# Patient Record
Sex: FEMALE | Race: WHITE | NOT HISPANIC OR LATINO | Employment: OTHER | URBAN - METROPOLITAN AREA
[De-identification: names, ages, dates, MRNs, and addresses within clinical notes are randomized per-mention and may not be internally consistent; named-entity substitution may affect disease eponyms.]

---

## 2017-02-20 RX ORDER — MESALAMINE 1.2 G/1
TABLET, DELAYED RELEASE ORAL
Qty: 60 TABLET | Refills: 0 | Status: SHIPPED | OUTPATIENT
Start: 2017-02-20 | End: 2017-04-10 | Stop reason: SDUPTHER

## 2017-03-06 ENCOUNTER — OFFICE VISIT (OUTPATIENT)
Dept: GASTROENTEROLOGY | Facility: CLINIC | Age: 77
End: 2017-03-06

## 2017-03-06 VITALS
BODY MASS INDEX: 23.55 KG/M2 | HEIGHT: 62 IN | WEIGHT: 128 LBS | SYSTOLIC BLOOD PRESSURE: 126 MMHG | DIASTOLIC BLOOD PRESSURE: 74 MMHG

## 2017-03-06 DIAGNOSIS — K21.9 GASTROESOPHAGEAL REFLUX DISEASE, ESOPHAGITIS PRESENCE NOT SPECIFIED: Primary | ICD-10-CM

## 2017-03-06 DIAGNOSIS — K63.5 COLON POLYPS: ICD-10-CM

## 2017-03-06 DIAGNOSIS — K51.90 ULCERATIVE COLITIS WITHOUT COMPLICATIONS, UNSPECIFIED LOCATION (HCC): ICD-10-CM

## 2017-03-06 PROCEDURE — 99213 OFFICE O/P EST LOW 20 MIN: CPT | Performed by: INTERNAL MEDICINE

## 2017-03-06 RX ORDER — OMEPRAZOLE 20 MG/1
20 CAPSULE, DELAYED RELEASE ORAL AS NEEDED
Refills: 0 | COMMUNITY
Start: 2017-02-20 | End: 2019-04-08 | Stop reason: ALTCHOICE

## 2017-03-06 NOTE — PROGRESS NOTES
Chief Complaint   Patient presents with   • Crohn's Disease       History of Present Illness: She is on Lialda 2/day, and is on imodium 1/day prn.  She also takes colestipol 1 BID. Yesterday she had 2 BM. She will have 1-3 BM/day. No rectal bleeding or melena. No nausea or vomiting. No abdominal or chest pain.  Weight stable.     Past Medical History   Diagnosis Date   • Anemia    • Arthritis    • Crohn disease    • GERD (gastroesophageal reflux disease)    • History of transfusion    • Hyperlipidemia    • Hypertension        Past Surgical History   Procedure Laterality Date   • Colectomy partial / total     • Cholecystectomy     • Appendectomy     • Splenectomy     • Endoscopy  08/20/2015     erythematous mucosa in stomach, chronic gastritis,    • Colonoscopy  08/20/2015     s/p right hemicolectomy, recurrent Crohns, IH         Current Outpatient Prescriptions:   •  colestipol (COLESTID) 1 G tablet, Take 1 g by mouth 2 (two) times a day., Disp: , Rfl:   •  diazepam (VALIUM) 5 MG tablet, Take 5 mg by mouth 2 (two) times a day as needed for anxiety., Disp: , Rfl:   •  LIALDA 1.2 G EC tablet, take 2 tablets by mouth every morning, Disp: 60 tablet, Rfl: 0  •  metoprolol tartrate (LOPRESSOR) 50 MG tablet, take 1 tablet by mouth twice a day, Disp: , Rfl: 0  •  Multiple Vitamin (MULTI VITAMIN DAILY) tablet, Take  by mouth., Disp: , Rfl:   •  omeprazole (priLOSEC) 20 MG capsule, , Disp: , Rfl: 0  •  potassium chloride (MICRO-K) 10 MEQ CR capsule, take 1 capsule by mouth once daily, Disp: , Rfl: 0  •  pravastatin (PRAVACHOL) 20 MG tablet, Take 20 mg by mouth daily., Disp: , Rfl:   •  vitamin B-12 (CYANOCOBALAMIN) 100 MCG tablet, Take 50 mcg by mouth daily., Disp: , Rfl:   •  ferrous sulfate 325 (65 FE) MG tablet, , Disp: , Rfl: 0    Allergies   Allergen Reactions   • Amitriptyline    • Mysoline [Primidone]        History reviewed. No pertinent family history.    Social History     Social History   • Marital status: Single      Spouse name: N/A   • Number of children: N/A   • Years of education: N/A     Occupational History   • Not on file.     Social History Main Topics   • Smoking status: Never Smoker   • Smokeless tobacco: Not on file   • Alcohol use No   • Drug use: No   • Sexual activity: Defer     Other Topics Concern   • Not on file     Social History Narrative       Review of Systems   All other systems reviewed and are negative.      Vitals:    03/06/17 0930   BP: 126/74       Physical Exam   Constitutional: She is oriented to person, place, and time. She appears well-developed and well-nourished. No distress.   HENT:   Head: Normocephalic and atraumatic. Hair is normal.   Right Ear: Hearing, tympanic membrane, external ear and ear canal normal. No drainage. No decreased hearing is noted.   Left Ear: Hearing, tympanic membrane, external ear and ear canal normal. No decreased hearing is noted.   Nose: No nasal deformity.   Mouth/Throat: Oropharynx is clear and moist.   Eyes: Conjunctivae, EOM and lids are normal. Pupils are equal, round, and reactive to light. Right eye exhibits no discharge. Left eye exhibits no discharge.   Neck: Normal range of motion. Neck supple. No JVD present. No tracheal deviation present. No thyromegaly present.   Cardiovascular: Normal rate, regular rhythm, normal heart sounds, intact distal pulses and normal pulses.  Exam reveals no gallop and no friction rub.    No murmur heard.  Pulmonary/Chest: Effort normal and breath sounds normal. No respiratory distress. She has no wheezes. She has no rales. She exhibits no tenderness.   Abdominal: Soft. Bowel sounds are normal. She exhibits no distension and no mass. There is no tenderness. There is no rebound and no guarding. No hernia.   Genitourinary:   Genitourinary Comments: She refused a rectal exam.   Musculoskeletal: Normal range of motion. She exhibits no edema, tenderness or deformity.   Lymphadenopathy:     She has no cervical adenopathy.    Neurological: She is alert and oriented to person, place, and time. She has normal reflexes. She displays normal reflexes. No cranial nerve deficit. She exhibits normal muscle tone. Coordination normal.   Skin: Skin is warm and dry. No rash noted. She is not diaphoretic. No erythema.   Psychiatric: She has a normal mood and affect. Her behavior is normal. Judgment and thought content normal.   Vitals reviewed.      Michela was seen today for crohn's disease.    Diagnoses and all orders for this visit:    Gastroesophageal reflux disease, esophagitis presence not specified    Ulcerative colitis without complications, unspecified location    Colon polyps         Assessment:  1) h/o crohn's disease of the TI with resection of the TI in 2014  2) h/o colon polyps  3) GERD    Recommendations:  1) Continue Lialda 2/day.  2) f/u 1 yr.      No Follow-up on file.

## 2017-04-10 RX ORDER — MESALAMINE 1.2 G/1
TABLET, DELAYED RELEASE ORAL
Qty: 60 TABLET | Refills: 11 | Status: SHIPPED | OUTPATIENT
Start: 2017-04-10 | End: 2018-05-15 | Stop reason: SDUPTHER

## 2017-09-11 ENCOUNTER — TELEPHONE (OUTPATIENT)
Dept: GASTROENTEROLOGY | Facility: CLINIC | Age: 77
End: 2017-09-11

## 2017-09-11 NOTE — TELEPHONE ENCOUNTER
Faxed request received from [facility] to change Lialda to generic Mesalamine.  Original rx sent to [facility].      Call to pt.  States she wants to keep Lialda thru [facility] - NOT [facility].  Does not want to change to generic.  Denial faxed to  - confirmation received.

## 2018-02-26 ENCOUNTER — APPOINTMENT (OUTPATIENT)
Dept: CT IMAGING | Facility: HOSPITAL | Age: 78
End: 2018-02-26

## 2018-02-26 ENCOUNTER — HOSPITAL ENCOUNTER (EMERGENCY)
Facility: HOSPITAL | Age: 78
Discharge: HOME | End: 2018-02-27
Attending: EMERGENCY MEDICINE | Admitting: EMERGENCY MEDICINE

## 2018-02-26 VITALS
WEIGHT: 127 LBS | SYSTOLIC BLOOD PRESSURE: 165 MMHG | HEIGHT: 62 IN | RESPIRATION RATE: 19 BRPM | TEMPERATURE: 98.4 F | BODY MASS INDEX: 23.37 KG/M2 | HEART RATE: 59 BPM | DIASTOLIC BLOOD PRESSURE: 82 MMHG | OXYGEN SATURATION: 97 %

## 2018-02-26 DIAGNOSIS — I10 ELEVATED BLOOD PRESSURE READING IN OFFICE WITH DIAGNOSIS OF HYPERTENSION: ICD-10-CM

## 2018-02-26 DIAGNOSIS — G43.109 OCULAR MIGRAINE: Primary | ICD-10-CM

## 2018-02-26 LAB
ALBUMIN SERPL-MCNC: 4.2 G/DL (ref 3.5–5.2)
ALBUMIN/GLOB SERPL: 1.3 G/DL
ALP SERPL-CCNC: 113 U/L (ref 39–117)
ALT SERPL W P-5'-P-CCNC: 27 U/L (ref 1–33)
ANION GAP SERPL CALCULATED.3IONS-SCNC: 11.2 MMOL/L
APTT PPP: 28.5 SECONDS (ref 22.7–35.4)
AST SERPL-CCNC: 29 U/L (ref 1–32)
BASOPHILS # BLD AUTO: 0.05 10*3/MM3 (ref 0–0.2)
BASOPHILS NFR BLD AUTO: 0.5 % (ref 0–1.5)
BILIRUB SERPL-MCNC: 0.2 MG/DL (ref 0.1–1.2)
BUN BLD-MCNC: 16 MG/DL (ref 8–23)
BUN/CREAT SERPL: 16.5 (ref 7–25)
CALCIUM SPEC-SCNC: 9.7 MG/DL (ref 8.6–10.5)
CHLORIDE SERPL-SCNC: 99 MMOL/L (ref 98–107)
CO2 SERPL-SCNC: 29.8 MMOL/L (ref 22–29)
CREAT BLD-MCNC: 0.97 MG/DL (ref 0.57–1)
DEPRECATED RDW RBC AUTO: 50.2 FL (ref 37–54)
EOSINOPHIL # BLD AUTO: 0.23 10*3/MM3 (ref 0–0.7)
EOSINOPHIL NFR BLD AUTO: 2.2 % (ref 0.3–6.2)
ERYTHROCYTE [DISTWIDTH] IN BLOOD BY AUTOMATED COUNT: 14.9 % (ref 11.7–13)
GFR SERPL CREATININE-BSD FRML MDRD: 56 ML/MIN/1.73
GLOBULIN UR ELPH-MCNC: 3.3 GM/DL
GLUCOSE BLD-MCNC: 137 MG/DL (ref 65–99)
HCT VFR BLD AUTO: 44.6 % (ref 35.6–45.5)
HGB BLD-MCNC: 14.1 G/DL (ref 11.9–15.5)
IMM GRANULOCYTES # BLD: 0.02 10*3/MM3 (ref 0–0.03)
IMM GRANULOCYTES NFR BLD: 0.2 % (ref 0–0.5)
INR PPP: 0.96 (ref 0.9–1.1)
LYMPHOCYTES # BLD AUTO: 4.17 10*3/MM3 (ref 0.9–4.8)
LYMPHOCYTES NFR BLD AUTO: 39.9 % (ref 19.6–45.3)
MCH RBC QN AUTO: 28.9 PG (ref 26.9–32)
MCHC RBC AUTO-ENTMCNC: 31.6 G/DL (ref 32.4–36.3)
MCV RBC AUTO: 91.4 FL (ref 80.5–98.2)
MONOCYTES # BLD AUTO: 0.98 10*3/MM3 (ref 0.2–1.2)
MONOCYTES NFR BLD AUTO: 9.4 % (ref 5–12)
NEUTROPHILS # BLD AUTO: 4.99 10*3/MM3 (ref 1.9–8.1)
NEUTROPHILS NFR BLD AUTO: 47.8 % (ref 42.7–76)
PLATELET # BLD AUTO: 253 10*3/MM3 (ref 140–500)
PMV BLD AUTO: 10.8 FL (ref 6–12)
POTASSIUM BLD-SCNC: 4 MMOL/L (ref 3.5–5.2)
PROT SERPL-MCNC: 7.5 G/DL (ref 6–8.5)
PROTHROMBIN TIME: 12.6 SECONDS (ref 11.7–14.2)
RBC # BLD AUTO: 4.88 10*6/MM3 (ref 3.9–5.2)
SODIUM BLD-SCNC: 140 MMOL/L (ref 136–145)
WBC NRBC COR # BLD: 10.44 10*3/MM3 (ref 4.5–10.7)

## 2018-02-26 PROCEDURE — 85025 COMPLETE CBC W/AUTO DIFF WBC: CPT | Performed by: EMERGENCY MEDICINE

## 2018-02-26 PROCEDURE — 85610 PROTHROMBIN TIME: CPT | Performed by: EMERGENCY MEDICINE

## 2018-02-26 PROCEDURE — 99285 EMERGENCY DEPT VISIT HI MDM: CPT

## 2018-02-26 PROCEDURE — 80053 COMPREHEN METABOLIC PANEL: CPT | Performed by: EMERGENCY MEDICINE

## 2018-02-26 PROCEDURE — 85730 THROMBOPLASTIN TIME PARTIAL: CPT | Performed by: EMERGENCY MEDICINE

## 2018-02-26 PROCEDURE — 70450 CT HEAD/BRAIN W/O DYE: CPT

## 2018-02-26 RX ORDER — EZETIMIBE 10 MG/1
10 TABLET ORAL EVERY EVENING
COMMUNITY
End: 2021-08-10 | Stop reason: HOSPADM

## 2018-02-26 RX ORDER — SODIUM CHLORIDE 0.9 % (FLUSH) 0.9 %
10 SYRINGE (ML) INJECTION AS NEEDED
Status: DISCONTINUED | OUTPATIENT
Start: 2018-02-26 | End: 2018-02-27 | Stop reason: HOSPADM

## 2018-02-26 RX ORDER — CLONIDINE HYDROCHLORIDE 0.1 MG/1
0.1 TABLET ORAL ONCE
Status: COMPLETED | OUTPATIENT
Start: 2018-02-26 | End: 2018-02-26

## 2018-02-26 RX ADMIN — CLONIDINE HYDROCHLORIDE 0.1 MG: 0.1 TABLET ORAL at 23:07

## 2018-03-14 ENCOUNTER — OFFICE VISIT (OUTPATIENT)
Dept: GASTROENTEROLOGY | Facility: CLINIC | Age: 78
End: 2018-03-14

## 2018-03-14 VITALS
HEIGHT: 62 IN | WEIGHT: 128 LBS | SYSTOLIC BLOOD PRESSURE: 142 MMHG | TEMPERATURE: 97.9 F | DIASTOLIC BLOOD PRESSURE: 74 MMHG | BODY MASS INDEX: 23.55 KG/M2

## 2018-03-14 DIAGNOSIS — K50.00 CROHN'S DISEASE OF SMALL INTESTINE WITHOUT COMPLICATION (HCC): ICD-10-CM

## 2018-03-14 DIAGNOSIS — K21.9 GASTROESOPHAGEAL REFLUX DISEASE, ESOPHAGITIS PRESENCE NOT SPECIFIED: Primary | ICD-10-CM

## 2018-03-14 PROCEDURE — 99213 OFFICE O/P EST LOW 20 MIN: CPT | Performed by: INTERNAL MEDICINE

## 2018-03-14 NOTE — PROGRESS NOTES
Chief Complaint   Patient presents with   • Annual Exam       History of Present Illness: 76 yo female w/ h/o crohn's of the TI with resection of TI in 2014. She is on mesalamine DR 2/day. No abdominal or chest pain. She has 1-2 soft/watery BM/day average. NO rectal bleeding or melena. No nausea or vomiting. NO fevers, chills, nite sweats. Weight stable. She went to the [facility] ER last month with headaches (occular migraines). She takes Colestipol 1 BID.     Past Medical History:   Diagnosis Date   • Anemia    • Arthritis    • Crohn disease    • GERD (gastroesophageal reflux disease)    • History of transfusion    • Hyperlipidemia    • Hypertension    • TIA (transient ischemic attack)        Past Surgical History:   Procedure Laterality Date   • APPENDECTOMY     • CHOLECYSTECTOMY     • COLECTOMY PARTIAL / TOTAL     • COLONOSCOPY  08/20/2015    s/p right hemicolectomy, recurrent Crohns, IH   • ENDOSCOPY  08/20/2015    erythematous mucosa in stomach, chronic gastritis,    • SPLENECTOMY           Current Outpatient Prescriptions:   •  colestipol (COLESTID) 1 G tablet, Take 1 g by mouth 2 (two) times a day., Disp: , Rfl:   •  diazepam (VALIUM) 5 MG tablet, Take 5 mg by mouth 2 (two) times a day as needed for anxiety., Disp: , Rfl:   •  ezetimibe (ZETIA) 10 MG tablet, Take 10 mg by mouth Every Evening., Disp: , Rfl:   •  LIALDA 1.2 G EC tablet, take 2 tablets by mouth every morning, Disp: 60 tablet, Rfl: 11  •  metoprolol tartrate (LOPRESSOR) 50 MG tablet, take 1 tablet by mouth twice a day, Disp: , Rfl: 0  •  Multiple Vitamin (MULTI VITAMIN DAILY) tablet, Take  by mouth., Disp: , Rfl:   •  potassium chloride (MICRO-K) 10 MEQ CR capsule, take 1 capsule by mouth once daily, Disp: , Rfl: 0  •  vitamin B-12 (CYANOCOBALAMIN) 100 MCG tablet, Take 50 mcg by mouth daily., Disp: , Rfl:   •  ferrous sulfate 325 (65 FE) MG tablet, , Disp: , Rfl: 0  •  omeprazole (priLOSEC) 20 MG capsule, Take 20 mg by mouth As Needed., Disp: , Rfl:  0  •  pravastatin (PRAVACHOL) 20 MG tablet, Take 20 mg by mouth daily., Disp: , Rfl:     Allergies   Allergen Reactions   • Amitriptyline    • Mysoline [Primidone]        History reviewed. No pertinent family history.    Social History     Social History   • Marital status: Single     Spouse name: N/A   • Number of children: N/A   • Years of education: N/A     Occupational History   • Not on file.     Social History Main Topics   • Smoking status: Never Smoker   • Smokeless tobacco: Not on file   • Alcohol use No   • Drug use: No   • Sexual activity: Defer     Other Topics Concern   • Not on file     Social History Narrative   • No narrative on file       Review of Systems   All other systems reviewed and are negative.      Vitals:    03/14/18 1513   BP: 142/74   Temp: 97.9 °F (36.6 °C)       Physical Exam   Constitutional: She is oriented to person, place, and time. She appears well-developed and well-nourished. No distress.   HENT:   Head: Normocephalic and atraumatic. Hair is normal.   Right Ear: Hearing, tympanic membrane, external ear and ear canal normal. No drainage. No decreased hearing is noted.   Left Ear: Hearing, tympanic membrane, external ear and ear canal normal. No decreased hearing is noted.   Nose: No nasal deformity.   Mouth/Throat: Oropharynx is clear and moist.   Eyes: Conjunctivae, EOM and lids are normal. Pupils are equal, round, and reactive to light. Right eye exhibits no discharge. Left eye exhibits no discharge.   Neck: Normal range of motion. Neck supple. No JVD present. No tracheal deviation present. No thyromegaly present.   Cardiovascular: Normal rate, regular rhythm, normal heart sounds, intact distal pulses and normal pulses.  Exam reveals no gallop and no friction rub.    No murmur heard.  Pulmonary/Chest: Effort normal and breath sounds normal. No respiratory distress. She has no wheezes. She has no rales. She exhibits no tenderness.   Abdominal: Soft. Bowel sounds are normal. She  exhibits no distension and no mass. There is no tenderness. There is no rebound and no guarding. No hernia.   Musculoskeletal: Normal range of motion. She exhibits no edema, tenderness or deformity.   Lymphadenopathy:     She has no cervical adenopathy.   Neurological: She is alert and oriented to person, place, and time. She has normal reflexes. She displays normal reflexes. No cranial nerve deficit. She exhibits normal muscle tone. Coordination normal.   Skin: Skin is warm and dry. No rash noted. She is not diaphoretic. No erythema.   Psychiatric: She has a normal mood and affect. Her behavior is normal. Judgment and thought content normal.   Vitals reviewed.      Michela was seen today for annual exam.    Diagnoses and all orders for this visit:    Gastroesophageal reflux disease, esophagitis presence not specified    Crohn's disease of small intestine without complication      Assessment:  1) h/o crohn's disease of the TI with resection in 2014. Diagnosed in 2013.   2) h/o elevated LFT's with intermittent abdominal pains.  3) gerd     Recommendations:  1) Continue taking Mesalamine 2/day.  2) Try decreasing the Colestipol.   3) f/u 1 yr.     Return in about 1 year (around 3/14/2019).    Marcel Ricketts MD  3/14/2018

## 2018-05-18 RX ORDER — MESALAMINE 1.2 G/1
TABLET, DELAYED RELEASE ORAL
Qty: 60 TABLET | Refills: 10 | Status: SHIPPED | OUTPATIENT
Start: 2018-05-18 | End: 2019-06-12 | Stop reason: SDUPTHER

## 2019-04-08 ENCOUNTER — OFFICE VISIT (OUTPATIENT)
Dept: GASTROENTEROLOGY | Facility: CLINIC | Age: 79
End: 2019-04-08

## 2019-04-08 VITALS
SYSTOLIC BLOOD PRESSURE: 156 MMHG | BODY MASS INDEX: 24.11 KG/M2 | WEIGHT: 131 LBS | DIASTOLIC BLOOD PRESSURE: 84 MMHG | HEIGHT: 62 IN | TEMPERATURE: 98.1 F

## 2019-04-08 DIAGNOSIS — K50.00 CROHN'S DISEASE OF SMALL INTESTINE WITHOUT COMPLICATION (HCC): ICD-10-CM

## 2019-04-08 DIAGNOSIS — K21.9 GASTROESOPHAGEAL REFLUX DISEASE, ESOPHAGITIS PRESENCE NOT SPECIFIED: Primary | ICD-10-CM

## 2019-04-08 PROCEDURE — 99213 OFFICE O/P EST LOW 20 MIN: CPT | Performed by: INTERNAL MEDICINE

## 2019-04-08 RX ORDER — FAMOTIDINE 20 MG/1
20 TABLET, FILM COATED ORAL DAILY
COMMUNITY
End: 2021-08-10 | Stop reason: HOSPADM

## 2019-04-08 RX ORDER — LOPERAMIDE HYDROCHLORIDE 2 MG/1
2 CAPSULE ORAL EVERY MORNING
COMMUNITY
End: 2021-08-10 | Stop reason: HOSPADM

## 2019-04-08 RX ORDER — AMLODIPINE BESYLATE 5 MG/1
5 TABLET ORAL DAILY
Refills: 1 | COMMUNITY
Start: 2019-02-04 | End: 2021-08-10 | Stop reason: HOSPADM

## 2019-04-08 NOTE — PROGRESS NOTES
Chief Complaint   Patient presents with   • Follow-up   • Crohn's Disease   • Heartburn       History of Present Illness: 78-year-old female with a history of Crohn's disease of the terminal ileum diagnosed in 2013.  She had resection of the terminal ileum in 2014. She will have diarrhea usually in the AM. She averages 2-3 BM/day with some urgency. . No rectal bleeding or melena. No abdominal or chest pain. No nausea or vomiting. She has gained some weight. She takes colestid one/day. She tried stopping colestid and she had to have BM at night. She is on Mesalamine (Lialda 1.2 gm) one day. She takes imodium 1/day average.     Past Medical History:   Diagnosis Date   • Anemia    • Arthritis    • Crohn disease (CMS/HCC)    • GERD (gastroesophageal reflux disease)    • History of transfusion    • Hyperlipidemia    • Hypertension    • Ocular migraine    • TIA (transient ischemic attack)        Past Surgical History:   Procedure Laterality Date   • APPENDECTOMY     • CHOLECYSTECTOMY     • COLECTOMY PARTIAL / TOTAL     • COLONOSCOPY  08/20/2015    s/p right hemicolectomy, recurrent Crohns, IH   • ENDOSCOPY  08/20/2015    erythematous mucosa in stomach, chronic gastritis,    • SPLENECTOMY           Current Outpatient Medications:   •  amLODIPine (NORVASC) 5 MG tablet, Take 5 mg by mouth Daily., Disp: , Rfl: 1  •  colestipol (COLESTID) 1 G tablet, Take 1 g by mouth 2 (two) times a day., Disp: , Rfl:   •  diazepam (VALIUM) 5 MG tablet, Take 2.5 mg by mouth Every Morning., Disp: , Rfl:   •  ezetimibe (ZETIA) 10 MG tablet, Take 10 mg by mouth Every Evening., Disp: , Rfl:   •  famotidine (PEPCID) 20 MG tablet, Take 20 mg by mouth Daily., Disp: , Rfl:   •  loperamide (IMODIUM) 2 MG capsule, Take 2 mg by mouth Every Morning., Disp: , Rfl:   •  mesalamine (LIALDA) 1.2 g EC tablet, take 2 tablets by mouth every morning, Disp: 60 tablet, Rfl: 10  •  metoprolol tartrate (LOPRESSOR) 50 MG tablet, take 1 tablet by mouth twice a day,  Disp: , Rfl: 0  •  Multiple Vitamin (MULTI VITAMIN DAILY) tablet, Take  by mouth., Disp: , Rfl:   •  potassium chloride (MICRO-K) 10 MEQ CR capsule, take 1 capsule by mouth once daily, Disp: , Rfl: 0  •  vitamin B-12 (CYANOCOBALAMIN) 100 MCG tablet, Take 1,000 mcg by mouth Daily., Disp: , Rfl:     Allergies   Allergen Reactions   • Amitriptyline    • Mysoline [Primidone]        History reviewed. No pertinent family history.    Social History     Socioeconomic History   • Marital status: Single     Spouse name: Not on file   • Number of children: Not on file   • Years of education: Not on file   • Highest education level: Not on file   Tobacco Use   • Smoking status: Never Smoker   • Smokeless tobacco: Never Used   Substance and Sexual Activity   • Alcohol use: No   • Drug use: No   • Sexual activity: Defer       Review of Systems   All other systems reviewed and are negative.      Vitals:    04/08/19 1206   BP: 156/84   Temp: 98.1 °F (36.7 °C)       Physical Exam   Constitutional: She is oriented to person, place, and time. She appears well-developed and well-nourished. No distress.   HENT:   Head: Normocephalic and atraumatic. Hair is normal.   Right Ear: Hearing, tympanic membrane, external ear and ear canal normal. No drainage. No decreased hearing is noted.   Left Ear: Hearing, tympanic membrane, external ear and ear canal normal. No decreased hearing is noted.   Nose: No nasal deformity.   Mouth/Throat: Oropharynx is clear and moist.   Eyes: Conjunctivae, EOM and lids are normal. Pupils are equal, round, and reactive to light. Right eye exhibits no discharge. Left eye exhibits no discharge.   Neck: Normal range of motion. Neck supple. No JVD present. No tracheal deviation present. No thyromegaly present.   Cardiovascular: Normal rate, regular rhythm, normal heart sounds, intact distal pulses and normal pulses. Exam reveals no gallop and no friction rub.   No murmur heard.  Pulmonary/Chest: Effort normal and  breath sounds normal. No respiratory distress. She has no wheezes. She has no rales. She exhibits no tenderness.   Abdominal: Soft. Bowel sounds are normal. She exhibits no distension and no mass. There is no tenderness. There is no rebound and no guarding. No hernia.   Musculoskeletal: Normal range of motion. She exhibits no edema, tenderness or deformity.   Lymphadenopathy:     She has no cervical adenopathy.   Neurological: She is alert and oriented to person, place, and time. She has normal reflexes. She displays normal reflexes. No cranial nerve deficit. She exhibits normal muscle tone. Coordination normal.   Skin: Skin is warm and dry. No rash noted. She is not diaphoretic. No erythema.   Psychiatric: She has a normal mood and affect. Her behavior is normal. Judgment and thought content normal.   Vitals reviewed.      Michela was seen today for follow-up, crohn's disease and heartburn.    Diagnoses and all orders for this visit:    Gastroesophageal reflux disease, esophagitis presence not specified    Crohn's disease of small intestine without complication (CMS/[facility])      Assessment:  1.  History of Crohn's disease of the terminal ileum diagnosed in 2013 with resection of the terminal ileum in 2014.  2. GERD    Recommendations:  1. Take imodium 2/day  2) f/u one year.   3) She would rather not have a colonoscopy now.     Return in about 1 year (around 4/8/2020).    Marcel Ricketts MD  4/8/2019

## 2019-04-27 ENCOUNTER — APPOINTMENT (OUTPATIENT)
Dept: CT IMAGING | Facility: HOSPITAL | Age: 79
End: 2019-04-27

## 2019-04-27 ENCOUNTER — HOSPITAL ENCOUNTER (INPATIENT)
Facility: HOSPITAL | Age: 79
LOS: 3 days | Discharge: HOME | End: 2019-04-30
Attending: EMERGENCY MEDICINE | Admitting: HOSPITALIST

## 2019-04-27 DIAGNOSIS — K50.018 CROHN'S DISEASE OF SMALL INTESTINE WITH OTHER COMPLICATION (HCC): ICD-10-CM

## 2019-04-27 DIAGNOSIS — R10.84 GENERALIZED ABDOMINAL PAIN: Primary | ICD-10-CM

## 2019-04-27 DIAGNOSIS — D72.829 LEUKOCYTOSIS, UNSPECIFIED TYPE: ICD-10-CM

## 2019-04-27 LAB
ALBUMIN SERPL-MCNC: 4.5 G/DL (ref 3.5–5.2)
ALBUMIN/GLOB SERPL: 1.6 G/DL
ALP SERPL-CCNC: 124 U/L (ref 39–117)
ALT SERPL W P-5'-P-CCNC: 39 U/L (ref 1–33)
ANION GAP SERPL CALCULATED.3IONS-SCNC: 13.5 MMOL/L
AST SERPL-CCNC: 26 U/L (ref 1–32)
BACTERIA UR QL AUTO: NORMAL /HPF
BASOPHILS # BLD AUTO: 0.05 10*3/MM3 (ref 0–0.2)
BASOPHILS NFR BLD AUTO: 0.3 % (ref 0–1.5)
BILIRUB SERPL-MCNC: 0.4 MG/DL (ref 0.2–1.2)
BILIRUB UR QL STRIP: NEGATIVE
BUN BLD-MCNC: 11 MG/DL (ref 8–23)
BUN/CREAT SERPL: 12.6 (ref 7–25)
CALCIUM SPEC-SCNC: 10.2 MG/DL (ref 8.6–10.5)
CHLORIDE SERPL-SCNC: 101 MMOL/L (ref 98–107)
CLARITY UR: CLEAR
CO2 SERPL-SCNC: 27.5 MMOL/L (ref 22–29)
COLOR UR: YELLOW
CREAT BLD-MCNC: 0.87 MG/DL (ref 0.57–1)
DEPRECATED RDW RBC AUTO: 47 FL (ref 37–54)
EOSINOPHIL # BLD AUTO: 0.11 10*3/MM3 (ref 0–0.4)
EOSINOPHIL NFR BLD AUTO: 0.6 % (ref 0.3–6.2)
ERYTHROCYTE [DISTWIDTH] IN BLOOD BY AUTOMATED COUNT: 14.5 % (ref 12.3–15.4)
GFR SERPL CREATININE-BSD FRML MDRD: 63 ML/MIN/1.73
GLOBULIN UR ELPH-MCNC: 2.9 GM/DL
GLUCOSE BLD-MCNC: 103 MG/DL (ref 65–99)
GLUCOSE UR STRIP-MCNC: NEGATIVE MG/DL
HCT VFR BLD AUTO: 45.6 % (ref 34–46.6)
HGB BLD-MCNC: 14.7 G/DL (ref 12–15.9)
HGB UR QL STRIP.AUTO: NEGATIVE
HOLD SPECIMEN: NORMAL
HOLD SPECIMEN: NORMAL
HYALINE CASTS UR QL AUTO: NORMAL /LPF
IMM GRANULOCYTES # BLD AUTO: 0.11 10*3/MM3 (ref 0–0.05)
IMM GRANULOCYTES NFR BLD AUTO: 0.6 % (ref 0–0.5)
KETONES UR QL STRIP: NEGATIVE
LEUKOCYTE ESTERASE UR QL STRIP.AUTO: ABNORMAL
LIPASE SERPL-CCNC: 42 U/L (ref 13–60)
LYMPHOCYTES # BLD AUTO: 1.78 10*3/MM3 (ref 0.7–3.1)
LYMPHOCYTES NFR BLD AUTO: 8.9 % (ref 19.6–45.3)
MCH RBC QN AUTO: 28.8 PG (ref 26.6–33)
MCHC RBC AUTO-ENTMCNC: 32.2 G/DL (ref 31.5–35.7)
MCV RBC AUTO: 89.4 FL (ref 79–97)
MONOCYTES # BLD AUTO: 1.24 10*3/MM3 (ref 0.1–0.9)
MONOCYTES NFR BLD AUTO: 6.2 % (ref 5–12)
NEUTROPHILS # BLD AUTO: 16.62 10*3/MM3 (ref 1.7–7)
NEUTROPHILS NFR BLD AUTO: 83.4 % (ref 42.7–76)
NITRITE UR QL STRIP: NEGATIVE
NRBC BLD AUTO-RTO: 0 /100 WBC (ref 0–0.2)
PH UR STRIP.AUTO: 6.5 [PH] (ref 5–8)
PLATELET # BLD AUTO: 255 10*3/MM3 (ref 140–450)
PMV BLD AUTO: 10.5 FL (ref 6–12)
POTASSIUM BLD-SCNC: 3.8 MMOL/L (ref 3.5–5.2)
PROT SERPL-MCNC: 7.4 G/DL (ref 6–8.5)
PROT UR QL STRIP: NEGATIVE
RBC # BLD AUTO: 5.1 10*6/MM3 (ref 3.77–5.28)
RBC # UR: NORMAL /HPF
REF LAB TEST METHOD: NORMAL
SODIUM BLD-SCNC: 142 MMOL/L (ref 136–145)
SP GR UR STRIP: 1.01 (ref 1–1.03)
SQUAMOUS #/AREA URNS HPF: NORMAL /HPF
UROBILINOGEN UR QL STRIP: ABNORMAL
WBC NRBC COR # BLD: 19.91 10*3/MM3 (ref 3.4–10.8)
WBC UR QL AUTO: NORMAL /HPF
WHOLE BLOOD HOLD SPECIMEN: NORMAL
WHOLE BLOOD HOLD SPECIMEN: NORMAL

## 2019-04-27 PROCEDURE — 87507 IADNA-DNA/RNA PROBE TQ 12-25: CPT | Performed by: HOSPITALIST

## 2019-04-27 PROCEDURE — 80053 COMPREHEN METABOLIC PANEL: CPT

## 2019-04-27 PROCEDURE — 99284 EMERGENCY DEPT VISIT MOD MDM: CPT

## 2019-04-27 PROCEDURE — 74177 CT ABD & PELVIS W/CONTRAST: CPT

## 2019-04-27 PROCEDURE — 99221 1ST HOSP IP/OBS SF/LOW 40: CPT | Performed by: INTERNAL MEDICINE

## 2019-04-27 PROCEDURE — 25010000002 MORPHINE PER 10 MG: Performed by: NURSE PRACTITIONER

## 2019-04-27 PROCEDURE — 25010000002 IOPAMIDOL 61 % SOLUTION: Performed by: NURSE PRACTITIONER

## 2019-04-27 PROCEDURE — 25010000002 METHYLPREDNISOLONE PER 40 MG: Performed by: HOSPITALIST

## 2019-04-27 PROCEDURE — 85025 COMPLETE CBC W/AUTO DIFF WBC: CPT

## 2019-04-27 PROCEDURE — 25810000003 SODIUM CHLORIDE 0.9 % WITH KCL 20 MEQ 20-0.9 MEQ/L-% SOLUTION: Performed by: HOSPITALIST

## 2019-04-27 PROCEDURE — 83690 ASSAY OF LIPASE: CPT

## 2019-04-27 PROCEDURE — 81001 URINALYSIS AUTO W/SCOPE: CPT

## 2019-04-27 PROCEDURE — 25010000002 METHYLPREDNISOLONE PER 125 MG: Performed by: NURSE PRACTITIONER

## 2019-04-27 PROCEDURE — 80074 ACUTE HEPATITIS PANEL: CPT | Performed by: INTERNAL MEDICINE

## 2019-04-27 PROCEDURE — 25010000002 ONDANSETRON PER 1 MG: Performed by: NURSE PRACTITIONER

## 2019-04-27 RX ORDER — DIAZEPAM 5 MG/1
2.5 TABLET ORAL EVERY 6 HOURS PRN
Status: DISCONTINUED | OUTPATIENT
Start: 2019-04-27 | End: 2019-04-30 | Stop reason: HOSPADM

## 2019-04-27 RX ORDER — MESALAMINE 1.2 G/1
2.4 TABLET, DELAYED RELEASE ORAL EVERY MORNING
Status: DISCONTINUED | OUTPATIENT
Start: 2019-04-27 | End: 2019-04-30 | Stop reason: HOSPADM

## 2019-04-27 RX ORDER — SODIUM CHLORIDE AND POTASSIUM CHLORIDE 150; 900 MG/100ML; MG/100ML
75 INJECTION, SOLUTION INTRAVENOUS CONTINUOUS
Status: DISCONTINUED | OUTPATIENT
Start: 2019-04-27 | End: 2019-04-29

## 2019-04-27 RX ORDER — CHOLECALCIFEROL (VITAMIN D3) 125 MCG
1000 CAPSULE ORAL DAILY
Status: DISCONTINUED | OUTPATIENT
Start: 2019-04-27 | End: 2019-04-30 | Stop reason: HOSPADM

## 2019-04-27 RX ORDER — AMLODIPINE BESYLATE 5 MG/1
5 TABLET ORAL DAILY
Status: DISCONTINUED | OUTPATIENT
Start: 2019-04-27 | End: 2019-04-30 | Stop reason: HOSPADM

## 2019-04-27 RX ORDER — ONDANSETRON 2 MG/ML
4 INJECTION INTRAMUSCULAR; INTRAVENOUS ONCE
Status: DISCONTINUED | OUTPATIENT
Start: 2019-04-27 | End: 2019-04-27

## 2019-04-27 RX ORDER — ONDANSETRON 2 MG/ML
4 INJECTION INTRAMUSCULAR; INTRAVENOUS EVERY 6 HOURS PRN
Status: DISCONTINUED | OUTPATIENT
Start: 2019-04-27 | End: 2019-04-30 | Stop reason: HOSPADM

## 2019-04-27 RX ORDER — METHYLPREDNISOLONE SODIUM SUCCINATE 40 MG/ML
40 INJECTION, POWDER, LYOPHILIZED, FOR SOLUTION INTRAMUSCULAR; INTRAVENOUS EVERY 12 HOURS
Status: DISCONTINUED | OUTPATIENT
Start: 2019-04-27 | End: 2019-04-29

## 2019-04-27 RX ORDER — METOPROLOL TARTRATE 50 MG/1
50 TABLET, FILM COATED ORAL 2 TIMES DAILY
Status: DISCONTINUED | OUTPATIENT
Start: 2019-04-27 | End: 2019-04-30 | Stop reason: HOSPADM

## 2019-04-27 RX ORDER — CHOLESTYRAMINE LIGHT 4 G/5.7G
1 POWDER, FOR SUSPENSION ORAL DAILY
Status: DISCONTINUED | OUTPATIENT
Start: 2019-04-27 | End: 2019-04-30 | Stop reason: HOSPADM

## 2019-04-27 RX ORDER — FAMOTIDINE 20 MG/1
20 TABLET, FILM COATED ORAL DAILY
Status: DISCONTINUED | OUTPATIENT
Start: 2019-04-27 | End: 2019-04-27

## 2019-04-27 RX ORDER — DIPHENOXYLATE HYDROCHLORIDE AND ATROPINE SULFATE 2.5; .025 MG/1; MG/1
1 TABLET ORAL DAILY
Status: DISCONTINUED | OUTPATIENT
Start: 2019-04-27 | End: 2019-04-30 | Stop reason: HOSPADM

## 2019-04-27 RX ORDER — MORPHINE SULFATE 2 MG/ML
2 INJECTION, SOLUTION INTRAMUSCULAR; INTRAVENOUS EVERY 4 HOURS PRN
Status: DISCONTINUED | OUTPATIENT
Start: 2019-04-27 | End: 2019-04-30 | Stop reason: HOSPADM

## 2019-04-27 RX ORDER — METHYLPREDNISOLONE SODIUM SUCCINATE 125 MG/2ML
125 INJECTION, POWDER, LYOPHILIZED, FOR SOLUTION INTRAMUSCULAR; INTRAVENOUS ONCE
Status: COMPLETED | OUTPATIENT
Start: 2019-04-27 | End: 2019-04-27

## 2019-04-27 RX ORDER — ONDANSETRON 2 MG/ML
4 INJECTION INTRAMUSCULAR; INTRAVENOUS ONCE
Status: COMPLETED | OUTPATIENT
Start: 2019-04-27 | End: 2019-04-27

## 2019-04-27 RX ORDER — SODIUM CHLORIDE 0.9 % (FLUSH) 0.9 %
10 SYRINGE (ML) INJECTION AS NEEDED
Status: DISCONTINUED | OUTPATIENT
Start: 2019-04-27 | End: 2019-04-30 | Stop reason: HOSPADM

## 2019-04-27 RX ORDER — MORPHINE SULFATE 2 MG/ML
2 INJECTION, SOLUTION INTRAMUSCULAR; INTRAVENOUS ONCE
Status: COMPLETED | OUTPATIENT
Start: 2019-04-27 | End: 2019-04-27

## 2019-04-27 RX ORDER — DIAZEPAM 5 MG/1
2.5 TABLET ORAL EVERY MORNING
Status: DISCONTINUED | OUTPATIENT
Start: 2019-04-27 | End: 2019-04-27

## 2019-04-27 RX ORDER — FAMOTIDINE 20 MG/1
20 TABLET, FILM COATED ORAL 2 TIMES DAILY
Status: DISCONTINUED | OUTPATIENT
Start: 2019-04-27 | End: 2019-04-28

## 2019-04-27 RX ADMIN — METHYLPREDNISOLONE SODIUM SUCCINATE 40 MG: 40 INJECTION, POWDER, FOR SOLUTION INTRAMUSCULAR; INTRAVENOUS at 23:33

## 2019-04-27 RX ADMIN — SODIUM CHLORIDE 1000 ML: 9 INJECTION, SOLUTION INTRAVENOUS at 06:58

## 2019-04-27 RX ADMIN — METHYLPREDNISOLONE SODIUM SUCCINATE 40 MG: 40 INJECTION, POWDER, FOR SOLUTION INTRAMUSCULAR; INTRAVENOUS at 14:04

## 2019-04-27 RX ADMIN — METOPROLOL TARTRATE 50 MG: 50 TABLET, FILM COATED ORAL at 12:26

## 2019-04-27 RX ADMIN — POTASSIUM CHLORIDE AND SODIUM CHLORIDE 75 ML/HR: 900; 150 INJECTION, SOLUTION INTRAVENOUS at 14:03

## 2019-04-27 RX ADMIN — ONDANSETRON 4 MG: 2 INJECTION INTRAMUSCULAR; INTRAVENOUS at 07:01

## 2019-04-27 RX ADMIN — METRONIDAZOLE 500 MG: 500 INJECTION, SOLUTION INTRAVENOUS at 20:10

## 2019-04-27 RX ADMIN — Medication 1000 MCG: at 12:26

## 2019-04-27 RX ADMIN — METOPROLOL TARTRATE 50 MG: 50 TABLET, FILM COATED ORAL at 20:11

## 2019-04-27 RX ADMIN — AMLODIPINE BESYLATE 5 MG: 5 TABLET ORAL at 12:26

## 2019-04-27 RX ADMIN — IOPAMIDOL 85 ML: 612 INJECTION, SOLUTION INTRAVENOUS at 07:11

## 2019-04-27 RX ADMIN — FAMOTIDINE 20 MG: 20 TABLET, FILM COATED ORAL at 12:26

## 2019-04-27 RX ADMIN — MORPHINE SULFATE 2 MG: 2 INJECTION, SOLUTION INTRAMUSCULAR; INTRAVENOUS at 07:01

## 2019-04-27 RX ADMIN — FAMOTIDINE 20 MG: 20 TABLET, FILM COATED ORAL at 20:11

## 2019-04-27 RX ADMIN — METRONIDAZOLE 500 MG: 500 INJECTION, SOLUTION INTRAVENOUS at 14:03

## 2019-04-27 RX ADMIN — METHYLPREDNISOLONE SODIUM SUCCINATE 125 MG: 125 INJECTION, POWDER, FOR SOLUTION INTRAMUSCULAR; INTRAVENOUS at 09:11

## 2019-04-27 RX ADMIN — MESALAMINE 2.4 G: 1.2 TABLET, DELAYED RELEASE ORAL at 12:26

## 2019-04-28 ENCOUNTER — ANESTHESIA EVENT (OUTPATIENT)
Dept: GASTROENTEROLOGY | Facility: HOSPITAL | Age: 79
End: 2019-04-28

## 2019-04-28 ENCOUNTER — ANESTHESIA (OUTPATIENT)
Dept: GASTROENTEROLOGY | Facility: HOSPITAL | Age: 79
End: 2019-04-28

## 2019-04-28 LAB
ADV 40+41 DNA STL QL NAA+NON-PROBE: NOT DETECTED
ALBUMIN SERPL-MCNC: 3.4 G/DL (ref 3.5–5.2)
ALBUMIN/GLOB SERPL: 1.5 G/DL
ALP SERPL-CCNC: 98 U/L (ref 39–117)
ALT SERPL W P-5'-P-CCNC: 120 U/L (ref 1–33)
ANION GAP SERPL CALCULATED.3IONS-SCNC: 9.6 MMOL/L
AST SERPL-CCNC: 72 U/L (ref 1–32)
ASTRO TYP 1-8 RNA STL QL NAA+NON-PROBE: NOT DETECTED
BASOPHILS # BLD AUTO: 0.02 10*3/MM3 (ref 0–0.2)
BASOPHILS NFR BLD AUTO: 0.1 % (ref 0–1.5)
BILIRUB SERPL-MCNC: 0.5 MG/DL (ref 0.2–1.2)
BUN BLD-MCNC: 13 MG/DL (ref 8–23)
BUN/CREAT SERPL: 15.9 (ref 7–25)
C CAYETANENSIS DNA STL QL NAA+NON-PROBE: NOT DETECTED
CALCIUM SPEC-SCNC: 8.5 MG/DL (ref 8.6–10.5)
CAMPY SP DNA.DIARRHEA STL QL NAA+PROBE: NOT DETECTED
CHLORIDE SERPL-SCNC: 109 MMOL/L (ref 98–107)
CHOLEST SERPL-MCNC: 149 MG/DL (ref 0–200)
CO2 SERPL-SCNC: 22.4 MMOL/L (ref 22–29)
CREAT BLD-MCNC: 0.82 MG/DL (ref 0.57–1)
CRYPTOSP STL CULT: NOT DETECTED
DEPRECATED RDW RBC AUTO: 50.5 FL (ref 37–54)
E COLI DNA SPEC QL NAA+PROBE: NOT DETECTED
E HISTOLYT AG STL-ACNC: NOT DETECTED
EAEC PAA PLAS AGGR+AATA ST NAA+NON-PRB: NOT DETECTED
EC STX1 + STX2 GENES STL NAA+PROBE: NOT DETECTED
EOSINOPHIL # BLD AUTO: 0 10*3/MM3 (ref 0–0.4)
EOSINOPHIL NFR BLD AUTO: 0 % (ref 0.3–6.2)
EPEC EAE GENE STL QL NAA+NON-PROBE: NOT DETECTED
ERYTHROCYTE [DISTWIDTH] IN BLOOD BY AUTOMATED COUNT: 15 % (ref 12.3–15.4)
ETEC LTA+ST1A+ST1B TOX ST NAA+NON-PROBE: NOT DETECTED
G LAMBLIA DNA SPEC QL NAA+PROBE: NOT DETECTED
GFR SERPL CREATININE-BSD FRML MDRD: 68 ML/MIN/1.73
GLOBULIN UR ELPH-MCNC: 2.3 GM/DL
GLUCOSE BLD-MCNC: 124 MG/DL (ref 65–99)
HAV IGM SERPL QL IA: NORMAL
HBA1C MFR BLD: 5.1 % (ref 4.8–5.6)
HBV CORE IGM SERPL QL IA: NORMAL
HBV SURFACE AG SERPL QL IA: NORMAL
HCT VFR BLD AUTO: 36.9 % (ref 34–46.6)
HCV AB SER DONR QL: NORMAL
HDLC SERPL-MCNC: 58 MG/DL (ref 40–60)
HGB BLD-MCNC: 11.5 G/DL (ref 12–15.9)
IMM GRANULOCYTES # BLD AUTO: 0.07 10*3/MM3 (ref 0–0.05)
IMM GRANULOCYTES NFR BLD AUTO: 0.5 % (ref 0–0.5)
LDLC SERPL CALC-MCNC: 79 MG/DL (ref 0–100)
LDLC/HDLC SERPL: 1.37 {RATIO}
LIPASE SERPL-CCNC: 15 U/L (ref 13–60)
LYMPHOCYTES # BLD AUTO: 1.95 10*3/MM3 (ref 0.7–3.1)
LYMPHOCYTES NFR BLD AUTO: 13 % (ref 19.6–45.3)
MCH RBC QN AUTO: 28.3 PG (ref 26.6–33)
MCHC RBC AUTO-ENTMCNC: 31.2 G/DL (ref 31.5–35.7)
MCV RBC AUTO: 90.9 FL (ref 79–97)
MONOCYTES # BLD AUTO: 0.67 10*3/MM3 (ref 0.1–0.9)
MONOCYTES NFR BLD AUTO: 4.5 % (ref 5–12)
NEUTROPHILS # BLD AUTO: 12.27 10*3/MM3 (ref 1.7–7)
NEUTROPHILS NFR BLD AUTO: 81.9 % (ref 42.7–76)
NOROVIRUS GI+II RNA STL QL NAA+NON-PROBE: NOT DETECTED
NRBC BLD AUTO-RTO: 0 /100 WBC (ref 0–0.2)
NT-PROBNP SERPL-MCNC: 817.9 PG/ML (ref 5–1800)
P SHIGELLOIDES DNA STL QL NAA+NON-PROBE: NOT DETECTED
PLATELET # BLD AUTO: 231 10*3/MM3 (ref 140–450)
PMV BLD AUTO: 10.8 FL (ref 6–12)
POTASSIUM BLD-SCNC: 4.2 MMOL/L (ref 3.5–5.2)
PROT SERPL-MCNC: 5.7 G/DL (ref 6–8.5)
RBC # BLD AUTO: 4.06 10*6/MM3 (ref 3.77–5.28)
RV RNA STL NAA+PROBE: NOT DETECTED
SALMONELLA DNA SPEC QL NAA+PROBE: NOT DETECTED
SAPO I+II+IV+V RNA STL QL NAA+NON-PROBE: NOT DETECTED
SHIGELLA SP+EIEC IPAH STL QL NAA+PROBE: NOT DETECTED
SODIUM BLD-SCNC: 141 MMOL/L (ref 136–145)
TRIGL SERPL-MCNC: 58 MG/DL (ref 0–150)
TSH SERPL DL<=0.05 MIU/L-ACNC: 0.6 MIU/ML (ref 0.27–4.2)
V CHOLERAE DNA SPEC QL NAA+PROBE: NOT DETECTED
VIBRIO DNA SPEC NAA+PROBE: NOT DETECTED
VLDLC SERPL-MCNC: 11.6 MG/DL (ref 5–40)
WBC NRBC COR # BLD: 14.98 10*3/MM3 (ref 3.4–10.8)
YERSINIA STL CULT: NOT DETECTED

## 2019-04-28 PROCEDURE — 0DB98ZX EXCISION OF DUODENUM, VIA NATURAL OR ARTIFICIAL OPENING ENDOSCOPIC, DIAGNOSTIC: ICD-10-PCS | Performed by: INTERNAL MEDICINE

## 2019-04-28 PROCEDURE — 83036 HEMOGLOBIN GLYCOSYLATED A1C: CPT | Performed by: HOSPITALIST

## 2019-04-28 PROCEDURE — 85025 COMPLETE CBC W/AUTO DIFF WBC: CPT | Performed by: HOSPITALIST

## 2019-04-28 PROCEDURE — 83690 ASSAY OF LIPASE: CPT | Performed by: INTERNAL MEDICINE

## 2019-04-28 PROCEDURE — 80053 COMPREHEN METABOLIC PANEL: CPT | Performed by: HOSPITALIST

## 2019-04-28 PROCEDURE — 88305 TISSUE EXAM BY PATHOLOGIST: CPT | Performed by: INTERNAL MEDICINE

## 2019-04-28 PROCEDURE — 83880 ASSAY OF NATRIURETIC PEPTIDE: CPT | Performed by: HOSPITALIST

## 2019-04-28 PROCEDURE — 25010000002 PROPOFOL 10 MG/ML EMULSION: Performed by: ANESTHESIOLOGY

## 2019-04-28 PROCEDURE — 25810000003 SODIUM CHLORIDE 0.9 % WITH KCL 20 MEQ 20-0.9 MEQ/L-% SOLUTION: Performed by: HOSPITALIST

## 2019-04-28 PROCEDURE — 87081 CULTURE SCREEN ONLY: CPT | Performed by: INTERNAL MEDICINE

## 2019-04-28 PROCEDURE — 84443 ASSAY THYROID STIM HORMONE: CPT | Performed by: HOSPITALIST

## 2019-04-28 PROCEDURE — 0DB68ZX EXCISION OF STOMACH, VIA NATURAL OR ARTIFICIAL OPENING ENDOSCOPIC, DIAGNOSTIC: ICD-10-PCS | Performed by: INTERNAL MEDICINE

## 2019-04-28 PROCEDURE — 25010000002 METHYLPREDNISOLONE PER 40 MG: Performed by: HOSPITALIST

## 2019-04-28 PROCEDURE — 80061 LIPID PANEL: CPT | Performed by: HOSPITALIST

## 2019-04-28 PROCEDURE — 43239 EGD BIOPSY SINGLE/MULTIPLE: CPT | Performed by: INTERNAL MEDICINE

## 2019-04-28 RX ORDER — LIDOCAINE HYDROCHLORIDE 20 MG/ML
INJECTION, SOLUTION INFILTRATION; PERINEURAL AS NEEDED
Status: DISCONTINUED | OUTPATIENT
Start: 2019-04-28 | End: 2019-04-28 | Stop reason: SURG

## 2019-04-28 RX ORDER — ONDANSETRON 2 MG/ML
4 INJECTION INTRAMUSCULAR; INTRAVENOUS ONCE AS NEEDED
Status: DISCONTINUED | OUTPATIENT
Start: 2019-04-28 | End: 2019-04-28 | Stop reason: HOSPADM

## 2019-04-28 RX ORDER — EPHEDRINE SULFATE 50 MG/ML
5 INJECTION, SOLUTION INTRAVENOUS ONCE AS NEEDED
Status: DISCONTINUED | OUTPATIENT
Start: 2019-04-28 | End: 2019-04-28 | Stop reason: HOSPADM

## 2019-04-28 RX ORDER — NALOXONE HCL 0.4 MG/ML
0.2 VIAL (ML) INJECTION AS NEEDED
Status: DISCONTINUED | OUTPATIENT
Start: 2019-04-28 | End: 2019-04-28 | Stop reason: HOSPADM

## 2019-04-28 RX ORDER — SODIUM CHLORIDE 9 MG/ML
30 INJECTION, SOLUTION INTRAVENOUS CONTINUOUS PRN
Status: DISCONTINUED | OUTPATIENT
Start: 2019-04-28 | End: 2019-04-30 | Stop reason: HOSPADM

## 2019-04-28 RX ORDER — PANTOPRAZOLE SODIUM 40 MG/1
40 TABLET, DELAYED RELEASE ORAL
Status: DISCONTINUED | OUTPATIENT
Start: 2019-04-28 | End: 2019-04-28 | Stop reason: HOSPADM

## 2019-04-28 RX ORDER — PROMETHAZINE HYDROCHLORIDE 25 MG/1
25 TABLET ORAL ONCE AS NEEDED
Status: DISCONTINUED | OUTPATIENT
Start: 2019-04-28 | End: 2019-04-28 | Stop reason: HOSPADM

## 2019-04-28 RX ORDER — HYDROMORPHONE HCL 110MG/55ML
0.5 PATIENT CONTROLLED ANALGESIA SYRINGE INTRAVENOUS
Status: DISCONTINUED | OUTPATIENT
Start: 2019-04-28 | End: 2019-04-28 | Stop reason: HOSPADM

## 2019-04-28 RX ORDER — PROPOFOL 10 MG/ML
VIAL (ML) INTRAVENOUS AS NEEDED
Status: DISCONTINUED | OUTPATIENT
Start: 2019-04-28 | End: 2019-04-28 | Stop reason: SURG

## 2019-04-28 RX ORDER — HYDROCODONE BITARTRATE AND ACETAMINOPHEN 7.5; 325 MG/1; MG/1
1 TABLET ORAL ONCE AS NEEDED
Status: DISCONTINUED | OUTPATIENT
Start: 2019-04-28 | End: 2019-04-28 | Stop reason: HOSPADM

## 2019-04-28 RX ORDER — ACETAMINOPHEN 650 MG/1
650 SUPPOSITORY RECTAL ONCE AS NEEDED
Status: DISCONTINUED | OUTPATIENT
Start: 2019-04-28 | End: 2019-04-28 | Stop reason: HOSPADM

## 2019-04-28 RX ORDER — HYDRALAZINE HYDROCHLORIDE 20 MG/ML
5 INJECTION INTRAMUSCULAR; INTRAVENOUS
Status: DISCONTINUED | OUTPATIENT
Start: 2019-04-28 | End: 2019-04-28 | Stop reason: HOSPADM

## 2019-04-28 RX ORDER — PANTOPRAZOLE SODIUM 40 MG/1
40 TABLET, DELAYED RELEASE ORAL
Status: DISCONTINUED | OUTPATIENT
Start: 2019-04-29 | End: 2019-04-30 | Stop reason: HOSPADM

## 2019-04-28 RX ORDER — DIPHENHYDRAMINE HCL 25 MG
25 CAPSULE ORAL EVERY 6 HOURS PRN
Status: DISCONTINUED | OUTPATIENT
Start: 2019-04-28 | End: 2019-04-28 | Stop reason: HOSPADM

## 2019-04-28 RX ORDER — SODIUM CHLORIDE, SODIUM LACTATE, POTASSIUM CHLORIDE, CALCIUM CHLORIDE 600; 310; 30; 20 MG/100ML; MG/100ML; MG/100ML; MG/100ML
INJECTION, SOLUTION INTRAVENOUS CONTINUOUS PRN
Status: DISCONTINUED | OUTPATIENT
Start: 2019-04-28 | End: 2019-04-28 | Stop reason: SURG

## 2019-04-28 RX ORDER — PROMETHAZINE HYDROCHLORIDE 25 MG/1
25 SUPPOSITORY RECTAL ONCE AS NEEDED
Status: DISCONTINUED | OUTPATIENT
Start: 2019-04-28 | End: 2019-04-28 | Stop reason: HOSPADM

## 2019-04-28 RX ORDER — FENTANYL CITRATE 50 UG/ML
50 INJECTION, SOLUTION INTRAMUSCULAR; INTRAVENOUS
Status: DISCONTINUED | OUTPATIENT
Start: 2019-04-28 | End: 2019-04-28 | Stop reason: HOSPADM

## 2019-04-28 RX ORDER — PROPOFOL 10 MG/ML
VIAL (ML) INTRAVENOUS CONTINUOUS PRN
Status: DISCONTINUED | OUTPATIENT
Start: 2019-04-28 | End: 2019-04-28 | Stop reason: SURG

## 2019-04-28 RX ORDER — FLUMAZENIL 0.1 MG/ML
0.2 INJECTION INTRAVENOUS AS NEEDED
Status: DISCONTINUED | OUTPATIENT
Start: 2019-04-28 | End: 2019-04-28 | Stop reason: HOSPADM

## 2019-04-28 RX ORDER — ACETAMINOPHEN 325 MG/1
650 TABLET ORAL ONCE AS NEEDED
Status: DISCONTINUED | OUTPATIENT
Start: 2019-04-28 | End: 2019-04-28 | Stop reason: HOSPADM

## 2019-04-28 RX ORDER — OXYCODONE AND ACETAMINOPHEN 7.5; 325 MG/1; MG/1
1 TABLET ORAL ONCE AS NEEDED
Status: DISCONTINUED | OUTPATIENT
Start: 2019-04-28 | End: 2019-04-28 | Stop reason: HOSPADM

## 2019-04-28 RX ORDER — PROMETHAZINE HYDROCHLORIDE 25 MG/ML
12.5 INJECTION, SOLUTION INTRAMUSCULAR; INTRAVENOUS ONCE AS NEEDED
Status: DISCONTINUED | OUTPATIENT
Start: 2019-04-28 | End: 2019-04-28 | Stop reason: HOSPADM

## 2019-04-28 RX ORDER — DIPHENHYDRAMINE HCL 25 MG
25 CAPSULE ORAL
Status: DISCONTINUED | OUTPATIENT
Start: 2019-04-28 | End: 2019-04-28 | Stop reason: HOSPADM

## 2019-04-28 RX ADMIN — MESALAMINE 2.4 G: 1.2 TABLET, DELAYED RELEASE ORAL at 06:36

## 2019-04-28 RX ADMIN — PROPOFOL 100 MCG/KG/MIN: 10 INJECTION, EMULSION INTRAVENOUS at 10:46

## 2019-04-28 RX ADMIN — AMLODIPINE BESYLATE 5 MG: 5 TABLET ORAL at 09:57

## 2019-04-28 RX ADMIN — POTASSIUM CHLORIDE AND SODIUM CHLORIDE 75 ML/HR: 900; 150 INJECTION, SOLUTION INTRAVENOUS at 04:17

## 2019-04-28 RX ADMIN — LIDOCAINE HYDROCHLORIDE 100 MG: 20 INJECTION, SOLUTION INFILTRATION; PERINEURAL at 10:46

## 2019-04-28 RX ADMIN — PROPOFOL 50 MG: 10 INJECTION, EMULSION INTRAVENOUS at 10:46

## 2019-04-28 RX ADMIN — DIAZEPAM 2.5 MG: 5 TABLET ORAL at 09:55

## 2019-04-28 RX ADMIN — METOPROLOL TARTRATE 50 MG: 50 TABLET, FILM COATED ORAL at 20:41

## 2019-04-28 RX ADMIN — METRONIDAZOLE 500 MG: 500 INJECTION, SOLUTION INTRAVENOUS at 04:17

## 2019-04-28 RX ADMIN — METHYLPREDNISOLONE SODIUM SUCCINATE 40 MG: 40 INJECTION, POWDER, FOR SOLUTION INTRAMUSCULAR; INTRAVENOUS at 11:41

## 2019-04-28 RX ADMIN — METHYLPREDNISOLONE SODIUM SUCCINATE 40 MG: 40 INJECTION, POWDER, FOR SOLUTION INTRAMUSCULAR; INTRAVENOUS at 23:01

## 2019-04-28 RX ADMIN — POTASSIUM CHLORIDE AND SODIUM CHLORIDE 75 ML/HR: 900; 150 INJECTION, SOLUTION INTRAVENOUS at 23:01

## 2019-04-28 RX ADMIN — METRONIDAZOLE 500 MG: 500 INJECTION, SOLUTION INTRAVENOUS at 21:19

## 2019-04-28 RX ADMIN — SODIUM CHLORIDE 30 ML/HR: 9 INJECTION, SOLUTION INTRAVENOUS at 10:28

## 2019-04-28 RX ADMIN — SODIUM CHLORIDE, POTASSIUM CHLORIDE, SODIUM LACTATE AND CALCIUM CHLORIDE: 600; 310; 30; 20 INJECTION, SOLUTION INTRAVENOUS at 10:44

## 2019-04-28 RX ADMIN — FAMOTIDINE 20 MG: 20 TABLET, FILM COATED ORAL at 09:55

## 2019-04-28 RX ADMIN — METOPROLOL TARTRATE 50 MG: 50 TABLET, FILM COATED ORAL at 09:55

## 2019-04-28 RX ADMIN — METRONIDAZOLE 500 MG: 500 INJECTION, SOLUTION INTRAVENOUS at 11:41

## 2019-04-28 NOTE — ANESTHESIA PREPROCEDURE EVALUATION
Anesthesia Evaluation     Patient summary reviewed and Nursing notes reviewed   no history of anesthetic complications:  NPO Solid Status: > 8 hours  NPO Liquid Status: > 2 hours           Airway   Mallampati: II  TM distance: >3 FB  Neck ROM: full  Dental - normal exam     Pulmonary - negative pulmonary ROS and normal exam   Cardiovascular - normal exam  Exercise tolerance: good (4-7 METS)    Rhythm: regular    (+) hypertension, hyperlipidemia,       Neuro/Psych  (+) TIA, headaches,     GI/Hepatic/Renal/Endo    (+)  GERD,      Musculoskeletal     Abdominal  - normal exam    Bowel sounds: normal.   Substance History - negative use     OB/GYN negative ob/gyn ROS         Other   (+) arthritis                     Anesthesia Plan    ASA 3     MAC     Anesthetic plan, all risks, benefits, and alternatives have been provided, discussed and informed consent has been obtained with: patient.

## 2019-04-28 NOTE — ANESTHESIA POSTPROCEDURE EVALUATION
"Patient: Michela Aguilar    Procedure Summary     Date:  04/28/19 Room / Location:   [facility] /  [facility]    Anesthesia Start:  1044 Anesthesia Stop:  1058    Procedure:  ESOPHAGOGASTRODUODENOSCOPY WITH BIOPSIES (N/A Esophagus) Diagnosis:       Generalized abdominal pain      Crohn's disease of small intestine with other complication (CMS/HCC)      (Generalized abdominal pain [R10.84])      (Crohn's disease of small intestine with other complication (CMS/HCC) [K50.018])    Surgeon:  Marcel Ricketts MD Provider:  Martha Gould MD    Anesthesia Type:  MAC ASA Status:  3          Anesthesia Type: MAC  Last vitals  BP   158/67 (04/28/19 1020)   Temp   36.5 °C (97.7 °F) (04/28/19 0532)   Pulse   56 (04/28/19 1020)   Resp   14 (04/28/19 1020)     SpO2   100 % (04/28/19 1020)     Post Anesthesia Care and Evaluation    Patient location during evaluation: PACU  Patient participation: complete - patient participated  Level of consciousness: sleepy but conscious  Pain management: adequate  Airway patency: patent  Anesthetic complications: No anesthetic complications    Cardiovascular status: acceptable  Respiratory status: acceptable  Hydration status: acceptable    Comments: /67 (BP Location: Right arm, Patient Position: Sitting)   Pulse 56   Temp 36.5 °C (97.7 °F) (Oral)   Resp 14   Ht 157.5 cm (62\")   Wt 60.6 kg (133 lb 11.2 oz)   SpO2 100%   BMI 24.45 kg/m²         "

## 2019-04-29 ENCOUNTER — APPOINTMENT (OUTPATIENT)
Dept: MRI IMAGING | Facility: HOSPITAL | Age: 79
End: 2019-04-29

## 2019-04-29 LAB
ALBUMIN SERPL-MCNC: 3.3 G/DL (ref 3.5–5.2)
ALBUMIN/GLOB SERPL: 1.6 G/DL
ALP SERPL-CCNC: 85 U/L (ref 39–117)
ALT SERPL W P-5'-P-CCNC: 83 U/L (ref 1–33)
ANION GAP SERPL CALCULATED.3IONS-SCNC: 9.4 MMOL/L
AST SERPL-CCNC: 42 U/L (ref 1–32)
BASOPHILS # BLD AUTO: 0.02 10*3/MM3 (ref 0–0.2)
BASOPHILS NFR BLD AUTO: 0.1 % (ref 0–1.5)
BILIRUB SERPL-MCNC: 0.4 MG/DL (ref 0.2–1.2)
BUN BLD-MCNC: 14 MG/DL (ref 8–23)
BUN/CREAT SERPL: 17.9 (ref 7–25)
CALCIUM SPEC-SCNC: 8.3 MG/DL (ref 8.6–10.5)
CHLORIDE SERPL-SCNC: 111 MMOL/L (ref 98–107)
CO2 SERPL-SCNC: 21.6 MMOL/L (ref 22–29)
CREAT BLD-MCNC: 0.78 MG/DL (ref 0.57–1)
CYTO UR: NORMAL
DEPRECATED RDW RBC AUTO: 50.9 FL (ref 37–54)
EOSINOPHIL # BLD AUTO: 0 10*3/MM3 (ref 0–0.4)
EOSINOPHIL NFR BLD AUTO: 0 % (ref 0.3–6.2)
ERYTHROCYTE [DISTWIDTH] IN BLOOD BY AUTOMATED COUNT: 15.3 % (ref 12.3–15.4)
GFR SERPL CREATININE-BSD FRML MDRD: 72 ML/MIN/1.73
GLOBULIN UR ELPH-MCNC: 2.1 GM/DL
GLUCOSE BLD-MCNC: 125 MG/DL (ref 65–99)
HCT VFR BLD AUTO: 34.4 % (ref 34–46.6)
HGB BLD-MCNC: 10.8 G/DL (ref 12–15.9)
IMM GRANULOCYTES # BLD AUTO: 0.12 10*3/MM3 (ref 0–0.05)
IMM GRANULOCYTES NFR BLD AUTO: 0.7 % (ref 0–0.5)
LAB AP CASE REPORT: NORMAL
LYMPHOCYTES # BLD AUTO: 1.87 10*3/MM3 (ref 0.7–3.1)
LYMPHOCYTES NFR BLD AUTO: 10.8 % (ref 19.6–45.3)
MCH RBC QN AUTO: 28.6 PG (ref 26.6–33)
MCHC RBC AUTO-ENTMCNC: 31.4 G/DL (ref 31.5–35.7)
MCV RBC AUTO: 91.2 FL (ref 79–97)
MONOCYTES # BLD AUTO: 0.6 10*3/MM3 (ref 0.1–0.9)
MONOCYTES NFR BLD AUTO: 3.5 % (ref 5–12)
NEUTROPHILS # BLD AUTO: 14.66 10*3/MM3 (ref 1.7–7)
NEUTROPHILS NFR BLD AUTO: 84.9 % (ref 42.7–76)
NRBC BLD AUTO-RTO: 0.1 /100 WBC (ref 0–0.2)
PATH REPORT.FINAL DX SPEC: NORMAL
PATH REPORT.GROSS SPEC: NORMAL
PLATELET # BLD AUTO: 237 10*3/MM3 (ref 140–450)
PMV BLD AUTO: 10.8 FL (ref 6–12)
POTASSIUM BLD-SCNC: 3.9 MMOL/L (ref 3.5–5.2)
PROT SERPL-MCNC: 5.4 G/DL (ref 6–8.5)
RBC # BLD AUTO: 3.77 10*6/MM3 (ref 3.77–5.28)
SODIUM BLD-SCNC: 142 MMOL/L (ref 136–145)
UREASE TISS QL: NEGATIVE
WBC NRBC COR # BLD: 17.27 10*3/MM3 (ref 3.4–10.8)

## 2019-04-29 PROCEDURE — 99232 SBSQ HOSP IP/OBS MODERATE 35: CPT | Performed by: INTERNAL MEDICINE

## 2019-04-29 PROCEDURE — 0 GADOBENATE DIMEGLUMINE 529 MG/ML SOLUTION: Performed by: HOSPITALIST

## 2019-04-29 PROCEDURE — 63710000001 PREDNISONE PER 1 MG: Performed by: INTERNAL MEDICINE

## 2019-04-29 PROCEDURE — A9577 INJ MULTIHANCE: HCPCS | Performed by: HOSPITALIST

## 2019-04-29 PROCEDURE — 80053 COMPREHEN METABOLIC PANEL: CPT | Performed by: HOSPITALIST

## 2019-04-29 PROCEDURE — 85025 COMPLETE CBC W/AUTO DIFF WBC: CPT | Performed by: HOSPITALIST

## 2019-04-29 PROCEDURE — 74183 MRI ABD W/O CNTR FLWD CNTR: CPT

## 2019-04-29 RX ORDER — PREDNISONE 20 MG/1
40 TABLET ORAL
Status: DISCONTINUED | OUTPATIENT
Start: 2019-04-29 | End: 2019-04-30 | Stop reason: HOSPADM

## 2019-04-29 RX ORDER — LOPERAMIDE HYDROCHLORIDE 2 MG/1
2 CAPSULE ORAL 2 TIMES DAILY PRN
Status: DISCONTINUED | OUTPATIENT
Start: 2019-04-29 | End: 2019-04-30 | Stop reason: HOSPADM

## 2019-04-29 RX ADMIN — PANTOPRAZOLE SODIUM 40 MG: 40 TABLET, DELAYED RELEASE ORAL at 06:37

## 2019-04-29 RX ADMIN — Medication 1 TABLET: at 10:35

## 2019-04-29 RX ADMIN — PREDNISONE 40 MG: 20 TABLET ORAL at 14:21

## 2019-04-29 RX ADMIN — DIAZEPAM 2.5 MG: 5 TABLET ORAL at 08:37

## 2019-04-29 RX ADMIN — METRONIDAZOLE 500 MG: 500 INJECTION, SOLUTION INTRAVENOUS at 21:31

## 2019-04-29 RX ADMIN — GADOBENATE DIMEGLUMINE 12 ML: 529 INJECTION, SOLUTION INTRAVENOUS at 09:28

## 2019-04-29 RX ADMIN — AMLODIPINE BESYLATE 5 MG: 5 TABLET ORAL at 10:35

## 2019-04-29 RX ADMIN — LOPERAMIDE HYDROCHLORIDE 2 MG: 2 CAPSULE ORAL at 18:20

## 2019-04-29 RX ADMIN — MESALAMINE 2.4 G: 1.2 TABLET, DELAYED RELEASE ORAL at 10:34

## 2019-04-29 RX ADMIN — Medication 1000 MCG: at 10:34

## 2019-04-29 RX ADMIN — METRONIDAZOLE 500 MG: 500 INJECTION, SOLUTION INTRAVENOUS at 03:53

## 2019-04-29 RX ADMIN — METOPROLOL TARTRATE 50 MG: 50 TABLET, FILM COATED ORAL at 10:34

## 2019-04-29 RX ADMIN — DIAZEPAM 2.5 MG: 5 TABLET ORAL at 02:30

## 2019-04-29 RX ADMIN — METOPROLOL TARTRATE 50 MG: 50 TABLET, FILM COATED ORAL at 21:35

## 2019-04-29 RX ADMIN — METRONIDAZOLE 500 MG: 500 INJECTION, SOLUTION INTRAVENOUS at 12:03

## 2019-04-30 VITALS
SYSTOLIC BLOOD PRESSURE: 170 MMHG | OXYGEN SATURATION: 98 % | RESPIRATION RATE: 16 BRPM | TEMPERATURE: 97.1 F | HEIGHT: 62 IN | HEART RATE: 52 BPM | WEIGHT: 133.7 LBS | BODY MASS INDEX: 24.61 KG/M2 | DIASTOLIC BLOOD PRESSURE: 73 MMHG

## 2019-04-30 LAB
ALBUMIN SERPL-MCNC: 3.4 G/DL (ref 3.5–5.2)
ALBUMIN/GLOB SERPL: 1.6 G/DL
ALP SERPL-CCNC: 80 U/L (ref 39–117)
ALT SERPL W P-5'-P-CCNC: 63 U/L (ref 1–33)
ANION GAP SERPL CALCULATED.3IONS-SCNC: 7.2 MMOL/L
AST SERPL-CCNC: 30 U/L (ref 1–32)
BASOPHILS # BLD AUTO: 0.03 10*3/MM3 (ref 0–0.2)
BASOPHILS NFR BLD AUTO: 0.2 % (ref 0–1.5)
BILIRUB SERPL-MCNC: 0.3 MG/DL (ref 0.2–1.2)
BUN BLD-MCNC: 12 MG/DL (ref 8–23)
BUN/CREAT SERPL: 17.6 (ref 7–25)
CALCIUM SPEC-SCNC: 8.3 MG/DL (ref 8.6–10.5)
CHLORIDE SERPL-SCNC: 110 MMOL/L (ref 98–107)
CO2 SERPL-SCNC: 24.8 MMOL/L (ref 22–29)
CREAT BLD-MCNC: 0.68 MG/DL (ref 0.57–1)
DEPRECATED RDW RBC AUTO: 51.7 FL (ref 37–54)
EOSINOPHIL # BLD AUTO: 0.03 10*3/MM3 (ref 0–0.4)
EOSINOPHIL NFR BLD AUTO: 0.2 % (ref 0.3–6.2)
ERYTHROCYTE [DISTWIDTH] IN BLOOD BY AUTOMATED COUNT: 15.5 % (ref 12.3–15.4)
GFR SERPL CREATININE-BSD FRML MDRD: 84 ML/MIN/1.73
GLOBULIN UR ELPH-MCNC: 2.1 GM/DL
GLUCOSE BLD-MCNC: 87 MG/DL (ref 65–99)
HCT VFR BLD AUTO: 34.2 % (ref 34–46.6)
HGB BLD-MCNC: 10.8 G/DL (ref 12–15.9)
IMM GRANULOCYTES # BLD AUTO: 0.09 10*3/MM3 (ref 0–0.05)
IMM GRANULOCYTES NFR BLD AUTO: 0.6 % (ref 0–0.5)
LYMPHOCYTES # BLD AUTO: 3.34 10*3/MM3 (ref 0.7–3.1)
LYMPHOCYTES NFR BLD AUTO: 23.3 % (ref 19.6–45.3)
MCH RBC QN AUTO: 28.8 PG (ref 26.6–33)
MCHC RBC AUTO-ENTMCNC: 31.6 G/DL (ref 31.5–35.7)
MCV RBC AUTO: 91.2 FL (ref 79–97)
MONOCYTES # BLD AUTO: 1.29 10*3/MM3 (ref 0.1–0.9)
MONOCYTES NFR BLD AUTO: 9 % (ref 5–12)
NEUTROPHILS # BLD AUTO: 9.54 10*3/MM3 (ref 1.7–7)
NEUTROPHILS NFR BLD AUTO: 66.7 % (ref 42.7–76)
NRBC BLD AUTO-RTO: 0 /100 WBC (ref 0–0.2)
PLATELET # BLD AUTO: 242 10*3/MM3 (ref 140–450)
PMV BLD AUTO: 11.1 FL (ref 6–12)
POTASSIUM BLD-SCNC: 3.7 MMOL/L (ref 3.5–5.2)
PROT SERPL-MCNC: 5.5 G/DL (ref 6–8.5)
RBC # BLD AUTO: 3.75 10*6/MM3 (ref 3.77–5.28)
SODIUM BLD-SCNC: 142 MMOL/L (ref 136–145)
WBC NRBC COR # BLD: 14.32 10*3/MM3 (ref 3.4–10.8)

## 2019-04-30 PROCEDURE — 80053 COMPREHEN METABOLIC PANEL: CPT | Performed by: INTERNAL MEDICINE

## 2019-04-30 PROCEDURE — 85025 COMPLETE CBC W/AUTO DIFF WBC: CPT | Performed by: INTERNAL MEDICINE

## 2019-04-30 PROCEDURE — 99232 SBSQ HOSP IP/OBS MODERATE 35: CPT | Performed by: INTERNAL MEDICINE

## 2019-04-30 PROCEDURE — 63710000001 PREDNISONE PER 1 MG: Performed by: INTERNAL MEDICINE

## 2019-04-30 RX ORDER — PREDNISONE 20 MG/1
40 TABLET ORAL
Qty: 40 TABLET | Refills: 0 | Status: SHIPPED | OUTPATIENT
Start: 2019-05-01 | End: 2019-05-01 | Stop reason: DRUGHIGH

## 2019-04-30 RX ADMIN — MESALAMINE 2.4 G: 1.2 TABLET, DELAYED RELEASE ORAL at 06:29

## 2019-04-30 RX ADMIN — Medication 1000 MCG: at 08:27

## 2019-04-30 RX ADMIN — METRONIDAZOLE 500 MG: 500 INJECTION, SOLUTION INTRAVENOUS at 12:50

## 2019-04-30 RX ADMIN — AMLODIPINE BESYLATE 5 MG: 5 TABLET ORAL at 08:27

## 2019-04-30 RX ADMIN — METOPROLOL TARTRATE 50 MG: 50 TABLET, FILM COATED ORAL at 08:27

## 2019-04-30 RX ADMIN — METRONIDAZOLE 500 MG: 500 INJECTION, SOLUTION INTRAVENOUS at 04:46

## 2019-04-30 RX ADMIN — Medication 1 TABLET: at 08:28

## 2019-04-30 RX ADMIN — PREDNISONE 40 MG: 20 TABLET ORAL at 08:27

## 2019-04-30 RX ADMIN — PANTOPRAZOLE SODIUM 40 MG: 40 TABLET, DELAYED RELEASE ORAL at 06:29

## 2019-05-01 ENCOUNTER — TELEPHONE (OUTPATIENT)
Dept: GASTROENTEROLOGY | Facility: CLINIC | Age: 79
End: 2019-05-01

## 2019-05-01 DIAGNOSIS — K50.00 CROHN'S DISEASE OF SMALL INTESTINE WITHOUT COMPLICATION (HCC): Primary | ICD-10-CM

## 2019-05-01 RX ORDER — PREDNISONE 10 MG/1
TABLET ORAL
Qty: 120 TABLET | Refills: 4 | Status: SHIPPED | OUTPATIENT
Start: 2019-05-01 | End: 2019-12-30

## 2019-05-01 NOTE — PROGRESS NOTES
Case Management Discharge Note    Final Note: Home    Destination      No service has been selected for the patient.      Durable Medical Equipment      No service has been selected for the patient.      Dialysis/Infusion      No service has been selected for the patient.      Home Medical Care      No service has been selected for the patient.      Therapy      No service has been selected for the patient.      Community Resources      No service has been selected for the patient.        Transportation Services  Private: Car  Other: Other(Home w/o needs)    Final Discharge Disposition Code: 01 - home or self-care

## 2019-05-01 NOTE — TELEPHONE ENCOUNTER
----- Message from Kelsey Flores sent at 5/1/2019 11:17 AM EDT -----  Regarding: pt called with questions   Contact: [phone]  Pt just got out the hospital yesterday

## 2019-05-01 NOTE — TELEPHONE ENCOUNTER
Pt called and reports that she was dc'd home from [facility] yesterday. Pt states she need to make a f/u appt for 2-4 wks .  Pt made appt for 05/20 with Tiara BURRELL at 10am.      Also pt reports that they discharged her on prednisone 40mg daily.  They told her to call us and see how Dr Ricketts wanted her to taper the dose and pt reports she will needs a refill for the taper. Advised pt will send message to Dr Ricketts.

## 2019-05-20 ENCOUNTER — OFFICE VISIT (OUTPATIENT)
Dept: GASTROENTEROLOGY | Facility: CLINIC | Age: 79
End: 2019-05-20

## 2019-05-20 VITALS
SYSTOLIC BLOOD PRESSURE: 134 MMHG | WEIGHT: 127.6 LBS | DIASTOLIC BLOOD PRESSURE: 70 MMHG | HEIGHT: 62 IN | TEMPERATURE: 98.4 F | BODY MASS INDEX: 23.48 KG/M2

## 2019-05-20 DIAGNOSIS — K56.600 PARTIAL SMALL BOWEL OBSTRUCTION (HCC): Primary | ICD-10-CM

## 2019-05-20 DIAGNOSIS — K29.00 OTHER ACUTE GASTRITIS WITHOUT HEMORRHAGE: ICD-10-CM

## 2019-05-20 DIAGNOSIS — R74.8 ELEVATED LIVER ENZYMES: ICD-10-CM

## 2019-05-20 DIAGNOSIS — K50.018 CROHN'S DISEASE OF SMALL INTESTINE WITH OTHER COMPLICATION (HCC): ICD-10-CM

## 2019-05-20 LAB
ALBUMIN SERPL-MCNC: 3.9 G/DL (ref 3.5–5.2)
ALBUMIN/GLOB SERPL: 2.1 G/DL
ALP SERPL-CCNC: 68 U/L (ref 39–117)
ALT SERPL-CCNC: 21 U/L (ref 1–33)
AST SERPL-CCNC: 17 U/L (ref 1–32)
BASOPHILS # BLD AUTO: 0.04 10*3/MM3 (ref 0–0.2)
BASOPHILS NFR BLD AUTO: 0.3 % (ref 0–1.5)
BILIRUB SERPL-MCNC: 0.3 MG/DL (ref 0.2–1.2)
BUN SERPL-MCNC: 16 MG/DL (ref 8–23)
BUN/CREAT SERPL: 16.2 (ref 7–25)
CALCIUM SERPL-MCNC: 9.6 MG/DL (ref 8.6–10.5)
CHLORIDE SERPL-SCNC: 103 MMOL/L (ref 98–107)
CO2 SERPL-SCNC: 27.3 MMOL/L (ref 22–29)
CREAT SERPL-MCNC: 0.99 MG/DL (ref 0.57–1)
EOSINOPHIL # BLD AUTO: 0.08 10*3/MM3 (ref 0–0.4)
EOSINOPHIL NFR BLD AUTO: 0.6 % (ref 0.3–6.2)
ERYTHROCYTE [DISTWIDTH] IN BLOOD BY AUTOMATED COUNT: 16.9 % (ref 12.3–15.4)
GLOBULIN SER CALC-MCNC: 1.9 GM/DL
GLUCOSE SERPL-MCNC: 82 MG/DL (ref 65–99)
HCT VFR BLD AUTO: 40.4 % (ref 34–46.6)
HGB BLD-MCNC: 12.7 G/DL (ref 12–15.9)
IMM GRANULOCYTES # BLD AUTO: 0.07 10*3/MM3 (ref 0–0.05)
IMM GRANULOCYTES NFR BLD AUTO: 0.6 % (ref 0–0.5)
LYMPHOCYTES # BLD AUTO: 2 10*3/MM3 (ref 0.7–3.1)
LYMPHOCYTES NFR BLD AUTO: 15.8 % (ref 19.6–45.3)
MCH RBC QN AUTO: 29.3 PG (ref 26.6–33)
MCHC RBC AUTO-ENTMCNC: 31.4 G/DL (ref 31.5–35.7)
MCV RBC AUTO: 93.3 FL (ref 79–97)
MONOCYTES # BLD AUTO: 0.66 10*3/MM3 (ref 0.1–0.9)
MONOCYTES NFR BLD AUTO: 5.2 % (ref 5–12)
NEUTROPHILS # BLD AUTO: 9.81 10*3/MM3 (ref 1.7–7)
NEUTROPHILS NFR BLD AUTO: 77.5 % (ref 42.7–76)
NRBC BLD AUTO-RTO: 0 /100 WBC (ref 0–0.2)
PLATELET # BLD AUTO: 216 10*3/MM3 (ref 140–450)
POTASSIUM SERPL-SCNC: 4.1 MMOL/L (ref 3.5–5.2)
PROT SERPL-MCNC: 5.8 G/DL (ref 6–8.5)
RBC # BLD AUTO: 4.33 10*6/MM3 (ref 3.77–5.28)
SODIUM SERPL-SCNC: 141 MMOL/L (ref 136–145)
WBC # BLD AUTO: 12.66 10*3/MM3 (ref 3.4–10.8)

## 2019-05-20 PROCEDURE — 99214 OFFICE O/P EST MOD 30 MIN: CPT | Performed by: NURSE PRACTITIONER

## 2019-05-20 NOTE — PROGRESS NOTES
Chief Complaint   Patient presents with   • Crohn's Disease       Michela Aguilar is a  78 y.o. female here for a hospital follow up visit for Crohn's disease.     HPI  79 yo f presents today for hospital follow up visit for Crohn's disease, partial bowel obstruction and Enteritis. She is a patient of Dr. Ricketts. She was seen by our service at [facility] on 4/27/19. She was admitted with worsening abd pain. CT scan abd/pelvis showed:    IMPRESSION:  1. Findings suggesting small bowel enteritis. Mild wall thickening in  the terminal ileum, as well as descending and sigmoid colon, may in part  be result of lack of distention, but may reflect inflammation related to  patient's Crohn disease. Borderline caliber of some loops of small  bowel, follow-up recommended. Mild pelvic free fluid.  2. No obstructive uropathy.  3. Gallbladder is surgically absent. Common biliary duct caliber, 1 cm,  and mild intrahepatic biliary ductal dilatation may related to the post  cholecystectomy, correlate clinically.     She had elevated WBC and LFT's. She then underwent MRCP that showed:  IMPRESSION:  No intra or extrahepatic biliary obstruction or  choledocholithiasis identified. The pancreatic duct demonstrates normal  caliber.    She was started on prednisone taper for her suspected Crohn's flare. She continued to have upper abd pain and nausea so she underwent EGD that showed Gastritis. Path was negative. She started eating better and her bowels started moving well. She also has hx GERD and admits she does well most of the time with pepcid when she remembers to take it. She admits she doesn't like taking pills.     Since being home she admits she has been feeling a lot better. She is down to 15 mg daily of prednisone. She continues to take Lialda 1-2 tabs daily. She has been taking the pepcid 20 mg daily since being home. She admits her appetite is better and her bowels are moving well. She denies any dysphagia,  reflux, abd pain, N&V, diarrhea, constipation, rectal bleeding or melena.         Past Medical History:   Diagnosis Date   • Anemia    • Arthritis    • Crohn disease (CMS/HCC)    • GERD (gastroesophageal reflux disease)    • History of transfusion    • Hyperlipidemia    • Hypertension    • Ocular migraine    • TIA (transient ischemic attack)        Past Surgical History:   Procedure Laterality Date   • APPENDECTOMY     • CHOLECYSTECTOMY     • COLECTOMY PARTIAL / TOTAL     • COLONOSCOPY  08/20/2015    s/p right hemicolectomy, recurrent Crohns, IH   • ENDOSCOPY  08/20/2015    erythematous mucosa in stomach, chronic gastritis,    • ENDOSCOPY N/A 4/28/2019    Erythematous mucosa in stomach, path benign   • SPLENECTOMY         Scheduled Meds:    Continuous Infusions:  No current facility-administered medications for this visit.     PRN Meds:.    Allergies   Allergen Reactions   • Amitriptyline    • Mysoline [Primidone]        Social History     Socioeconomic History   • Marital status: Single     Spouse name: Not on file   • Number of children: Not on file   • Years of education: Not on file   • Highest education level: Not on file   Tobacco Use   • Smoking status: Never Smoker   • Smokeless tobacco: Never Used   Substance and Sexual Activity   • Alcohol use: No   • Drug use: No   • Sexual activity: Defer       History reviewed. No pertinent family history.    Review of Systems   Constitutional: Negative for appetite change, chills, diaphoresis, fatigue, fever and unexpected weight change.   HENT: Negative for nosebleeds, postnasal drip, sore throat, trouble swallowing and voice change.    Respiratory: Negative for cough, choking, chest tightness, shortness of breath and wheezing.    Cardiovascular: Negative for chest pain.   Gastrointestinal: Positive for abdominal distention. Negative for abdominal pain, anal bleeding, blood in stool, constipation, diarrhea, nausea, rectal pain and vomiting.   Endocrine: Negative for  polydipsia, polyphagia and polyuria.   Musculoskeletal: Negative for gait problem.   Skin: Negative for rash and wound.   Allergic/Immunologic: Negative for food allergies.   Neurological: Negative for dizziness, speech difficulty and light-headedness.   Psychiatric/Behavioral: Negative for confusion, self-injury, sleep disturbance and suicidal ideas.       Vitals:    05/20/19 1002   BP: 134/70   Temp: 98.4 °F (36.9 °C)       Physical Exam   Constitutional: She is oriented to person, place, and time. She appears well-developed and well-nourished. She does not appear ill. No distress.   HENT:   Head: Normocephalic.   Eyes: Pupils are equal, round, and reactive to light.   Cardiovascular: Normal rate, regular rhythm and normal heart sounds.   Pulmonary/Chest: Effort normal and breath sounds normal.   Abdominal: Soft. Bowel sounds are normal. She exhibits no distension and no mass. There is no hepatosplenomegaly. There is no tenderness. There is no rebound and no guarding. No hernia.   Musculoskeletal: Normal range of motion.   Neurological: She is alert and oriented to person, place, and time.   Skin: Skin is warm and dry.   Psychiatric: She has a normal mood and affect. Her speech is normal and behavior is normal. Judgment normal.       No images are attached to the encounter.    Assessment & Plan     1. Partial small bowel obstruction (CMS/HCC)  - CBC & Differential  - Comprehensive Metabolic Panel    2. Crohn's disease of small intestine with other complication (CMS/HCC)  - CBC & Differential  - Comprehensive Metabolic Panel    3. Other acute gastritis without hemorrhage    4. Elevated liver enzymes  - Comprehensive Metabolic Panel    Reviewed hospital records with her today. She seems to be much improved now. Bowels moving well. Good appetite. Continue prednisone taper and Lialda 2 tabs daily for now. Recommend Pepcid 20 mg BID for now with her newly diagnosed gastritis. Continue bland diet. Will check labs today  to make sure LFTs and WBC are trending down. Will have her call office next week with update. Follow up with Dr. Ricketts in 2 months. Call office with any issues.

## 2019-05-21 ENCOUNTER — TELEPHONE (OUTPATIENT)
Dept: GASTROENTEROLOGY | Facility: CLINIC | Age: 79
End: 2019-05-21

## 2019-05-21 NOTE — TELEPHONE ENCOUNTER
----- Message from YANY Lentz sent at 5/21/2019  8:41 AM EDT -----  Please call patient and let her know the labs are much improved. Hgb is much better at 12.7 from 10.8. WBC is still slightly elevated but lower than previous. LFTs are all normal. Thanks.

## 2019-05-21 NOTE — TELEPHONE ENCOUNTER
Call to pt.  Advise per M Kathleen that labs are much improved.  Hgb much better at 12.7 from 10.8.  WBC is still slightly elevated, but lower than previous.  LFTs are all normal.  Verb understanding.

## 2019-06-13 RX ORDER — MESALAMINE 1.2 G/1
TABLET, DELAYED RELEASE ORAL
Qty: 60 TABLET | Refills: 0 | Status: SHIPPED | OUTPATIENT
Start: 2019-06-13 | End: 2019-08-23 | Stop reason: SDUPTHER

## 2019-06-24 ENCOUNTER — TELEPHONE (OUTPATIENT)
Dept: GASTROENTEROLOGY | Facility: CLINIC | Age: 79
End: 2019-06-24

## 2019-06-24 ENCOUNTER — PREP FOR SURGERY (OUTPATIENT)
Dept: OTHER | Facility: HOSPITAL | Age: 79
End: 2019-06-24

## 2019-06-24 DIAGNOSIS — K50.90 CROHN DISEASE (HCC): Primary | ICD-10-CM

## 2019-06-24 DIAGNOSIS — K50.00 CROHN'S DISEASE OF SMALL INTESTINE WITHOUT COMPLICATION (HCC): ICD-10-CM

## 2019-06-24 DIAGNOSIS — R14.0 BLOATING: ICD-10-CM

## 2019-06-24 DIAGNOSIS — R14.0 BLOATING: Primary | ICD-10-CM

## 2019-06-24 NOTE — TELEPHONE ENCOUNTER
C/o since this past Wed has had abdominal pain with bloating and nausea. She stopped prednisone x 2 weeks.        She last had a colonoscopy 2015? Last SBFT 4/16. Her surgery by Dr. Limon in 2014. She is having  2-3 BM/day that are watery. No rectal bleeding or melena.   Assessment:  1) flair of crohn's with her off of prednisone    Plan:  1) Try prednisone 20 mg/day. I won't give 40 mg/day because it causes trouble sleeping when on high doses of prednisone.   2) SBFT  3) colonoscopy. (on the day of c/s get labs and r/o TB. Go to ID to talk about vaccines given that she had her spleen taken out)  4. Think about Humira vs having repeat surgical resection of the TI?  5. Lialda 1-2 pills/day.    SCHEDULING - please schedule her for a colonoscopy to be done in the next two weeks.   Marcel Ricketts M.D.

## 2019-06-24 NOTE — TELEPHONE ENCOUNTER
Called pt and pt reports that she was in the hospital in April.  She finished her prednisone taper on June 6 th.   She reports that on June 19th her stomach began hurting again.  She states the pain was the same type but not as severe and she also has nausea.  Pt is reports bloating, abd pain , and gas.   Pt reports her pain is a 5-6 on the pain scale and her pain is worse at night.  Pt reports that her bm's are daily and normal for her.  Pt denies a fever and chills. She states the pain has been going on for 7 days and does not want to go back to hospital.  She feels like her crohns is flaring again.   Pt is taking lialda 2 pills daily and a colestipol at night.  Pt is asking for something nausea and what can she do or take for the flare.   Pt advises she has a lot of prednisone left over. Advised pt will send message to Dr Ricketts and in the meantime if symptoms worsen to go to ER.

## 2019-06-24 NOTE — TELEPHONE ENCOUNTER
----- Message from Nilesh Palumbo sent at 6/24/2019 11:50 AM EDT -----  Regarding: not feeling well   Contact: [phone]  Complain of stomach pain and bloating

## 2019-06-25 PROBLEM — R14.0 BLOATING: Status: ACTIVE | Noted: 2019-06-25

## 2019-06-25 PROBLEM — K50.90 CROHN DISEASE (HCC): Status: ACTIVE | Noted: 2019-06-25

## 2019-07-10 ENCOUNTER — HOSPITAL ENCOUNTER (OUTPATIENT)
Dept: GENERAL RADIOLOGY | Facility: HOSPITAL | Age: 79
Discharge: HOME | End: 2019-07-10
Admitting: INTERNAL MEDICINE

## 2019-07-10 DIAGNOSIS — R14.0 BLOATING: ICD-10-CM

## 2019-07-10 DIAGNOSIS — K50.00 CROHN'S DISEASE OF SMALL INTESTINE WITHOUT COMPLICATION (HCC): ICD-10-CM

## 2019-07-10 PROCEDURE — 74250 X-RAY XM SM INT 1CNTRST STD: CPT

## 2019-07-10 PROCEDURE — A9270 NON-COVERED ITEM OR SERVICE: HCPCS | Performed by: INTERNAL MEDICINE

## 2019-07-10 PROCEDURE — 63710000001 BARIUM SULFATE 96 % RECONSTITUTED SUSPENSION: Performed by: INTERNAL MEDICINE

## 2019-07-10 RX ADMIN — BARIUM SULFATE 240 ML: 960 POWDER, FOR SUSPENSION ORAL at 10:00

## 2019-07-11 ENCOUNTER — TELEPHONE (OUTPATIENT)
Dept: GASTROENTEROLOGY | Facility: CLINIC | Age: 79
End: 2019-07-11

## 2019-07-11 NOTE — TELEPHONE ENCOUNTER
Tell her that her SBFT did show a distal esophageal and small bowel diverticulae (which I am not worried about. We can discuss in more detail when I see her next).        Please see how she is doing. I see that I am scheduled to do a colonoscopy next month. If she is still having troubles then have her see me in the office next week. [email or web address]

## 2019-07-12 NOTE — TELEPHONE ENCOUNTER
Have her decrease the prednisone to 15 mg po daily for two weeks. If she does well for that 2 week period of time then she could decrease the prednisone to 10 mg po daily after being on 15 mg/day for two weeks. arik

## 2019-07-15 NOTE — TELEPHONE ENCOUNTER
Call to pt.  Advise per Dr Ricketts to decrease the prednisone to 150 mg po daily for 2 wks.  If does well for that 2 wk period of time, they could decrease to 10 mg po daily.  Verb understanding.

## 2019-08-09 ENCOUNTER — ANESTHESIA EVENT (OUTPATIENT)
Dept: GASTROENTEROLOGY | Facility: HOSPITAL | Age: 79
End: 2019-08-09

## 2019-08-09 ENCOUNTER — HOSPITAL ENCOUNTER (OUTPATIENT)
Facility: HOSPITAL | Age: 79
Setting detail: HOSPITAL OUTPATIENT SURGERY
Discharge: HOME | End: 2019-08-09
Attending: INTERNAL MEDICINE | Admitting: INTERNAL MEDICINE

## 2019-08-09 ENCOUNTER — ANESTHESIA (OUTPATIENT)
Dept: GASTROENTEROLOGY | Facility: HOSPITAL | Age: 79
End: 2019-08-09

## 2019-08-09 VITALS
HEIGHT: 62 IN | SYSTOLIC BLOOD PRESSURE: 129 MMHG | TEMPERATURE: 97.8 F | HEART RATE: 69 BPM | OXYGEN SATURATION: 97 % | RESPIRATION RATE: 16 BRPM | DIASTOLIC BLOOD PRESSURE: 65 MMHG | BODY MASS INDEX: 23.2 KG/M2 | WEIGHT: 126.06 LBS

## 2019-08-09 DIAGNOSIS — K50.90 CROHN DISEASE (HCC): ICD-10-CM

## 2019-08-09 DIAGNOSIS — R14.0 BLOATING: ICD-10-CM

## 2019-08-09 LAB
ALBUMIN SERPL-MCNC: 3.8 G/DL (ref 3.5–5.2)
ALBUMIN/GLOB SERPL: 1.7 G/DL
ALP SERPL-CCNC: 68 U/L (ref 39–117)
ALT SERPL W P-5'-P-CCNC: 19 U/L (ref 1–33)
ANION GAP SERPL CALCULATED.3IONS-SCNC: 12 MMOL/L (ref 5–15)
AST SERPL-CCNC: 19 U/L (ref 1–32)
BASOPHILS # BLD AUTO: 0.05 10*3/MM3 (ref 0–0.2)
BASOPHILS NFR BLD AUTO: 0.5 % (ref 0–1.5)
BILIRUB SERPL-MCNC: 0.7 MG/DL (ref 0.2–1.2)
BUN BLD-MCNC: 13 MG/DL (ref 8–23)
BUN/CREAT SERPL: 13.8 (ref 7–25)
CALCIUM SPEC-SCNC: 8.8 MG/DL (ref 8.6–10.5)
CHLORIDE SERPL-SCNC: 105 MMOL/L (ref 98–107)
CO2 SERPL-SCNC: 26 MMOL/L (ref 22–29)
CREAT BLD-MCNC: 0.94 MG/DL (ref 0.57–1)
CRP SERPL-MCNC: 0.51 MG/DL (ref 0–0.5)
DEPRECATED RDW RBC AUTO: 55 FL (ref 37–54)
EOSINOPHIL # BLD AUTO: 0.18 10*3/MM3 (ref 0–0.4)
EOSINOPHIL NFR BLD AUTO: 1.6 % (ref 0.3–6.2)
ERYTHROCYTE [DISTWIDTH] IN BLOOD BY AUTOMATED COUNT: 15.8 % (ref 12.3–15.4)
ERYTHROCYTE [SEDIMENTATION RATE] IN BLOOD: 6 MM/HR (ref 0–30)
GFR SERPL CREATININE-BSD FRML MDRD: 58 ML/MIN/1.73
GLOBULIN UR ELPH-MCNC: 2.3 GM/DL
GLUCOSE BLD-MCNC: 85 MG/DL (ref 65–99)
HCT VFR BLD AUTO: 40.4 % (ref 34–46.6)
HGB BLD-MCNC: 12.5 G/DL (ref 12–15.9)
IMM GRANULOCYTES # BLD AUTO: 0.11 10*3/MM3 (ref 0–0.05)
IMM GRANULOCYTES NFR BLD AUTO: 1 % (ref 0–0.5)
LYMPHOCYTES # BLD AUTO: 2.51 10*3/MM3 (ref 0.7–3.1)
LYMPHOCYTES NFR BLD AUTO: 22.7 % (ref 19.6–45.3)
MCH RBC QN AUTO: 29.3 PG (ref 26.6–33)
MCHC RBC AUTO-ENTMCNC: 30.9 G/DL (ref 31.5–35.7)
MCV RBC AUTO: 94.8 FL (ref 79–97)
MONOCYTES # BLD AUTO: 1.12 10*3/MM3 (ref 0.1–0.9)
MONOCYTES NFR BLD AUTO: 10.1 % (ref 5–12)
NEUTROPHILS # BLD AUTO: 7.1 10*3/MM3 (ref 1.7–7)
NEUTROPHILS NFR BLD AUTO: 64.1 % (ref 42.7–76)
NRBC BLD AUTO-RTO: 0 /100 WBC (ref 0–0.2)
PLATELET # BLD AUTO: 309 10*3/MM3 (ref 140–450)
PMV BLD AUTO: 10.3 FL (ref 6–12)
POTASSIUM BLD-SCNC: 3.3 MMOL/L (ref 3.5–5.2)
PROT SERPL-MCNC: 6.1 G/DL (ref 6–8.5)
RBC # BLD AUTO: 4.26 10*6/MM3 (ref 3.77–5.28)
SODIUM BLD-SCNC: 143 MMOL/L (ref 136–145)
WBC NRBC COR # BLD: 11.07 10*3/MM3 (ref 3.4–10.8)

## 2019-08-09 PROCEDURE — 86480 TB TEST CELL IMMUN MEASURE: CPT | Performed by: INTERNAL MEDICINE

## 2019-08-09 PROCEDURE — 85652 RBC SED RATE AUTOMATED: CPT | Performed by: INTERNAL MEDICINE

## 2019-08-09 PROCEDURE — 80053 COMPREHEN METABOLIC PANEL: CPT | Performed by: INTERNAL MEDICINE

## 2019-08-09 PROCEDURE — 88305 TISSUE EXAM BY PATHOLOGIST: CPT | Performed by: INTERNAL MEDICINE

## 2019-08-09 PROCEDURE — 86140 C-REACTIVE PROTEIN: CPT | Performed by: INTERNAL MEDICINE

## 2019-08-09 PROCEDURE — 25010000002 PROPOFOL 10 MG/ML EMULSION: Performed by: NURSE ANESTHETIST, CERTIFIED REGISTERED

## 2019-08-09 PROCEDURE — 85025 COMPLETE CBC W/AUTO DIFF WBC: CPT | Performed by: INTERNAL MEDICINE

## 2019-08-09 PROCEDURE — 45380 COLONOSCOPY AND BIOPSY: CPT | Performed by: INTERNAL MEDICINE

## 2019-08-09 RX ORDER — SODIUM CHLORIDE, SODIUM LACTATE, POTASSIUM CHLORIDE, CALCIUM CHLORIDE 600; 310; 30; 20 MG/100ML; MG/100ML; MG/100ML; MG/100ML
30 INJECTION, SOLUTION INTRAVENOUS CONTINUOUS PRN
Status: DISCONTINUED | OUTPATIENT
Start: 2019-08-09 | End: 2019-08-09 | Stop reason: HOSPADM

## 2019-08-09 RX ORDER — PROPOFOL 10 MG/ML
VIAL (ML) INTRAVENOUS AS NEEDED
Status: DISCONTINUED | OUTPATIENT
Start: 2019-08-09 | End: 2019-08-09 | Stop reason: SURG

## 2019-08-09 RX ORDER — PROPOFOL 10 MG/ML
VIAL (ML) INTRAVENOUS CONTINUOUS PRN
Status: DISCONTINUED | OUTPATIENT
Start: 2019-08-09 | End: 2019-08-09 | Stop reason: SURG

## 2019-08-09 RX ORDER — LIDOCAINE HYDROCHLORIDE 20 MG/ML
INJECTION, SOLUTION INFILTRATION; PERINEURAL AS NEEDED
Status: DISCONTINUED | OUTPATIENT
Start: 2019-08-09 | End: 2019-08-09 | Stop reason: SURG

## 2019-08-09 RX ORDER — SODIUM CHLORIDE, SODIUM LACTATE, POTASSIUM CHLORIDE, CALCIUM CHLORIDE 600; 310; 30; 20 MG/100ML; MG/100ML; MG/100ML; MG/100ML
INJECTION, SOLUTION INTRAVENOUS CONTINUOUS PRN
Status: DISCONTINUED | OUTPATIENT
Start: 2019-08-09 | End: 2019-08-09 | Stop reason: SURG

## 2019-08-09 RX ADMIN — SODIUM CHLORIDE, POTASSIUM CHLORIDE, SODIUM LACTATE AND CALCIUM CHLORIDE 30 ML/HR: 600; 310; 30; 20 INJECTION, SOLUTION INTRAVENOUS at 12:37

## 2019-08-09 RX ADMIN — PROPOFOL 30 MG: 10 INJECTION, EMULSION INTRAVENOUS at 13:19

## 2019-08-09 RX ADMIN — LIDOCAINE HYDROCHLORIDE 60 MG: 20 INJECTION, SOLUTION INFILTRATION; PERINEURAL at 13:19

## 2019-08-09 RX ADMIN — SODIUM CHLORIDE, POTASSIUM CHLORIDE, SODIUM LACTATE AND CALCIUM CHLORIDE: 600; 310; 30; 20 INJECTION, SOLUTION INTRAVENOUS at 13:13

## 2019-08-09 RX ADMIN — PROPOFOL 300 MCG/KG/MIN: 10 INJECTION, EMULSION INTRAVENOUS at 13:19

## 2019-08-09 NOTE — ANESTHESIA PREPROCEDURE EVALUATION
Anesthesia Evaluation     Patient summary reviewed and Nursing notes reviewed   NPO Solid Status: > 8 hours  NPO Liquid Status: > 4 hours           Airway   TM distance: <3 FB  Neck ROM: limited  Possible difficult intubation  Dental - normal exam     Pulmonary - normal exam   Cardiovascular - normal exam    Patient on routine beta blocker and Beta blocker given within 24 hours of surgery    (+) hypertension 2 medications or greater,       Neuro/Psych  GI/Hepatic/Renal/Endo      ROS Comment: crohn's    Musculoskeletal     Abdominal    Substance History      OB/GYN          Other   (+) blood dyscrasia (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

## 2019-08-09 NOTE — ANESTHESIA POSTPROCEDURE EVALUATION
"Patient: Michela Aguilar    Procedure Summary     Date:  08/09/19 [facility]    Anesthesia Start:  1313 Anesthesia Stop:  1350    Procedure:  COLONOSCOPY to anastamosis and ileum with biopsies (N/A ) Diagnosis:       Crohn disease (CMS/HCC)      Bloating      (Crohn disease (CMS/HCC) [K50.90])      (Bloating [R14.0])    Surgeon:  Marcel Ricketts MD Provider:  Tressa Gamboa MD    Anesthesia Type:  MAC ASA Status:  3          Anesthesia Type: MAC  Last vitals  BP   129/65 (08/09/19 1409)   Temp   36.6 °C (97.8 °F) (08/09/19 1231)   Pulse   69 (08/09/19 1409)   Resp   16 (08/09/19 1409)     SpO2   97 % (08/09/19 1409)     Post Anesthesia Care and Evaluation    Patient location during evaluation: PACU  Patient participation: complete - patient participated  Level of consciousness: awake  Pain score: 0  Pain management: adequate  Airway patency: patent  Anesthetic complications: No anesthetic complications    Cardiovascular status: acceptable  Respiratory status: acceptable  Hydration status: acceptable    Comments: Blood pressure 129/65, pulse 69, temperature 36.6 °C (97.8 °F), temperature source Oral, resp. rate 16, height 157.5 cm (62\"), weight 57.2 kg (126 lb 1 oz), SpO2 97 %.    No anesthesia care post op    "

## 2019-08-09 NOTE — DISCHARGE INSTRUCTIONS
Colonoscopy, Adult, Care After  This sheet gives you information about how to care for yourself after your procedure. Your doctor may also give you more specific instructions. If you have problems or questions, call your doctor.  What can I expect after the procedure?  After the procedure, it is common to have:  · A small amount of blood in your poop for 24 hours.  · Some gas.  · Mild cramping or bloating in your belly.  Follow these instructions at home:  General instructions  · For the first 24 hours after the procedure:  ? Do not drive or use machinery.  ? Do not sign important documents.  ? Do not drink alcohol.  ? Do your daily activities more slowly than normal.  ? Eat foods that are soft and easy to digest.  · Take over-the-counter or prescription medicines only as told by your doctor.  To help cramping and bloating:    · Try walking around.  · Put heat on your belly (abdomen) as told by your doctor. Use a heat source that your doctor recommends, such as a moist heat pack or a heating pad.  ? Put a towel between your skin and the heat source.  ? Leave the heat on for 20-30 minutes.  ? Remove the heat if your skin turns bright red. This is especially important if you cannot feel pain, heat, or cold. You can get burned.  Eating and drinking    · Drink enough fluid to keep your pee (urine) clear or pale yellow.  · Return to your normal diet as told by your doctor. Avoid heavy or fried foods that are hard to digest.  · Avoid drinking alcohol for as long as told by your doctor.  Contact a doctor if:  · You have blood in your poop (stool) 2-3 days after the procedure.  Get help right away if:  · You have more than a small amount of blood in your poop.  · You see large clumps of tissue (blood clots) in your poop.  · Your belly is swollen.  · You feel sick to your stomach (nauseous).  · You throw up (vomit).  · You have a fever.  · You have belly pain that gets worse, and medicine does not help your  pain.  Summary  · After the procedure, it is common to have a small amount of blood in your poop. You may also have mild cramping and bloating in your belly.  · For the first 24 hours after the procedure, do not drive or use machinery, do not sign important documents, and do not drink alcohol.  · Get help right away if you have a lot of blood in your poop, feel sick to your stomach, have a fever, or have more belly pain.  This information is not intended to replace advice given to you by your health care provider. Make sure you discuss any questions you have with your health care provider.  Document Released: 01/20/2012 Document Revised: 10/18/2018 Document Reviewed: 09/11/2017  [employer] Interactive Patient Education © 2019 [employer]  Hemorrhoids  Hemorrhoids are swollen veins in and around the rectum or anus. There are two types of hemorrhoids:  · Internal hemorrhoids. These occur in the veins that are just inside the rectum. They may poke through to the outside and become irritated and painful.  · External hemorrhoids. These occur in the veins that are outside of the anus and can be felt as a painful swelling or hard lump near the anus.  Most hemorrhoids do not cause serious problems, and they can be managed with home treatments such as diet and lifestyle changes. If home treatments do not help your symptoms, procedures can be done to shrink or remove the hemorrhoids.  What are the causes?  This condition is caused by increased pressure in the anal area. This pressure may result from various things, including:  · Constipation.  · Straining to have a bowel movement.  · Diarrhea.  · Pregnancy.  · Obesity.  · Sitting for long periods of time.  · Heavy lifting or other activity that causes you to strain.  · Anal sex.  What are the signs or symptoms?  Symptoms of this condition include:  · Pain.  · Anal itching or irritation.  · Rectal bleeding.  · Leakage of stool (feces).  · Anal swelling.  · One or more lumps  around the anus.  How is this diagnosed?  This condition can often be diagnosed through a visual exam. Other exams or tests may also be done, such as:  · Examination of the rectal area with a gloved hand (digital rectal exam).  · Examination of the anal canal using a small tube (anoscope).  · A blood test, if you have lost a significant amount of blood.  · A test to look inside the colon (sigmoidoscopy or colonoscopy).  How is this treated?  This condition can usually be treated at home. However, various procedures may be done if dietary changes, lifestyle changes, and other home treatments do not help your symptoms. These procedures can help make the hemorrhoids smaller or remove them completely. Some of these procedures involve surgery, and others do not. Common procedures include:  · Rubber band ligation. Rubber bands are placed at the base of the hemorrhoids to cut off the blood supply to them.  · Sclerotherapy. Medicine is injected into the hemorrhoids to shrink them.  · Infrared coagulation. A type of light energy is used to get rid of the hemorrhoids.  · Hemorrhoidectomy surgery. The hemorrhoids are surgically removed, and the veins that supply them are tied off.  · Stapled hemorrhoidopexy surgery. A circular stapling device is used to remove the hemorrhoids and use staples to cut off the blood supply to them.  Follow these instructions at home:  Eating and drinking  · Eat foods that have a lot of fiber in them, such as whole grains, beans, nuts, fruits, and vegetables. Ask your health care provider about taking products that have added fiber (fiber supplements).  · Drink enough fluid to keep your urine clear or pale yellow.  Managing pain and swelling  · Take warm sitz baths for 20 minutes, 3-4 times a day to ease pain and discomfort.  · If directed, apply ice to the affected area. Using ice packs between sitz baths may be helpful.  ? Put ice in a plastic bag.  ? Place a towel between your skin and the  bag.  ? Leave the ice on for 20 minutes, 2-3 times a day.  General instructions  · Take over-the-counter and prescription medicines only as told by your health care provider.  · Use medicated creams or suppositories as told.  · Exercise regularly.  · Go to the bathroom when you have the urge to have a bowel movement. Do not wait.  · Avoid straining to have bowel movements.  · Keep the anal area dry and clean. Use wet toilet paper or moist towelettes after a bowel movement.  · Do not sit on the toilet for long periods of time. This increases blood pooling and pain.  Contact a health care provider if:  · You have increasing pain and swelling that are not controlled by treatment or medicine.  · You have uncontrolled bleeding.  · You have difficulty having a bowel movement, or you are unable to have a bowel movement.  · You have pain or inflammation outside the area of the hemorrhoids.  This information is not intended to replace advice given to you by your health care provider. Make sure you discuss any questions you have with your health care provider.  Document Released: 12/15/2001 Document Revised: 05/17/2017 Document Reviewed: 08/31/2016  [employer] Interactive Patient Education © 2019 [employer]

## 2019-08-09 NOTE — H&P
Chief Complaint   Patient presents with   • Crohn's Disease         Michela Aguilar is a  78 y.o. female here for a hospital follow up visit for Crohn's disease.      HPI  77 yo f presents today for hospital follow up visit for Crohn's disease, partial bowel obstruction and Enteritis. She is a patient of Dr. Ricketts. She was seen by our service at [facility] on 4/27/19. She was admitted with worsening abd pain. CT scan abd/pelvis showed:     IMPRESSION:  1. Findings suggesting small bowel enteritis. Mild wall thickening in  the terminal ileum, as well as descending and sigmoid colon, may in part  be result of lack of distention, but may reflect inflammation related to  patient's Crohn disease. Borderline caliber of some loops of small  bowel, follow-up recommended. Mild pelvic free fluid.  2. No obstructive uropathy.  3. Gallbladder is surgically absent. Common biliary duct caliber, 1 cm,  and mild intrahepatic biliary ductal dilatation may related to the post  cholecystectomy, correlate clinically.     She had elevated WBC and LFT's. She then underwent MRCP that showed:  IMPRESSION:  No intra or extrahepatic biliary obstruction or  choledocholithiasis identified. The pancreatic duct demonstrates normal  caliber.     She was started on prednisone taper for her suspected Crohn's flare. She continued to have upper abd pain and nausea so she underwent EGD that showed Gastritis. Path was negative. She started eating better and her bowels started moving well. She also has hx GERD and admits she does well most of the time with pepcid when she remembers to take it. She admits she doesn't like taking pills.      Since being home she admits she has been feeling a lot better. She is down to 15 mg daily of prednisone. She continues to take Lialda 1-2 tabs daily. She has been taking the pepcid 20 mg daily since being home. She admits her appetite is better and her bowels are moving well. She denies any  dysphagia, reflux, abd pain, N&V, diarrhea, constipation, rectal bleeding or melena.            Medical History        Past Medical History:   Diagnosis Date   • Anemia     • Arthritis     • Crohn disease (CMS/HCC)     • GERD (gastroesophageal reflux disease)     • History of transfusion     • Hyperlipidemia     • Hypertension     • Ocular migraine     • TIA (transient ischemic attack)              Surgical History         Past Surgical History:   Procedure Laterality Date   • APPENDECTOMY       • CHOLECYSTECTOMY       • COLECTOMY PARTIAL / TOTAL       • COLONOSCOPY   08/20/2015     s/p right hemicolectomy, recurrent Crohns, IH   • ENDOSCOPY   08/20/2015     erythematous mucosa in stomach, chronic gastritis,    • ENDOSCOPY N/A 4/28/2019     Erythematous mucosa in stomach, path benign   • SPLENECTOMY                Scheduled Meds:     Continuous Infusions:  No current facility-administered medications for this visit.      PRN Meds:.          Allergies   Allergen Reactions   • Amitriptyline     • Mysoline [Primidone]           Social History   Social History            Socioeconomic History   • Marital status: Single       Spouse name: Not on file   • Number of children: Not on file   • Years of education: Not on file   • Highest education level: Not on file   Tobacco Use   • Smoking status: Never Smoker   • Smokeless tobacco: Never Used   Substance and Sexual Activity   • Alcohol use: No   • Drug use: No   • Sexual activity: Defer            History reviewed. No pertinent family history.     Review of Systems   Constitutional: Negative for appetite change, chills, diaphoresis, fatigue, fever and unexpected weight change.   HENT: Negative for nosebleeds, postnasal drip, sore throat, trouble swallowing and voice change.    Respiratory: Negative for cough, choking, chest tightness, shortness of breath and wheezing.    Cardiovascular: Negative for chest pain.   Gastrointestinal: Positive for abdominal distention.  Negative for abdominal pain, anal bleeding, blood in stool, constipation, diarrhea, nausea, rectal pain and vomiting.   Endocrine: Negative for polydipsia, polyphagia and polyuria.   Musculoskeletal: Negative for gait problem.   Skin: Negative for rash and wound.   Allergic/Immunologic: Negative for food allergies.   Neurological: Negative for dizziness, speech difficulty and light-headedness.   Psychiatric/Behavioral: Negative for confusion, self-injury, sleep disturbance and suicidal ideas.             Vitals:     05/20/19 1002   BP: 134/70   Temp: 98.4 °F (36.9 °C)         Physical Exam   Constitutional: She is oriented to person, place, and time. She appears well-developed and well-nourished. She does not appear ill. No distress.   HENT:   Head: Normocephalic.   Eyes: Pupils are equal, round, and reactive to light.   Cardiovascular: Normal rate, regular rhythm and normal heart sounds.   Pulmonary/Chest: Effort normal and breath sounds normal.   Abdominal: Soft. Bowel sounds are normal. She exhibits no distension and no mass. There is no hepatosplenomegaly. There is no tenderness. There is no rebound and no guarding. No hernia.   Musculoskeletal: Normal range of motion.   Neurological: She is alert and oriented to person, place, and time.   Skin: Skin is warm and dry.   Psychiatric: She has a normal mood and affect. Her speech is normal and behavior is normal. Judgment normal.         No images are attached to the encounter.     Assessment & Plan      1. Partial small bowel obstruction (CMS/HCC)  - CBC & Differential  - Comprehensive Metabolic Panel     2. Crohn's disease of small intestine with other complication (CMS/HCC)  - CBC & Differential  - Comprehensive Metabolic Panel     3. Other acute gastritis without hemorrhage     4. Elevated liver enzymes  - Comprehensive Metabolic Panel     Reviewed hospital records with her today. She seems to be much improved now. Bowels moving well. Good appetite. Continue  prednisone taper and Lialda 2 tabs daily for now. Recommend Pepcid 20 mg BID for now with her newly diagnosed gastritis. Continue bland diet. Will check labs today to make sure LFTs and WBC are trending down. Will have her call office next week with update. Follow up with Dr. Ricketts in 2 months. Call office with any issues.                   8/9/19 - No change from the above H and P.   Marcel Ricketts M.D.

## 2019-08-12 LAB
CYTO UR: NORMAL
LAB AP CASE REPORT: NORMAL
LAB AP DIAGNOSIS COMMENT: NORMAL
PATH REPORT.FINAL DX SPEC: NORMAL
PATH REPORT.GROSS SPEC: NORMAL

## 2019-08-14 ENCOUNTER — TELEPHONE (OUTPATIENT)
Dept: GASTROENTEROLOGY | Facility: CLINIC | Age: 79
End: 2019-08-14

## 2019-08-14 LAB
QUANTIFERON CRITERIA: NORMAL
QUANTIFERON MITOGEN VALUE: >10 IU/ML
QUANTIFERON NIL VALUE: 0.04 IU/ML
QUANTIFERON TB1 AG VALUE: 0.07 IU/ML
QUANTIFERON TB2 AG VALUE: 0.03 IU/ML
QUANTIFERON-TB GOLD PLUS: NEGATIVE

## 2019-08-14 NOTE — TELEPHONE ENCOUNTER
----- Message from Marcel Ricketts MD sent at 8/13/2019  5:42 PM EDT -----  Tell her that her lab work looks good.  Biopsies of the colon were consistent with Crohn's disease.  We can discuss in more detail when I see her in the office.  Please fax a copy of this report to her PCP.  Thx. kjh

## 2019-08-14 NOTE — TELEPHONE ENCOUNTER
Called pt and advised per Dr Ricketts that her labs looked good.  Bx of the colon were consistent with Crohns disease.  He will discuss in more detail at her appt on 08/29/2019.  Pt verb understanding.     Labs and path report faxed to Dr Mckenzie thru [employer].

## 2019-08-23 RX ORDER — MESALAMINE 1.2 G/1
TABLET, DELAYED RELEASE ORAL
Qty: 60 TABLET | Refills: 0 | Status: SHIPPED | OUTPATIENT
Start: 2019-08-23 | End: 2019-08-29

## 2019-08-29 ENCOUNTER — OFFICE VISIT (OUTPATIENT)
Dept: GASTROENTEROLOGY | Facility: CLINIC | Age: 79
End: 2019-08-29

## 2019-08-29 VITALS
DIASTOLIC BLOOD PRESSURE: 68 MMHG | WEIGHT: 129.8 LBS | BODY MASS INDEX: 23.89 KG/M2 | SYSTOLIC BLOOD PRESSURE: 118 MMHG | HEIGHT: 62 IN

## 2019-08-29 DIAGNOSIS — K50.018 CROHN'S DISEASE OF SMALL INTESTINE WITH OTHER COMPLICATION (HCC): Primary | ICD-10-CM

## 2019-08-29 PROCEDURE — 99213 OFFICE O/P EST LOW 20 MIN: CPT | Performed by: INTERNAL MEDICINE

## 2019-08-29 RX ORDER — MESALAMINE 1.2 G/1
TABLET, DELAYED RELEASE ORAL
Qty: 120 TABLET | Refills: 11 | Status: SHIPPED | OUTPATIENT
Start: 2019-08-29 | End: 2020-09-15

## 2019-08-29 NOTE — PROGRESS NOTES
Chief Complaint   Patient presents with   • Crohn's Disease   • Heartburn       History of Present Illness: 78-year-old female who has a history of Crohn's disease of the terminal ileum and colon.  She was admitted to the hospital in 4 of 2019 with partial small bowel obstruction and enteritis.  The patient did well on steroids.  I did a colonoscopy on her on 8/9/2019.  In 2014 she had resection of the terminal ileum and cecum by Dr. oswald.  The colonoscopy showed that she was status post right hemicolectomy with ulceration at the anastomosis of the terminal ileum to the ascending colon.  I went about 15 cm up into the terminal ileum.  The terminal ileum looked normal.  The anastomosis of the terminal ileum to the colon was ulcerated.  She did have internal hemorrhoids.  Pathology showed:  Result Notes for Tissue Pathology Exam     Notes recorded by Marcel Ricketts MD on 8/13/2019 at 5:42 PM EDT  Tell her that her lab work looks good.  Biopsies of the colon were consistent with Crohn's disease.  We can discuss in more detail when I see her in the office.  Please fax a copy of this report to her PCP.  LauraLorena prosper   Tissue Pathology Exam: [id number]   Order: [id number]   Collected:  8/9/2019 13:33 Status:  Final result   Visible to patient:  No (Not Released) Dx:  Crohn disease (CMS/[facility]); Bloating   Component    Final Diagnosis   1. 10 cm into Ileum Biopsy: Benign small intestinal mucosa with               A. Focal active moderate chronic inflammation with mild villous blunting.               B. Focal active cryptitis noted, no well developed crypt abscess formation, granulomatous inflammation nor                    intestinal parasites identified (see comment).                 2. Ileocolonic Anastomotic Ulcer Biopsy: Benign small intestinal mucosa with                A. Mucosal erosion with active moderate chronic inflammation and inflamed granulation tissue.               B. Focal active cryptitis noted, no well formed  crypt abscess formation, granulomatous inflammation                   nor intestinal parasites by routine staining.               C. Inflammatory atypia noted, no glandular dysplasia nor malignancy identified.     3. Colon Biopsy at 70 cm: Benign colonic mucosa with                A. Minimal crypt distortion.               B. No active inflammation nor granulomatous inflammation.                 4. Colon Biopsy at 60 cm: Benign colonic mucosa with               A. Relatively normal crypt architecture.               B. No active inflammation nor granulomatous inflammation.     5. Colon Biopsy at 50 cm: Benign colonic mucosa with               A. Normal crypt architecture.               B. No active inflammation nor granulomatous inflammation.     6. Colon Biopsy at 40 cm:  Benign colonic mucosa with               A. Focal minimally active inflammation with mild hyperplastic change noted.                B. Minimal active cryptitis noted, no well developed crypt abscess formation nor granulomatous inflammation                    Identified.     7. Colon Biopsy at 30 cm: Benign colonic mucosa with                A. Minimal crypt distortion noted.               B. No active inflammation nor granulomatous inflammation identified.     8. Colon Biopsy at 20 cm: Benign colonic mucosa with                A. Relatively normal crypt architecture with mild hyperplastic change.               B. No active inflammation nor granulomatous inflammation identified.     9. Rectal Biopsy: Benign colonic mucosa with                A. Normal crypt architecture.               B. No active inflammation nor granulomatous inflammation identified.              The bloating has slowed down. The last 3 weeks she has had only three bouts. She is on prednisone 5 mg every other day now. NO abdominal or chest pain. No nausea or vomtiing. No diarrhea or constipation. No rectal bleeding or melena. Weight stable. On mesalamine 2/day x 4 yrs. She  averages 1-2 BM/day.     Past Medical History:   Diagnosis Date   • Anemia    • Arthritis    • Crohn disease (CMS/HCC)    • GERD (gastroesophageal reflux disease)    • History of transfusion    • Hyperlipidemia    • Hypertension    • Ocular migraine    • TIA (transient ischemic attack)        Past Surgical History:   Procedure Laterality Date   • APPENDECTOMY     • CHOLECYSTECTOMY     • COLECTOMY PARTIAL / TOTAL     • COLONOSCOPY  08/20/2015    s/p right hemicolectomy, recurrent Crohns, IH   • COLONOSCOPY N/A 8/9/2019    Crohns, IH.     • ENDOSCOPY  08/20/2015    erythematous mucosa in stomach, chronic gastritis,    • ENDOSCOPY N/A 4/28/2019    Erythematous mucosa in stomach, path benign   • SPLENECTOMY           Current Outpatient Medications:   •  amLODIPine (NORVASC) 5 MG tablet, Take 5 mg by mouth Daily., Disp: , Rfl: 1  •  colestipol (COLESTID) 1 G tablet, Take 1 g by mouth 2 (Two) Times a Day., Disp: , Rfl:   •  diazepam (VALIUM) 5 MG tablet, Take 2.5 mg by mouth Every Morning., Disp: , Rfl:   •  ezetimibe (ZETIA) 10 MG tablet, Take 10 mg by mouth Every Evening., Disp: , Rfl:   •  famotidine (PEPCID) 20 MG tablet, Take 20 mg by mouth Daily., Disp: , Rfl:   •  loperamide (IMODIUM) 2 MG capsule, Take 2 mg by mouth Every Morning., Disp: , Rfl:   •  mesalamine (LIALDA) 1.2 g EC tablet, Take 4 po daily, Disp: 120 tablet, Rfl: 11  •  metoprolol tartrate (LOPRESSOR) 50 MG tablet, take 1 tablet by mouth twice a day, Disp: , Rfl: 0  •  Multiple Vitamin (MULTI VITAMIN DAILY) tablet, Take  by mouth., Disp: , Rfl:   •  potassium chloride (MICRO-K) 10 MEQ CR capsule, take 1 capsule by mouth once daily, Disp: , Rfl: 0  •  predniSONE (DELTASONE) 10 MG tablet, Take 4 by mouth daily., Disp: 120 tablet, Rfl: 4  •  vitamin B-12 (CYANOCOBALAMIN) 100 MCG tablet, Take 1,000 mcg by mouth Daily., Disp: , Rfl:     Allergies   Allergen Reactions   • Amitriptyline Confusion   • Mysoline [Primidone] Confusion       History reviewed. No  pertinent family history.    Social History     Socioeconomic History   • Marital status: Single     Spouse name: Not on file   • Number of children: Not on file   • Years of education: Not on file   • Highest education level: Not on file   Tobacco Use   • Smoking status: Never Smoker   • Smokeless tobacco: Never Used   Substance and Sexual Activity   • Alcohol use: No   • Drug use: No   • Sexual activity: Defer       Review of Systems   All other systems reviewed and are negative.      Vitals:    08/29/19 1007   BP: 118/68       Physical Exam   Constitutional: She is oriented to person, place, and time. She appears well-developed and well-nourished. No distress.   HENT:   Head: Normocephalic and atraumatic. Hair is normal.   Right Ear: Hearing, tympanic membrane, external ear and ear canal normal. No drainage. No decreased hearing is noted.   Left Ear: Hearing, tympanic membrane, external ear and ear canal normal. No decreased hearing is noted.   Nose: No nasal deformity.   Mouth/Throat: Oropharynx is clear and moist.   Eyes: Conjunctivae, EOM and lids are normal. Pupils are equal, round, and reactive to light. Right eye exhibits no discharge. Left eye exhibits no discharge.   Neck: Normal range of motion. Neck supple. No JVD present. No tracheal deviation present. No thyromegaly present.   Cardiovascular: Normal rate, regular rhythm, normal heart sounds, intact distal pulses and normal pulses. Exam reveals no gallop and no friction rub.   No murmur heard.  Pulmonary/Chest: Effort normal and breath sounds normal. No respiratory distress. She has no wheezes. She has no rales. She exhibits no tenderness.   Abdominal: Soft. Bowel sounds are normal. She exhibits no distension and no mass. There is no tenderness. There is no rebound and no guarding. No hernia.   Musculoskeletal: Normal range of motion. She exhibits no edema, tenderness or deformity.   Lymphadenopathy:     She has no cervical adenopathy.   Neurological:  She is alert and oriented to person, place, and time. She has normal reflexes. She displays normal reflexes. No cranial nerve deficit. She exhibits normal muscle tone. Coordination normal.   Skin: Skin is warm and dry. No rash noted. She is not diaphoretic. No erythema.   Psychiatric: She has a normal mood and affect. Her behavior is normal. Judgment and thought content normal.   Vitals reviewed.      Michela was seen today for crohn's disease and heartburn.    Diagnoses and all orders for this visit:    Crohn's disease of small intestine with other complication (CMS/HCC)  -     mesalamine (LIALDA) 1.2 g EC tablet; Take 4 po daily       Assessment:  1) Crohn's of the TI and colon    Recommendations:  1) Continue weaning prednisone  2) I wanted to have her increase the Lialda to 4/day (from 2/day) but she wants to stay on 2/day  3) f/u 4 mos    Return in about 4 months (around 12/29/2019).    Marcel Ricketts MD  8/29/2019

## 2019-10-07 ENCOUNTER — TELEPHONE (OUTPATIENT)
Dept: GASTROENTEROLOGY | Facility: CLINIC | Age: 79
End: 2019-10-07

## 2019-10-07 DIAGNOSIS — R74.8 ELEVATED LIVER ENZYMES: Primary | ICD-10-CM

## 2019-10-07 NOTE — TELEPHONE ENCOUNTER
Elevated enzymes could be secondary to Lialda.  How much Lialda is she now taking?  Any other new medicines?  She taking any anti-inflammatory medication?  Any new medicines for cholesterol?  Any recent antibiotics?

## 2019-10-07 NOTE — TELEPHONE ENCOUNTER
----- Message from Lo Almanza sent at 10/7/2019  1:18 PM EDT -----  Regarding: labs  Contact: [phone]  Pt calling wanting to discuss her labs with the nurse.

## 2019-10-07 NOTE — TELEPHONE ENCOUNTER
Called pt and pt is asking about her elevated liver enzymes ( alk phos, ast and alt).  Labs are found under lab time.  Pt reports that her pcp is asking if the mesalamine could be causing this and what should she do.  Advised pt that Dr Ricketts is out of the office for 2 wks but will send message to Ashwini BURRELL.  Pt verb understanding.

## 2019-10-08 NOTE — TELEPHONE ENCOUNTER
Before we stop the Lialda lets go ahead and repeat a CMP to see if her liver enzymes are still elevated.  If they are then we consider changing the Lialda to something else. thanks.

## 2019-10-08 NOTE — TELEPHONE ENCOUNTER
Called pt and pt reports that she is taking 2 lialda per day.  She denies any new medications and is not taking any anti-inflammatoried.  Pt is still taking zetia and colestid but these are not new medication . Also pt has not been on any recent antibx's.  Advised will update Ashwini NP. Pt verb understanding.

## 2019-10-08 NOTE — TELEPHONE ENCOUNTER
Called pt and advised per Ashwini Np that before we stop the lialda lets go ahead and repeat a cmp to see if her liver enzymes are still elevated.  If they are the we can consider changing the lialda to something else.  Pt verb understanding and made lab appt for 10/16 10 am .

## 2019-10-16 LAB
ALBUMIN SERPL-MCNC: 4 G/DL (ref 3.5–5.2)
ALBUMIN/GLOB SERPL: 1.9 G/DL
ALP SERPL-CCNC: 102 U/L (ref 39–117)
ALT SERPL-CCNC: 16 U/L (ref 1–33)
AST SERPL-CCNC: 23 U/L (ref 1–32)
BILIRUB SERPL-MCNC: 0.3 MG/DL (ref 0.2–1.2)
BUN SERPL-MCNC: 11 MG/DL (ref 8–23)
BUN/CREAT SERPL: 12.4 (ref 7–25)
CALCIUM SERPL-MCNC: 8.9 MG/DL (ref 8.6–10.5)
CHLORIDE SERPL-SCNC: 103 MMOL/L (ref 98–107)
CO2 SERPL-SCNC: 28 MMOL/L (ref 22–29)
CREAT SERPL-MCNC: 0.89 MG/DL (ref 0.57–1)
GLOBULIN SER CALC-MCNC: 2.1 GM/DL
GLUCOSE SERPL-MCNC: 90 MG/DL (ref 65–99)
POTASSIUM SERPL-SCNC: 4.1 MMOL/L (ref 3.5–5.2)
PROT SERPL-MCNC: 6.1 G/DL (ref 6–8.5)
SODIUM SERPL-SCNC: 142 MMOL/L (ref 136–145)

## 2019-10-17 ENCOUNTER — TELEPHONE (OUTPATIENT)
Dept: GASTROENTEROLOGY | Facility: CLINIC | Age: 79
End: 2019-10-17

## 2019-10-17 NOTE — TELEPHONE ENCOUNTER
Call to pt.  Advise per M Kathleen that liver enzymes are completely normal now and no longer need f/u unless having issues.    Verb understanding.  Follows with Dr Ricketts for Crohn's - will keep f/u appt on 12/30.

## 2019-10-17 NOTE — TELEPHONE ENCOUNTER
----- Message from YANY Lentz sent at 10/17/2019  9:03 AM EDT -----  Please call the patient and let him know that his liver enzymes are completely normal now and he no longer has a follow-up with us unless he is having any issues.  So happy for him.

## 2019-12-20 ENCOUNTER — HOSPITAL ENCOUNTER (EMERGENCY)
Facility: HOSPITAL | Age: 79
Discharge: HOME | End: 2019-12-20
Attending: EMERGENCY MEDICINE | Admitting: EMERGENCY MEDICINE

## 2019-12-20 ENCOUNTER — APPOINTMENT (OUTPATIENT)
Dept: CT IMAGING | Facility: HOSPITAL | Age: 79
End: 2019-12-20

## 2019-12-20 VITALS
RESPIRATION RATE: 18 BRPM | TEMPERATURE: 97.5 F | OXYGEN SATURATION: 99 % | SYSTOLIC BLOOD PRESSURE: 164 MMHG | BODY MASS INDEX: 23.74 KG/M2 | DIASTOLIC BLOOD PRESSURE: 89 MMHG | HEART RATE: 99 BPM | HEIGHT: 62 IN

## 2019-12-20 DIAGNOSIS — R11.2 NAUSEA, VOMITING AND DIARRHEA: ICD-10-CM

## 2019-12-20 DIAGNOSIS — R19.7 NAUSEA, VOMITING AND DIARRHEA: ICD-10-CM

## 2019-12-20 DIAGNOSIS — R10.10 PAIN OF UPPER ABDOMEN: Primary | ICD-10-CM

## 2019-12-20 LAB
ALBUMIN SERPL-MCNC: 4.2 G/DL (ref 3.5–5.2)
ALBUMIN/GLOB SERPL: 1.4 G/DL
ALP SERPL-CCNC: 116 U/L (ref 39–117)
ALT SERPL W P-5'-P-CCNC: 20 U/L (ref 1–33)
ANION GAP SERPL CALCULATED.3IONS-SCNC: 16.2 MMOL/L (ref 5–15)
AST SERPL-CCNC: 26 U/L (ref 1–32)
BASOPHILS # BLD AUTO: 0.08 10*3/MM3 (ref 0–0.2)
BASOPHILS NFR BLD AUTO: 0.3 % (ref 0–1.5)
BILIRUB SERPL-MCNC: 0.4 MG/DL (ref 0.2–1.2)
BUN BLD-MCNC: 15 MG/DL (ref 8–23)
BUN/CREAT SERPL: 18.3 (ref 7–25)
CALCIUM SPEC-SCNC: 9.2 MG/DL (ref 8.6–10.5)
CHLORIDE SERPL-SCNC: 107 MMOL/L (ref 98–107)
CO2 SERPL-SCNC: 21.8 MMOL/L (ref 22–29)
CREAT BLD-MCNC: 0.82 MG/DL (ref 0.57–1)
DEPRECATED RDW RBC AUTO: 43.9 FL (ref 37–54)
EOSINOPHIL # BLD AUTO: 0.15 10*3/MM3 (ref 0–0.4)
EOSINOPHIL NFR BLD AUTO: 0.6 % (ref 0.3–6.2)
ERYTHROCYTE [DISTWIDTH] IN BLOOD BY AUTOMATED COUNT: 13.5 % (ref 12.3–15.4)
GFR SERPL CREATININE-BSD FRML MDRD: 67 ML/MIN/1.73
GLOBULIN UR ELPH-MCNC: 2.9 GM/DL
GLUCOSE BLD-MCNC: 109 MG/DL (ref 65–99)
HCT VFR BLD AUTO: 43.7 % (ref 34–46.6)
HGB BLD-MCNC: 14 G/DL (ref 12–15.9)
IMM GRANULOCYTES # BLD AUTO: 0.1 10*3/MM3 (ref 0–0.05)
IMM GRANULOCYTES NFR BLD AUTO: 0.4 % (ref 0–0.5)
LIPASE SERPL-CCNC: 49 U/L (ref 13–60)
LYMPHOCYTES # BLD AUTO: 1.43 10*3/MM3 (ref 0.7–3.1)
LYMPHOCYTES NFR BLD AUTO: 6.1 % (ref 19.6–45.3)
MCH RBC QN AUTO: 28.2 PG (ref 26.6–33)
MCHC RBC AUTO-ENTMCNC: 32 G/DL (ref 31.5–35.7)
MCV RBC AUTO: 87.9 FL (ref 79–97)
MONOCYTES # BLD AUTO: 2.28 10*3/MM3 (ref 0.1–0.9)
MONOCYTES NFR BLD AUTO: 9.8 % (ref 5–12)
NEUTROPHILS # BLD AUTO: 19.26 10*3/MM3 (ref 1.7–7)
NEUTROPHILS NFR BLD AUTO: 82.8 % (ref 42.7–76)
NRBC BLD AUTO-RTO: 0 /100 WBC (ref 0–0.2)
PLATELET # BLD AUTO: 223 10*3/MM3 (ref 140–450)
PMV BLD AUTO: 10.2 FL (ref 6–12)
POTASSIUM BLD-SCNC: 3.9 MMOL/L (ref 3.5–5.2)
PROT SERPL-MCNC: 7.1 G/DL (ref 6–8.5)
RBC # BLD AUTO: 4.97 10*6/MM3 (ref 3.77–5.28)
SODIUM BLD-SCNC: 145 MMOL/L (ref 136–145)
WBC NRBC COR # BLD: 23.3 10*3/MM3 (ref 3.4–10.8)

## 2019-12-20 PROCEDURE — 80053 COMPREHEN METABOLIC PANEL: CPT | Performed by: EMERGENCY MEDICINE

## 2019-12-20 PROCEDURE — 96374 THER/PROPH/DIAG INJ IV PUSH: CPT

## 2019-12-20 PROCEDURE — 83690 ASSAY OF LIPASE: CPT | Performed by: EMERGENCY MEDICINE

## 2019-12-20 PROCEDURE — 74177 CT ABD & PELVIS W/CONTRAST: CPT

## 2019-12-20 PROCEDURE — 99282 EMERGENCY DEPT VISIT SF MDM: CPT

## 2019-12-20 PROCEDURE — 25010000002 IOPAMIDOL 61 % SOLUTION: Performed by: EMERGENCY MEDICINE

## 2019-12-20 PROCEDURE — 25010000002 ONDANSETRON PER 1 MG: Performed by: EMERGENCY MEDICINE

## 2019-12-20 PROCEDURE — 85025 COMPLETE CBC W/AUTO DIFF WBC: CPT | Performed by: EMERGENCY MEDICINE

## 2019-12-20 RX ORDER — ONDANSETRON 4 MG/1
4 TABLET, ORALLY DISINTEGRATING ORAL EVERY 8 HOURS PRN
Qty: 15 TABLET | Refills: 0 | Status: SHIPPED | OUTPATIENT
Start: 2019-12-20 | End: 2022-12-20 | Stop reason: ALTCHOICE

## 2019-12-20 RX ORDER — SODIUM CHLORIDE 0.9 % (FLUSH) 0.9 %
10 SYRINGE (ML) INJECTION AS NEEDED
Status: DISCONTINUED | OUTPATIENT
Start: 2019-12-20 | End: 2019-12-20 | Stop reason: HOSPADM

## 2019-12-20 RX ORDER — ONDANSETRON 2 MG/ML
4 INJECTION INTRAMUSCULAR; INTRAVENOUS ONCE
Status: COMPLETED | OUTPATIENT
Start: 2019-12-20 | End: 2019-12-20

## 2019-12-20 RX ADMIN — SODIUM CHLORIDE 1000 ML: 9 INJECTION, SOLUTION INTRAVENOUS at 04:25

## 2019-12-20 RX ADMIN — ONDANSETRON 4 MG: 2 INJECTION INTRAMUSCULAR; INTRAVENOUS at 04:25

## 2019-12-20 RX ADMIN — IOPAMIDOL 85 ML: 612 INJECTION, SOLUTION INTRAVENOUS at 05:12

## 2019-12-20 NOTE — ED TRIAGE NOTES
PT PRESENTS TO ER WITH C/O ABDOMINAL PAIN WITH N/V/D ONSET 4 HOURS AGO. REPORTS HX OF CROHN'S. RATES PAIN 3/10 AT THIS TIME.    AMBULATORY TO TRIAGE DESK. NAD NOTED. VSS. A/O X4. MMM. RR E/U. SKIN PWD.

## 2019-12-20 NOTE — ED PROVIDER NOTES
EMERGENCY DEPARTMENT ENCOUNTER    CHIEF COMPLAINT  Chief Complaint: N/V/D  History given by: Patient  History limited by: nothing  Room Number: 09/09  PMD: Roni Mckenzie MD   GI: Dr. Ricketts      HPI:  Pt is a 79 y.o. female who presents complaining of N/V/D which began earlier this morning. Pt admits to abd pain. Pt reports hx of Crohn's Disease but states the only difference between this and her current sx is the abd pain.    Duration:  Earlier this morning  Onset: gradual  Timing: onstant  Quality: N/V/D  Intensity/Severity: mild  Progression: unchanged  Associated Symptoms: abd pain  Aggravating Factors: none  Alleviating Factors: none      PAST MEDICAL HISTORY  Active Ambulatory Problems     Diagnosis Date Noted   • Partial small bowel obstruction (CMS/[facility]) 04/19/2016   • Generalized abdominal pain 04/27/2019   • Crohn's disease of small intestine with other complication (CMS/[facility]) 04/27/2019   • Crohn disease (CMS/[facility]) 06/25/2019   • Bloating 06/25/2019     Resolved Ambulatory Problems     Diagnosis Date Noted   • No Resolved Ambulatory Problems     Past Medical History:   Diagnosis Date   • Anemia    • Arthritis    • GERD (gastroesophageal reflux disease)    • History of transfusion    • Hyperlipidemia    • Hypertension    • Ocular migraine    • TIA (transient ischemic attack)        PAST SURGICAL HISTORY  Past Surgical History:   Procedure Laterality Date   • APPENDECTOMY     • CHOLECYSTECTOMY     • COLECTOMY PARTIAL / TOTAL     • COLONOSCOPY  08/20/2015    s/p right hemicolectomy, recurrent Crohns, IH   • COLONOSCOPY N/A 8/9/2019    Crohns, IH.     • ENDOSCOPY  08/20/2015    erythematous mucosa in stomach, chronic gastritis,    • ENDOSCOPY N/A 4/28/2019    Erythematous mucosa in stomach, path benign   • SPLENECTOMY         FAMILY HISTORY  No family history on file.    SOCIAL HISTORY  Social History     Socioeconomic History   • Marital status: Single     Spouse name: Not on file   • Number of children:  Not on file   • Years of education: Not on file   • Highest education level: Not on file   Tobacco Use   • Smoking status: Never Smoker   • Smokeless tobacco: Never Used   Substance and Sexual Activity   • Alcohol use: No   • Drug use: No   • Sexual activity: Defer       ALLERGIES  Amitriptyline and Mysoline [primidone]    REVIEW OF SYSTEMS  Review of Systems   Constitutional: Negative for fever.   HENT: Negative for sore throat.    Eyes: Negative.    Respiratory: Negative for cough and shortness of breath.    Cardiovascular: Negative for chest pain.   Gastrointestinal: Positive for abdominal pain, diarrhea, nausea and vomiting.   Genitourinary: Negative for dysuria.   Musculoskeletal: Negative for neck pain.   Skin: Negative for rash.   Allergic/Immunologic: Negative.    Neurological: Negative for weakness, numbness and headaches.   Hematological: Negative.    Psychiatric/Behavioral: Negative.    All other systems reviewed and are negative.      PHYSICAL EXAM  ED Triage Vitals [12/20/19 0406]   Temp Heart Rate Resp BP SpO2   97.5 °F (36.4 °C) 99 18 -- 99 %      Temp src Heart Rate Source Patient Position BP Location FiO2 (%)   Tympanic Monitor -- -- --       Physical Exam   Constitutional: She is oriented to person, place, and time. No distress.   HENT:   Head: Normocephalic and atraumatic.   Eyes: Pupils are equal, round, and reactive to light. EOM are normal.   Neck: Normal range of motion. Neck supple.   Cardiovascular: Normal rate, regular rhythm and normal heart sounds.   Pulmonary/Chest: Effort normal and breath sounds normal. No respiratory distress.   Abdominal: Soft. There is no tenderness. There is no rebound and no guarding.   Musculoskeletal: Normal range of motion. She exhibits no edema.   Neurological: She is alert and oriented to person, place, and time. She has normal sensation and normal strength.   Skin: Skin is warm and dry. No rash noted.   Psychiatric: Mood and affect normal.   Nursing note and  vitals reviewed.      LAB RESULTS  Lab Results (last 24 hours)     Procedure Component Value Units Date/Time    CBC & Differential [[id number]] Collected:  12/20/19 0418    Specimen:  Blood Updated:  12/20/19 0429    Narrative:       The following orders were created for panel order CBC & Differential.  Procedure                               Abnormality         Status                     ---------                               -----------         ------                     CBC Auto Differential[[id number]]        Abnormal            Final result                 Please view results for these tests on the individual orders.    Comprehensive Metabolic Panel [[id number]]  (Abnormal) Collected:  12/20/19 0418    Specimen:  Blood Updated:  12/20/19 0450     Glucose 109 mg/dL      BUN 15 mg/dL      Creatinine 0.82 mg/dL      Sodium 145 mmol/L      Potassium 3.9 mmol/L      Chloride 107 mmol/L      CO2 21.8 mmol/L      Calcium 9.2 mg/dL      Total Protein 7.1 g/dL      Albumin 4.20 g/dL      ALT (SGPT) 20 U/L      AST (SGOT) 26 U/L      Alkaline Phosphatase 116 U/L      Total Bilirubin 0.4 mg/dL      eGFR Non African Amer 67 mL/min/1.73      Globulin 2.9 gm/dL      A/G Ratio 1.4 g/dL      BUN/Creatinine Ratio 18.3     Anion Gap 16.2 mmol/L     Narrative:       GFR Normal >60  Chronic Kidney Disease <60  Kidney Failure <15      Lipase [[id number]]  (Normal) Collected:  12/20/19 0418    Specimen:  Blood Updated:  12/20/19 0450     Lipase 49 U/L     CBC Auto Differential [[id number]]  (Abnormal) Collected:  12/20/19 0418    Specimen:  Blood Updated:  12/20/19 0429     WBC 23.30 10*3/mm3      RBC 4.97 10*6/mm3      Hemoglobin 14.0 g/dL      Hematocrit 43.7 %      MCV 87.9 fL      MCH 28.2 pg      MCHC 32.0 g/dL      RDW 13.5 %      RDW-SD 43.9 fl      MPV 10.2 fL      Platelets 223 10*3/mm3      Neutrophil % 82.8 %      Lymphocyte % 6.1 %      Monocyte % 9.8 %      Eosinophil % 0.6 %      Basophil % 0.3 %      Immature Grans % 0.4  %      Neutrophils, Absolute 19.26 10*3/mm3      Lymphocytes, Absolute 1.43 10*3/mm3      Monocytes, Absolute 2.28 10*3/mm3      Eosinophils, Absolute 0.15 10*3/mm3      Basophils, Absolute 0.08 10*3/mm3      Immature Grans, Absolute 0.10 10*3/mm3      nRBC 0.0 /100 WBC           I ordered the above labs and reviewed the results    RADIOLOGY  Ct Abdomen Pelvis With Contrast    Result Date: 12/20/2019  CT SCANS ABDOMEN AND PELVIS WITH IV CONTRAST.  HISTORY: Abdominal pain, unspecified  COMPARISON: 04/27/2019.  TECHNIQUE: Radiation dose reduction techniques were utilized, including automated exposure control and exposure modulation based on body size. Axial images were obtained from the lung bases to the symphysis pubis with IV contrast only.. Oral contrast was not administered per request.  FINDINGS :  Prior splenectomy, appendectomy and cholecystectomy with stable mild biliary dilation which is not uncommon postcholecystectomy. Remaining solid organs have an unremarkable appearance. Aorta nonaneurysmal. There is a small, 13 mm enhancing uterine mass to the left of midline which is unchanged and likely reflective of a small leiomyoma. The GI tract not opacified for assessment but non obstructive in appearance. There is a small hiatal hernia.        IMPRESSION : 1. Small hiatal hernia. 2. Stable enhancing uterine lesion, likely leiomyoma           I ordered the above noted radiological studies. Interpreted by radiologist. Reviewed by me in PACS.       PROCEDURES  Procedures      PROGRESS AND CONSULTS     0554- Rechecked pt who is resting comfortably in NAD. Pt states that she has not vomited or has an episode of diarrhea while in the ED. Informed pt of the lab  and imaging results. D/w pt the plan to DC with nausea mediation. Pt understands and agrees with plan. All questions answered.        MEDICATIONS GIVEN IN ER  Medications   sodium chloride 0.9 % flush 10 mL (has no administration in time range)   sodium  chloride 0.9 % bolus 1,000 mL (0 mL Intravenous Stopped 12/20/19 9827)   ondansetron (ZOFRAN) injection 4 mg (4 mg Intravenous Given 12/20/19 9857)   iopamidol (ISOVUE-300) 61 % injection 85 mL (85 mL Intravenous Given 12/20/19 5341)         MEDICAL DECISION MAKING  Results were reviewed/discussed with the patient and they were also made aware of online access. Pt also made aware that some labs, such as cultures, will not be resulted during ER visit and follow up with PMD is necessary.     MDM  Number of Diagnoses or Management Options     Amount and/or Complexity of Data Reviewed  Clinical lab tests: reviewed and ordered (WBC: 23.30  BUN: 15  Creatinine: 0.82  Potassium: 3.9  Sodium: 145)  Tests in the radiology section of CPT®: reviewed and ordered (CT Abd/Pelvis: 1. Small hiatal hernia. 2. Stable enhancing uterine lesion, likely leiomyoma  )  Decide to obtain previous medical records or to obtain history from someone other than the patient: yes  Review and summarize past medical records: yes (Pt was admitted for her Chron's Disease in April 2019)           DIAGNOSIS  Final diagnoses:   Pain of upper abdomen   Nausea, vomiting and diarrhea       DISPOSITION  DISCHARGE    Patient discharged in stable condition.    Reviewed implications of results, diagnosis, meds, responsibility to follow up, warning signs and symptoms of possible worsening, potential complications and reasons to return to ER.    Patient/Family voiced understanding of above instructions.    Discussed plan for discharge, as there is no emergent indication for admission. Patient referred to primary care provider for BP management due to today's BP. Pt/family is agreeable and understands need for follow up and repeat testing.  Pt is aware that discharge does not mean that nothing is wrong but it indicates no emergency is present that requires admission and they must continue care with follow-up as given below or physician of their choice.      FOLLOW-UP  Roni Mckenzie MD  [address]  [id number]  [place] [address]  [phone]    Schedule an appointment as soon as possible for a visit            Medication List      New Prescriptions    ondansetron ODT 4 MG disintegrating tablet  Commonly known as:  ZOFRAN ODT  Take 1 tablet by mouth Every 8 (Eight) Hours As Needed for Nausea or   Vomiting.              Latest Documented Vital Signs:  As of 6:51 AM  BP- 164/89 HR- 99 Temp- 97.5 °F (36.4 °C) (Tympanic) O2 sat- 99%    --  Documentation assistance provided by negro Fontaine for Dr. Dockery.  Information recorded by the scribe was done at my direction and has been verified and validated by me.       Marilyn Fontaine  12/20/19 0600       Mat Dockery MD  12/20/19 0606

## 2019-12-30 ENCOUNTER — OFFICE VISIT (OUTPATIENT)
Dept: GASTROENTEROLOGY | Facility: CLINIC | Age: 79
End: 2019-12-30

## 2019-12-30 VITALS
DIASTOLIC BLOOD PRESSURE: 68 MMHG | HEIGHT: 62 IN | TEMPERATURE: 97.5 F | BODY MASS INDEX: 24.07 KG/M2 | SYSTOLIC BLOOD PRESSURE: 140 MMHG | WEIGHT: 130.8 LBS

## 2019-12-30 DIAGNOSIS — R14.0 BLOATING: ICD-10-CM

## 2019-12-30 DIAGNOSIS — K50.018 CROHN'S DISEASE OF SMALL INTESTINE WITH OTHER COMPLICATION (HCC): ICD-10-CM

## 2019-12-30 DIAGNOSIS — D72.828 OTHER ELEVATED WHITE BLOOD CELL (WBC) COUNT: Primary | ICD-10-CM

## 2019-12-30 DIAGNOSIS — K21.9 GASTROESOPHAGEAL REFLUX DISEASE, ESOPHAGITIS PRESENCE NOT SPECIFIED: ICD-10-CM

## 2019-12-30 LAB
BASOPHILS # BLD AUTO: 0.05 10*3/MM3 (ref 0–0.2)
BASOPHILS NFR BLD AUTO: 0.6 % (ref 0–1.5)
EOSINOPHIL # BLD AUTO: 0.12 10*3/MM3 (ref 0–0.4)
EOSINOPHIL NFR BLD AUTO: 1.5 % (ref 0.3–6.2)
ERYTHROCYTE [DISTWIDTH] IN BLOOD BY AUTOMATED COUNT: 13.1 % (ref 12.3–15.4)
HCT VFR BLD AUTO: 40.1 % (ref 34–46.6)
HGB BLD-MCNC: 13.1 G/DL (ref 12–15.9)
IMM GRANULOCYTES # BLD AUTO: 0.03 10*3/MM3 (ref 0–0.05)
IMM GRANULOCYTES NFR BLD AUTO: 0.4 % (ref 0–0.5)
LYMPHOCYTES # BLD AUTO: 1.69 10*3/MM3 (ref 0.7–3.1)
LYMPHOCYTES NFR BLD AUTO: 20.8 % (ref 19.6–45.3)
MCH RBC QN AUTO: 27.8 PG (ref 26.6–33)
MCHC RBC AUTO-ENTMCNC: 32.7 G/DL (ref 31.5–35.7)
MCV RBC AUTO: 85.1 FL (ref 79–97)
MONOCYTES # BLD AUTO: 0.77 10*3/MM3 (ref 0.1–0.9)
MONOCYTES NFR BLD AUTO: 9.5 % (ref 5–12)
NEUTROPHILS # BLD AUTO: 5.46 10*3/MM3 (ref 1.7–7)
NEUTROPHILS NFR BLD AUTO: 67.2 % (ref 42.7–76)
NRBC BLD AUTO-RTO: 0 /100 WBC (ref 0–0.2)
PLATELET # BLD AUTO: 260 10*3/MM3 (ref 140–450)
RBC # BLD AUTO: 4.71 10*6/MM3 (ref 3.77–5.28)
WBC # BLD AUTO: 8.12 10*3/MM3 (ref 3.4–10.8)

## 2019-12-30 PROCEDURE — 99214 OFFICE O/P EST MOD 30 MIN: CPT | Performed by: INTERNAL MEDICINE

## 2019-12-30 NOTE — PROGRESS NOTES
Chief Complaint   Patient presents with   • Follow-up   • Crohn's Disease   • Heartburn       History of Present Illness:   79 y.o. female with a history of Crohn's disease of the terminal ileum and colon.  In 2014 Dr. Bella Limon performed resection of the terminal ileum and cecum. She has been off of prednisone since I last saw her 8/29/19. She feels OK. She went to the ER 12/20/19. CT abd/pelvis looked OK. Given IVF and Zofran and sent home and has done well. NO abdominal pain or nausea now. No fevers, chills. Denies NSAIDs use. No dysuria, no coughing. Occasional watery stools. She has 1-2 BM/day. No rectal bleeding or melena. Weight stable. She is on Lialda 2/day. She has bloating and gas sometimes.     Past Medical History:   Diagnosis Date   • Anemia    • Arthritis    • Crohn disease (CMS/HCC)    • GERD (gastroesophageal reflux disease)    • History of transfusion    • Hyperlipidemia    • Hypertension    • Ocular migraine    • TIA (transient ischemic attack)        Past Surgical History:   Procedure Laterality Date   • APPENDECTOMY     • CHOLECYSTECTOMY     • COLECTOMY PARTIAL / TOTAL     • COLONOSCOPY  08/20/2015    s/p right hemicolectomy, recurrent Crohns, IH   • COLONOSCOPY N/A 8/9/2019    Crohns, IH.     • ENDOSCOPY  08/20/2015    erythematous mucosa in stomach, chronic gastritis,    • ENDOSCOPY N/A 4/28/2019    Erythematous mucosa in stomach, path benign   • SPLENECTOMY           Current Outpatient Medications:   •  amLODIPine (NORVASC) 5 MG tablet, Take 5 mg by mouth Daily., Disp: , Rfl: 1  •  colestipol (COLESTID) 1 G tablet, Take 1 g by mouth Daily., Disp: , Rfl:   •  diazepam (VALIUM) 5 MG tablet, Take 2.5 mg by mouth Every Morning., Disp: , Rfl:   •  ezetimibe (ZETIA) 10 MG tablet, Take 10 mg by mouth Every Evening., Disp: , Rfl:   •  famotidine (PEPCID) 20 MG tablet, Take 20 mg by mouth Daily., Disp: , Rfl:   •  loperamide (IMODIUM) 2 MG capsule, Take 2 mg by mouth Every Morning., Disp: , Rfl:    •  mesalamine (LIALDA) 1.2 g EC tablet, Take 4 po daily (Patient taking differently: Take 800 mg by mouth Daily. Take 4 po daily), Disp: 120 tablet, Rfl: 11  •  metoprolol tartrate (LOPRESSOR) 50 MG tablet, take 1 tablet by mouth twice a day, Disp: , Rfl: 0  •  Multiple Vitamin (MULTI VITAMIN DAILY) tablet, Take  by mouth., Disp: , Rfl:   •  ondansetron ODT (ZOFRAN ODT) 4 MG disintegrating tablet, Take 1 tablet by mouth Every 8 (Eight) Hours As Needed for Nausea or Vomiting., Disp: 15 tablet, Rfl: 0  •  potassium chloride (MICRO-K) 10 MEQ CR capsule, take 1 capsule by mouth once daily, Disp: , Rfl: 0  •  vitamin B-12 (CYANOCOBALAMIN) 100 MCG tablet, Take 1,000 mcg by mouth Daily., Disp: , Rfl:     Allergies   Allergen Reactions   • Amitriptyline Confusion   • Mysoline [Primidone] Confusion       History reviewed. No pertinent family history.    Social History     Socioeconomic History   • Marital status: Single     Spouse name: Not on file   • Number of children: Not on file   • Years of education: Not on file   • Highest education level: Not on file   Tobacco Use   • Smoking status: Never Smoker   • Smokeless tobacco: Never Used   Substance and Sexual Activity   • Alcohol use: No   • Drug use: No   • Sexual activity: Defer       Review of Systems   Gastrointestinal: Negative for abdominal distention and abdominal pain.   All other systems reviewed and are negative.      Vitals:    12/30/19 1025   BP: 140/68   Temp: 97.5 °F (36.4 °C)       Physical Exam   Constitutional: She is oriented to person, place, and time. She appears well-developed and well-nourished. No distress.   HENT:   Head: Normocephalic and atraumatic. Hair is normal.   Right Ear: Hearing, tympanic membrane, external ear and ear canal normal. No drainage. No decreased hearing is noted.   Left Ear: Hearing, tympanic membrane, external ear and ear canal normal. No decreased hearing is noted.   Nose: No nasal deformity.   Mouth/Throat: Oropharynx is  clear and moist.   Eyes: Pupils are equal, round, and reactive to light. Conjunctivae, EOM and lids are normal. Right eye exhibits no discharge. Left eye exhibits no discharge.   Neck: Normal range of motion. Neck supple. No JVD present. No tracheal deviation present. No thyromegaly present.   Cardiovascular: Normal rate, regular rhythm, normal heart sounds, intact distal pulses and normal pulses. Exam reveals no gallop and no friction rub.   No murmur heard.  Pulmonary/Chest: Effort normal and breath sounds normal. No respiratory distress. She has no wheezes. She has no rales. She exhibits no tenderness.   Abdominal: Soft. Bowel sounds are normal. She exhibits no distension and no mass. There is no tenderness. There is no rebound and no guarding. No hernia.   Musculoskeletal: Normal range of motion. She exhibits no edema, tenderness or deformity.   Lymphadenopathy:     She has no cervical adenopathy.   Neurological: She is alert and oriented to person, place, and time. She has normal reflexes. She displays normal reflexes. No cranial nerve deficit. She exhibits normal muscle tone. Coordination normal.   Skin: Skin is warm and dry. No rash noted. She is not diaphoretic. No erythema.   Psychiatric: She has a normal mood and affect. Her behavior is normal. Judgment and thought content normal.   Vitals reviewed.      Michela was seen today for follow-up, crohn's disease and heartburn.    Diagnoses and all orders for this visit:    Other elevated white blood cell (WBC) count  -     CBC & Differential    Crohn's disease of small intestine with other complication (CMS/HCC)  -     CBC & Differential    Bloating    Gastroesophageal reflux disease, esophagitis presence not specified      Assessment:  1. a history of Crohn's disease of the terminal ileum and colon.  In 2014 Dr. Bella Limon performed resection of the terminal ileum and cecum.  2. Elevated WBC  3. Bloating and gas.     Recommendations:  1. CBC  2. Take Lialda  3/day  3. F/u 8 weeks.     Return in about 8 weeks (around 2/24/2020).    Marcel Ricketts MD  12/30/2019

## 2020-01-09 ENCOUNTER — TELEPHONE (OUTPATIENT)
Dept: GASTROENTEROLOGY | Facility: CLINIC | Age: 80
End: 2020-01-09

## 2020-01-09 NOTE — TELEPHONE ENCOUNTER
Call to pt.  Advise per Dr Ricketts that repeat CBC from 12/30/19 showed that WBC had normalized to 8.1, which is good.  Verb understanding.

## 2020-01-09 NOTE — TELEPHONE ENCOUNTER
Called pt and left vm for pt to call back.     Copy of Dr Ricketts's note sent to Dr Mckenzie thru [facility].

## 2020-01-09 NOTE — TELEPHONE ENCOUNTER
----- Message from Marcel Ricketts MD sent at 1/1/2020  5:31 PM EST -----  01/01/20  Tell her that her repeat CBC fromn 12/30/19 showed that her WBC had normalized to 8.1, which is good. FAX to her PCP.   Dai cheng

## 2020-03-04 ENCOUNTER — OFFICE VISIT (OUTPATIENT)
Dept: GASTROENTEROLOGY | Facility: CLINIC | Age: 80
End: 2020-03-04

## 2020-03-04 VITALS
BODY MASS INDEX: 24.07 KG/M2 | HEIGHT: 62 IN | DIASTOLIC BLOOD PRESSURE: 74 MMHG | WEIGHT: 130.8 LBS | TEMPERATURE: 97.9 F | SYSTOLIC BLOOD PRESSURE: 124 MMHG

## 2020-03-04 DIAGNOSIS — K21.9 GASTROESOPHAGEAL REFLUX DISEASE, ESOPHAGITIS PRESENCE NOT SPECIFIED: ICD-10-CM

## 2020-03-04 DIAGNOSIS — K50.018 CROHN'S DISEASE OF SMALL INTESTINE WITH OTHER COMPLICATION (HCC): Primary | ICD-10-CM

## 2020-03-04 PROCEDURE — 99213 OFFICE O/P EST LOW 20 MIN: CPT | Performed by: INTERNAL MEDICINE

## 2020-09-14 DIAGNOSIS — K50.018 CROHN'S DISEASE OF SMALL INTESTINE WITH OTHER COMPLICATION (HCC): ICD-10-CM

## 2020-09-15 RX ORDER — MESALAMINE 1.2 G/1
TABLET, DELAYED RELEASE ORAL
Qty: 120 TABLET | Refills: 6 | Status: SHIPPED | OUTPATIENT
Start: 2020-09-15 | End: 2021-03-11 | Stop reason: SDUPTHER

## 2020-12-14 ENCOUNTER — TRANSCRIBE ORDERS (OUTPATIENT)
Dept: ADMINISTRATIVE | Facility: HOSPITAL | Age: 80
End: 2020-12-14

## 2020-12-14 ENCOUNTER — HOSPITAL ENCOUNTER (OUTPATIENT)
Dept: CT IMAGING | Facility: HOSPITAL | Age: 80
Discharge: HOME | End: 2020-12-14
Admitting: INTERNAL MEDICINE

## 2020-12-14 DIAGNOSIS — R94.5 NONSPECIFIC ABNORMAL RESULTS OF LIVER FUNCTION STUDY: Primary | ICD-10-CM

## 2020-12-14 DIAGNOSIS — R94.5 NONSPECIFIC ABNORMAL RESULTS OF LIVER FUNCTION STUDY: ICD-10-CM

## 2020-12-14 LAB — CREAT BLDA-MCNC: 1.1 MG/DL (ref 0.6–1.3)

## 2020-12-14 PROCEDURE — 82565 ASSAY OF CREATININE: CPT

## 2020-12-14 PROCEDURE — 25010000002 IOPAMIDOL 61 % SOLUTION: Performed by: INTERNAL MEDICINE

## 2020-12-14 PROCEDURE — 74177 CT ABD & PELVIS W/CONTRAST: CPT

## 2020-12-14 RX ADMIN — IOPAMIDOL 85 ML: 612 INJECTION, SOLUTION INTRAVENOUS at 10:52

## 2020-12-14 NOTE — NURSING NOTE
Dr. Farah read CT A&P and Dr. Mckenzie was called and given  the impressions and stated patient may go home. Ambulated out to car per self.

## 2021-01-13 ENCOUNTER — TELEPHONE (OUTPATIENT)
Dept: GASTROENTEROLOGY | Facility: CLINIC | Age: 81
End: 2021-01-13

## 2021-01-13 NOTE — TELEPHONE ENCOUNTER
----- Message from Lo Alfaro Rep sent at 1/13/2021 12:57 PM EST -----  Regarding: questions  Contact: [phone]  Pt has some questions for you

## 2021-01-13 NOTE — TELEPHONE ENCOUNTER
Called pt and pt reports that her pcp Dr Mckenzie wanted her to see Dr Liliam raphael for her elvated lft's . She states that they have sent the labs. Advised pt that we do not have the labs but will call and obtain them. Also advised will send message to manager for sooner appt.   Verb understanding.     Called Dr Mckenzie's office and they are closed for the day.  Will try again tomorrow.

## 2021-03-02 DIAGNOSIS — Z23 IMMUNIZATION DUE: ICD-10-CM

## 2021-03-08 ENCOUNTER — OFFICE VISIT (OUTPATIENT)
Dept: GASTROENTEROLOGY | Facility: CLINIC | Age: 81
End: 2021-03-08

## 2021-03-08 VITALS
SYSTOLIC BLOOD PRESSURE: 130 MMHG | TEMPERATURE: 97.8 F | WEIGHT: 133 LBS | BODY MASS INDEX: 24.48 KG/M2 | DIASTOLIC BLOOD PRESSURE: 70 MMHG | HEIGHT: 62 IN

## 2021-03-08 DIAGNOSIS — K75.9 INFLAMMATORY LIVER DISEASE, UNSPECIFIED: ICD-10-CM

## 2021-03-08 DIAGNOSIS — R79.89 ELEVATED LFTS: Primary | ICD-10-CM

## 2021-03-08 DIAGNOSIS — R14.0 BLOATING: ICD-10-CM

## 2021-03-08 DIAGNOSIS — K50.018 CROHN'S DISEASE OF SMALL INTESTINE WITH OTHER COMPLICATION (HCC): ICD-10-CM

## 2021-03-08 LAB
ALBUMIN SERPL-MCNC: 4.3 G/DL (ref 3.5–5.2)
ALBUMIN/GLOB SERPL: 2.2 G/DL
ALP SERPL-CCNC: 109 U/L (ref 39–117)
ALT SERPL-CCNC: 22 U/L (ref 1–33)
AMYLASE SERPL-CCNC: 109 U/L (ref 28–100)
AST SERPL-CCNC: 42 U/L (ref 1–32)
BASOPHILS # BLD AUTO: 0.08 10*3/MM3 (ref 0–0.2)
BASOPHILS NFR BLD AUTO: 1 % (ref 0–1.5)
BILIRUB SERPL-MCNC: 0.4 MG/DL (ref 0–1.2)
BUN SERPL-MCNC: 11 MG/DL (ref 8–23)
BUN/CREAT SERPL: 12.6 (ref 7–25)
CALCIUM SERPL-MCNC: 9.2 MG/DL (ref 8.6–10.5)
CHLORIDE SERPL-SCNC: 104 MMOL/L (ref 98–107)
CO2 SERPL-SCNC: 26.6 MMOL/L (ref 22–29)
CREAT SERPL-MCNC: 0.87 MG/DL (ref 0.57–1)
EOSINOPHIL # BLD AUTO: 0.12 10*3/MM3 (ref 0–0.4)
EOSINOPHIL NFR BLD AUTO: 1.5 % (ref 0.3–6.2)
ERYTHROCYTE [DISTWIDTH] IN BLOOD BY AUTOMATED COUNT: 13.6 % (ref 12.3–15.4)
GLOBULIN SER CALC-MCNC: 2 GM/DL
GLUCOSE SERPL-MCNC: 86 MG/DL (ref 65–99)
HCT VFR BLD AUTO: 41.8 % (ref 34–46.6)
HGB BLD-MCNC: 13.9 G/DL (ref 12–15.9)
IMM GRANULOCYTES # BLD AUTO: 0.02 10*3/MM3 (ref 0–0.05)
IMM GRANULOCYTES NFR BLD AUTO: 0.2 % (ref 0–0.5)
LIPASE SERPL-CCNC: 37 U/L (ref 13–60)
LYMPHOCYTES # BLD AUTO: 2.11 10*3/MM3 (ref 0.7–3.1)
LYMPHOCYTES NFR BLD AUTO: 25.5 % (ref 19.6–45.3)
MCH RBC QN AUTO: 29.6 PG (ref 26.6–33)
MCHC RBC AUTO-ENTMCNC: 33.3 G/DL (ref 31.5–35.7)
MCV RBC AUTO: 88.9 FL (ref 79–97)
MONOCYTES # BLD AUTO: 0.66 10*3/MM3 (ref 0.1–0.9)
MONOCYTES NFR BLD AUTO: 8 % (ref 5–12)
NEUTROPHILS # BLD AUTO: 5.27 10*3/MM3 (ref 1.7–7)
NEUTROPHILS NFR BLD AUTO: 63.8 % (ref 42.7–76)
NRBC BLD AUTO-RTO: 0 /100 WBC (ref 0–0.2)
PLATELET # BLD AUTO: 214 10*3/MM3 (ref 140–450)
POTASSIUM SERPL-SCNC: 4.2 MMOL/L (ref 3.5–5.2)
PROT SERPL-MCNC: 6.3 G/DL (ref 6–8.5)
RBC # BLD AUTO: 4.7 10*6/MM3 (ref 3.77–5.28)
SODIUM SERPL-SCNC: 141 MMOL/L (ref 136–145)
WBC # BLD AUTO: 8.26 10*3/MM3 (ref 3.4–10.8)

## 2021-03-08 PROCEDURE — 99214 OFFICE O/P EST MOD 30 MIN: CPT | Performed by: INTERNAL MEDICINE

## 2021-03-08 NOTE — PROGRESS NOTES
"Chief Complaint   Patient presents with   • Elevated Hepatic Enzymes       History of Present Illness:   80 y.o. female        I last saw her in 3 of 2020.  My assessment and recommendations were as follows:  Assessment:  1. with a history of Crohn's disease of the terminal ileum and colon.  In 2014 Dr. Bella Limon performed resection of the terminal ileum and cecum.  2. GERD  3.      Recommendations:  1. Continue Lialda 3/day and Pepcid.  2         C/O intermittent bloating and gas x >3 yrs, this is the worse. She is on Mesalamine 2/day for crohn's. She isn't sure if that helps. She may see the pills in her stool at times. The price of Mesalamine is much higher. The prednisone helped. No abdominal or chest pain. NO nausea or vomiting. NO fevers, chills. NO rectal bleeding or melena. Her weight has gone up.        Her LFT's are going up.  She had a CT abd/pelvis at [facility] in 12/20:  Impression   There is stable mild biliary prominence which is likely   related to the previous cholecystectomy. No acute abnormality is seen.   Please follow-up as clinically indicated.       This report was finalized on 12/15/2020 8:37 AM by Dr. Lyndsay Farah M.D.         NO ETOH. No new medicines. Her LFT's werre elevated in 4/19. She had an MRCP then that was unrevealing. She was on Pravachol previously and was changed to Zetia (which can also cause elevated LFT\"s.     Past Medical History:   Diagnosis Date   • Anemia    • Arthritis    • Crohn disease (CMS/HCC)    • GERD (gastroesophageal reflux disease)    • History of transfusion    • Hyperlipidemia    • Hypertension    • Ocular migraine    • TIA (transient ischemic attack)        Past Surgical History:   Procedure Laterality Date   • APPENDECTOMY     • CHOLECYSTECTOMY     • COLECTOMY PARTIAL / TOTAL     • COLONOSCOPY  08/20/2015    s/p right hemicolectomy, recurrent Crohns, IH   • COLONOSCOPY N/A 8/9/2019    Crohns, IH.     • ENDOSCOPY  08/20/2015    erythematous mucosa in stomach, " chronic gastritis,    • ENDOSCOPY N/A 4/28/2019    Erythematous mucosa in stomach, path benign   • SPLENECTOMY           Current Outpatient Medications:   •  amLODIPine (NORVASC) 5 MG tablet, Take 5 mg by mouth Daily., Disp: , Rfl: 1  •  colestipol (COLESTID) 1 G tablet, Take 1 g by mouth Daily., Disp: , Rfl:   •  diazepam (VALIUM) 5 MG tablet, Take 2.5 mg by mouth Every Morning., Disp: , Rfl:   •  ezetimibe (ZETIA) 10 MG tablet, Take 10 mg by mouth Every Evening., Disp: , Rfl:   •  famotidine (PEPCID) 20 MG tablet, Take 20 mg by mouth Daily., Disp: , Rfl:   •  loperamide (IMODIUM) 2 MG capsule, Take 2 mg by mouth Every Morning., Disp: , Rfl:   •  mesalamine (LIALDA) 1.2 g EC tablet, TAKE 4 TABLETS BY MOUTH DAILY, Disp: 120 tablet, Rfl: 6  •  metoprolol tartrate (LOPRESSOR) 50 MG tablet, take 1 tablet by mouth twice a day, Disp: , Rfl: 0  •  Multiple Vitamin (MULTI VITAMIN DAILY) tablet, Take  by mouth., Disp: , Rfl:   •  potassium chloride (MICRO-K) 10 MEQ CR capsule, take 1 capsule by mouth once daily, Disp: , Rfl: 0  •  vitamin B-12 (CYANOCOBALAMIN) 100 MCG tablet, Take 1,000 mcg by mouth Daily., Disp: , Rfl:   •  ondansetron ODT (ZOFRAN ODT) 4 MG disintegrating tablet, Take 1 tablet by mouth Every 8 (Eight) Hours As Needed for Nausea or Vomiting., Disp: 15 tablet, Rfl: 0    Allergies   Allergen Reactions   • Amitriptyline Confusion   • Mysoline [Primidone] Confusion       History reviewed. No pertinent family history.    Social History     Socioeconomic History   • Marital status: Single     Spouse name: Not on file   • Number of children: Not on file   • Years of education: Not on file   • Highest education level: Not on file   Tobacco Use   • Smoking status: Never Smoker   • Smokeless tobacco: Never Used   Substance and Sexual Activity   • Alcohol use: No   • Drug use: No   • Sexual activity: Defer       Review of Systems   Gastrointestinal: Positive for abdominal distention. Negative for abdominal pain.      Pertinent positives and negatives documented in the HPI and all other systems reviewed and were found to be negative.  Vitals:    03/08/21 0957   BP: 130/70   Temp: 97.8 °F (36.6 °C)       Physical Exam  Vitals reviewed.   Constitutional:       General: She is not in acute distress.     Appearance: Normal appearance. She is well-developed. She is not diaphoretic.   HENT:      Head: Normocephalic and atraumatic. Hair is normal.      Right Ear: Hearing, tympanic membrane, ear canal and external ear normal. No decreased hearing noted. No drainage.      Left Ear: Hearing, tympanic membrane, ear canal and external ear normal. No decreased hearing noted.      Nose: Nose normal. No nasal deformity.      Mouth/Throat:      Mouth: Mucous membranes are moist.   Eyes:      General: Lids are normal.         Right eye: No discharge.         Left eye: No discharge.      Extraocular Movements: Extraocular movements intact.      Conjunctiva/sclera: Conjunctivae normal.      Pupils: Pupils are equal, round, and reactive to light.   Neck:      Thyroid: No thyromegaly.      Vascular: No JVD.      Trachea: No tracheal deviation.   Cardiovascular:      Rate and Rhythm: Normal rate and regular rhythm.      Pulses: Normal pulses.      Heart sounds: Normal heart sounds. No murmur. No friction rub. No gallop.    Pulmonary:      Effort: Pulmonary effort is normal. No respiratory distress.      Breath sounds: Normal breath sounds. No wheezing or rales.   Chest:      Chest wall: No tenderness.   Abdominal:      General: Bowel sounds are normal. There is no distension.      Palpations: Abdomen is soft. There is no mass.      Tenderness: There is no abdominal tenderness. There is no guarding or rebound.      Hernia: No hernia is present.   Genitourinary:     Rectum: Normal. Guaiac result negative.   Musculoskeletal:         General: No tenderness or deformity. Normal range of motion.      Cervical back: Normal range of motion and neck  supple.   Lymphadenopathy:      Cervical: No cervical adenopathy.   Skin:     General: Skin is warm and dry.      Findings: No erythema or rash.   Neurological:      Mental Status: She is alert and oriented to person, place, and time.      Cranial Nerves: No cranial nerve deficit.      Motor: No abnormal muscle tone.      Coordination: Coordination normal.      Deep Tendon Reflexes: Reflexes are normal and symmetric. Reflexes normal.   Psychiatric:         Mood and Affect: Mood normal.         Behavior: Behavior normal.         Thought Content: Thought content normal.         Judgment: Judgment normal.         Diagnoses and all orders for this visit:    1. Elevated LFTs (Primary)  -     Amylase  -     Lipase  -     CBC & Differential  -     Comprehensive Metabolic Panel  -     Hepatitis Panel, Acute  -     EBVCA(IgG / M)  -     Alpha - 1 - Antitrypsin  -     MICHAELA  -     Anti-Smooth Muscle Antibody Titer  -     Celiac Comprehensive Panel  -     Ceruloplasmin  -     Mitochondrial Antibodies, M2  -     Protein Elec + Interp, Serum  -     MRI abdomen w wo contrast mrcp; Future    2. Crohn's disease of small intestine with other complication (CMS/HCC)  -     Amylase  -     Lipase  -     CBC & Differential  -     Comprehensive Metabolic Panel  -     Hepatitis Panel, Acute  -     EBVCA(IgG / M)  -     Alpha - 1 - Antitrypsin  -     MICHAELA  -     Anti-Smooth Muscle Antibody Titer  -     Celiac Comprehensive Panel  -     Ceruloplasmin  -     Mitochondrial Antibodies, M2  -     Protein Elec + Interp, Serum    3. Bloating  -     Amylase  -     Lipase  -     CBC & Differential  -     Comprehensive Metabolic Panel  -     Hepatitis Panel, Acute  -     EBVCA(IgG / M)  -     Alpha - 1 - Antitrypsin  -     MICHAELA  -     Anti-Smooth Muscle Antibody Titer  -     Celiac Comprehensive Panel  -     Ceruloplasmin  -     Mitochondrial Antibodies, M2  -     Protein Elec + Interp, Serum  -     MRI abdomen w wo contrast mrcp; Future    4.  "Inflammatory liver disease, unspecified   -     Hepatitis Panel, Acute      Assessment:  1. H/o crohns of anastomosis of TI to ascending colon  2. Elevated LFT's with dilated bile ducts. Zetia could cause elevated LFT\"s  3. Bloating and gas    Recommendations:  1. Labs: CMP, hepatitis profile, EB viral serology  2. MRCP  3. Change from Lialda (mesalamine 1.2 gm) to Mesalamine DR (400 mg) 4/day. #90 by mail.  4. F/u 6 weeks.       No follow-ups on file.    Marcel Ricketts MD  3/8/2021  "

## 2021-03-09 LAB
A1AT SERPL-MCNC: 124 MG/DL (ref 101–187)
ACTIN IGG SERPL-ACNC: 13 UNITS (ref 0–19)
ALBUMIN SERPL ELPH-MCNC: 3.5 G/DL (ref 2.9–4.4)
ALBUMIN/GLOB SERPL: 1.3 {RATIO} (ref 0.7–1.7)
ALPHA1 GLOB SERPL ELPH-MCNC: 0.2 G/DL (ref 0–0.4)
ALPHA2 GLOB SERPL ELPH-MCNC: 0.8 G/DL (ref 0.4–1)
ANA SER QL: NEGATIVE
B-GLOBULIN SERPL ELPH-MCNC: 1.2 G/DL (ref 0.7–1.3)
CERULOPLASMIN SERPL-MCNC: 25.1 MG/DL (ref 19–39)
ENDOMYSIUM IGA SER QL: NEGATIVE
GAMMA GLOB SERPL ELPH-MCNC: 0.6 G/DL (ref 0.4–1.8)
GLIADIN PEPTIDE IGA SER-ACNC: 7 UNITS (ref 0–19)
GLIADIN PEPTIDE IGG SER-ACNC: 4 UNITS (ref 0–19)
GLOBULIN SER CALC-MCNC: 2.8 G/DL (ref 2.2–3.9)
HAV IGM SERPL QL IA: NEGATIVE
HBV CORE IGM SERPL QL IA: NEGATIVE
HBV SURFACE AG SERPL QL IA: NEGATIVE
HCV AB S/CO SERPL IA: <0.1 S/CO RATIO (ref 0–0.9)
IGA SERPL-MCNC: 185 MG/DL (ref 64–422)
LABORATORY COMMENT REPORT: NORMAL
M PROTEIN SERPL ELPH-MCNC: NORMAL G/DL
MITOCHONDRIA M2 IGG SER-ACNC: <20 UNITS (ref 0–20)
PROT PATTERN SERPL ELPH-IMP: NORMAL
TTG IGA SER-ACNC: <2 U/ML (ref 0–3)
TTG IGG SER-ACNC: 7 U/ML (ref 0–5)

## 2021-03-11 ENCOUNTER — TELEPHONE (OUTPATIENT)
Dept: GASTROENTEROLOGY | Facility: CLINIC | Age: 81
End: 2021-03-11

## 2021-03-11 DIAGNOSIS — K50.018 CROHN'S DISEASE OF SMALL INTESTINE WITH OTHER COMPLICATION (HCC): ICD-10-CM

## 2021-03-11 LAB
EBV VCA IGG SER IA-ACNC: <18 U/ML (ref 0–17.9)
EBV VCA IGM SER IA-ACNC: <36 U/ML (ref 0–35.9)

## 2021-03-11 RX ORDER — MESALAMINE 1.2 G/1
TABLET, DELAYED RELEASE ORAL
Qty: 360 TABLET | Refills: 3 | Status: SHIPPED | OUTPATIENT
Start: 2021-03-11 | End: 2021-03-15 | Stop reason: ALTCHOICE

## 2021-03-11 NOTE — TELEPHONE ENCOUNTER
----- Message from Lo Bowen sent at 3/11/2021 11:40 AM EST -----  Regarding: mesalamine 400mg  Contact: [phone]  PT needs an electronic prescription sent to [facility], please send     mesalamine 400mg      [facility] - [place], [place] - [address] [place] [address] - [phone] [facility] [phone] FX    Contact Information    Phone Fax Address  [phone] [address] [facility] [address]            PT number- [phone]

## 2021-03-14 NOTE — PROGRESS NOTES
03/14/21       Her labs look good. Your LFT's look normal except your AST is mildaly elevated at 42.        Let's see what the MRI of the abdomen shows?        FAX to her PCP.   Dai law

## 2021-03-15 ENCOUNTER — TELEPHONE (OUTPATIENT)
Dept: GASTROENTEROLOGY | Facility: CLINIC | Age: 81
End: 2021-03-15

## 2021-03-15 RX ORDER — MESALAMINE 400 MG/1
CAPSULE, DELAYED RELEASE ORAL
Qty: 360 CAPSULE | Refills: 3 | Status: SHIPPED | OUTPATIENT
Start: 2021-03-15 | End: 2021-03-18 | Stop reason: SDUPTHER

## 2021-03-15 NOTE — TELEPHONE ENCOUNTER
----- Message from Marcel Ricketts MD sent at 3/14/2021  5:42 PM EDT -----  03/14/21       Her labs look good. Your LFT's look normal except your AST is mildaly elevated at 42.        Let's see what the MRI of the abdomen shows?        FAX to her PCP.   Thx. kj

## 2021-03-15 NOTE — TELEPHONE ENCOUNTER
----- Message from Lo Alfaro sent at 3/15/2021  8:52 AM EDT -----  Regarding: med  Contact: [phone]  Pt had the wrong dose sent to pharmacy it S/B         mesalamine 400mg          mesalamine (LIALDA) 1.2 g EC tablet     S/B     mesalamine 400mg          [facility] - [place], [place] - [address] [place] [address] - [phone]  - [phone] [address] [address]   Phone:  [phone]  Fax:  [phone]

## 2021-03-15 NOTE — TELEPHONE ENCOUNTER
Call to pt.  Advise per DR Ricketts note.  Verb understanding.     Labs faxed via epic to DR Roni Mckenzie.      Pt states lialda cost is >$1000/90 day supply.  Requests change to mesalamine 400 mg as discussed with DR Ricketts on 3/8 (see o/v note recommendations).      Escribe initiated for mesalamine  400 mg 4 tabs  po daily, #360, R3.  Message to DR Ricketts.

## 2021-03-18 ENCOUNTER — TELEPHONE (OUTPATIENT)
Dept: GASTROENTEROLOGY | Facility: CLINIC | Age: 81
End: 2021-03-18

## 2021-03-18 NOTE — TELEPHONE ENCOUNTER
See note of 3/15.      Call to pt.  Verify mesalamine 400 mg - 4 tabs by mouth daily, #360 (for 90 day supply).      Pharmacy info updated - elinor initiated and to Dr Ricketts.

## 2021-03-18 NOTE — TELEPHONE ENCOUNTER
----- Message from Lo Padron sent at 3/18/2021  8:44 AM EDT -----  Regarding: new script/pharmacy  Contact: [phone]  [facility] script was sent to [facility] and not [facility]. Script also needs to be 400 mg  #1080 tablets for 90 days. [facility] and update quantity. Thank you      mesalamine (DELZICOL) 400 MG capsule delayed-release delayed release capsule 360 capsule 3 3/15/2021    Sig: Take 4 tabs by mouth daily   Sent to pharmacy as: Mesalamine 400 MG Oral Capsule Delayed Release (DELZICOL)   E-Prescribing Status: Receipt confirmed by pharmacy (3/15/2021  6:57 PM EDT)   Pharmacy      [facility] - [address] [phone] [facility] [phone] FX

## 2021-03-18 NOTE — TELEPHONE ENCOUNTER
Please call the patient. She says that she is on Mesalamine 4 po daily. So a 90 days supply would be #360 and not 1080 which she said in her note. Tell her that we will send a prescription for #360 and not 1080 pills. If she is OK with that then I will sign the script. Thx.kjh

## 2021-03-19 RX ORDER — MESALAMINE 400 MG/1
CAPSULE, DELAYED RELEASE ORAL
Qty: 360 CAPSULE | Refills: 3 | Status: SHIPPED | OUTPATIENT
Start: 2021-03-19 | End: 2021-08-10 | Stop reason: HOSPADM

## 2021-03-26 ENCOUNTER — TELEPHONE (OUTPATIENT)
Dept: GASTROENTEROLOGY | Facility: CLINIC | Age: 81
End: 2021-03-26

## 2021-03-26 NOTE — TELEPHONE ENCOUNTER
----- Message from Lo Bowen sent at 3/26/2021 12:19 PM EDT -----  Regarding: Vickey SWEENEY  Contact: [phone]  PT would ana to discuss the best way to take medication Vickey SWEENEY, Please advise

## 2021-03-26 NOTE — TELEPHONE ENCOUNTER
Called pt and pt is asking how does she take the mesalamine.  Advised pt to take all 4 pills at the same time.  Pt verb understanding.

## 2021-04-09 ENCOUNTER — APPOINTMENT (OUTPATIENT)
Dept: MRI IMAGING | Facility: HOSPITAL | Age: 81
End: 2021-04-09

## 2021-04-11 ENCOUNTER — HOSPITAL ENCOUNTER (OUTPATIENT)
Dept: MRI IMAGING | Facility: HOSPITAL | Age: 81
Discharge: HOME | End: 2021-04-11
Admitting: INTERNAL MEDICINE

## 2021-04-11 DIAGNOSIS — R14.0 BLOATING: ICD-10-CM

## 2021-04-11 DIAGNOSIS — R79.89 ELEVATED LFTS: ICD-10-CM

## 2021-04-11 PROCEDURE — 82565 ASSAY OF CREATININE: CPT

## 2021-04-11 PROCEDURE — 74183 MRI ABD W/O CNTR FLWD CNTR: CPT

## 2021-04-11 PROCEDURE — 0 GADOBENATE DIMEGLUMINE 529 MG/ML SOLUTION: Performed by: INTERNAL MEDICINE

## 2021-04-11 PROCEDURE — A9577 INJ MULTIHANCE: HCPCS | Performed by: INTERNAL MEDICINE

## 2021-04-11 RX ADMIN — GADOBENATE DIMEGLUMINE 12 ML: 529 INJECTION, SOLUTION INTRAVENOUS at 11:19

## 2021-04-12 LAB — CREAT BLDA-MCNC: 1 MG/DL (ref 0.6–1.3)

## 2021-04-19 ENCOUNTER — OFFICE VISIT (OUTPATIENT)
Dept: GASTROENTEROLOGY | Facility: CLINIC | Age: 81
End: 2021-04-19

## 2021-04-19 VITALS — WEIGHT: 133 LBS | HEIGHT: 62 IN | BODY MASS INDEX: 24.48 KG/M2 | TEMPERATURE: 97.1 F

## 2021-04-19 DIAGNOSIS — K21.9 GASTROESOPHAGEAL REFLUX DISEASE WITHOUT ESOPHAGITIS: ICD-10-CM

## 2021-04-19 DIAGNOSIS — R79.89 ELEVATED LIVER FUNCTION TESTS: ICD-10-CM

## 2021-04-19 DIAGNOSIS — K50.00 CROHN'S DISEASE OF SMALL INTESTINE WITHOUT COMPLICATION (HCC): Primary | ICD-10-CM

## 2021-04-19 PROCEDURE — 99214 OFFICE O/P EST MOD 30 MIN: CPT | Performed by: NURSE PRACTITIONER

## 2021-04-19 NOTE — PROGRESS NOTES
Chief Complaint   Patient presents with   • Discuss MRI ABD findings   • Crohn's Disease       Michela Aguilar is a  80 y.o. female here for a follow up visit for Crohn's disease.    HPI  80-year-old female presents today for follow-up visit for Crohn's disease.  She is a patient of Dr. Ricketts.  She was last seen in the office on 3/8/2021.  She has a history of Crohn's disease of the small bowel and underwent resection in 2014.  She was recently changed from Lialda to Delzicol due to her insurance.  She is taking Delzicol 4 tabs daily and is wondering if she is taking it correctly.  She had some questions about wondering about maybe splitting the dose up during the day.  She does report currently that her bowels are moving well.  They are quite loose every morning but she is accustomed to that.  She is not having any fecal incontinence episodes.  She also has a history of GERD and admits she does well as long she takes Pepcid 20 mg daily.  She also takes colestipol 1 g daily to help control the diarrhea.  She tells me if she takes 2 it just makes her too constipated.  Her last colonoscopy was in 2019.  She also has history of cholecystectomy.  She had a CT scan done this past December that showed:  Impression   There is stable mild biliary prominence which is likely   related to the previous cholecystectomy. No acute abnormality is seen.   Please follow-up as clinically indicated.      She then had a repeat imaging she had an MRI done this past March that showed:    IMPRESSION:  Since prior imaging in 2019 there is mild to minimal  increase in biliary dilatation without choledocholithiasis. 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 Correlation with alkaline phosphatase is  recommended to rule out ampullary stenosis.     She did have some elevated liver enzymes including an elevated alkaline phosphatase.  Her most recent lab work does show improvement and  only shows a slightly elevated AST at 42.  All other liver enzymes are completely normal.  She also had liver serology labs including hepatitis panel which were all normal.  She does report feeling quite well right now.  She denies any dysphagia, reflux, abdominal pain, nausea and vomiting, constipation, rectal bleeding or melena.  She is appetite is good and her weight is stable.      Past Medical History:   Diagnosis Date   • Anemia    • Arthritis    • Crohn disease (CMS/HCC)    • GERD (gastroesophageal reflux disease)    • History of transfusion    • Hyperlipidemia    • Hypertension    • Ocular migraine    • TIA (transient ischemic attack)        Past Surgical History:   Procedure Laterality Date   • APPENDECTOMY     • CHOLECYSTECTOMY     • COLECTOMY PARTIAL / TOTAL     • COLONOSCOPY  08/20/2015    s/p right hemicolectomy, recurrent Crohns, IH   • COLONOSCOPY N/A 8/9/2019    Crohns, IH.     • ENDOSCOPY  08/20/2015    erythematous mucosa in stomach, chronic gastritis,    • ENDOSCOPY N/A 4/28/2019    Erythematous mucosa in stomach, path benign   • SPLENECTOMY         Scheduled Meds:    Continuous Infusions:No current facility-administered medications for this visit.      PRN Meds:.    Allergies   Allergen Reactions   • Amitriptyline Confusion   • Mysoline [Primidone] Confusion       Social History     Socioeconomic History   • Marital status: Single     Spouse name: Not on file   • Number of children: Not on file   • Years of education: Not on file   • Highest education level: Not on file   Tobacco Use   • Smoking status: Never Smoker   • Smokeless tobacco: Never Used   Substance and Sexual Activity   • Alcohol use: No   • Drug use: No   • Sexual activity: Defer       History reviewed. No pertinent family history.    Review of Systems   Constitutional: Negative for appetite change, chills, diaphoresis, fatigue, fever and unexpected weight change.   HENT: Negative for nosebleeds, postnasal drip, sore throat, trouble  swallowing and voice change.    Respiratory: Negative for cough, choking, chest tightness, shortness of breath and wheezing.    Cardiovascular: Negative for chest pain, palpitations and leg swelling.   Gastrointestinal: Negative for abdominal distention, abdominal pain, anal bleeding, blood in stool, constipation, diarrhea, nausea, rectal pain and vomiting.   Endocrine: Negative for polydipsia, polyphagia and polyuria.   Musculoskeletal: Negative for gait problem.   Skin: Negative for rash and wound.   Allergic/Immunologic: Negative for food allergies.   Neurological: Negative for dizziness, speech difficulty and light-headedness.   Psychiatric/Behavioral: Negative for confusion, self-injury, sleep disturbance and suicidal ideas.       Vitals:    04/19/21 1058   Temp: 97.1 °F (36.2 °C)       Physical Exam  Constitutional:       General: She is not in acute distress.     Appearance: She is well-developed. She is not ill-appearing.   HENT:      Head: Normocephalic.   Eyes:      Pupils: Pupils are equal, round, and reactive to light.   Cardiovascular:      Rate and Rhythm: Normal rate and regular rhythm.      Heart sounds: Normal heart sounds.   Pulmonary:      Effort: Pulmonary effort is normal.      Breath sounds: Normal breath sounds.   Abdominal:      General: Bowel sounds are normal. There is no distension.      Palpations: Abdomen is soft. There is no mass.      Tenderness: There is no abdominal tenderness. There is no guarding or rebound.      Hernia: No hernia is present.   Musculoskeletal:         General: Normal range of motion.   Skin:     General: Skin is warm and dry.   Neurological:      Mental Status: She is alert and oriented to person, place, and time.   Psychiatric:         Speech: Speech normal.         Behavior: Behavior normal.         Judgment: Judgment normal.         No radiology results for the last 7 days     Assessment and plan     1. Crohn's disease of small intestine without complication  (CMS/HCC)    2. Elevated liver function tests    3. Gastroesophageal reflux disease without esophagitis    Reviewed most recent labs and imaging results with her today.  I did give her copies of the CT scan and the MRI so she could show her PCP.  Overall she seems to be doing quite well.  The MRI did show stable slightly dilated bile ducts most likely due to her previous history of cholecystectomy.  Most recent liver enzymes look really good.  Only the AST is slightly elevated at 42.  Bowels are moving well on 4 Delzicol a day and colestipol daily.  I told her it was okay to split up the Delzicol and take 2 in the morning and 2 in the evening before food.  GERD seems well controlled on Pepcid 20 mg daily.  Continue GERD precautions.  Patient tells me she is up-to-date with all of her health screenings and vaccinations.  Patient to follow-up with Dr. Ricketts in 3 to 4 months.  Patient is agreeable to the plan.

## 2021-07-28 ENCOUNTER — OFFICE VISIT (OUTPATIENT)
Dept: GASTROENTEROLOGY | Facility: CLINIC | Age: 81
End: 2021-07-28

## 2021-07-28 VITALS
DIASTOLIC BLOOD PRESSURE: 72 MMHG | SYSTOLIC BLOOD PRESSURE: 126 MMHG | TEMPERATURE: 98.2 F | HEIGHT: 62 IN | BODY MASS INDEX: 24.66 KG/M2 | WEIGHT: 134 LBS

## 2021-07-28 DIAGNOSIS — K21.9 GASTROESOPHAGEAL REFLUX DISEASE WITHOUT ESOPHAGITIS: ICD-10-CM

## 2021-07-28 DIAGNOSIS — K50.00 CROHN'S DISEASE OF SMALL INTESTINE WITHOUT COMPLICATION (HCC): Primary | ICD-10-CM

## 2021-07-28 PROCEDURE — 99213 OFFICE O/P EST LOW 20 MIN: CPT | Performed by: INTERNAL MEDICINE

## 2021-07-28 RX ORDER — CIPROFLOXACIN 500 MG/1
TABLET, FILM COATED ORAL
COMMUNITY
Start: 2021-07-21 | End: 2021-08-10 | Stop reason: HOSPADM

## 2021-07-28 NOTE — PROGRESS NOTES
Chief Complaint   Patient presents with   • Crohn's Disease       History of Present Illness:   80 y.o. female with GERD and a history of Crohn's disease of the small bowel and underwent resection in 2014 (diagnosed in 2013).  She was recently changed from Lialda to Mesalamine  mg 2 BID. She is doing well and is affordable. NO abdominal or chest pain. Her last flair up was 4/19 and was hospitalized. No nausea or vomiting. NO fevers, chills. Weight stable. No diarrhea. Takes imodium 1/day. She averages 1-2 BM/day. No rectal bleeding or melena.  She last had a colonoscopy in 8/19.     Past Medical History:   Diagnosis Date   • Anemia    • Arthritis    • Crohn disease (CMS/HCC)    • GERD (gastroesophageal reflux disease)    • History of transfusion    • Hyperlipidemia    • Hypertension    • Ocular migraine    • TIA (transient ischemic attack)    • UTI (urinary tract infection) 07/21/2021       Past Surgical History:   Procedure Laterality Date   • APPENDECTOMY     • CHOLECYSTECTOMY     • COLECTOMY PARTIAL / TOTAL     • COLONOSCOPY  08/20/2015    s/p right hemicolectomy, recurrent Crohns, IH   • COLONOSCOPY N/A 8/9/2019    Crohns, IH.     • ENDOSCOPY  08/20/2015    erythematous mucosa in stomach, chronic gastritis,    • ENDOSCOPY N/A 4/28/2019    Erythematous mucosa in stomach, path benign   • SPLENECTOMY           Current Outpatient Medications:   •  amLODIPine (NORVASC) 5 MG tablet, Take 5 mg by mouth Daily., Disp: , Rfl: 1  •  ciprofloxacin (CIPRO) 500 MG tablet, TAKE 1 TABLET BY MOUTH NOW THEN TAKE 1 TABLET TWICE DAILY, Disp: , Rfl:   •  colestipol (COLESTID) 1 G tablet, Take 1 g by mouth Daily., Disp: , Rfl:   •  diazepam (VALIUM) 5 MG tablet, Take 2.5 mg by mouth Every Morning., Disp: , Rfl:   •  ezetimibe (ZETIA) 10 MG tablet, Take 10 mg by mouth Every Evening., Disp: , Rfl:   •  famotidine (PEPCID) 20 MG tablet, Take 20 mg by mouth Daily., Disp: , Rfl:   •  loperamide (IMODIUM) 2 MG capsule, Take 2 mg by  mouth Every Morning., Disp: , Rfl:   •  mesalamine (DELZICOL) 400 MG capsule delayed-release delayed release capsule, Take 4 tabs by mouth daily, Disp: 360 capsule, Rfl: 3  •  metoprolol tartrate (LOPRESSOR) 50 MG tablet, take 1 tablet by mouth twice a day, Disp: , Rfl: 0  •  Multiple Vitamin (MULTI VITAMIN DAILY) tablet, Take  by mouth., Disp: , Rfl:   •  potassium chloride (MICRO-K) 10 MEQ CR capsule, take 1 capsule by mouth once daily, Disp: , Rfl: 0  •  vitamin B-12 (CYANOCOBALAMIN) 100 MCG tablet, Take 1,000 mcg by mouth Daily., Disp: , Rfl:   •  ondansetron ODT (ZOFRAN ODT) 4 MG disintegrating tablet, Take 1 tablet by mouth Every 8 (Eight) Hours As Needed for Nausea or Vomiting., Disp: 15 tablet, Rfl: 0    Allergies   Allergen Reactions   • Amitriptyline Confusion   • Mysoline [Primidone] Confusion       History reviewed. No pertinent family history.    Social History     Socioeconomic History   • Marital status: Single     Spouse name: Not on file   • Number of children: Not on file   • Years of education: Not on file   • Highest education level: Not on file   Tobacco Use   • Smoking status: Never Smoker   • Smokeless tobacco: Never Used   Substance and Sexual Activity   • Alcohol use: No   • Drug use: No   • Sexual activity: Defer       Review of Systems   Gastrointestinal: Negative for abdominal pain.     Pertinent positives and negatives documented in the HPI and all other systems reviewed and were found to be negative.  Vitals:    07/28/21 1250   BP: 126/72   Temp: 98.2 °F (36.8 °C)       Physical Exam  Vitals reviewed.   Constitutional:       General: She is not in acute distress.     Appearance: Normal appearance. She is well-developed. She is not diaphoretic.   HENT:      Head: Normocephalic and atraumatic. Hair is normal.      Right Ear: Hearing, tympanic membrane, ear canal and external ear normal. No decreased hearing noted. No drainage.      Left Ear: Hearing, tympanic membrane, ear canal and  external ear normal. No decreased hearing noted.      Nose: Nose normal. No nasal deformity.      Mouth/Throat:      Mouth: Mucous membranes are moist.   Eyes:      General: Lids are normal.         Right eye: No discharge.         Left eye: No discharge.      Extraocular Movements: Extraocular movements intact.      Conjunctiva/sclera: Conjunctivae normal.      Pupils: Pupils are equal, round, and reactive to light.   Neck:      Thyroid: No thyromegaly.      Vascular: No JVD.      Trachea: No tracheal deviation.   Cardiovascular:      Rate and Rhythm: Normal rate and regular rhythm.      Pulses: Normal pulses.      Heart sounds: Normal heart sounds. No murmur heard.   No friction rub. No gallop.    Pulmonary:      Effort: Pulmonary effort is normal. No respiratory distress.      Breath sounds: Normal breath sounds. No wheezing or rales.   Chest:      Chest wall: No tenderness.   Abdominal:      General: Bowel sounds are normal. There is no distension.      Palpations: Abdomen is soft. There is no mass.      Tenderness: There is no abdominal tenderness. There is no guarding or rebound.      Hernia: No hernia is present.   Musculoskeletal:         General: No tenderness or deformity. Normal range of motion.      Cervical back: Normal range of motion and neck supple.   Lymphadenopathy:      Cervical: No cervical adenopathy.   Skin:     General: Skin is warm and dry.      Findings: No erythema or rash.   Neurological:      Mental Status: She is alert and oriented to person, place, and time.      Cranial Nerves: No cranial nerve deficit.      Motor: No abnormal muscle tone.      Coordination: Coordination normal.      Deep Tendon Reflexes: Reflexes are normal and symmetric. Reflexes normal.   Psychiatric:         Mood and Affect: Mood normal.         Behavior: Behavior normal.         Thought Content: Thought content normal.         Judgment: Judgment normal.         Diagnoses and all orders for this visit:    1.  Crohn's disease of small intestine without complication (CMS/HCC) (Primary)    2. Gastroesophageal reflux disease without esophagitis      Assessment:  1. a history of Crohn's disease of the small bowel and underwent resection in 2014.  She was recently changed from Lialda to Mesalamine  due to her insurance.  She is taking Delzicol 4 tabs daily   2. GERD  3.     Recommendations:  1. Continue same  2. F/u 6 mos.     Return in about 6 months (around 1/28/2022).    Marcel Ricketts MD  7/28/2021

## 2021-08-03 ENCOUNTER — APPOINTMENT (OUTPATIENT)
Dept: GENERAL RADIOLOGY | Facility: HOSPITAL | Age: 81
End: 2021-08-03

## 2021-08-03 ENCOUNTER — APPOINTMENT (OUTPATIENT)
Dept: CT IMAGING | Facility: HOSPITAL | Age: 81
End: 2021-08-03

## 2021-08-03 ENCOUNTER — HOSPITAL ENCOUNTER (INPATIENT)
Facility: HOSPITAL | Age: 81
LOS: 7 days | Discharge: HOME | End: 2021-08-10
Attending: EMERGENCY MEDICINE | Admitting: HOSPITALIST

## 2021-08-03 DIAGNOSIS — K56.609 SMALL BOWEL OBSTRUCTION (HCC): Primary | ICD-10-CM

## 2021-08-03 DIAGNOSIS — Z90.49 HISTORY OF BOWEL RESECTION: ICD-10-CM

## 2021-08-03 DIAGNOSIS — Z87.19 HISTORY OF CROHN'S DISEASE: ICD-10-CM

## 2021-08-03 DIAGNOSIS — R11.0 NAUSEA: ICD-10-CM

## 2021-08-03 DIAGNOSIS — R10.13 EPIGASTRIC PAIN: ICD-10-CM

## 2021-08-03 LAB
ALBUMIN SERPL-MCNC: 4.7 G/DL (ref 3.5–5.2)
ALBUMIN/GLOB SERPL: 2 G/DL
ALP SERPL-CCNC: 112 U/L (ref 39–117)
ALT SERPL W P-5'-P-CCNC: 29 U/L (ref 1–33)
ANION GAP SERPL CALCULATED.3IONS-SCNC: 12.9 MMOL/L (ref 5–15)
AST SERPL-CCNC: 29 U/L (ref 1–32)
BASOPHILS # BLD AUTO: 0.07 10*3/MM3 (ref 0–0.2)
BASOPHILS NFR BLD AUTO: 0.6 % (ref 0–1.5)
BILIRUB SERPL-MCNC: 0.4 MG/DL (ref 0–1.2)
BILIRUB UR QL STRIP: NEGATIVE
BUN SERPL-MCNC: 11 MG/DL (ref 8–23)
BUN/CREAT SERPL: 13.1 (ref 7–25)
CALCIUM SPEC-SCNC: 9.8 MG/DL (ref 8.6–10.5)
CHLORIDE SERPL-SCNC: 103 MMOL/L (ref 98–107)
CLARITY UR: CLEAR
CO2 SERPL-SCNC: 25.1 MMOL/L (ref 22–29)
COLOR UR: YELLOW
CREAT SERPL-MCNC: 0.84 MG/DL (ref 0.57–1)
DEPRECATED RDW RBC AUTO: 43.1 FL (ref 37–54)
EOSINOPHIL # BLD AUTO: 0.08 10*3/MM3 (ref 0–0.4)
EOSINOPHIL NFR BLD AUTO: 0.7 % (ref 0.3–6.2)
ERYTHROCYTE [DISTWIDTH] IN BLOOD BY AUTOMATED COUNT: 13.4 % (ref 12.3–15.4)
GFR SERPL CREATININE-BSD FRML MDRD: 65 ML/MIN/1.73
GLOBULIN UR ELPH-MCNC: 2.3 GM/DL
GLUCOSE SERPL-MCNC: 107 MG/DL (ref 65–99)
GLUCOSE UR STRIP-MCNC: NEGATIVE MG/DL
HCT VFR BLD AUTO: 44.4 % (ref 34–46.6)
HGB BLD-MCNC: 14.6 G/DL (ref 12–15.9)
HGB UR QL STRIP.AUTO: NEGATIVE
HOLD SPECIMEN: NORMAL
HOLD SPECIMEN: NORMAL
IMM GRANULOCYTES # BLD AUTO: 0.04 10*3/MM3 (ref 0–0.05)
IMM GRANULOCYTES NFR BLD AUTO: 0.3 % (ref 0–0.5)
KETONES UR QL STRIP: NEGATIVE
LEUKOCYTE ESTERASE UR QL STRIP.AUTO: NEGATIVE
LIPASE SERPL-CCNC: 43 U/L (ref 13–60)
LYMPHOCYTES # BLD AUTO: 2.27 10*3/MM3 (ref 0.7–3.1)
LYMPHOCYTES NFR BLD AUTO: 19.5 % (ref 19.6–45.3)
MCH RBC QN AUTO: 28.6 PG (ref 26.6–33)
MCHC RBC AUTO-ENTMCNC: 32.9 G/DL (ref 31.5–35.7)
MCV RBC AUTO: 87.1 FL (ref 79–97)
MONOCYTES # BLD AUTO: 0.81 10*3/MM3 (ref 0.1–0.9)
MONOCYTES NFR BLD AUTO: 6.9 % (ref 5–12)
NEUTROPHILS NFR BLD AUTO: 72 % (ref 42.7–76)
NEUTROPHILS NFR BLD AUTO: 8.4 10*3/MM3 (ref 1.7–7)
NITRITE UR QL STRIP: NEGATIVE
NRBC BLD AUTO-RTO: 0 /100 WBC (ref 0–0.2)
PH UR STRIP.AUTO: 6.5 [PH] (ref 5–8)
PLATELET # BLD AUTO: 252 10*3/MM3 (ref 140–450)
PMV BLD AUTO: 10.1 FL (ref 6–12)
POTASSIUM SERPL-SCNC: 4 MMOL/L (ref 3.5–5.2)
PROT SERPL-MCNC: 7 G/DL (ref 6–8.5)
PROT UR QL STRIP: NEGATIVE
QT INTERVAL: 261 MS
RBC # BLD AUTO: 5.1 10*6/MM3 (ref 3.77–5.28)
SARS-COV-2 RNA RESP QL NAA+PROBE: NOT DETECTED
SODIUM SERPL-SCNC: 141 MMOL/L (ref 136–145)
SP GR UR STRIP: 1.01 (ref 1–1.03)
TROPONIN T SERPL-MCNC: <0.01 NG/ML (ref 0–0.03)
TROPONIN T SERPL-MCNC: <0.01 NG/ML (ref 0–0.03)
UROBILINOGEN UR QL STRIP: NORMAL
WBC # BLD AUTO: 11.67 10*3/MM3 (ref 3.4–10.8)
WHOLE BLOOD HOLD SPECIMEN: NORMAL

## 2021-08-03 PROCEDURE — 25010000002 MORPHINE PER 10 MG: Performed by: EMERGENCY MEDICINE

## 2021-08-03 PROCEDURE — 74177 CT ABD & PELVIS W/CONTRAST: CPT

## 2021-08-03 PROCEDURE — 85025 COMPLETE CBC W/AUTO DIFF WBC: CPT

## 2021-08-03 PROCEDURE — 84484 ASSAY OF TROPONIN QUANT: CPT | Performed by: EMERGENCY MEDICINE

## 2021-08-03 PROCEDURE — 80053 COMPREHEN METABOLIC PANEL: CPT

## 2021-08-03 PROCEDURE — 71046 X-RAY EXAM CHEST 2 VIEWS: CPT

## 2021-08-03 PROCEDURE — 81003 URINALYSIS AUTO W/O SCOPE: CPT

## 2021-08-03 PROCEDURE — 25010000002 IOPAMIDOL 61 % SOLUTION: Performed by: EMERGENCY MEDICINE

## 2021-08-03 PROCEDURE — 93010 ELECTROCARDIOGRAM REPORT: CPT | Performed by: INTERNAL MEDICINE

## 2021-08-03 PROCEDURE — U0003 INFECTIOUS AGENT DETECTION BY NUCLEIC ACID (DNA OR RNA); SEVERE ACUTE RESPIRATORY SYNDROME CORONAVIRUS 2 (SARS-COV-2) (CORONAVIRUS DISEASE [COVID-19]), AMPLIFIED PROBE TECHNIQUE, MAKING USE OF HIGH THROUGHPUT TECHNOLOGIES AS DESCRIBED BY CMS-2020-01-R: HCPCS | Performed by: EMERGENCY MEDICINE

## 2021-08-03 PROCEDURE — 93005 ELECTROCARDIOGRAM TRACING: CPT | Performed by: EMERGENCY MEDICINE

## 2021-08-03 PROCEDURE — 25010000002 SODIUM CHLORIDE 0.9 % WITH KCL 20 MEQ 20-0.9 MEQ/L-% SOLUTION: Performed by: HOSPITALIST

## 2021-08-03 PROCEDURE — 25010000002 ONDANSETRON PER 1 MG: Performed by: EMERGENCY MEDICINE

## 2021-08-03 PROCEDURE — 83690 ASSAY OF LIPASE: CPT

## 2021-08-03 PROCEDURE — 99285 EMERGENCY DEPT VISIT HI MDM: CPT

## 2021-08-03 PROCEDURE — 84484 ASSAY OF TROPONIN QUANT: CPT

## 2021-08-03 PROCEDURE — 93005 ELECTROCARDIOGRAM TRACING: CPT

## 2021-08-03 PROCEDURE — 99222 1ST HOSP IP/OBS MODERATE 55: CPT | Performed by: SURGERY

## 2021-08-03 PROCEDURE — 74018 RADEX ABDOMEN 1 VIEW: CPT

## 2021-08-03 PROCEDURE — 25010000002 HYDROMORPHONE PER 4 MG: Performed by: EMERGENCY MEDICINE

## 2021-08-03 RX ORDER — FAMOTIDINE 10 MG/ML
20 INJECTION, SOLUTION INTRAVENOUS ONCE
Status: COMPLETED | OUTPATIENT
Start: 2021-08-03 | End: 2021-08-03

## 2021-08-03 RX ORDER — HYDROMORPHONE HYDROCHLORIDE 1 MG/ML
0.5 INJECTION, SOLUTION INTRAMUSCULAR; INTRAVENOUS; SUBCUTANEOUS EVERY 4 HOURS PRN
Status: DISCONTINUED | OUTPATIENT
Start: 2021-08-03 | End: 2021-08-09

## 2021-08-03 RX ORDER — HYDROMORPHONE HYDROCHLORIDE 1 MG/ML
0.5 INJECTION, SOLUTION INTRAMUSCULAR; INTRAVENOUS; SUBCUTANEOUS ONCE
Status: COMPLETED | OUTPATIENT
Start: 2021-08-03 | End: 2021-08-03

## 2021-08-03 RX ORDER — AMLODIPINE BESYLATE 10 MG/1
10 TABLET ORAL DAILY
Status: DISCONTINUED | OUTPATIENT
Start: 2021-08-03 | End: 2021-08-03

## 2021-08-03 RX ORDER — AMLODIPINE BESYLATE 5 MG/1
5 TABLET ORAL DAILY
Status: DISCONTINUED | OUTPATIENT
Start: 2021-08-03 | End: 2021-08-03

## 2021-08-03 RX ORDER — ONDANSETRON 2 MG/ML
4 INJECTION INTRAMUSCULAR; INTRAVENOUS EVERY 6 HOURS PRN
Status: DISCONTINUED | OUTPATIENT
Start: 2021-08-03 | End: 2021-08-10

## 2021-08-03 RX ORDER — SODIUM CHLORIDE 0.9 % (FLUSH) 0.9 %
10 SYRINGE (ML) INJECTION AS NEEDED
Status: DISCONTINUED | OUTPATIENT
Start: 2021-08-03 | End: 2021-08-10 | Stop reason: HOSPADM

## 2021-08-03 RX ORDER — METOPROLOL TARTRATE 50 MG/1
50 TABLET, FILM COATED ORAL 2 TIMES DAILY
Status: DISCONTINUED | OUTPATIENT
Start: 2021-08-03 | End: 2021-08-05

## 2021-08-03 RX ORDER — PANTOPRAZOLE SODIUM 40 MG/10ML
40 INJECTION, POWDER, LYOPHILIZED, FOR SOLUTION INTRAVENOUS EVERY 12 HOURS SCHEDULED
Status: DISCONTINUED | OUTPATIENT
Start: 2021-08-03 | End: 2021-08-05

## 2021-08-03 RX ORDER — SODIUM CHLORIDE AND POTASSIUM CHLORIDE 150; 900 MG/100ML; MG/100ML
50 INJECTION, SOLUTION INTRAVENOUS CONTINUOUS
Status: DISCONTINUED | OUTPATIENT
Start: 2021-08-03 | End: 2021-08-05

## 2021-08-03 RX ORDER — FAMOTIDINE 10 MG/ML
20 INJECTION, SOLUTION INTRAVENOUS ONCE
Status: DISCONTINUED | OUTPATIENT
Start: 2021-08-03 | End: 2021-08-03

## 2021-08-03 RX ORDER — DIAZEPAM 5 MG/1
2.5 TABLET ORAL EVERY MORNING
Status: DISCONTINUED | OUTPATIENT
Start: 2021-08-04 | End: 2021-08-10 | Stop reason: HOSPADM

## 2021-08-03 RX ORDER — MORPHINE SULFATE 2 MG/ML
2 INJECTION, SOLUTION INTRAMUSCULAR; INTRAVENOUS ONCE
Status: COMPLETED | OUTPATIENT
Start: 2021-08-03 | End: 2021-08-03

## 2021-08-03 RX ORDER — ONDANSETRON 2 MG/ML
4 INJECTION INTRAMUSCULAR; INTRAVENOUS ONCE
Status: COMPLETED | OUTPATIENT
Start: 2021-08-03 | End: 2021-08-03

## 2021-08-03 RX ORDER — MORPHINE SULFATE 2 MG/ML
2 INJECTION, SOLUTION INTRAMUSCULAR; INTRAVENOUS ONCE
Status: DISCONTINUED | OUTPATIENT
Start: 2021-08-03 | End: 2021-08-03

## 2021-08-03 RX ORDER — DILTIAZEM HCL IN NACL,ISO-OSM 125 MG/125
5-15 PLASTIC BAG, INJECTION (ML) INTRAVENOUS
Status: DISCONTINUED | OUTPATIENT
Start: 2021-08-03 | End: 2021-08-07

## 2021-08-03 RX ADMIN — SODIUM CHLORIDE, POTASSIUM CHLORIDE, SODIUM LACTATE AND CALCIUM CHLORIDE 500 ML: 600; 310; 30; 20 INJECTION, SOLUTION INTRAVENOUS at 13:11

## 2021-08-03 RX ADMIN — MORPHINE SULFATE 2 MG: 2 INJECTION, SOLUTION INTRAMUSCULAR; INTRAVENOUS at 10:56

## 2021-08-03 RX ADMIN — IOPAMIDOL 85 ML: 612 INJECTION, SOLUTION INTRAVENOUS at 10:11

## 2021-08-03 RX ADMIN — AMLODIPINE BESYLATE 10 MG: 10 TABLET ORAL at 17:58

## 2021-08-03 RX ADMIN — SODIUM CHLORIDE, POTASSIUM CHLORIDE, SODIUM LACTATE AND CALCIUM CHLORIDE 500 ML: 600; 310; 30; 20 INJECTION, SOLUTION INTRAVENOUS at 09:16

## 2021-08-03 RX ADMIN — ONDANSETRON 4 MG: 2 INJECTION INTRAMUSCULAR; INTRAVENOUS at 09:16

## 2021-08-03 RX ADMIN — METOPROLOL TARTRATE 5 MG: 5 INJECTION, SOLUTION INTRAVENOUS at 19:40

## 2021-08-03 RX ADMIN — FAMOTIDINE 20 MG: 10 INJECTION INTRAVENOUS at 09:14

## 2021-08-03 RX ADMIN — PANTOPRAZOLE SODIUM 40 MG: 40 INJECTION, POWDER, FOR SOLUTION INTRAVENOUS at 21:19

## 2021-08-03 RX ADMIN — METOROPROLOL TARTRATE 5 MG: 5 INJECTION, SOLUTION INTRAVENOUS at 13:17

## 2021-08-03 RX ADMIN — Medication 5 MG/HR: at 23:50

## 2021-08-03 RX ADMIN — POTASSIUM CHLORIDE AND SODIUM CHLORIDE 75 ML/HR: 900; 150 INJECTION, SOLUTION INTRAVENOUS at 18:48

## 2021-08-03 RX ADMIN — HYDROMORPHONE HYDROCHLORIDE 0.5 MG: 1 INJECTION, SOLUTION INTRAMUSCULAR; INTRAVENOUS; SUBCUTANEOUS at 13:09

## 2021-08-03 RX ADMIN — METOROPROLOL TARTRATE 5 MG: 5 INJECTION, SOLUTION INTRAVENOUS at 14:01

## 2021-08-03 NOTE — ED PROVIDER NOTES
EMERGENCY DEPARTMENT ENCOUNTER    Room Number:  N429/1  Date of encounter:  8/3/2021  PCP: Roni Mckenzie MD  Historian: Patient      HPI:  Chief Complaint: Abdominal pain, nausea  A complete HPI/ROS/PMH/PSH/SH/FH are unobtainable due to: None    Context: Michela Aguilar is a 80 y.o. female who presents to the ED via private vehicle for evaluation for epigastric abdominal pain since last night with nausea but no vomiting.  Patient has not had any diarrhea.  History of Crohn's disease and this feels similar to that that she has had the past.  No fevers, chills, cough, chest pain, shortness of breath.  Did have some reflux earlier.      MEDICAL RECORD REVIEW    MRCP 04/11/2021    IMPRESSION:  Since prior imaging in 2019 there is mild to minimal  increase in biliary dilatation without choledocholithiasis. The findings are nonspecific and favored to represent benign biliary ectasia status post cholecystectomy. Correlation with alkaline phosphatase is  recommended to rule out ampullary stenosis.     This report was finalized on 4/13/2021 11:28 AM by Dr. Kirby Thompson M.D.    PAST MEDICAL HISTORY  Active Ambulatory Problems     Diagnosis Date Noted   • Partial small bowel obstruction (CMS/HCC) 04/19/2016   • Generalized abdominal pain 04/27/2019   • Crohn's disease of small intestine with other complication (CMS/HCC) 04/27/2019   • Crohn disease (CMS/HCC) 06/25/2019   • Bloating 06/25/2019     Resolved Ambulatory Problems     Diagnosis Date Noted   • No Resolved Ambulatory Problems     Past Medical History:   Diagnosis Date   • Anemia    • Arthritis    • GERD (gastroesophageal reflux disease)    • History of transfusion    • Hyperlipidemia    • Hypertension    • Ocular migraine    • TIA (transient ischemic attack)    • UTI (urinary tract infection) 07/21/2021         PAST SURGICAL HISTORY  Past Surgical History:   Procedure Laterality Date   • APPENDECTOMY     • CHOLECYSTECTOMY     • COLECTOMY PARTIAL / TOTAL     •  COLONOSCOPY  08/20/2015    s/p right hemicolectomy, recurrent Crohns, IH   • COLONOSCOPY N/A 8/9/2019    Crohns, IH.     • ENDOSCOPY  08/20/2015    erythematous mucosa in stomach, chronic gastritis,    • ENDOSCOPY N/A 4/28/2019    Erythematous mucosa in stomach, path benign   • SPLENECTOMY           FAMILY HISTORY  History reviewed. No pertinent family history.      SOCIAL HISTORY  Social History     Socioeconomic History   • Marital status: Single     Spouse name: Not on file   • Number of children: Not on file   • Years of education: Not on file   • Highest education level: Not on file   Tobacco Use   • Smoking status: Never Smoker   • Smokeless tobacco: Never Used   Substance and Sexual Activity   • Alcohol use: No   • Drug use: No   • Sexual activity: Defer         ALLERGIES  Amitriptyline and Mysoline [primidone]        REVIEW OF SYSTEMS  Review of Systems     All systems reviewed and negative except for those discussed in HPI.       PHYSICAL EXAM    I have reviewed the triage vital signs and nursing notes.    ED Triage Vitals   Temp Heart Rate Resp BP SpO2   08/03/21 0516 08/03/21 0514 08/03/21 0514 08/03/21 0516 08/03/21 0514   98.2 °F (36.8 °C) 94 18 151/80 96 %      Temp src Heart Rate Source Patient Position BP Location FiO2 (%)   08/03/21 0516 08/03/21 0514 -- -- --   Tympanic Monitor          Physical Exam  General: Awake, alert, appears uncomfortable but nontoxic  HEENT: Mucous membranes moist, atraumatic, EOMI  Neck: Full ROM  Pulm: Symmetric chest rise, nonlabored, lungs CTAB  Cardiovascular: Regular rate and rhythm, intact distal pulses  GI: Soft, epigastric tenderness to palpation, nondistended, no rebound, no guarding, bowel sounds diminished  MSK: Full ROM, no deformity  Skin: Warm, dry  Neuro: Alert and oriented x 3, GCS 15, moving all extremities, no focal deficits  Psych: Calm, cooperative      Surgical mask, protective eye goggles, and gloves used during this encounter. Patient in surgical  mask.      LAB RESULTS  Recent Results (from the past 24 hour(s))   ECG 12 Lead    Collection Time: 08/03/21  5:21 AM   Result Value Ref Range    QT Interval 380 ms   Comprehensive Metabolic Panel    Collection Time: 08/03/21  5:30 AM    Specimen: Blood   Result Value Ref Range    Glucose 107 (H) 65 - 99 mg/dL    BUN 11 8 - 23 mg/dL    Creatinine 0.84 0.57 - 1.00 mg/dL    Sodium 141 136 - 145 mmol/L    Potassium 4.0 3.5 - 5.2 mmol/L    Chloride 103 98 - 107 mmol/L    CO2 25.1 22.0 - 29.0 mmol/L    Calcium 9.8 8.6 - 10.5 mg/dL    Total Protein 7.0 6.0 - 8.5 g/dL    Albumin 4.70 3.50 - 5.20 g/dL    ALT (SGPT) 29 1 - 33 U/L    AST (SGOT) 29 1 - 32 U/L    Alkaline Phosphatase 112 39 - 117 U/L    Total Bilirubin 0.4 0.0 - 1.2 mg/dL    eGFR Non African Amer 65 >60 mL/min/1.73    Globulin 2.3 gm/dL    A/G Ratio 2.0 g/dL    BUN/Creatinine Ratio 13.1 7.0 - 25.0    Anion Gap 12.9 5.0 - 15.0 mmol/L   Lipase    Collection Time: 08/03/21  5:30 AM    Specimen: Blood   Result Value Ref Range    Lipase 43 13 - 60 U/L   Troponin    Collection Time: 08/03/21  5:30 AM    Specimen: Blood   Result Value Ref Range    Troponin T <0.010 0.000 - 0.030 ng/mL   Green Top (Gel)    Collection Time: 08/03/21  5:30 AM   Result Value Ref Range    Extra Tube Hold for add-ons.    Lavender Top    Collection Time: 08/03/21  5:30 AM   Result Value Ref Range    Extra Tube hold for add-on    Gold Top - SST    Collection Time: 08/03/21  5:30 AM   Result Value Ref Range    Extra Tube Hold for add-ons.    CBC Auto Differential    Collection Time: 08/03/21  5:30 AM    Specimen: Blood   Result Value Ref Range    WBC 11.67 (H) 3.40 - 10.80 10*3/mm3    RBC 5.10 3.77 - 5.28 10*6/mm3    Hemoglobin 14.6 12.0 - 15.9 g/dL    Hematocrit 44.4 34.0 - 46.6 %    MCV 87.1 79.0 - 97.0 fL    MCH 28.6 26.6 - 33.0 pg    MCHC 32.9 31.5 - 35.7 g/dL    RDW 13.4 12.3 - 15.4 %    RDW-SD 43.1 37.0 - 54.0 fl    MPV 10.1 6.0 - 12.0 fL    Platelets 252 140 - 450 10*3/mm3    Neutrophil  % 72.0 42.7 - 76.0 %    Lymphocyte % 19.5 (L) 19.6 - 45.3 %    Monocyte % 6.9 5.0 - 12.0 %    Eosinophil % 0.7 0.3 - 6.2 %    Basophil % 0.6 0.0 - 1.5 %    Immature Grans % 0.3 0.0 - 0.5 %    Neutrophils, Absolute 8.40 (H) 1.70 - 7.00 10*3/mm3    Lymphocytes, Absolute 2.27 0.70 - 3.10 10*3/mm3    Monocytes, Absolute 0.81 0.10 - 0.90 10*3/mm3    Eosinophils, Absolute 0.08 0.00 - 0.40 10*3/mm3    Basophils, Absolute 0.07 0.00 - 0.20 10*3/mm3    Immature Grans, Absolute 0.04 0.00 - 0.05 10*3/mm3    nRBC 0.0 0.0 - 0.2 /100 WBC   Urinalysis With Microscopic If Indicated (No Culture) - Urine, Clean Catch    Collection Time: 08/03/21  8:32 AM    Specimen: Urine, Clean Catch   Result Value Ref Range    Color, UA Yellow Yellow, Straw    Appearance, UA Clear Clear    pH, UA 6.5 5.0 - 8.0    Specific Gravity, UA 1.015 1.005 - 1.030    Glucose, UA Negative Negative    Ketones, UA Negative Negative    Bilirubin, UA Negative Negative    Blood, UA Negative Negative    Protein, UA Negative Negative    Leuk Esterase, UA Negative Negative    Nitrite, UA Negative Negative    Urobilinogen, UA 0.2 E.U./dL 0.2 - 1.0 E.U./dL   Troponin    Collection Time: 08/03/21  9:17 AM    Specimen: Blood   Result Value Ref Range    Troponin T <0.010 0.000 - 0.030 ng/mL   COVID-19,[facility] NP SWAB IN TRANSPORT MEDIA 8-12 HR TAT - Swab, Nasopharynx    Collection Time: 08/03/21 11:03 AM    Specimen: Nasopharynx; Swab   Result Value Ref Range    COVID19 Not Detected Not Detected - Ref. Range   ECG 12 Lead    Collection Time: 08/03/21  1:08 PM   Result Value Ref Range    QT Interval 261 ms       Ordered the above labs and independently reviewed the results.        RADIOLOGY  XR Chest 2 View    Result Date: 8/3/2021  XR CHEST 2 VW-  HISTORY:  Upper abdominal pain that started last night, just finished antibiotics for UTI.  COMPARISON:  Chest radiograph 09/01/2014  FINDINGS:  2 views of the chest were obtained.  Support Devices:  None.  Cardiac Silhouette/Mediastinum/Ritu:  The cardiac, mediastinal, and hilar contours are within normal limits and not significantly changed. There is calcific aortic atherosclerosis. Lungs/Pleural Spaces:  The lungs and pleural spaces are clear. Chest Wall/Diaphragm/Upper Abdomen:  There are surgical clips in the right upper quadrant. There is calcific atherosclerosis in the upper abdomen. There is exaggeration of the normal thoracic kyphosis. There is diffuse osseous demineralization.   CONCLUSION(S):   1.  No focal consolidation or effusion.  This report was finalized on 8/3/2021 7:42 AM by Dr. Jeanne Holley M.D.      CT Abdomen Pelvis With Contrast    Result Date: 8/3/2021  CT ABDOMEN PELVIS W CONTRAST-  CLINICAL HISTORY: 80-year-old female with nausea and epigastric pain. History of Crohn's disease.  TECHNIQUE: Spiral CT images were acquired through the abdomen and pelvis with IV contrast only and were reconstructed in 3 mm thick slices.  Radiation dose reduction techniques were utilized, including automated exposure control and exposure modulation based on body size.  COMPARISON: CT scan of the abdomen and pelvis dated 12/14/2020  FINDINGS: The liver and pancreas and kidneys and adrenal glands appear within normal limits. The gallbladder and spleen are absent. The stomach is moderately distended with fluid and air. In addition, there are multiple moderately distended air and fluid-filled segments of small bowel within the abdomen and pelvis. Completely decompressed segments of small bowel are also identified in the right hemipelvis extending to the terminal ileum. The findings are consistent with distal small bowel obstruction. The exact site of the obstruction is identified, but is suspected to be in the right side of the pelvis. The colon is unremarkable. It contains air and formed stool. There is a small amount of ascites in the pelvis. These findings are all new since the preceding CT..      CT findings  consistent with distal small bowel obstruction as described. The exact site of the obstruction is not identified, but is suspected to be in the right side of the pelvis.  This report was finalized on 8/3/2021 10:44 AM by Dr. Honorio Montemayor M.D.      XR Abdomen KUB    Result Date: 8/3/2021  PROCEDURE:  XR ABDOMEN KUB-  HISTORY: Small bowel obstruction, NG tube placement.  COMPARISON: CT abdomen and pelvis with contrast 08/03/2021. Chest radiograph 08/03/2021.  FINDINGS: A single portable view of the abdomen/pelvis was obtained. There is a new enteric tube with the tip projecting over the gastric body. The sidehole is below the gastroesophageal junction. The cardiac silhouette is normal in size. There is calcific aortic atherosclerosis. There is mild patchy opacity in the lung bases, left greater than right, new from earlier same date chest radiographs, suggestive of atelectasis. There are surgical clips in the right upper quadrant. There is multilevel degenerative disc disease.  This report was finalized on 8/3/2021 3:02 PM by Dr. Jeanne Holley M.D.        I ordered the above noted radiological studies. Reviewed by me.  See dictation for official radiology interpretation.      PROCEDURES    Critical Care  Performed by: Manuelito Tilley MD  Authorized by: Manuelito Tilley MD     Critical care provider statement:     Critical care time (minutes):  33    Critical care time was exclusive of:  Separately billable procedures and treating other patients    Critical care was necessary to treat or prevent imminent or life-threatening deterioration of the following conditions:  Cardiac failure    Critical care was time spent personally by me on the following activities:  Development of treatment plan with patient or surrogate, discussions with consultants, evaluation of patient's response to treatment, examination of patient, obtaining history from patient or surrogate, ordering and performing treatments and interventions,  ordering and review of laboratory studies, ordering and review of radiographic studies, pulse oximetry, re-evaluation of patient's condition and review of old charts      EKG    EKG Time: 0521  Rhythm/Rate: Sinus rhythm with a rate of 77  Borderline left axis deviation  Normal intervals  Low voltages in the precordial leads  No STEMI     Interpreted Contemporaneously by me, independently viewed  No emergent changes compared to February 2013    EKG    EKG Time: 1308  Rhythm/Rate: Atrial fibrillation with a rate of 135  Normal axis  QTC of 404  Nonspecific repolarization abnormalities  No STEMI     Interpreted Contemporaneously by me, independently viewed  These are new changes compared to initial        MEDICATIONS GIVEN IN ER    Medications   sodium chloride 0.9 % flush 10 mL (has no administration in time range)   famotidine (PEPCID) injection 20 mg (20 mg Intravenous Given 8/3/21 0914)   ondansetron (ZOFRAN) injection 4 mg (4 mg Intravenous Given 8/3/21 0916)   lactated ringers bolus 500 mL (0 mL Intravenous Stopped 8/3/21 0946)   iopamidol (ISOVUE-300) 61 % injection 100 mL (85 mL Intravenous Given by Other 8/3/21 1011)   morphine injection 2 mg (2 mg Intravenous Given 8/3/21 1056)   HYDROmorphone (DILAUDID) injection 0.5 mg (0.5 mg Intravenous Given 8/3/21 1309)   lactated ringers bolus 500 mL (0 mL Intravenous Stopped 8/3/21 1404)   metoprolol tartrate (LOPRESSOR) injection 5 mg (5 mg Intravenous Given 8/3/21 1317)   metoprolol tartrate (LOPRESSOR) injection 5 mg (5 mg Intravenous Given 8/3/21 1401)         PROGRESS, DATA ANALYSIS, CONSULTS, AND MEDICAL DECISION MAKING    All labs have been independently reviewed by me.  All radiology studies have been reviewed by me and discussed with radiologist dictating the report.   EKG's independently viewed and interpreted by me.  Discussion below represents my analysis of pertinent findings related to patient's condition, differential diagnosis, treatment plan and final  disposition.    Initial concern for gastritis, colitis, Crohn's flare, bowel obstruction, renal failure, electrolyte normality, among others.    ED Course as of Aug 03 1545   Tue Aug 03, 2021   1044 Discussed CT scan with Dr. Montemayor, patient demonstrating a small bowel obstruction    [DC]   1051 Patient updated on the CT findings with the bowel obstruction, discussed that I am awaiting surgical call back with potential NG tube placement, plan for hospitalization.  All questions and concerns addressed.  We will give a small dose of morphine for additional pain support.  Nausea seems improved.    [DC]   1142 Discussed with Dr. Victor, Surgery, discussed patient's clinical course and findings today, at this point he feel like we can continue to monitor and see how she does without an NG tube and, if vomiting returns will plan for an NG tube at that time.  He will consult.    [DC]   1154 Discussed with MIHIR Jones, discussed patient's clinical course and findings today, surgical consult, and need for hospitalization.    [DC]   1410 Patient has developed several episodes of vomiting, will plan for NG tube.  Also went into atrial fibrillation with RVR, several doses of IV Lopressor been given, cardiology consult in place    [DC]      ED Course User Index  [DC] Manuelito Tilley MD     HR with minor improvement after two doses of IV Lopressor, still awaiting Cardiology consult, patient has gone up to the floor.     AS OF 15:45 EDT VITALS:    BP - 131/97  HR - (!) 138  TEMP - 98.2 °F (36.8 °C) (Tympanic)  02 SATS - 94%        DIAGNOSIS  Final diagnoses:   Small bowel obstruction (CMS/HCC)   Epigastric pain   Nausea   History of Crohn's disease   History of bowel resection         DISPOSITION  HOSPITALIZATION    Discussed treatment plan and reason for hospitalization with pt/family and hospitalizing physician.  Pt/family voiced understanding of the plan for hospitalization for further testing/treatment as needed.                   Manuelito Tilley MD  08/03/21 1540

## 2021-08-03 NOTE — H&P
History and physical    Primary care physician  Dr. Mckenzie    Chief complaint  Abdominal pain  Nausea vomiting  No BM for last few days    History of present illness  80-year-old white female with history of Crohn's disease hypertension hyperlipidemia chronic anemia and gastroesophageal reflux disease who also has had atrial fibrillation several years ago but no follow-up as she has been in sinus rhythm presented to [facility] emergency room with abdominal pain for last 2 days followed by nausea and started vomiting in the ER.  Patient also have palpitations but no chest pain shortness of breath.  Patient last BM was 2 days ago and was passing gas until today.  Patient work-up in ER revealed small bowel obstruction and found to be in atrial fibrillation with rapid ventricular rate admit for management.  Patient has no fever chills night sweats weight loss or weight gain.    PAST MEDICAL HISTORY  • Anemia     • Arthritis     • GERD (gastroesophageal reflux disease)     • History of transfusion     • Hyperlipidemia     • Hypertension     • Ocular migraine     • TIA (transient ischemic attack)     • UTI (urinary tract infection) 07/21/2021      PAST SURGICAL HISTORY              Procedure Laterality Date   • APPENDECTOMY       • CHOLECYSTECTOMY       • COLECTOMY PARTIAL / TOTAL       • COLONOSCOPY   08/20/2015     s/p right hemicolectomy, recurrent Crohns, IH   • COLONOSCOPY N/A 8/9/2019     Crohns, IH.     • ENDOSCOPY   08/20/2015     erythematous mucosa in stomach, chronic gastritis,    • ENDOSCOPY N/A 4/28/2019     Erythematous mucosa in stomach, path benign   • SPLENECTOMY             FAMILY HISTORY  History reviewed. No pertinent family history.     SOCIAL HISTORY                 Socioeconomic History   • Marital status: Single       Spouse name: Not on file   • Number of children: Not on file   • Years of education: Not on file   • Highest education level: Not on file   Tobacco Use   • Smoking status:  "Never Smoker   • Smokeless tobacco: Never Used   Substance and Sexual Activity   • Alcohol use: No   • Drug use: No   • Sexual activity: Defer         ALLERGIES  Amitriptyline and Mysoline [primidone]  Home medications reviewed     REVIEW OF SYSTEMS  All systems reviewed and negative except for those discussed in HPI.      PHYSICAL EXAM   Blood pressure 131/97, pulse (!) 138, temperature 97.4 °F (36.3 °C), temperature source Oral, resp. rate 16, height 157.5 cm (62\"), weight 61.2 kg (135 lb), SpO2 94 %.    General: Awake, alert, appears uncomfortable but nontoxic  HEENT: Mucous membranes moist, atraumatic, EOMI  Neck: Full ROM  Pulm: Symmetric chest rise, nonlabored, lungs CTAB  Cardiovascular: Tachy S1-S2 irregular  GI: Soft, epigastric tenderness to palpation, hypoactive bowel sounds  MSK: Full ROM, no deformity  Skin: Warm, dry  Neuro: Alert and oriented x 3, GCS 15, moving all extremities, no focal deficits  Psych: Calm, cooperative     LAB RESULTS  Lab Results (last 24 hours)     Procedure Component Value Units Date/Time    COVID PRE-OP / PRE-PROCEDURE SCREENING ORDER (NO ISOLATION) - Swab, Nasopharynx [[id number]]  (Normal) Collected: 08/03/21 1103    Specimen: Swab from Nasopharynx Updated: 08/03/21 1243    Narrative:      The following orders were created for panel order COVID PRE-OP / PRE-PROCEDURE SCREENING ORDER (NO ISOLATION) - Swab, Nasopharynx.  Procedure                               Abnormality         Status                     ---------                               -----------         ------                     COVID-19,[facility]...[[id number]]  Normal              Final result                 Please view results for these tests on the individual orders.    COVID-19,[facility] CEPHEID/MARIA LUISA NP SWAB IN TRANSPORT MEDIA 8-12 HR TAT - Swab, Nasopharynx [[id number]]  (Normal) Collected: 08/03/21 1103    Specimen: Swab from Nasopharynx Updated: 08/03/21 1243     COVID19 Not Detected    Narrative: "      Fact sheet for providers: https://www.fda.gov/media/526354/download     Fact sheet for patients: https://www.fda.gov/media/488007/download    Troponin [[id number]]  (Normal) Collected: 08/03/21 0917    Specimen: Blood Updated: 08/03/21 0953     Troponin T <0.010 ng/mL     Narrative:      Troponin T Reference Range:  <= 0.03 ng/mL-   Negative for AMI  >0.03 ng/mL-     Abnormal for myocardial necrosis.  Clinicians would have to utilize clinical acumen, EKG, Troponin and serial changes to determine if it is an Acute Myocardial Infarction or myocardial injury due to an underlying chronic condition.       Results may be falsely decreased if patient taking Biotin.      Urinalysis With Microscopic If Indicated (No Culture) - Urine, Clean Catch [[id number]]  (Normal) Collected: 08/03/21 0832    Specimen: Urine, Clean Catch Updated: 08/03/21 0853     Color, UA Yellow     Appearance, UA Clear     pH, UA 6.5     Specific Gravity, UA 1.015     Glucose, UA Negative     Ketones, UA Negative     Bilirubin, UA Negative     Blood, UA Negative     Protein, UA Negative     Leuk Esterase, UA Negative     Nitrite, UA Negative     Urobilinogen, UA 0.2 E.U./dL    Narrative:      Urine microscopic not indicated.    [place] Draw [[id number]] Collected: 08/03/21 0530    Specimen: Blood Updated: 08/03/21 0631    Narrative:      The following orders were created for panel order [place] Draw.  Procedure                               Abnormality         Status                     ---------                               -----------         ------                     Green Top (Gel)[[id number]]                                  Final result               Lavender Top[[id number]]                                     Final result               Gold Top - SST[[id number]]                                   Final result                 Please view results for these tests on the individual orders.    Green Top (Gel) [[id number]] Collected: 08/03/21 0530     Specimen: Blood Updated: 08/03/21 0631     Extra Tube Hold for add-ons.     Comment: Auto resulted.       Lavender Top [[id number]] Collected: 08/03/21 0530    Specimen: Blood Updated: 08/03/21 0631     Extra Tube hold for add-on     Comment: Auto resulted       Gold Top - SST [[id number]] Collected: 08/03/21 0530    Specimen: Blood Updated: 08/03/21 0631     Extra Tube Hold for add-ons.     Comment: Auto resulted.       Comprehensive Metabolic Panel [[id number]]  (Abnormal) Collected: 08/03/21 0530    Specimen: Blood Updated: 08/03/21 0601     Glucose 107 mg/dL      BUN 11 mg/dL      Creatinine 0.84 mg/dL      Sodium 141 mmol/L      Potassium 4.0 mmol/L      Chloride 103 mmol/L      CO2 25.1 mmol/L      Calcium 9.8 mg/dL      Total Protein 7.0 g/dL      Albumin 4.70 g/dL      ALT (SGPT) 29 U/L      AST (SGOT) 29 U/L      Alkaline Phosphatase 112 U/L      Total Bilirubin 0.4 mg/dL      eGFR Non African Amer 65 mL/min/1.73      Globulin 2.3 gm/dL      A/G Ratio 2.0 g/dL      BUN/Creatinine Ratio 13.1     Anion Gap 12.9 mmol/L     Narrative:      GFR Normal >60  Chronic Kidney Disease <60  Kidney Failure <15      Lipase [[id number]]  (Normal) Collected: 08/03/21 0530    Specimen: Blood Updated: 08/03/21 0601     Lipase 43 U/L     Troponin [[id number]]  (Normal) Collected: 08/03/21 0530    Specimen: Blood Updated: 08/03/21 0559     Troponin T <0.010 ng/mL     Narrative:      Troponin T Reference Range:  <= 0.03 ng/mL-   Negative for AMI  >0.03 ng/mL-     Abnormal for myocardial necrosis.  Clinicians would have to utilize clinical acumen, EKG, Troponin and serial changes to determine if it is an Acute Myocardial Infarction or myocardial injury due to an underlying chronic condition.       Results may be falsely decreased if patient taking Biotin.      CBC & Differential [[id number]]  (Abnormal) Collected: 08/03/21 0530    Specimen: Blood Updated: 08/03/21 0554    Narrative:      The following orders were created for panel  order CBC & Differential.  Procedure                               Abnormality         Status                     ---------                               -----------         ------                     CBC Auto Differential[[id number]]        Abnormal            Final result                 Please view results for these tests on the individual orders.    CBC Auto Differential [[id number]]  (Abnormal) Collected: 08/03/21 0530    Specimen: Blood Updated: 08/03/21 0554     WBC 11.67 10*3/mm3      RBC 5.10 10*6/mm3      Hemoglobin 14.6 g/dL      Hematocrit 44.4 %      MCV 87.1 fL      MCH 28.6 pg      MCHC 32.9 g/dL      RDW 13.4 %      RDW-SD 43.1 fl      MPV 10.1 fL      Platelets 252 10*3/mm3      Neutrophil % 72.0 %      Lymphocyte % 19.5 %      Monocyte % 6.9 %      Eosinophil % 0.7 %      Basophil % 0.6 %      Immature Grans % 0.3 %      Neutrophils, Absolute 8.40 10*3/mm3      Lymphocytes, Absolute 2.27 10*3/mm3      Monocytes, Absolute 0.81 10*3/mm3      Eosinophils, Absolute 0.08 10*3/mm3      Basophils, Absolute 0.07 10*3/mm3      Immature Grans, Absolute 0.04 10*3/mm3      nRBC 0.0 /100 WBC         Imaging Results (Last 24 Hours)     Procedure Component Value Units Date/Time    XR Abdomen KUB [[id number]] Collected: 08/03/21 1459     Updated: 08/03/21 1505    Narrative:      PROCEDURE:  XR ABDOMEN KUB-     HISTORY: Small bowel obstruction, NG tube placement.     COMPARISON: CT abdomen and pelvis with contrast 08/03/2021. Chest  radiograph 08/03/2021.     FINDINGS: A single portable view of the abdomen/pelvis was obtained.  There is a new enteric tube with the tip projecting over the gastric  body. The sidehole is below the gastroesophageal junction. The cardiac  silhouette is normal in size. There is calcific aortic atherosclerosis.  There is mild patchy opacity in the lung bases, left greater than right,  new from earlier same date chest radiographs, suggestive of atelectasis.  There are surgical clips in the  right upper quadrant. There is  multilevel degenerative disc disease.     This report was finalized on 8/3/2021 3:02 PM by Dr. Jeanne Holley M.D.       CT Abdomen Pelvis With Contrast [[id number]] Collected: 08/03/21 1033     Updated: 08/03/21 1047    Narrative:      CT ABDOMEN PELVIS W CONTRAST-     CLINICAL HISTORY: 80-year-old female with nausea and epigastric pain.  History of Crohn's disease.     TECHNIQUE: Spiral CT images were acquired through the abdomen and pelvis  with IV contrast only and were reconstructed in 3 mm thick slices.     Radiation dose reduction techniques were utilized, including automated  exposure control and exposure modulation based on body size.     COMPARISON: CT scan of the abdomen and pelvis dated 12/14/2020     FINDINGS: The liver and pancreas and kidneys and adrenal glands appear  within normal limits. The gallbladder and spleen are absent. The stomach  is moderately distended with fluid and air. In addition, there are  multiple moderately distended air and fluid-filled segments of small  bowel within the abdomen and pelvis. Completely decompressed segments of  small bowel are also identified in the right hemipelvis extending to the  terminal ileum. The findings are consistent with distal small bowel  obstruction. The exact site of the obstruction is identified, but is  suspected to be in the right side of the pelvis. The colon is  unremarkable. It contains air and formed stool. There is a small amount  of ascites in the pelvis. These findings are all new since the preceding  CT..       Impression:      CT findings consistent with distal small bowel obstruction  as described. The exact site of the obstruction is not identified, but  is suspected to be in the right side of the pelvis.     This report was finalized on 8/3/2021 10:44 AM by Dr. Honorio Montemayor M.D.       XR Chest 2 View [[id number]] Collected: 08/03/21 0740     Updated: 08/03/21 0745    Narrative:      XR CHEST 2 VW-      HISTORY:  Upper abdominal pain that started last night, just finished  antibiotics for UTI.     COMPARISON:  Chest radiograph 09/01/2014     FINDINGS:    2 views of the chest were obtained.     Support Devices:  None.  Cardiac Silhouette/Mediastinum/Ritu:  The cardiac, mediastinal, and  hilar contours are within normal limits and not significantly changed.  There is calcific aortic atherosclerosis.  Lungs/Pleural Spaces:  The lungs and pleural spaces are clear.  Chest Wall/Diaphragm/Upper Abdomen:  There are surgical clips in the  right upper quadrant. There is calcific atherosclerosis in the upper  abdomen. There is exaggeration of the normal thoracic kyphosis. There is  diffuse osseous demineralization.        CONCLUSION(S):       1.  No focal consolidation or effusion.     This report was finalized on 8/3/2021 7:42 AM by Dr. Jeanne Holley M.D.           ECG 12 Lead  Component   Ref Range & Units 8/3/21 1308 8/3/21 0521   QT Interval   ms 261 P  380 P         HEART RATE= 135  bpm  RR Interval= 417  ms  MD Interval=   ms  P Horizontal Axis=   deg  P Front Axis=   deg  QRSD Interval= 73  ms  QT Interval= 261  ms  QRS Axis= -2  deg  T Wave Axis= 164  deg  - ABNORMAL ECG -  Atrial fibrillation with rapid V-rate  Low voltage, precordial leads  Repolarization abnormality, prob rate related             Current Facility-Administered Medications:   •  amLODIPine (NORVASC) tablet 10 mg, 10 mg, Oral, Daily, Drew Cardona MD  •  [START ON 8/4/2021] diazePAM (VALIUM) tablet 2.5 mg, 2.5 mg, Oral, QAM, Drew Cardona MD  •  HYDROmorphone (DILAUDID) injection 0.5 mg, 0.5 mg, Intravenous, Q4H PRN, Drew Cardona MD  •  metoprolol tartrate (LOPRESSOR) injection 5 mg, 5 mg, Intravenous, Q6H PRN, Drew Cardona MD  •  metoprolol tartrate (LOPRESSOR) tablet 50 mg, 50 mg, Oral, BID, Drew Cardona MD  •  ondansetron (ZOFRAN) injection 4 mg, 4 mg, Intravenous, Q6H PRN, Drew Cardona MD  •  pantoprazole (PROTONIX) injection 40 mg, 40 mg,  Intravenous, Q12H, Cesilia Cardona MD  •  sodium chloride 0.9 % flush 10 mL, 10 mL, Intravenous, PRN, Emergency, Triage Protocol, MD  •  sodium chloride 0.9 % with KCl 20 mEq/L infusion, 75 mL/hr, Intravenous, Continuous, Cesilia Cardona MD     ASSESSMENT  Small bowel obstruction  New onset atrial fibrillation with rapid ventricular rate  Crohn's disease  Hypertension  Vitamin B12 deficiency  Gastroesophageal reflux disease    PLAN  Admit  Control the heart rate  NPO  NG tube  IV Protonix  IV fluid  General surgery consult  Cardiology and gastroenterology to follow patient  Check 2D echo  Serial cardiac enzymes and EKG  Adjust home medications  Stress ulcer DVT prophylaxis  Supportive care  DNR  Discussed with nursing staff  Follow closely further recommendation according to hospital course    CESILIA CARDONA MD

## 2021-08-03 NOTE — CASE MANAGEMENT/SOCIAL WORK
Discharge Planning Assessment  [facility]     Patient Name: Michela Aguilar  MRN: [id number]  Today's Date: 8/3/2021    Admit Date: 8/3/2021    Discharge Needs Assessment     Row Name 08/03/21 1603       Living Environment    Lives With  sibling(s)    Name(s) of Who Lives With Patient  Jackelin Aguilar    Current Living Arrangements  home/apartment/condo    Primary Care Provided by  self    Provides Primary Care For  no one    Family Caregiver if Needed  sibling(s)    Family Caregiver Names  Jackelin Aguilar, Kevin Spear    Quality of Family Relationships  involved;supportive    Able to Return to Prior Arrangements  yes    Living Arrangement Comments  Lives with sister 24/7       Resource/Environmental Concerns    Resource/Environmental Concerns  none       Transition Planning    Patient/Family Anticipates Transition to  home with family    Patient/Family Anticipated Services at Transition  none    Transportation Anticipated  family or friend will provide       Discharge Needs Assessment    Equipment Currently Used at Home  none    Concerns to be Addressed  discharge planning    Equipment Needed After Discharge  none        Discharge Plan     Row Name 08/03/21 1602       Plan    Plan  Plan pending treatment course    Patient/Family in Agreement with Plan  yes    Plan Comments  CCP spoke to patient at bedside; explained role of CCP, verified facesheet, and discussed d/c planning needs. Plan pending treatment course, family to transport. Patient lives with her sister who is home 24/7 and can help if needed. Patient lives in a 1 level home with 1 step to enter. Patient uses no DME at home and is independent for mobility at baseline.  Patient has never been to a SNF or used HH. Patient is able to drive herself to any future appointments needed, or has her sister for support. CCP to follow for any recommendations that arise pending treatment. REJI العلي        Continued Care and Services - Admitted Since 8/3/2021     Coordination has not been started for this encounter.         Demographic Summary     Row Name 08/03/21 1603       General Information    Admission Type  inpatient    Arrived From  home    Referral Source  admission list    Reason for Consult  discharge planning    Preferred Language  English     Used During This Interaction  no       Contact Information    Permission Granted to Share Info With  family/designee        Functional Status     Row Name 08/03/21 1603       Functional Status    Usual Activity Tolerance  good    Current Activity Tolerance  moderate       Functional Status, IADL    Medications  independent    Meal Preparation  independent    Housekeeping  independent    Laundry  independent    Shopping  independent       Mental Status    General Appearance WDL  WDL        Psychosocial    No documentation.       Abuse/Neglect    No documentation.       Legal    No documentation.       Substance Abuse    No documentation.       Patient Forms    No documentation.           REJI LAGOS

## 2021-08-03 NOTE — ED TRIAGE NOTES
Pt to ed from home with complaints of upper abd pain. States the pain has been there since 2100 8/2. Patient has hx of crohns, and states she was just treated for a uti and finished her dose of cipro.    Subjective   History of Present Illness   History of Present Illness    Chief complaint: head injury    Location: Forehead    Quality/Severity: No pain.  0/10    Timing/Duration: A few hours prior to arrival.  Constant    Modifying Factors: None    Associated Symptoms: Denies headache.  Denies change in vision.  Denies nausea or vomiting.  Denies neck pain.  Denies chest pain or shortness of breath.  Denies back pain.  Denies seizure.  Denies fevers or chills.    Narrative: 77-year-old female, on warfarin for paroxysmal atrial fibrillation, presents after she tripped, falling forward hitting her forehead.  She did have some skin tears on her arm as well.  She went to urgent care, where her wounds were cleaned and addressed.  They sent her here for a head CT.  Patient is quite upset that she even has to be here.  She states that she has no symptoms.  She does agree to head CT, but does not want her INR drawn.  Her last INR was 1.8 on 1/9/2020.    Review of Systems   Constitutional: Negative.    Eyes: Negative.  Negative for visual disturbance.   Respiratory: Negative.  Negative for shortness of breath.    Cardiovascular: Negative.  Negative for chest pain.   Gastrointestinal: Negative.  Negative for abdominal pain.   Musculoskeletal: Negative.  Negative for back pain and neck pain.   Skin: Negative.    Neurological: Negative.  Negative for dizziness and headaches.   All other systems reviewed and are negative.      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   • CARDIAC CATHETERIZATION  01/01/2011   • CARDIAC  DEFIBRILLATOR PLACEMENT  2010   • MITRAL VALVE REPAIR/REPLACEMENT  2010       Family History   Problem Relation Age of Onset   • Coronary artery disease Father    • No Known Problems Mother        Social History     Socioeconomic History   • Marital status: Single     Spouse name: Not on file   • Number of children: Not on file   • Years of education: Not on file   • Highest education level: Not on file   Tobacco Use   • Smoking status: Former Smoker   • Smokeless tobacco: Never Used   • Tobacco comment: caffeine use   Substance and Sexual Activity   • Alcohol use: No   • Drug use: No   • Sexual activity: Defer     No current facility-administered medications for this encounter.     Current Outpatient Medications:   •  b complex vitamins capsule, Take 1 capsule by mouth Daily., Disp: , Rfl:   •  BIOTIN PO, Take 1 tablet by mouth Daily., Disp: , Rfl:   •  bumetanide (BUMEX) 2 MG tablet, Take 1 tablet by mouth 2 (Two) Times a Day As Needed (edema). (Patient taking differently: Take 2 mg by mouth Daily. 1-2 as needed), Disp: 180 tablet, Rfl: 0  •  Cholecalciferol (VITAMIN D-3 PO), Take 1 tablet by mouth Daily. Only through the winter, Disp: , Rfl:   •  diclofenac (VOLTAREN) 1 % gel gel, Apply 4 g topically to the appropriate area as directed 4 (Four) Times a Day., Disp: 100 g, Rfl: 5  •  fluticasone (FLONASE) 50 MCG/ACT nasal spray, 1 spray into each nostril Daily., Disp: , Rfl:   •  levothyroxine (SYNTHROID, LEVOTHROID) 150 MCG tablet, Take 150 mcg by mouth Daily., Disp: , Rfl:   •  methylPREDNISolone (MEDROL, STEPHON,) 4 MG tablet, Use as directed by package instructions, Disp: 21 tablet, Rfl: 0  •  metoprolol succinate XL (TOPROL-XL) 25 MG 24 hr tablet, Take 1 tablet by mouth Daily., Disp: 90 tablet, Rfl: 3  •  Multiple Vitamin (MULTIVITAMIN) capsule, Take 1 capsule by mouth Daily., Disp: , Rfl:   •  Potassium 99 MG tablet, Take 1 tablet by mouth As Needed., Disp: , Rfl:   •  warfarin (JANTOVEN) 5 MG tablet, Take 1  tablet by mouth Daily. Or as directed by [facility], Disp: 90 tablet, Rfl: 0  •  CALCIUM-MAGNESIUM-ZINC PO, Take 1 tablet by mouth Daily., Disp: , Rfl:         Objective   Physical Exam  Vitals:    01/21/20 1814   BP: 173/83   Pulse:    Resp:    Temp:    SpO2:    Heart rate 76, respirations 16, temp 98.2, oxygen saturations 96% on room air    GENERAL: a/o x 4, NAD  SKIN: Warm pink and dry   HEENT:  PERRLA, EOM intact, conjunctiva normal, sclera clear.  Mid to right forehead with golf ball sized edema with ecchymosis.  There is no erythema.  NECK: supple, no spinal or paraspinal tenderness.  Full range of motion.  LUNGS: Clear to auscultation bilaterally without wheezes, rales or rhonchi.  No accessory muscle use and no nasal flaring.  No ecchymosis  CARDIAC:  Regular rate and rhythm, S1-S2.  No murmurs, rubs or gallops.  No peripheral edema.  Equal pulses bilaterally.  ABDOMEN: Soft, nontender, nondistended.  No guarding or rebound tenderness.  Normal bowel sounds.  No ecchymosis  MUSCULOSKELETAL: Moves all extremities well.  No deformity.  No spinal or paraspinal tenderness.  No ecchymosis.  NEURO: Cranial nerves II through XII grossly intact.  No gross focal deficits.  Alert.  Normal speech and motor.  PSYCH: Normal mood and affect        Procedures           ED Course        Reviewed CT head. Independently viewed by me. Interpreted by radiologist. Discussed with pt.  Xr Shoulder 2+ View Right    Result Date: 1/20/2020  Impression: Ordering physician's impression is located in the Encounter Note dated 01/20/20.     Ct Head Without Contrast    Result Date: 1/21/2020  Narrative: CT Head WO HISTORY: 77-year-old female with frontal head bruising and swelling status post fall this afternoon and striking forehead. Patient on blood thinners. TECHNIQUE: Routine noncontrast head CT. Coronal and sagittal reformatted images. Radiation dose reduction techniques included automated exposure control or exposure  modulation based on body size. Count of known CT and cardiac nuc med studies performed in previous 12 months: 1. COMPARISON: CT head 12/18/2016 FINDINGS: No hemorrhage, acute infarction, mass lesion, or abnormal extra-axial fluid collection. No midline shift or focal mass effect. Ventricular system is normal in size and configuration. Small right frontal scalp hematoma. No acute osseous abnormality. Visualized paranasal sinuses are clear. Visualized mastoid air cells are clear.     Impression: 1. No acute intracranial abnormality. 2. Small right frontal scalp hematoma. No underlying osseous injury. Signer Name: Eduardo Castillo MD  Signed: 1/21/2020 6:41 PM  Workstation Name: [email or web address]  [facility]    Patient declines INR check.  She is completely asymptomatic and neurologically intact.  Follow-up primary care.    Discussed pertinent labs and imaging findings with the patient/family.  Patient/Family voiced understanding of need to follow-up for recheck, further testing as needed.  Return to the emergency Department warnings were given.              MDM  Number of Diagnoses or Management Options  Chronic anticoagulation: established and worsening  Hematoma of scalp, initial encounter: new and requires workup  Injury of forehead, initial encounter: new and requires workup     Amount and/or Complexity of Data Reviewed  Tests in the radiology section of CPT®: reviewed and ordered  Tests in the medicine section of CPT®: reviewed and ordered    Risk of Complications, Morbidity, and/or Mortality  Presenting problems: moderate  Diagnostic procedures: moderate  Management options: moderate    Patient Progress  Patient progress: improved      Final diagnoses:   Injury of forehead, initial encounter   Hematoma of scalp, initial encounter   Chronic anticoagulation     Dictated utilizing Dragon dictation         Mallika Oliveira PA-C  01/21/20 1926

## 2021-08-03 NOTE — CONSULTS
Cc: Abdominal pain, vomiting    History of presenting illness:   This is a nice 80-year-old female with a history of Crohn's disease who underwent what sounds like a laparoscopic ileocecal resection in 2014 done by Dr. Limon.  She recovered well, and although she has some complaints about loose stools and incontinence, she has not had any significant trouble since that time.  Yesterday evening she began having some upper abdominal pain which worsened through the night and became fairly intense this morning, prompting a visit to the emergency room.  She has had some vomiting here in the emergency department.  She describes bloating.  Last bowel movement was yesterday.    Past Medical History: Crohn's disease, anemia, TIA, hypertension, hyperlipidemia, gastroesophageal reflux disease    Past Surgical History: Laparoscopic splenectomy (precise indication unclear, patient says she was anemic), done around 2009.  Laparoscopic cholecystectomy around 2012 or 2013.  Laparoscopic ileocecal or right colectomy 2014.  Colonoscopy most recently 2019.    Medications: Mesalamine, amlodipine, cholestatic fall, Valium, Zetia, Pepcid, Imodium    Allergies: Amitriptyline, primidone (caused confusion)    Social History: She is a non-smoker, lives fairly independently    Family History: Negative for known colorectal cancer    Review of Systems:  Constitutional: Positive for weakness, negative for weight loss, fever, chills  HENT: no hearing change, no runny nose  Eyes: no visual changes or icterus  Neck: no swollen glands or dysphagia or odynophagia  Respiratory: negative for SOB, cough, hemoptysis or wheezing  Cardiovascular: negative for chest pain, palpitations or peripheral edema  Gastrointestinal: Positive for abdominal pain, bloating, diarrhea, nausea and vomiting  Musculoskeletal: negative for myalgias or joint pain  Skin: patient denies icterus or rash  Hematologic: negative for easy bleeding or bruising      Physical  Exam:  Body mass index: 24.7  Vitals: Heart rate initially low in the 40s to 50s at admission, was anywhere from the 130s to the 160s during my exam apparently in atrial fibrillation.  Blood pressure 154/96.  Temperature 98.2 oxygenation 96% on room air.  General: alert and oriented, appropriate, no acute distress  HENT:  Head is normocephalic, atraumatic, mucous membranes are moist  Eyes: Extraocular movements are intact, no scleral icterus  Neck: Supple without lymphadenopathy or thyromegaly, trachea is in the midline  Respiratory: Lungs are clear bilaterally without wheezing, no use of accessory muscles is noted  Cardiovascular: Regular rate and rhythm without murmur, no peripheral edema  Gastrointestinal: Soft, mildly distended, minimal tenderness.  No guarding.  Laparoscopic incisions are noted.  There is a periumbilical incision and probably an extraction site seen above this.  There is no obvious hernia.  Musculoskeletal: Muscle bulk and strength is normal and equal bilaterally.  No gross deformity.  Skin: No rash or icterus noted    Laboratory data: White blood cell count 11.7 hemoglobin 14.6 chemistries are in good order.    Imaging data: CT of the abdomen and pelvis is reviewed by me.  Stomach is moderately distended.  There are moderately dilated small bowel loops proximally and there appear to be some distally decompressed loops.  There does appear to be gas in the colon.  There is no obvious twisting of the mesentery.  I do not see any obvious inflammatory changes of the small bowel consistent with an inflammation of Crohn's disease.  The point of obstruction appears to be proximal to her ileocolic anastomosis (although I do not see staples consistent with ileocolic anastomosis, I assume that is what she has had).      Assessment and plan:   -Small bowel obstruction, seems to be partial at this time.  She vomited earlier, and as such I would recommend nasogastric tube placement.  At the moment there is  no indication for urgent surgical intervention.  It seems to me that this is more likely related to scar tissue and adhesions then from acute inflammation from Crohn's disease, but it is difficult to be certain.  We will follow along with you closely.  -History of Crohn's disease, on mesalamine, would defer further medical management to gastroenterology  -Atrial fibrillation with rapid ventricular response, she does not carry this diagnosis chronically.  Most likely this is a stress reaction related to her bowel obstruction.  Defer management to medical service.      Parrish Victor MD, FACS  General, Minimally Invasive and Endoscopic Surgery  [facility]    [address], [address]  [place], [place], [address]  P: [phone]  F: [phone]

## 2021-08-04 ENCOUNTER — APPOINTMENT (OUTPATIENT)
Dept: GENERAL RADIOLOGY | Facility: HOSPITAL | Age: 81
End: 2021-08-04

## 2021-08-04 ENCOUNTER — APPOINTMENT (OUTPATIENT)
Dept: CARDIOLOGY | Facility: HOSPITAL | Age: 81
End: 2021-08-04

## 2021-08-04 LAB
ALBUMIN SERPL-MCNC: 3.5 G/DL (ref 3.5–5.2)
ALBUMIN/GLOB SERPL: 1.6 G/DL
ALP SERPL-CCNC: 121 U/L (ref 39–117)
ALT SERPL W P-5'-P-CCNC: 317 U/L (ref 1–33)
ANION GAP SERPL CALCULATED.3IONS-SCNC: 10.4 MMOL/L (ref 5–15)
AORTIC DIMENSIONLESS INDEX: 1 (DI)
AST SERPL-CCNC: 223 U/L (ref 1–32)
BASOPHILS # BLD AUTO: 0.05 10*3/MM3 (ref 0–0.2)
BASOPHILS NFR BLD AUTO: 0.3 % (ref 0–1.5)
BH CV ECHO MEAS - ACS: 1.6 CM
BH CV ECHO MEAS - AO MAX PG (FULL): 1.5 MMHG
BH CV ECHO MEAS - AO MAX PG: 7.3 MMHG
BH CV ECHO MEAS - AO MEAN PG (FULL): 0 MMHG
BH CV ECHO MEAS - AO MEAN PG: 3 MMHG
BH CV ECHO MEAS - AO ROOT AREA (BSA CORRECTED): 1.7
BH CV ECHO MEAS - AO ROOT AREA: 5.7 CM^2
BH CV ECHO MEAS - AO ROOT DIAM: 2.7 CM
BH CV ECHO MEAS - AO V2 MAX: 135 CM/SEC
BH CV ECHO MEAS - AO V2 MEAN: 83.4 CM/SEC
BH CV ECHO MEAS - AO V2 VTI: 22 CM
BH CV ECHO MEAS - ASC AORTA: 2.9 CM
BH CV ECHO MEAS - AVA(I,A): 2.5 CM^2
BH CV ECHO MEAS - AVA(I,D): 2.5 CM^2
BH CV ECHO MEAS - AVA(V,A): 2.3 CM^2
BH CV ECHO MEAS - AVA(V,D): 2.3 CM^2
BH CV ECHO MEAS - BSA(HAYCOCK): 1.6 M^2
BH CV ECHO MEAS - BSA: 1.6 M^2
BH CV ECHO MEAS - BZI_BMI: 24.7 KILOGRAMS/M^2
BH CV ECHO MEAS - BZI_METRIC_HEIGHT: 157.5 CM
BH CV ECHO MEAS - BZI_METRIC_WEIGHT: 61.2 KG
BH CV ECHO MEAS - EDV(CUBED): 42.9 ML
BH CV ECHO MEAS - EDV(MOD-SP2): 40 ML
BH CV ECHO MEAS - EDV(MOD-SP4): 49 ML
BH CV ECHO MEAS - EDV(TEICH): 50.9 ML
BH CV ECHO MEAS - EF(CUBED): 81.3 %
BH CV ECHO MEAS - EF(MOD-BP): 62 %
BH CV ECHO MEAS - EF(MOD-SP2): 55 %
BH CV ECHO MEAS - EF(MOD-SP4): 71.4 %
BH CV ECHO MEAS - EF(TEICH): 75 %
BH CV ECHO MEAS - ESV(CUBED): 8 ML
BH CV ECHO MEAS - ESV(MOD-SP2): 18 ML
BH CV ECHO MEAS - ESV(MOD-SP4): 14 ML
BH CV ECHO MEAS - ESV(TEICH): 12.7 ML
BH CV ECHO MEAS - FS: 42.9 %
BH CV ECHO MEAS - IVS/LVPW: 0.85
BH CV ECHO MEAS - IVSD: 1.1 CM
BH CV ECHO MEAS - LAT PEAK E' VEL: 7 CM/SEC
BH CV ECHO MEAS - LV DIASTOLIC VOL/BSA (35-75): 30.3 ML/M^2
BH CV ECHO MEAS - LV MASS(C)D: 135.8 GRAMS
BH CV ECHO MEAS - LV MASS(C)DI: 84 GRAMS/M^2
BH CV ECHO MEAS - LV MAX PG: 5.8 MMHG
BH CV ECHO MEAS - LV MEAN PG: 3 MMHG
BH CV ECHO MEAS - LV SYSTOLIC VOL/BSA (12-30): 8.7 ML/M^2
BH CV ECHO MEAS - LV V1 MAX: 120 CM/SEC
BH CV ECHO MEAS - LV V1 MEAN: 88.2 CM/SEC
BH CV ECHO MEAS - LV V1 VTI: 21.7 CM
BH CV ECHO MEAS - LVIDD: 3.5 CM
BH CV ECHO MEAS - LVIDS: 2 CM
BH CV ECHO MEAS - LVLD AP2: 6 CM
BH CV ECHO MEAS - LVLD AP4: 6.1 CM
BH CV ECHO MEAS - LVLS AP2: 5 CM
BH CV ECHO MEAS - LVLS AP4: 5.8 CM
BH CV ECHO MEAS - LVOT AREA (M): 2.5 CM^2
BH CV ECHO MEAS - LVOT AREA: 2.5 CM^2
BH CV ECHO MEAS - LVOT DIAM: 1.8 CM
BH CV ECHO MEAS - LVPWD: 1.3 CM
BH CV ECHO MEAS - MV DEC SLOPE: 574 CM/SEC^2
BH CV ECHO MEAS - MV DEC TIME: 120 SEC
BH CV ECHO MEAS - MV E MAX VEL: 99 CM/SEC
BH CV ECHO MEAS - MV MEAN PG: 2 MMHG
BH CV ECHO MEAS - MV P1/2T MAX VEL: 133 CM/SEC
BH CV ECHO MEAS - MV P1/2T: 67.9 MSEC
BH CV ECHO MEAS - MV V2 MEAN: 56.2 CM/SEC
BH CV ECHO MEAS - MV V2 VTI: 29.1 CM
BH CV ECHO MEAS - MVA P1/2T LCG: 1.7 CM^2
BH CV ECHO MEAS - MVA(P1/2T): 3.2 CM^2
BH CV ECHO MEAS - MVA(VTI): 1.9 CM^2
BH CV ECHO MEAS - PA ACC SLOPE: 1615 CM/SEC^2
BH CV ECHO MEAS - PA ACC TIME: 0.06 SEC
BH CV ECHO MEAS - PA MAX PG (FULL): 1.8 MMHG
BH CV ECHO MEAS - PA MAX PG: 3.1 MMHG
BH CV ECHO MEAS - PA PR(ACCEL): 54.3 MMHG
BH CV ECHO MEAS - PA V2 MAX: 88.2 CM/SEC
BH CV ECHO MEAS - RAP SYSTOLE: 3 MMHG
BH CV ECHO MEAS - RV MAX PG: 1.3 MMHG
BH CV ECHO MEAS - RV MEAN PG: 1 MMHG
BH CV ECHO MEAS - RV V1 MAX: 57.1 CM/SEC
BH CV ECHO MEAS - RV V1 MEAN: 37.1 CM/SEC
BH CV ECHO MEAS - RV V1 VTI: 9.7 CM
BH CV ECHO MEAS - RVSP: 24.3 MMHG
BH CV ECHO MEAS - SI(AO): 77.9 ML/M^2
BH CV ECHO MEAS - SI(CUBED): 21.6 ML/M^2
BH CV ECHO MEAS - SI(LVOT): 34.1 ML/M^2
BH CV ECHO MEAS - SI(MOD-SP2): 13.6 ML/M^2
BH CV ECHO MEAS - SI(MOD-SP4): 21.6 ML/M^2
BH CV ECHO MEAS - SI(TEICH): 23.6 ML/M^2
BH CV ECHO MEAS - SV(AO): 126 ML
BH CV ECHO MEAS - SV(CUBED): 34.9 ML
BH CV ECHO MEAS - SV(LVOT): 55.2 ML
BH CV ECHO MEAS - SV(MOD-SP2): 22 ML
BH CV ECHO MEAS - SV(MOD-SP4): 35 ML
BH CV ECHO MEAS - SV(TEICH): 38.1 ML
BH CV ECHO MEAS - TAPSE (>1.6): 1.4 CM
BH CV ECHO MEAS - TR MAX VEL: 231 CM/SEC
BH CV VAS BP RIGHT ARM: NORMAL MMHG
BH CV XLRA - RV BASE: 2.8 CM
BH CV XLRA - RV LENGTH: 4.9 CM
BH CV XLRA - RV MID: 1 CM
BH CV XLRA - TDI S': 11 CM/SEC
BILIRUB SERPL-MCNC: 1 MG/DL (ref 0–1.2)
BUN SERPL-MCNC: 14 MG/DL (ref 8–23)
BUN/CREAT SERPL: 16.1 (ref 7–25)
CALCIUM SPEC-SCNC: 8.5 MG/DL (ref 8.6–10.5)
CHLORIDE SERPL-SCNC: 108 MMOL/L (ref 98–107)
CHOLEST SERPL-MCNC: 148 MG/DL (ref 0–200)
CO2 SERPL-SCNC: 22.6 MMOL/L (ref 22–29)
CREAT SERPL-MCNC: 0.87 MG/DL (ref 0.57–1)
DEPRECATED RDW RBC AUTO: 43 FL (ref 37–54)
EOSINOPHIL # BLD AUTO: 0.03 10*3/MM3 (ref 0–0.4)
EOSINOPHIL NFR BLD AUTO: 0.2 % (ref 0.3–6.2)
ERYTHROCYTE [DISTWIDTH] IN BLOOD BY AUTOMATED COUNT: 13.3 % (ref 12.3–15.4)
GFR SERPL CREATININE-BSD FRML MDRD: 63 ML/MIN/1.73
GLOBULIN UR ELPH-MCNC: 2.2 GM/DL
GLUCOSE SERPL-MCNC: 103 MG/DL (ref 65–99)
HBA1C MFR BLD: 5.26 % (ref 4.8–5.6)
HCT VFR BLD AUTO: 38.9 % (ref 34–46.6)
HDLC SERPL-MCNC: 61 MG/DL (ref 40–60)
HGB BLD-MCNC: 12.9 G/DL (ref 12–15.9)
IMM GRANULOCYTES # BLD AUTO: 0.07 10*3/MM3 (ref 0–0.05)
IMM GRANULOCYTES NFR BLD AUTO: 0.4 % (ref 0–0.5)
LDLC SERPL CALC-MCNC: 71 MG/DL (ref 0–100)
LDLC/HDLC SERPL: 1.15 {RATIO}
LEFT ATRIUM VOLUME INDEX: 27 ML/M2
LV EF 2D ECHO EST: 60 %
LYMPHOCYTES # BLD AUTO: 2.17 10*3/MM3 (ref 0.7–3.1)
LYMPHOCYTES NFR BLD AUTO: 13.3 % (ref 19.6–45.3)
MAGNESIUM SERPL-MCNC: 2.1 MG/DL (ref 1.6–2.4)
MAXIMAL PREDICTED HEART RATE: 141 BPM
MCH RBC QN AUTO: 29.1 PG (ref 26.6–33)
MCHC RBC AUTO-ENTMCNC: 33.2 G/DL (ref 31.5–35.7)
MCV RBC AUTO: 87.6 FL (ref 79–97)
MONOCYTES # BLD AUTO: 1.18 10*3/MM3 (ref 0.1–0.9)
MONOCYTES NFR BLD AUTO: 7.3 % (ref 5–12)
NEUTROPHILS NFR BLD AUTO: 12.76 10*3/MM3 (ref 1.7–7)
NEUTROPHILS NFR BLD AUTO: 78.5 % (ref 42.7–76)
NRBC BLD AUTO-RTO: 0 /100 WBC (ref 0–0.2)
NT-PROBNP SERPL-MCNC: 4564 PG/ML (ref 0–1800)
PLATELET # BLD AUTO: 240 10*3/MM3 (ref 140–450)
PMV BLD AUTO: 10.3 FL (ref 6–12)
POTASSIUM SERPL-SCNC: 4.3 MMOL/L (ref 3.5–5.2)
PROT SERPL-MCNC: 5.7 G/DL (ref 6–8.5)
RBC # BLD AUTO: 4.44 10*6/MM3 (ref 3.77–5.28)
SODIUM SERPL-SCNC: 141 MMOL/L (ref 136–145)
STRESS TARGET HR: 120 BPM
TRIGL SERPL-MCNC: 85 MG/DL (ref 0–150)
TROPONIN T SERPL-MCNC: 0.1 NG/ML (ref 0–0.03)
TROPONIN T SERPL-MCNC: 0.1 NG/ML (ref 0–0.03)
TSH SERPL DL<=0.05 MIU/L-ACNC: 0.74 UIU/ML (ref 0.27–4.2)
VLDLC SERPL-MCNC: 16 MG/DL (ref 5–40)
WBC # BLD AUTO: 16.26 10*3/MM3 (ref 3.4–10.8)

## 2021-08-04 PROCEDURE — 25010000002 SODIUM CHLORIDE 0.9 % WITH KCL 20 MEQ 20-0.9 MEQ/L-% SOLUTION: Performed by: HOSPITALIST

## 2021-08-04 PROCEDURE — 25010000002 HYDROCORTISONE SODIUM SUCCINATE 100 MG RECONSTITUTED SOLUTION: Performed by: INTERNAL MEDICINE

## 2021-08-04 PROCEDURE — 93306 TTE W/DOPPLER COMPLETE: CPT

## 2021-08-04 PROCEDURE — 74018 RADEX ABDOMEN 1 VIEW: CPT

## 2021-08-04 PROCEDURE — 83880 ASSAY OF NATRIURETIC PEPTIDE: CPT | Performed by: HOSPITALIST

## 2021-08-04 PROCEDURE — 84443 ASSAY THYROID STIM HORMONE: CPT | Performed by: HOSPITALIST

## 2021-08-04 PROCEDURE — 80061 LIPID PANEL: CPT | Performed by: HOSPITALIST

## 2021-08-04 PROCEDURE — 99232 SBSQ HOSP IP/OBS MODERATE 35: CPT | Performed by: NURSE PRACTITIONER

## 2021-08-04 PROCEDURE — 80053 COMPREHEN METABOLIC PANEL: CPT | Performed by: HOSPITALIST

## 2021-08-04 PROCEDURE — 93005 ELECTROCARDIOGRAM TRACING: CPT | Performed by: INTERNAL MEDICINE

## 2021-08-04 PROCEDURE — 93306 TTE W/DOPPLER COMPLETE: CPT | Performed by: INTERNAL MEDICINE

## 2021-08-04 PROCEDURE — 83735 ASSAY OF MAGNESIUM: CPT | Performed by: INTERNAL MEDICINE

## 2021-08-04 PROCEDURE — 99221 1ST HOSP IP/OBS SF/LOW 40: CPT | Performed by: INTERNAL MEDICINE

## 2021-08-04 PROCEDURE — 93010 ELECTROCARDIOGRAM REPORT: CPT | Performed by: INTERNAL MEDICINE

## 2021-08-04 PROCEDURE — 83036 HEMOGLOBIN GLYCOSYLATED A1C: CPT | Performed by: HOSPITALIST

## 2021-08-04 PROCEDURE — 84484 ASSAY OF TROPONIN QUANT: CPT | Performed by: HOSPITALIST

## 2021-08-04 PROCEDURE — 85025 COMPLETE CBC W/AUTO DIFF WBC: CPT | Performed by: HOSPITALIST

## 2021-08-04 PROCEDURE — 99232 SBSQ HOSP IP/OBS MODERATE 35: CPT | Performed by: SURGERY

## 2021-08-04 PROCEDURE — 84484 ASSAY OF TROPONIN QUANT: CPT | Performed by: NURSE PRACTITIONER

## 2021-08-04 RX ADMIN — Medication 10 MG/HR: at 23:15

## 2021-08-04 RX ADMIN — METOPROLOL TARTRATE 50 MG: 50 TABLET, FILM COATED ORAL at 20:14

## 2021-08-04 RX ADMIN — PANTOPRAZOLE SODIUM 40 MG: 40 INJECTION, POWDER, FOR SOLUTION INTRAVENOUS at 08:45

## 2021-08-04 RX ADMIN — Medication 10 MG/HR: at 09:49

## 2021-08-04 RX ADMIN — HYDROCORTISONE SODIUM SUCCINATE 100 MG: 100 INJECTION, POWDER, FOR SOLUTION INTRAMUSCULAR; INTRAVENOUS at 12:33

## 2021-08-04 RX ADMIN — METOPROLOL TARTRATE 50 MG: 50 TABLET, FILM COATED ORAL at 08:36

## 2021-08-04 RX ADMIN — POTASSIUM CHLORIDE AND SODIUM CHLORIDE 75 ML/HR: 900; 150 INJECTION, SOLUTION INTRAVENOUS at 23:15

## 2021-08-04 RX ADMIN — PANTOPRAZOLE SODIUM 40 MG: 40 INJECTION, POWDER, FOR SOLUTION INTRAVENOUS at 20:15

## 2021-08-04 RX ADMIN — HYDROCORTISONE SODIUM SUCCINATE 100 MG: 100 INJECTION, POWDER, FOR SOLUTION INTRAMUSCULAR; INTRAVENOUS at 22:07

## 2021-08-04 NOTE — PLAN OF CARE
Goal Outcome Evaluation:              Outcome Summary: Called cardiology after several doses of lopresser was given previous shift. Cardiology started cardizem bolus/ gtt. Pt HR is much better and pt is rested well. BP is  wnl. Pt is independent at home here she shoudl be stand by. Pt NG tube on low suction. Pt is NPO and has oral care kit at bedside. GI doesnt believe that surgery is necessary right now for partial bowel obstruction. Cont to monitor, safety maintained.

## 2021-08-04 NOTE — CONSULTS
[facility]  Cardiology Consult Note    Patient Identification:  Name: Michela Aguilar  Age: 80 y.o.  Sex: female  :  1940  MRN: [id number]             Requesting Physician: Dr. VICENTE Cardona    Reason for Consultation / Chief Complaint: Elevated troponin/atrial fibrillation    History of Present Illness:   This is a 80-year-old female, who is new to our service.  She has a history to include anemia, hyperlipidemia, Crohn's disease, atrial fibrillation, hypertension, and history of TIA.  Presented to [facility] with abdominal pain which had been ongoing for the last 2 days.  It was associated with nausea vomiting and nausea.  He was noted to be in atrial fibrillation with RVR and she was admitted for management.  He was noted to have a small bowel obstruction.  No prior cardiac history noted.    On admission to troponins were elevated, Leukos 107, potassium 4, CO2 25.1, liver enzymes WNL, and WBC 11.67.  Chest x-ray showed no focal consolidation or effusion.             Comorbid cardiac risk factors: Hypertension, hyperlipidemia, family history of heart disease,      Past Medical History:  Past Medical History:   Diagnosis Date   • Anemia    • Arthritis    • Crohn disease (CMS/HCC)    • GERD (gastroesophageal reflux disease)    • History of transfusion    • Hyperlipidemia    • Hypertension    • Ocular migraine    • TIA (transient ischemic attack)    • UTI (urinary tract infection) 2021     Past Surgical History:  Past Surgical History:   Procedure Laterality Date   • APPENDECTOMY     • CHOLECYSTECTOMY     • COLECTOMY PARTIAL / TOTAL     • COLONOSCOPY  2015    s/p right hemicolectomy, recurrent Crohns, IH   • COLONOSCOPY N/A 2019    Crohns, IH.     • ENDOSCOPY  2015    erythematous mucosa in stomach, chronic gastritis,    • ENDOSCOPY N/A 2019    Erythematous mucosa in stomach, path benign   • SPLENECTOMY        Allergies:  Allergies   Allergen Reactions    • Amitriptyline Confusion   • Mysoline [Primidone] Confusion     Home Meds:  Medications Prior to Admission   Medication Sig Dispense Refill Last Dose   • amLODIPine (NORVASC) 5 MG tablet Take 5 mg by mouth Daily.  1 8/1/2021 at Unknown time   • ciprofloxacin (CIPRO) 500 MG tablet TAKE 1 TABLET BY MOUTH NOW THEN TAKE 1 TABLET TWICE DAILY   Past Week at Unknown time   • colestipol (COLESTID) 1 G tablet Take 1 g by mouth Daily.   8/1/2021 at Unknown time   • diazepam (VALIUM) 5 MG tablet Take 2.5 mg by mouth Every Morning.   8/1/2021 at Unknown time   • ezetimibe (ZETIA) 10 MG tablet Take 10 mg by mouth Every Evening.   8/1/2021 at Unknown time   • famotidine (PEPCID) 20 MG tablet Take 20 mg by mouth Daily.   8/1/2021 at Unknown time   • loperamide (IMODIUM) 2 MG capsule Take 2 mg by mouth Every Morning.   8/1/2021 at Unknown time   • mesalamine (DELZICOL) 400 MG capsule delayed-release delayed release capsule Take 4 tabs by mouth daily 360 capsule 3 8/1/2021 at Unknown time   • metoprolol tartrate (LOPRESSOR) 50 MG tablet take 1 tablet by mouth twice a day  0 8/1/2021 at Unknown time   • Multiple Vitamin (MULTI VITAMIN DAILY) tablet Take  by mouth.   Past Week at Unknown time   • ondansetron ODT (ZOFRAN ODT) 4 MG disintegrating tablet Take 1 tablet by mouth Every 8 (Eight) Hours As Needed for Nausea or Vomiting. 15 tablet 0 Past Week at Unknown time   • potassium chloride (MICRO-K) 10 MEQ CR capsule take 1 capsule by mouth once daily  0 Past Week at Unknown time   • vitamin B-12 (CYANOCOBALAMIN) 100 MCG tablet Take 1,000 mcg by mouth Daily.   Past Week at Unknown time     Current Meds:    Scheduled    Medication Ordered Dose/Rate, Route, Frequency Last Action   diazePAM (VALIUM) tablet 2.5 mg 2.5 mg, PO, QAM Ordered   hydrocortisone sodium succinate (Solu-CORTEF) injection 100 mg 100 mg, IV, Q12H Given, 100 mg at 08/04 1233   metoprolol tartrate (LOPRESSOR) tablet 50 mg 50 mg, PO, BID Given, 50 mg at 08/04 0836    pantoprazole (PROTONIX) injection 40 mg 40 mg, IV, Q12H Given, 40 mg at 08/04 0845   Continuous    Medication Ordered Dose/Rate, Route, Frequency Last Action   dilTIAZem (CARDIZEM) 125 mg in 125 mL 0.7% sodium chloride  infusion 5-15 mg/hr, IV, Titrated New Bag, 10 mg/hr at 08/04 0949   sodium chloride 0.9 % with KCl 20 mEq/L infusion 75 mL/hr, IV, Continuous New Bag, 75 mL/hr at 08/03 1848   PRN    Medication Ordered Dose/Rate, Route, Frequency Last Action   HYDROmorphone (DILAUDID) injection 0.5 mg 0.5 mg, IV, Q4H PRN Ordered   metoprolol tartrate (LOPRESSOR) injection 5 mg 5 mg, IV, Q6H PRN Given, 5 mg at 08/03 1940   ondansetron (ZOFRAN) injection 4 mg 4 mg, IV, Q6H PRN Ordered   sodium chloride 0.9 % flush 10 mL 10 mL, IV, PRN Ordered       Social History:   Social History     Tobacco Use   • Smoking status: Never Smoker   • Smokeless tobacco: Never Used   Substance Use Topics   • Alcohol use: No      Family History:  History reviewed. No pertinent family history.     Review of Systems    Constitutional: No weakness,fatigue, fever, rigors, chills   Eyes: No vision changes, eye pain   ENT/oropharynx: No difficulty swallowing, sore throat, epistaxis, changes in hearing   Cardiovascular: No chest pain, chest tightness, palpitations, paroxysmal nocturnal dyspnea, orthopnea, diaphoresis, dizziness / syncopal episode   Respiratory: No shortness of breath, dyspnea on exertion, cough, wheezing hemoptysis   Gastrointestinal: No abdominal pain, nausea, vomiting, diarrhea, bloody stools   Genitourinary: No hematuria, dysuria   Neurological: No headache, tremors, numbness,  one-sided weakness    Musculoskeletal: No cramps, myalgias,  joint pain, joint swelling   Integument: No rash, edema           Constitutional:  Temp:  [97.4 °F (36.3 °C)-98.1 °F (36.7 °C)] 98.1 °F (36.7 °C)  Heart Rate:  [] 104  Resp:  [15-16] 16  BP: ()/(48-97) 124/69    Physical Exam   General:  Appears in no acute distress, resting in  bed  Eyes: EOM normal no conjunctival drainage  HEENT:  No JVD. Thyroid not visibly enlarged. No mucosal pallor or cyanosis  Respiratory: Respirations regular and unlabored at rest. BBS with good air anterially and laterally. No crackles, rubs or wheezes auscultated  Cardiovascular: S1S2 Regular rate and rhythm. No murmur, rub or gallop auscultated. No carotid bruits. DP/PT pulses  +2  . No pretibial pitting edema  Gastrointestinal: Abdomen soft,  non tender. Bowel sounds present. No hepatosplenomegaly. No ascites  Musculoskeletal: ASENCIO x4. No abnormal movements  Extremities: No digital clubbing or cyanosis  Skin:   Skin warm and dry to touch. No rashes  No xanthoma  Neuro: AAO x3 CN II-XII grossly intact              Cardiographics  ECG:   afib    Telemetry:  afib    Echocardiogram:   Interpretation Summary    · Estimated left ventricular EF = 60% Left ventricular systolic function is normal.  · Left ventricular diastolic function was indeterminate.          Imaging  Chest X-ray:   Study Result    Narrative & Impression   ONE VIEW ABDOMEN     HISTORY: Small bowel obstruction.     FINDINGS: There is now some bowel gas scattered throughout the colon and  improvement in previous dilated small bowel loops noted on yesterday's  CT scan and the findings are most consistent with resolving small bowel  obstruction.     There is contrast within the urinary bladder from the recent IV contrast  enhanced CT scan and the urinary bladder has a smooth contour.     This report was finalized on 8/4/2021 11:14 AM by Dr. David Mayers M.D.        IMPRESSION:  CT findings consistent with distal small bowel obstruction  as described. The exact site of the obstruction is not identified, but  is suspected to be in the right side of the pelvis.     This report was finalized on 8/3/2021 10:44 AM by Dr. Honorio Montemayor M.D.       Lab Review   Results from last 7 days   Lab Units 08/04/21  1206 08/04/21  0530 08/03/21  0917   TROPONIN T  ng/mL 0.103* 0.097* <0.010     Results from last 7 days   Lab Units 08/04/21  0530   MAGNESIUM mg/dL 2.1     Results from last 7 days   Lab Units 08/04/21  0530   SODIUM mmol/L 141   POTASSIUM mmol/L 4.3   BUN mg/dL 14   CREATININE mg/dL 0.87   CALCIUM mg/dL 8.5*        Results from last 7 days   Lab Units 08/04/21  0530 08/03/21  0530   WBC 10*3/mm3 16.26* 11.67*   HEMOGLOBIN g/dL 12.9 14.6   HEMATOCRIT % 38.9 44.4   PLATELETS 10*3/mm3 240 252             Assessment:  New onset atrial fibrillation with RVR  small bowel obstruction  Hypertension: Stable  Hyperlipidemia: Good control noted.  Crohn's disease  Vitamin B-12 deficiency  leukocytosis    Recommendations / Plan:     This is a 80-year-old female, who is has history of hypertension and hyperlipidemia.  [facility].  She was noted to be in A. fib with RVR and was admitted for management.  She was placed on Cardizem drip at that time.  EKG shows atrial fibrillation. Continue Cardizem drip, and beta-blockade.   TSH WNL.  Electrolytes within normal limits.  No prior cardiac work-up noted.  She states she had a stress test years echo with [facility] which was okay.  Echo revealed EF 60%, the dysfunction was indeterminate.  See full report as above.  She will need a cardiac work-up once stable.      Labs/tests ordered for am: Will have nurse add Lovenox 1 mg/kg for atrial fibrillation with pharmacy to dose if okay with all.  Please obtain records from [facility] regarding stress test.cmp, and mag Tara Crews, APRN  8/4/2021, 13:46 EDT      EMR Dragon/Transcription:   Dictated utilizing Dragon dictation

## 2021-08-04 NOTE — PROGRESS NOTES
"Reevaluation    Chief complaint  Doing same  Still with abdominal pain and nausea but no vomiting  No new complaints  Family at bedside    History of present illness  80-year-old white female with history of Crohn's disease hypertension hyperlipidemia chronic anemia and gastroesophageal reflux disease who also has had atrial fibrillation several years ago but no follow-up as she has been in sinus rhythm presented to [facility] emergency room with abdominal pain for last 2 days followed by nausea and started vomiting in the ER.  Patient also have palpitations but no chest pain shortness of breath.  Patient last BM was 2 days ago and was passing gas until today.  Patient work-up in ER revealed small bowel obstruction and found to be in atrial fibrillation with rapid ventricular rate admit for management.  Patient has no fever chills night sweats weight loss or weight gain.     REVIEW OF SYSTEMS  All systems reviewed and negative except for those discussed in HPI.      PHYSICAL EXAM   Blood pressure 101/52, pulse 82, temperature 97.4 °F (36.3 °C), temperature source Oral, resp. rate 16, height 157.5 cm (62\"), weight 61.2 kg (135 lb), SpO2 94 %.    General: Awake, alert, appears uncomfortable but nontoxic  HEENT: Mucous membranes moist, atraumatic, EOMI  Neck: Full ROM  Pulm: Symmetric chest rise, nonlabored, lungs CTAB  Cardiovascular: Tachy S1-S2 irregular  GI: Soft, epigastric tenderness to palpation, hypoactive bowel sounds  MSK: Full ROM, no deformity  Skin: Warm, dry  Neuro: Alert and oriented x 3, GCS 15, moving all extremities, no focal deficits  Psych: Calm, cooperative     LAB RESULTS  Lab Results (last 24 hours)     Procedure Component Value Units Date/Time    Troponin [[id number]]  (Abnormal) Collected: 08/04/21 1206    Specimen: Blood Updated: 08/04/21 1249     Troponin T 0.103 ng/mL     Narrative:      Troponin T Reference Range:  <= 0.03 ng/mL-   Negative for AMI  >0.03 ng/mL-     Abnormal for " myocardial necrosis.  Clinicians would have to utilize clinical acumen, EKG, Troponin and serial changes to determine if it is an Acute Myocardial Infarction or myocardial injury due to an underlying chronic condition.       Results may be falsely decreased if patient taking Biotin.      Magnesium [[id number]]  (Normal) Collected: 08/04/21 0530    Specimen: Blood Updated: 08/04/21 0836     Magnesium 2.1 mg/dL     Troponin [[id number]]  (Abnormal) Collected: 08/04/21 0530    Specimen: Blood Updated: 08/04/21 0721     Troponin T 0.097 ng/mL     Narrative:      Troponin T Reference Range:  <= 0.03 ng/mL-   Negative for AMI  >0.03 ng/mL-     Abnormal for myocardial necrosis.  Clinicians would have to utilize clinical acumen, EKG, Troponin and serial changes to determine if it is an Acute Myocardial Infarction or myocardial injury due to an underlying chronic condition.       Results may be falsely decreased if patient taking Biotin.      Comprehensive Metabolic Panel [[id number]]  (Abnormal) Collected: 08/04/21 0530    Specimen: Blood Updated: 08/04/21 0707     Glucose 103 mg/dL      BUN 14 mg/dL      Creatinine 0.87 mg/dL      Sodium 141 mmol/L      Potassium 4.3 mmol/L      Chloride 108 mmol/L      CO2 22.6 mmol/L      Calcium 8.5 mg/dL      Total Protein 5.7 g/dL      Albumin 3.50 g/dL      ALT (SGPT) 317 U/L      AST (SGOT) 223 U/L      Alkaline Phosphatase 121 U/L      Total Bilirubin 1.0 mg/dL      eGFR Non African Amer 63 mL/min/1.73      Globulin 2.2 gm/dL      A/G Ratio 1.6 g/dL      BUN/Creatinine Ratio 16.1     Anion Gap 10.4 mmol/L     Narrative:      GFR Normal >60  Chronic Kidney Disease <60  Kidney Failure <15      Lipid Panel [[id number]]  (Abnormal) Collected: 08/04/21 0530    Specimen: Blood Updated: 08/04/21 0707     Total Cholesterol 148 mg/dL      Triglycerides 85 mg/dL      HDL Cholesterol 61 mg/dL      LDL Cholesterol  71 mg/dL      VLDL Cholesterol 16 mg/dL      LDL/HDL Ratio 1.15    Narrative:       Cholesterol Reference Ranges  (U.S. Department of Health and Human Services ATP III Classifications)    Desirable          <200 mg/dL  Borderline High    200-239 mg/dL  High Risk          >240 mg/dL      Triglyceride Reference Ranges  (U.S. Department of Health and Human Services ATP III Classifications)    Normal           <150 mg/dL  Borderline High  150-199 mg/dL  High             200-499 mg/dL  Very High        >500 mg/dL    HDL Reference Ranges  (U.S. Department of Health and Human Services ATP III Classifcations)    Low     <40 mg/dl (major risk factor for CHD)  High    >60 mg/dl ('negative' risk factor for CHD)        LDL Reference Ranges  (U.S. Department of Health and Human Services ATP III Classifcations)    Optimal          <100 mg/dL  Near Optimal     100-129 mg/dL  Borderline High  130-159 mg/dL  High             160-189 mg/dL  Very High        >189 mg/dL    TSH [[id number]]  (Normal) Collected: 08/04/21 0530    Specimen: Blood Updated: 08/04/21 0706     TSH 0.744 uIU/mL     BNP [[id number]]  (Abnormal) Collected: 08/04/21 0530    Specimen: Blood Updated: 08/04/21 0706     proBNP 4,564.0 pg/mL     Narrative:      Among patients with dyspnea, NT-proBNP is highly sensitive for the detection of acute congestive heart failure. In addition NT-proBNP of <300 pg/ml effectively rules out acute congestive heart failure with 99% negative predictive value.    Results may be falsely decreased if patient taking Biotin.      Hemoglobin A1c [[id number]]  (Normal) Collected: 08/04/21 0530    Specimen: Blood Updated: 08/04/21 0641     Hemoglobin A1C 5.26 %     Narrative:      Hemoglobin A1C Ranges:    Increased Risk for Diabetes  5.7% to 6.4%  Diabetes                     >= 6.5%  Diabetic Goal                < 7.0%    CBC & Differential [[id number]]  (Abnormal) Collected: 08/04/21 0530    Specimen: Blood Updated: 08/04/21 0612    Narrative:      The following orders were created for panel order CBC &  Differential.  Procedure                               Abnormality         Status                     ---------                               -----------         ------                     CBC Auto Differential[[id number]]        Abnormal            Final result                 Please view results for these tests on the individual orders.    CBC Auto Differential [[id number]]  (Abnormal) Collected: 08/04/21 0530    Specimen: Blood Updated: 08/04/21 0612     WBC 16.26 10*3/mm3      RBC 4.44 10*6/mm3      Hemoglobin 12.9 g/dL      Hematocrit 38.9 %      MCV 87.6 fL      MCH 29.1 pg      MCHC 33.2 g/dL      RDW 13.3 %      RDW-SD 43.0 fl      MPV 10.3 fL      Platelets 240 10*3/mm3      Neutrophil % 78.5 %      Lymphocyte % 13.3 %      Monocyte % 7.3 %      Eosinophil % 0.2 %      Basophil % 0.3 %      Immature Grans % 0.4 %      Neutrophils, Absolute 12.76 10*3/mm3      Lymphocytes, Absolute 2.17 10*3/mm3      Monocytes, Absolute 1.18 10*3/mm3      Eosinophils, Absolute 0.03 10*3/mm3      Basophils, Absolute 0.05 10*3/mm3      Immature Grans, Absolute 0.07 10*3/mm3      nRBC 0.0 /100 WBC         Imaging Results (Last 24 Hours)     Procedure Component Value Units Date/Time    XR Abdomen KUB [[id number]] Collected: 08/04/21 0805     Updated: 08/04/21 1117    Narrative:      ONE VIEW ABDOMEN     HISTORY: Small bowel obstruction.     FINDINGS: There is now some bowel gas scattered throughout the colon and  improvement in previous dilated small bowel loops noted on yesterday's  CT scan and the findings are most consistent with resolving small bowel  obstruction.     There is contrast within the urinary bladder from the recent IV contrast  enhanced CT scan and the urinary bladder has a smooth contour.     This report was finalized on 8/4/2021 11:14 AM by Dr. David Mayers M.D.           ECG 12 Lead  Component   Ref Range & Units 8/3/21 1308 8/3/21 0521   QT Interval   ms 261 P  380 P         HEART RATE= 135  bpm  RR  Interval= 417  ms  OR Interval=   ms  P Horizontal Axis=   deg  P Front Axis=   deg  QRSD Interval= 73  ms  QT Interval= 261  ms  QRS Axis= -2  deg  T Wave Axis= 164  deg  - ABNORMAL ECG -  Atrial fibrillation with rapid V-rate  Low voltage, precordial leads  Repolarization abnormality, prob rate related             Current Facility-Administered Medications:   •  diazePAM (VALIUM) tablet 2.5 mg, 2.5 mg, Oral, QAM, Drew Cardona MD  •  dilTIAZem (CARDIZEM) 125 mg in 125 mL 0.7% sodium chloride  infusion, 5-15 mg/hr, Intravenous, Titrated, Gildardo Clinton MD, Last Rate: 10 mL/hr at 08/04/21 0949, 10 mg/hr at 08/04/21 0949  •  hydrocortisone sodium succinate (Solu-CORTEF) injection 100 mg, 100 mg, Intravenous, Q12H, Power Singh MD, 100 mg at 08/04/21 1233  •  HYDROmorphone (DILAUDID) injection 0.5 mg, 0.5 mg, Intravenous, Q4H PRN, Drew Cardona MD  •  metoprolol tartrate (LOPRESSOR) injection 5 mg, 5 mg, Intravenous, Q6H PRN, Drew Cardona MD, 5 mg at 08/03/21 1940  •  metoprolol tartrate (LOPRESSOR) tablet 50 mg, 50 mg, Oral, BID, Drew Cardona MD, 50 mg at 08/04/21 0836  •  ondansetron (ZOFRAN) injection 4 mg, 4 mg, Intravenous, Q6H PRN, Drew Cardona MD  •  pantoprazole (PROTONIX) injection 40 mg, 40 mg, Intravenous, Q12H, Drew Cardona MD, 40 mg at 08/04/21 0845  •  sodium chloride 0.9 % flush 10 mL, 10 mL, Intravenous, PRN, Emergency, Triage Protocol, MD  •  sodium chloride 0.9 % with KCl 20 mEq/L infusion, 75 mL/hr, Intravenous, Continuous, Drew Cardona MD, Last Rate: 75 mL/hr at 08/03/21 1848, 75 mL/hr at 08/03/21 1848     ASSESSMENT  Small bowel obstruction  New onset atrial fibrillation with rapid ventricular rate  Elevated troponin no chest pain  Crohn's disease  Hypertension  Vitamin B12 deficiency  leukocytosis secondary to steroids  Gastroesophageal reflux disease    PLAN  CPM  Control the heart rate  NPO  NG tube  IV Protonix  IV fluid  IV steroids per GI  General surgery consult  appreciated  Cardiology and gastroenterology to follow patient  Adjust home medications  Stress ulcer DVT prophylaxis  Supportive care  DNR  Discussed with nursing staff and family  Follow closely further recommendation according to hospital course    CESILIA YUAN MD

## 2021-08-04 NOTE — CONSULTS
[facility]  Initial Inpatient Consult Note    Referring Provider: Dr. Roni Mckenzie, Dr. Cardona    Reason for Consultation: Crohn's disease, small bowel obstruction    Subjective     History of present illness:    80 y.o. female followed by Dr. Marcel Ricketts with a history of Crohn's dating back to 2014.  She had previous bowel resection by Dr. Bella Limon at that time involving the terminal ileum.  Just seen in the office setting several days ago without complaints.  Had switch from 1 form of mesalamine to another due to cost.  Last colonoscopy was in August 2019 after hospitalization in April that same year for nausea vomiting and diarrhea.  That study did show mild to moderate inflammation of the small bowel but negative in the colon itself.  There was no obvious stricture or obstruction at the anastomotic site.  Presentation at this time with CT scan findings suggesting obstruction of a partial nature located in the right pelvic area.  She had episodes of nausea and vomiting precluding this and did respond well to NG decompression which has been removed.  She is currently having some bowel sounds and has passed some flatus.  Surgery following added recommended relative gut rest with monitoring.  There may be some component of adhesion or scar tissue but may also be exacerbation of her Crohn's which would warrant further treatment will initiate corticosteroids for the short-term.  Also noted to have acute onset of atrial fibrillation which is being addressed by the cardiology service.    Past Medical History:  Past Medical History:   Diagnosis Date   • Anemia    • Arthritis    • Crohn disease (CMS/HCC)    • GERD (gastroesophageal reflux disease)    • History of transfusion    • Hyperlipidemia    • Hypertension    • Ocular migraine    • TIA (transient ischemic attack)    • UTI (urinary tract infection) 07/21/2021     Past Surgical History:  Past Surgical History:   Procedure Laterality  Date   • APPENDECTOMY     • CHOLECYSTECTOMY     • COLECTOMY PARTIAL / TOTAL     • COLONOSCOPY  08/20/2015    s/p right hemicolectomy, recurrent Crohns, IH   • COLONOSCOPY N/A 8/9/2019    Crohns, IH.     • ENDOSCOPY  08/20/2015    erythematous mucosa in stomach, chronic gastritis,    • ENDOSCOPY N/A 4/28/2019    Erythematous mucosa in stomach, path benign   • SPLENECTOMY        Social History:   Social History     Tobacco Use   • Smoking status: Never Smoker   • Smokeless tobacco: Never Used   Substance Use Topics   • Alcohol use: No      Family History:  History reviewed. No pertinent family history.    Home Meds:  Medications Prior to Admission   Medication Sig Dispense Refill Last Dose   • amLODIPine (NORVASC) 5 MG tablet Take 5 mg by mouth Daily.  1 8/1/2021 at Unknown time   • ciprofloxacin (CIPRO) 500 MG tablet TAKE 1 TABLET BY MOUTH NOW THEN TAKE 1 TABLET TWICE DAILY   Past Week at Unknown time   • colestipol (COLESTID) 1 G tablet Take 1 g by mouth Daily.   8/1/2021 at Unknown time   • diazepam (VALIUM) 5 MG tablet Take 2.5 mg by mouth Every Morning.   8/1/2021 at Unknown time   • ezetimibe (ZETIA) 10 MG tablet Take 10 mg by mouth Every Evening.   8/1/2021 at Unknown time   • famotidine (PEPCID) 20 MG tablet Take 20 mg by mouth Daily.   8/1/2021 at Unknown time   • loperamide (IMODIUM) 2 MG capsule Take 2 mg by mouth Every Morning.   8/1/2021 at Unknown time   • mesalamine (DELZICOL) 400 MG capsule delayed-release delayed release capsule Take 4 tabs by mouth daily 360 capsule 3 8/1/2021 at Unknown time   • metoprolol tartrate (LOPRESSOR) 50 MG tablet take 1 tablet by mouth twice a day  0 8/1/2021 at Unknown time   • Multiple Vitamin (MULTI VITAMIN DAILY) tablet Take  by mouth.   Past Week at Unknown time   • ondansetron ODT (ZOFRAN ODT) 4 MG disintegrating tablet Take 1 tablet by mouth Every 8 (Eight) Hours As Needed for Nausea or Vomiting. 15 tablet 0 Past Week at Unknown time   • potassium chloride (MICRO-K)  10 MEQ CR capsule take 1 capsule by mouth once daily  0 Past Week at Unknown time   • vitamin B-12 (CYANOCOBALAMIN) 100 MCG tablet Take 1,000 mcg by mouth Daily.   Past Week at Unknown time     Current Meds:   diazePAM, 2.5 mg, Oral, QAM  metoprolol tartrate, 50 mg, Oral, BID  pantoprazole, 40 mg, Intravenous, Q12H      Allergies:  Allergies   Allergen Reactions   • Amitriptyline Confusion   • Mysoline [Primidone] Confusion     Review of Systems  Pertinent items are noted in HPI, all other systems reviewed and negative     Objective     Vital Signs  Temp:  [97.4 °F (36.3 °C)-97.9 °F (36.6 °C)] 97.4 °F (36.3 °C)  Heart Rate:  [] 104  Resp:  [14-16] 16  BP: ()/() 108/64  Physical Exam:  General Appearance:    Alert, cooperative, in no acute distress   Head:    Normocephalic, without obvious abnormality, atraumatic   Eyes:          conjunctivae and sclerae normal, no   icterus   Throat:   no thrush, oral mucosa moist   Neck:   Supple, no adenopathy   Lungs:     Clear to auscultation bilaterally    Heart:    Regular rhythm and normal rate    Chest Wall:    No abnormalities observed   Abdomen:     Soft, nondistended, nontender; normal bowel sounds   Extremities:   no edema, no redness   Skin:   No bruising or rash   Psychiatric:  normal mood and insight     Results Review:   I reviewed the patient's new clinical results.    Results from last 7 days   Lab Units 08/04/21  0530 08/03/21  0530   WBC 10*3/mm3 16.26* 11.67*   HEMOGLOBIN g/dL 12.9 14.6   HEMATOCRIT % 38.9 44.4   PLATELETS 10*3/mm3 240 252     Results from last 7 days   Lab Units 08/04/21  0530 08/03/21  0530   SODIUM mmol/L 141 141   POTASSIUM mmol/L 4.3 4.0   CHLORIDE mmol/L 108* 103   CO2 mmol/L 22.6 25.1   BUN mg/dL 14 11   CREATININE mg/dL 0.87 0.84   CALCIUM mg/dL 8.5* 9.8   BILIRUBIN mg/dL 1.0 0.4   ALK PHOS U/L 121* 112   ALT (SGPT) U/L 317* 29   AST (SGOT) U/L 223* 29   GLUCOSE mg/dL 103* 107*         Lab Results   Lab Value Date/Time     LIPASE 43 08/03/2021 0530    LIPASE 37 03/08/2021 1145    LIPASE 49 12/20/2019 0418    LIPASE 15 04/28/2019 0714    LIPASE 42 04/27/2019 0557    LIPASE 29 04/19/2016 0925    LIPASE 60 09/01/2014 1000    LIPASE 22 01/14/2014 0611    LIPASE 49 01/12/2014 1340       Radiology:  XR Abdomen KUB         XR Abdomen KUB   Final Result      CT Abdomen Pelvis With Contrast   Final Result   CT findings consistent with distal small bowel obstruction   as described. The exact site of the obstruction is not identified, but   is suspected to be in the right side of the pelvis.       This report was finalized on 8/3/2021 10:44 AM by Dr. Honorio Montemayor M.D.          XR Chest 2 View   Final Result          Assessment/Plan   Patient Active Problem List   Diagnosis   • Partial small bowel obstruction (CMS/HCC)   • Generalized abdominal pain   • Crohn's disease of small intestine with other complication (CMS/HCC)   • Crohn disease (CMS/HCC)   • Bloating   • Small bowel obstruction (CMS/HCC)       Assessment:  1. Crohn's disease: Small bowel, managed with mesalamine  2. Small bowel obstruction: May be of a mechanical nature with adhesions or scar tissue, cannot rule out exacerbation of the Crohn's disease.  3. Atrial fibrillation    Plan:  · IV corticosteroids  · Gut rest  · KUB to monitor  · Cardiology to see for A. fib      I discussed the patients findings and my recommendations with patient and nursing staff.    Power Singh MD

## 2021-08-04 NOTE — NURSING NOTE
About 0400 Pt called RN in to room and states that she took out NG tube. Pt holding tube in hand. Pt woke up and states that it was not in nostril. About 150ml out low cont suction. Pt states that she does not want to have it put back in. HR elevated again with this. Cont to monitor.

## 2021-08-04 NOTE — PROGRESS NOTES
Follow-up partial small bowel obstruction    Subjective:  Reports feeling better today.  Thinks she passed a little bit of flatus but not entirely clear.  She removed her nasogastric tube last night and has no nausea or vomiting this morning.    Review of systems:  Constitutional: Positive for weakness, no fever or chills  Respiratory: Patient denies cough or wheezing    Physical exam:  Patient afebrile, heart rate 80s currently, went as high as the 110s during my exam, blood pressure 101/52  General: Awake and alert, no distress, chronic ill appearance  Eyes: Extraocular movements are intact without icterus  Neck: Supple, trachea midline  Respiratory: No use of accessory muscles, good bilateral chest expansion  Gastrointestinal: Abdomen is soft, there is mild tenderness, improved from yesterday, distention also improved, no hernia felt  Extremities: Minimal peripheral edema  Psychiatric: Judgment is intact    Labs are reviewed, white blood cell count 16.2 hemoglobin 12.9 chemistries are in reasonable order, bicarb 22.6    Plain film from this morning reviewed.  There appears to be gas in the colon now with decreased dilatation of the small bowel    Assessment and plan:  -Partial small bowel obstruction, improved  -Patient removed her nasogastric tube last night and I think we can leave that out for now  -Would continue with bowel rest, just ice chips and popsicles for now  -Await return of GI function, advance diet at that point  -No plans for operative intervention at the moment, will continue to follow    Parrish Victor MD  General and Endoscopic Surgery  [facility]    [address], [address]  [place], [place], [address]  P: [phone]  F: [phone]

## 2021-08-04 NOTE — PLAN OF CARE
Goal Outcome Evaluation:  Plan of Care Reviewed With: patient        Progress: no change  Outcome Summary: Admitted to  in afib with RVR. Amlodipine, lopressor IV administered for HR in 140s-150s. Pt not symptomatic. Will CTM.

## 2021-08-04 NOTE — PLAN OF CARE
I am the on call APRN tonight with LCG.  Received a call from RN reporting patient's HR remains tachycardic despite multiple doses of IV metoprolol earlier this afternoon.  HR sustaining 150s-160s.  BP 120s/70s. Normally on amlodipine at home.  Discussed with Dr. Rex MD--- will d/c amlodipine and stat cardizem drip per protocol with initial 20mg bolus and can repeat bolus x 1 in 60 minutes if remains tachycardic with HR >120 and if SBP allows. RN aware and will call with any issues/ concerns.

## 2021-08-05 ENCOUNTER — APPOINTMENT (OUTPATIENT)
Dept: GENERAL RADIOLOGY | Facility: HOSPITAL | Age: 81
End: 2021-08-05

## 2021-08-05 PROBLEM — I10 ESSENTIAL HYPERTENSION: Status: ACTIVE | Noted: 2021-08-05

## 2021-08-05 PROBLEM — I48.19 ATRIAL FIBRILLATION, PERSISTENT (HCC): Status: ACTIVE | Noted: 2021-08-05

## 2021-08-05 LAB
ALBUMIN SERPL-MCNC: 3.3 G/DL (ref 3.5–5.2)
ALBUMIN/GLOB SERPL: 1.5 G/DL
ALP SERPL-CCNC: 96 U/L (ref 39–117)
ALT SERPL W P-5'-P-CCNC: 175 U/L (ref 1–33)
ANION GAP SERPL CALCULATED.3IONS-SCNC: 12.9 MMOL/L (ref 5–15)
AST SERPL-CCNC: 78 U/L (ref 1–32)
BASOPHILS # BLD AUTO: 0.02 10*3/MM3 (ref 0–0.2)
BASOPHILS NFR BLD AUTO: 0.2 % (ref 0–1.5)
BILIRUB SERPL-MCNC: 0.6 MG/DL (ref 0–1.2)
BUN SERPL-MCNC: 20 MG/DL (ref 8–23)
BUN/CREAT SERPL: 24.7 (ref 7–25)
CALCIUM SPEC-SCNC: 8.4 MG/DL (ref 8.6–10.5)
CHLORIDE SERPL-SCNC: 112 MMOL/L (ref 98–107)
CO2 SERPL-SCNC: 18.1 MMOL/L (ref 22–29)
CREAT SERPL-MCNC: 0.81 MG/DL (ref 0.57–1)
DEPRECATED RDW RBC AUTO: 43.1 FL (ref 37–54)
EOSINOPHIL # BLD AUTO: 0 10*3/MM3 (ref 0–0.4)
EOSINOPHIL NFR BLD AUTO: 0 % (ref 0.3–6.2)
ERYTHROCYTE [DISTWIDTH] IN BLOOD BY AUTOMATED COUNT: 13.7 % (ref 12.3–15.4)
GFR SERPL CREATININE-BSD FRML MDRD: 68 ML/MIN/1.73
GLOBULIN UR ELPH-MCNC: 2.2 GM/DL
GLUCOSE SERPL-MCNC: 109 MG/DL (ref 65–99)
HCT VFR BLD AUTO: 34.2 % (ref 34–46.6)
HGB BLD-MCNC: 11.4 G/DL (ref 12–15.9)
IMM GRANULOCYTES # BLD AUTO: 0.06 10*3/MM3 (ref 0–0.05)
IMM GRANULOCYTES NFR BLD AUTO: 0.5 % (ref 0–0.5)
LYMPHOCYTES # BLD AUTO: 1.59 10*3/MM3 (ref 0.7–3.1)
LYMPHOCYTES NFR BLD AUTO: 12.5 % (ref 19.6–45.3)
MAGNESIUM SERPL-MCNC: 2.3 MG/DL (ref 1.6–2.4)
MCH RBC QN AUTO: 29.2 PG (ref 26.6–33)
MCHC RBC AUTO-ENTMCNC: 33.3 G/DL (ref 31.5–35.7)
MCV RBC AUTO: 87.5 FL (ref 79–97)
MONOCYTES # BLD AUTO: 0.79 10*3/MM3 (ref 0.1–0.9)
MONOCYTES NFR BLD AUTO: 6.2 % (ref 5–12)
NEUTROPHILS NFR BLD AUTO: 10.28 10*3/MM3 (ref 1.7–7)
NEUTROPHILS NFR BLD AUTO: 80.6 % (ref 42.7–76)
NRBC BLD AUTO-RTO: 0 /100 WBC (ref 0–0.2)
PLATELET # BLD AUTO: 219 10*3/MM3 (ref 140–450)
PMV BLD AUTO: 10.4 FL (ref 6–12)
POTASSIUM SERPL-SCNC: 4.2 MMOL/L (ref 3.5–5.2)
PROT SERPL-MCNC: 5.5 G/DL (ref 6–8.5)
RBC # BLD AUTO: 3.91 10*6/MM3 (ref 3.77–5.28)
SODIUM SERPL-SCNC: 143 MMOL/L (ref 136–145)
WBC # BLD AUTO: 12.74 10*3/MM3 (ref 3.4–10.8)

## 2021-08-05 PROCEDURE — 99232 SBSQ HOSP IP/OBS MODERATE 35: CPT | Performed by: INTERNAL MEDICINE

## 2021-08-05 PROCEDURE — 25010000002 FUROSEMIDE PER 20 MG: Performed by: INTERNAL MEDICINE

## 2021-08-05 PROCEDURE — 74018 RADEX ABDOMEN 1 VIEW: CPT

## 2021-08-05 PROCEDURE — 80053 COMPREHEN METABOLIC PANEL: CPT | Performed by: NURSE PRACTITIONER

## 2021-08-05 PROCEDURE — 99232 SBSQ HOSP IP/OBS MODERATE 35: CPT | Performed by: SURGERY

## 2021-08-05 PROCEDURE — 25010000002 SODIUM CHLORIDE 0.9 % WITH KCL 20 MEQ 20-0.9 MEQ/L-% SOLUTION: Performed by: HOSPITALIST

## 2021-08-05 PROCEDURE — 85025 COMPLETE CBC W/AUTO DIFF WBC: CPT | Performed by: HOSPITALIST

## 2021-08-05 PROCEDURE — 83735 ASSAY OF MAGNESIUM: CPT | Performed by: NURSE PRACTITIONER

## 2021-08-05 PROCEDURE — 25010000002 HYDROCORTISONE SODIUM SUCCINATE 100 MG RECONSTITUTED SOLUTION: Performed by: INTERNAL MEDICINE

## 2021-08-05 RX ORDER — PANTOPRAZOLE SODIUM 40 MG/1
40 TABLET, DELAYED RELEASE ORAL
Status: DISCONTINUED | OUTPATIENT
Start: 2021-08-06 | End: 2021-08-06

## 2021-08-05 RX ORDER — FUROSEMIDE 10 MG/ML
20 INJECTION INTRAMUSCULAR; INTRAVENOUS ONCE
Status: COMPLETED | OUTPATIENT
Start: 2021-08-05 | End: 2021-08-05

## 2021-08-05 RX ADMIN — FUROSEMIDE 20 MG: 10 INJECTION, SOLUTION INTRAMUSCULAR; INTRAVENOUS at 17:14

## 2021-08-05 RX ADMIN — METOPROLOL TARTRATE 75 MG: 25 TABLET, FILM COATED ORAL at 22:02

## 2021-08-05 RX ADMIN — HYDROCORTISONE SODIUM SUCCINATE 100 MG: 100 INJECTION, POWDER, FOR SOLUTION INTRAMUSCULAR; INTRAVENOUS at 22:02

## 2021-08-05 RX ADMIN — METOPROLOL TARTRATE 50 MG: 50 TABLET, FILM COATED ORAL at 09:12

## 2021-08-05 RX ADMIN — DIAZEPAM 2.5 MG: 5 TABLET ORAL at 09:12

## 2021-08-05 RX ADMIN — PANTOPRAZOLE SODIUM 40 MG: 40 INJECTION, POWDER, FOR SOLUTION INTRAVENOUS at 09:12

## 2021-08-05 RX ADMIN — POTASSIUM CHLORIDE AND SODIUM CHLORIDE 75 ML/HR: 900; 150 INJECTION, SOLUTION INTRAVENOUS at 13:19

## 2021-08-05 RX ADMIN — Medication 10 MG/HR: at 13:19

## 2021-08-05 RX ADMIN — HYDROCORTISONE SODIUM SUCCINATE 100 MG: 100 INJECTION, POWDER, FOR SOLUTION INTRAMUSCULAR; INTRAVENOUS at 09:12

## 2021-08-05 NOTE — CASE MANAGEMENT/SOCIAL WORK
Continued Stay Note  [facility]     Patient Name: Michela Aguilar  MRN: [id number]  Today's Date: 8/5/2021    Admit Date: 8/3/2021    Discharge Plan     Row Name 08/05/21 5233       Plan    Plan  Home with family support    Plan Comments  Met with patient at bedside. Discharge plans are to return home with sister, they help each other. Advanced diet to liquids today. Patient does not use any assistive or adaptive equipment. Patient declined meds to beds. Family will transport, either sister or nephew. Will continue to monitor for new or changing discharge needs.        Discharge Codes    No documentation.       Expected Discharge Date and Time     Expected Discharge Date Expected Discharge Time    Aug 7, 2021             Renetta Armstrong RN

## 2021-08-05 NOTE — PROGRESS NOTES
[facility]  Inpatient Progress Note    Reason for Follow Up:  Crohn's disease, small bowel obstruction    Subjective     Interval History:   Her NG tube came out.  She has no nausea or vomiting.  No abdominal pain.  She did have a bowel movement yesterday.    Current Facility-Administered Medications:   •  diazePAM (VALIUM) tablet 2.5 mg, 2.5 mg, Oral, QABrendan WINTERS Aftab, MD  •  dilTIAZem (CARDIZEM) 125 mg in 125 mL 0.7% sodium chloride  infusion, 5-15 mg/hr, Intravenous, Titrated, Gildardo Clinton MD, Last Rate: 10 mL/hr at 08/04/21 2315, 10 mg/hr at 08/04/21 2315  •  hydrocortisone sodium succinate (Solu-CORTEF) injection 100 mg, 100 mg, Intravenous, Q12H, Power Singh MD, 100 mg at 08/04/21 2207  •  HYDROmorphone (DILAUDID) injection 0.5 mg, 0.5 mg, Intravenous, Q4H PRN, Drew Cardona MD  •  metoprolol tartrate (LOPRESSOR) injection 5 mg, 5 mg, Intravenous, Q6H PRN, Drew Cardona MD, 5 mg at 08/03/21 1940  •  metoprolol tartrate (LOPRESSOR) tablet 50 mg, 50 mg, Oral, BID, Drew Cardona MD, 50 mg at 08/04/21 2014  •  ondansetron (ZOFRAN) injection 4 mg, 4 mg, Intravenous, Q6H PRN, Drew Cardona MD  •  pantoprazole (PROTONIX) injection 40 mg, 40 mg, Intravenous, Q12H, Drew Cardona MD, 40 mg at 08/04/21 2015  •  sodium chloride 0.9 % flush 10 mL, 10 mL, Intravenous, PRN, Emergency, Triage Protocol, MD  •  sodium chloride 0.9 % with KCl 20 mEq/L infusion, 75 mL/hr, Intravenous, Continuous, Drew Cardona MD, Last Rate: 75 mL/hr at 08/04/21 2315, 75 mL/hr at 08/04/21 2315  Review of Systems:    The following systems were reviewed and negative;  constitution, ENT, genitourinary, musculoskeletal and neurological    Objective     Vital Signs  Temp:  [97.6 °F (36.4 °C)-98.4 °F (36.9 °C)] 97.6 °F (36.4 °C)  Heart Rate:  [] 113  Resp:  [16-18] 18  BP: ()/(52-77) 118/77  Body mass index is 24.69 kg/m².    Intake/Output Summary (Last 24 hours) at 8/5/2021 0808  Last data  filed at 8/4/2021 1733  Gross per 24 hour   Intake 0 ml   Output --   Net 0 ml     No intake/output data recorded.     Physical Exam:   General: patient awake, alert and cooperative   Eyes: Normal lids and lashes, no scleral icterus   Neck: supple, normal ROM   Skin: warm and dry, not jaundiced   Cardiovascular: regular rhythm and rate, no murmurs auscultated   Pulm: clear to auscultation bilaterally, regular and unlabored   Abdomen: soft, nontender, nondistended; decreased bowel sounds   Extremities: no rash or edema   Psychiatric: Normal mood and behavior; memory intact     Results Review:     I reviewed the patient's new clinical results.    Results from last 7 days   Lab Units 08/05/21  0501 08/04/21  0530 08/03/21  0530   WBC 10*3/mm3 12.74* 16.26* 11.67*   HEMOGLOBIN g/dL 11.4* 12.9 14.6   HEMATOCRIT % 34.2 38.9 44.4   PLATELETS 10*3/mm3 219 240 252     Results from last 7 days   Lab Units 08/05/21  0501 08/04/21  0530 08/03/21  0530   SODIUM mmol/L 143 141 141   POTASSIUM mmol/L 4.2 4.3 4.0   CHLORIDE mmol/L 112* 108* 103   CO2 mmol/L 18.1* 22.6 25.1   BUN mg/dL 20 14 11   CREATININE mg/dL 0.81 0.87 0.84   CALCIUM mg/dL 8.4* 8.5* 9.8   BILIRUBIN mg/dL 0.6 1.0 0.4   ALK PHOS U/L 96 121* 112   ALT (SGPT) U/L 175* 317* 29   AST (SGOT) U/L 78* 223* 29   GLUCOSE mg/dL 109* 103* 107*         Lab Results   Lab Value Date/Time    LIPASE 43 08/03/2021 0530    LIPASE 37 03/08/2021 1145    LIPASE 49 12/20/2019 0418    LIPASE 15 04/28/2019 0714    LIPASE 42 04/27/2019 0557    LIPASE 29 04/19/2016 0925    LIPASE 60 09/01/2014 1000    LIPASE 22 01/14/2014 0611    LIPASE 49 01/12/2014 1340       Radiology:  XR Abdomen KUB   Final Result      XR Abdomen KUB   Final Result      CT Abdomen Pelvis With Contrast   Final Result   CT findings consistent with distal small bowel obstruction   as described. The exact site of the obstruction is not identified, but   is suspected to be in the right side of the pelvis.       This report  was finalized on 8/3/2021 10:44 AM by Dr. Honorio Montemayor M.D.          XR Chest 2 View   Final Result      XR Abdomen KUB    (Results Pending)       Assessment/Plan     Patient Active Problem List   Diagnosis   • Partial small bowel obstruction (CMS/HCC)   • Generalized abdominal pain   • Crohn's disease of small intestine with other complication (CMS/HCC)   • Crohn disease (CMS/HCC)   • Bloating   • Small bowel obstruction (CMS/HCC)       Assessment/Recommendations:    Assessment:  1. Crohn's disease: Small bowel, managed with mesalamine  2. Small bowel obstruction resolving: May be of a mechanical nature with adhesions or scar tissue, cannot rule out exacerbation of the Crohn's disease.  She is now on IV steroids.  Her NG tube fell out.  She had a bowel movement yesterday.  3. Atrial fibrillation     Plan:  · Continue IV corticosteroids  · I will defer starting her on a clear liquid diet to surgery.  · Cardiology plans to do a work-up when her small bowel obstruction has resolved.    I discussed the patients findings and my recommendations with patient.    Marcel Ricketts MD

## 2021-08-05 NOTE — PROGRESS NOTES
"Reevaluation    Chief complaint  Doing better   New complaints  Passing gas and had BM yesterday  Wants to eat  Family at bedside    History of present illness  80-year-old white female with history of Crohn's disease hypertension hyperlipidemia chronic anemia and gastroesophageal reflux disease who also has had atrial fibrillation several years ago but no follow-up as she has been in sinus rhythm presented to [facility] emergency room with abdominal pain for last 2 days followed by nausea and started vomiting in the ER.  Patient also have palpitations but no chest pain shortness of breath.  Patient last BM was 2 days ago and was passing gas until today.  Patient work-up in ER revealed small bowel obstruction and found to be in atrial fibrillation with rapid ventricular rate admit for management.  Patient has no fever chills night sweats weight loss or weight gain.     REVIEW OF SYSTEMS  Unremarkable     PHYSICAL EXAM   Blood pressure 116/80, pulse 91, temperature 97.7 °F (36.5 °C), resp. rate 16, height 157.5 cm (62\"), weight 61.2 kg (135 lb), SpO2 95 %.    General: Awake, alert, appears uncomfortable but nontoxic  HEENT: Mucous membranes moist, atraumatic, EOMI  Neck: Full ROM  Pulm: Symmetric chest rise, nonlabored, lungs CTAB  Cardiovascular: Tachy S1-S2 irregular  GI: Soft, epigastric tenderness to palpation, hypoactive bowel sounds  MSK: Full ROM, no deformity  Skin: Warm, dry  Neuro: Alert and oriented x 3, GCS 15, moving all extremities, no focal deficits  Psych: Calm, cooperative     LAB RESULTS  Lab Results (last 24 hours)     Procedure Component Value Units Date/Time    Comprehensive Metabolic Panel [[id number]]  (Abnormal) Collected: 08/05/21 0501    Specimen: Blood Updated: 08/05/21 0618     Glucose 109 mg/dL      BUN 20 mg/dL      Creatinine 0.81 mg/dL      Sodium 143 mmol/L      Potassium 4.2 mmol/L      Chloride 112 mmol/L      CO2 18.1 mmol/L      Calcium 8.4 mg/dL      Total Protein 5.5 " g/dL      Albumin 3.30 g/dL      ALT (SGPT) 175 U/L      AST (SGOT) 78 U/L      Alkaline Phosphatase 96 U/L      Total Bilirubin 0.6 mg/dL      eGFR Non African Amer 68 mL/min/1.73      Globulin 2.2 gm/dL      A/G Ratio 1.5 g/dL      BUN/Creatinine Ratio 24.7     Anion Gap 12.9 mmol/L     Narrative:      GFR Normal >60  Chronic Kidney Disease <60  Kidney Failure <15      Magnesium [[id number]]  (Normal) Collected: 08/05/21 0501    Specimen: Blood Updated: 08/05/21 0618     Magnesium 2.3 mg/dL     CBC & Differential [[id number]]  (Abnormal) Collected: 08/05/21 0501    Specimen: Blood Updated: 08/05/21 0600    Narrative:      The following orders were created for panel order CBC & Differential.  Procedure                               Abnormality         Status                     ---------                               -----------         ------                     CBC Auto Differential[[id number]]        Abnormal            Final result                 Please view results for these tests on the individual orders.    CBC Auto Differential [[id number]]  (Abnormal) Collected: 08/05/21 0501    Specimen: Blood Updated: 08/05/21 0600     WBC 12.74 10*3/mm3      RBC 3.91 10*6/mm3      Hemoglobin 11.4 g/dL      Hematocrit 34.2 %      MCV 87.5 fL      MCH 29.2 pg      MCHC 33.3 g/dL      RDW 13.7 %      RDW-SD 43.1 fl      MPV 10.4 fL      Platelets 219 10*3/mm3      Neutrophil % 80.6 %      Lymphocyte % 12.5 %      Monocyte % 6.2 %      Eosinophil % 0.0 %      Basophil % 0.2 %      Immature Grans % 0.5 %      Neutrophils, Absolute 10.28 10*3/mm3      Lymphocytes, Absolute 1.59 10*3/mm3      Monocytes, Absolute 0.79 10*3/mm3      Eosinophils, Absolute 0.00 10*3/mm3      Basophils, Absolute 0.02 10*3/mm3      Immature Grans, Absolute 0.06 10*3/mm3      nRBC 0.0 /100 WBC         Imaging Results (Last 24 Hours)     Procedure Component Value Units Date/Time    XR Abdomen KUB [[id number]] Collected: 08/05/21 1026     Updated: 08/05/21  1043    Narrative:      ONE VIEW ABDOMEN     HISTORY: Follow-up of small bowel obstruction.     FINDINGS: There is bowel gas scattered throughout the colon and no  distended small bowel is seen. This shows further improvement from  yesterday's exam and consistent with resolution of small bowel  obstruction.     This report was finalized on 8/5/2021 10:40 AM by Dr. David Mayers M.D.           ECG 12 Lead  Component   Ref Range & Units 8/3/21 1308 8/3/21 0521   QT Interval   ms 261 P  380 P         HEART RATE= 135  bpm  RR Interval= 417  ms  MS Interval=   ms  P Horizontal Axis=   deg  P Front Axis=   deg  QRSD Interval= 73  ms  QT Interval= 261  ms  QRS Axis= -2  deg  T Wave Axis= 164  deg  - ABNORMAL ECG -  Atrial fibrillation with rapid V-rate  Low voltage, precordial leads  Repolarization abnormality, prob rate related             Current Facility-Administered Medications:   •  diazePAM (VALIUM) tablet 2.5 mg, 2.5 mg, Oral, QAM, Drew Cardona MD, 2.5 mg at 08/05/21 0912  •  dilTIAZem (CARDIZEM) 125 mg in 125 mL 0.7% sodium chloride  infusion, 5-15 mg/hr, Intravenous, Titrated, Gildardo Clinton MD, Last Rate: 10 mL/hr at 08/05/21 1319, 10 mg/hr at 08/05/21 1319  •  hydrocortisone sodium succinate (Solu-CORTEF) injection 100 mg, 100 mg, Intravenous, Q12H, Power Singh MD, 100 mg at 08/05/21 0912  •  HYDROmorphone (DILAUDID) injection 0.5 mg, 0.5 mg, Intravenous, Q4H PRN, Drew Cardona MD  •  metoprolol tartrate (LOPRESSOR) injection 5 mg, 5 mg, Intravenous, Q6H PRN, Drew Cardona MD, 5 mg at 08/03/21 1940  •  metoprolol tartrate (LOPRESSOR) tablet 50 mg, 50 mg, Oral, BID, Drew Cardona MD, 50 mg at 08/05/21 0912  •  ondansetron (ZOFRAN) injection 4 mg, 4 mg, Intravenous, Q6H PRN, Drew Cardona MD  •  pantoprazole (PROTONIX) injection 40 mg, 40 mg, Intravenous, Q12H, Drew Cardona MD, 40 mg at 08/05/21 0912  •  sodium chloride 0.9 % flush 10 mL, 10 mL, Intravenous, PRN, Emergency, Triage Protocol,  MD  •  sodium chloride 0.9 % with KCl 20 mEq/L infusion, 75 mL/hr, Intravenous, Continuous, Cesilia Cardona MD, Last Rate: 75 mL/hr at 08/05/21 1319, 75 mL/hr at 08/05/21 1319     ASSESSMENT  Partial small bowel obstruction resolving  New onset atrial fibrillation with rapid ventricular rate  Elevated troponin no chest pain  Crohn's disease  Hypertension  Vitamin B12 deficiency  leukocytosis secondary to steroids  Gastroesophageal reflux disease    PLAN  CPM  Control the heart rate  IV Protonix  IV fluid  IV steroids per GI  Daily liquid diet and advance as tolerated  General surgery consult appreciated  Cardiology and gastroenterology to follow patient  Adjust home medications  Stress ulcer DVT prophylaxis  Supportive care  Patient is full code  Discussed with nursing staff and family  Follow closely further recommendation according to hospital course    CESILIA CARDONA MD

## 2021-08-05 NOTE — PLAN OF CARE
"Goal Outcome Evaluation:  Plan of Care Reviewed With: patient        Progress: improving  Outcome Summary: No c/o chest pain or discomfort. Pt reports feeling \"much better\" today. Afib in 120s in AM, down to 80s-90s after po metoprolol. Pt reports passing gas, bowel sounds hypoactive. Safety maintained. Will CTM.  "

## 2021-08-05 NOTE — PROGRESS NOTES
Follow-up partial small bowel obstruction     Subjective:  No complaints today, tolerated small amount of liquids, 2 bowel movements overnight and passing some flatus, no nausea or vomiting or abdominal pain currently.     Review of systems:  Constitutional: Positive for weakness, no fever or chills  Respiratory: Patient denies cough or wheezing     Physical exam:  Afebrile, heart rate irregular from 100s to 130s in A. fib, blood pressure 118/77  General: Awake and alert, no distress, chronic ill appearance  Eyes: Extraocular movements are intact without icterus  Neck: Supple, trachea midline  Respiratory: No use of accessory muscles, good bilateral chest expansion  Gastrointestinal: Abdomen is soft, minimal if any tenderness, mildly distended, no guarding, no hernia  Extremities: Minimal peripheral edema  Psychiatric: Judgment is intact     Labs are reviewed, white blood cell count improved to 12.7 from 16 yesterday, hemoglobin 11.4 chemistries are in reasonable order, liver function studies are improved creatinine normal at 0.8.       Assessment and plan:  -Partial small bowel obstruction, resolving  -Clear liquids today, then advance as able  -History of Crohn's disease, unclear to what extent this is responsible for her current issue, seems more like adhesive disease to me  -No plans for surgical intervention at this time  -Atrial fibrillation, per cardiology, okay for anticoagulation from my standpoint if that is felt to be appropriate     Parrish Victor MD  General and Endoscopic Surgery  [facility]     [address], [address]  [place], [place], [address]  P: [phone]  F: [phone]

## 2021-08-05 NOTE — PROGRESS NOTES
"   LOS: 2 days   Patient Care Team:  Roni Mckenzie MD as PCP - General  Roni Mckenzie MD as PCP - Family Medicine    Chief Complaint: AF     Interval History: Her belly feels better. She is tolerating some clear liquids and she had two very small BM. She walked around the unit.  She denies chest pain or palpitations. She is now having a bit of orthopnea and a cough and feels \"wheezy.\"       Objective   Vital Signs  Temp:  [97.6 °F (36.4 °C)-98.4 °F (36.9 °C)] 97.7 °F (36.5 °C)  Heart Rate:  [] 91  Resp:  [16-18] 16  BP: ()/(59-80) 116/80    Intake/Output Summary (Last 24 hours) at 8/5/2021 1657  Last data filed at 8/5/2021 1306  Gross per 24 hour   Intake 240 ml   Output --   Net 240 ml       Last Weight and Admission Weight        08/04/21  1111   Weight: 61.2 kg (135 lb)     Flowsheet Rows      First Filed Value   Admission Height  157.5 cm (62\") Documented at 08/03/2021 0830   Admission Weight  61.2 kg (135 lb) Documented at 08/03/2021 0830                  Physical Exam  Vitals reviewed.   Constitutional:       Appearance: She is well-developed.   HENT:      Head: Normocephalic.      Nose: Nose normal.   Eyes:      Conjunctiva/sclera: Conjunctivae normal.   Neck:      Vascular: No JVD.   Cardiovascular:      Rate and Rhythm: Normal rate. Rhythm irregular.      Pulses: Normal pulses.      Heart sounds: Normal heart sounds.   Pulmonary:      Effort: Pulmonary effort is normal.      Breath sounds: Examination of the right-lower field reveals rales. Rales present.   Abdominal:      Palpations: Abdomen is soft.      Tenderness: There is no abdominal tenderness.   Musculoskeletal:         General: Normal range of motion.      Cervical back: Normal range of motion.   Skin:     General: Skin is warm and dry.      Findings: No erythema.   Neurological:      General: No focal deficit present.      Mental Status: She is alert and oriented to person, place, and time.      Cranial Nerves: No cranial nerve " deficit.   Psychiatric:         Mood and Affect: Mood normal.         Behavior: Behavior normal.         Thought Content: Thought content normal.         Results Review:      Results from last 7 days   Lab Units 08/05/21  0501 08/04/21  0530 08/04/21  0530 08/03/21  0530 08/03/21  0530   SODIUM mmol/L 143  --  141  --  141   POTASSIUM mmol/L 4.2  --  4.3  --  4.0   CHLORIDE mmol/L 112*  --  108*  --  103   CO2 mmol/L 18.1*  --  22.6  --  25.1   BUN mg/dL 20  --  14  --  11   CREATININE mg/dL 0.81  --  0.87  --  0.84   GLUCOSE mg/dL 109*   < > 103*   < > 107*   CALCIUM mg/dL 8.4*  --  8.5*  --  9.8    < > = values in this interval not displayed.     Results from last 7 days   Lab Units 08/04/21  1206 08/04/21  0530 08/03/21  0917   TROPONIN T ng/mL 0.103* 0.097* <0.010     Results from last 7 days   Lab Units 08/05/21  0501 08/04/21  0530 08/03/21  0530   WBC 10*3/mm3 12.74* 16.26* 11.67*   HEMOGLOBIN g/dL 11.4* 12.9 14.6   HEMATOCRIT % 34.2 38.9 44.4   PLATELETS 10*3/mm3 219 240 252         Results from last 7 days   Lab Units 08/04/21  0530   CHOLESTEROL mg/dL 148     Results from last 7 days   Lab Units 08/05/21  0501   MAGNESIUM mg/dL 2.3     Results from last 7 days   Lab Units 08/04/21  0530   CHOLESTEROL mg/dL 148   TRIGLYCERIDES mg/dL 85   HDL CHOL mg/dL 61*   LDL CHOL mg/dL 71       I reviewed the patient's new clinical results.  I personally viewed and interpreted the patient's EKG/Telemetry data        Medication Review:   diazePAM, 2.5 mg, Oral, QAM  hydrocortisone sodium succinate, 100 mg, Intravenous, Q12H  metoprolol tartrate, 50 mg, Oral, BID  [START ON 8/6/2021] pantoprazole, 40 mg, Oral, Q AM        dilTIAZem, 5-15 mg/hr, Last Rate: 10 mg/hr (08/05/21 1319)  sodium chloride 0.9 % with KCl 20 mEq, 50 mL/hr, Last Rate: 75 mL/hr (08/05/21 1319)        Assessment/Plan     1. Atrial fibrillation -- of unclear duration, asymptomatic. Increase beta blocker, try to wean diltiazem. Echo shows normal LVSF. No  systemic AC for now given possibility of acute abdominal events and high dose steroids.      2. Cough/wheeze/orthopnea -- I think she's getting a bit wet. I stopped her IVF and will give a small dose of furosemide.    3. Elevated Tn -- no chest pain, normal LVEF/wall motion.  Likely demand from acute illness; may consider outpatient stress testing vs empiric medical therapy.  No aspirin given high dose steroids and abdominal issues.    4. SBO -- seems to be improving.    Will follow.     Juan Damon MD  08/05/21  16:57 EDT

## 2021-08-06 ENCOUNTER — APPOINTMENT (OUTPATIENT)
Dept: ULTRASOUND IMAGING | Facility: HOSPITAL | Age: 81
End: 2021-08-06

## 2021-08-06 LAB
ALBUMIN SERPL-MCNC: 3.4 G/DL (ref 3.5–5.2)
ALBUMIN/GLOB SERPL: 1.4 G/DL
ALP SERPL-CCNC: 85 U/L (ref 39–117)
ALT SERPL W P-5'-P-CCNC: 127 U/L (ref 1–33)
ANION GAP SERPL CALCULATED.3IONS-SCNC: 13.2 MMOL/L (ref 5–15)
AST SERPL-CCNC: 41 U/L (ref 1–32)
BASOPHILS # BLD AUTO: 0.03 10*3/MM3 (ref 0–0.2)
BASOPHILS NFR BLD AUTO: 0.2 % (ref 0–1.5)
BILIRUB SERPL-MCNC: 0.6 MG/DL (ref 0–1.2)
BUN SERPL-MCNC: 19 MG/DL (ref 8–23)
BUN/CREAT SERPL: 26 (ref 7–25)
CALCIUM SPEC-SCNC: 8.5 MG/DL (ref 8.6–10.5)
CHLORIDE SERPL-SCNC: 108 MMOL/L (ref 98–107)
CO2 SERPL-SCNC: 22.8 MMOL/L (ref 22–29)
CREAT SERPL-MCNC: 0.73 MG/DL (ref 0.57–1)
DEPRECATED RDW RBC AUTO: 42.9 FL (ref 37–54)
EOSINOPHIL # BLD AUTO: 0.01 10*3/MM3 (ref 0–0.4)
EOSINOPHIL NFR BLD AUTO: 0.1 % (ref 0.3–6.2)
ERYTHROCYTE [DISTWIDTH] IN BLOOD BY AUTOMATED COUNT: 13.6 % (ref 12.3–15.4)
GFR SERPL CREATININE-BSD FRML MDRD: 77 ML/MIN/1.73
GLOBULIN UR ELPH-MCNC: 2.5 GM/DL
GLUCOSE SERPL-MCNC: 102 MG/DL (ref 65–99)
HCT VFR BLD AUTO: 33.7 % (ref 34–46.6)
HGB BLD-MCNC: 11.1 G/DL (ref 12–15.9)
IMM GRANULOCYTES # BLD AUTO: 0.1 10*3/MM3 (ref 0–0.05)
IMM GRANULOCYTES NFR BLD AUTO: 0.7 % (ref 0–0.5)
LYMPHOCYTES # BLD AUTO: 1.61 10*3/MM3 (ref 0.7–3.1)
LYMPHOCYTES NFR BLD AUTO: 10.9 % (ref 19.6–45.3)
MCH RBC QN AUTO: 28.8 PG (ref 26.6–33)
MCHC RBC AUTO-ENTMCNC: 32.9 G/DL (ref 31.5–35.7)
MCV RBC AUTO: 87.3 FL (ref 79–97)
MONOCYTES # BLD AUTO: 0.79 10*3/MM3 (ref 0.1–0.9)
MONOCYTES NFR BLD AUTO: 5.4 % (ref 5–12)
NEUTROPHILS NFR BLD AUTO: 12.17 10*3/MM3 (ref 1.7–7)
NEUTROPHILS NFR BLD AUTO: 82.7 % (ref 42.7–76)
NRBC BLD AUTO-RTO: 0.1 /100 WBC (ref 0–0.2)
PLATELET # BLD AUTO: 233 10*3/MM3 (ref 140–450)
PMV BLD AUTO: 10.5 FL (ref 6–12)
POTASSIUM SERPL-SCNC: 3.8 MMOL/L (ref 3.5–5.2)
PROT SERPL-MCNC: 5.9 G/DL (ref 6–8.5)
RBC # BLD AUTO: 3.86 10*6/MM3 (ref 3.77–5.28)
SODIUM SERPL-SCNC: 144 MMOL/L (ref 136–145)
WBC # BLD AUTO: 14.71 10*3/MM3 (ref 3.4–10.8)

## 2021-08-06 PROCEDURE — 25010000002 HYDROCORTISONE SODIUM SUCCINATE 100 MG RECONSTITUTED SOLUTION: Performed by: INTERNAL MEDICINE

## 2021-08-06 PROCEDURE — 99232 SBSQ HOSP IP/OBS MODERATE 35: CPT | Performed by: INTERNAL MEDICINE

## 2021-08-06 PROCEDURE — 80053 COMPREHEN METABOLIC PANEL: CPT | Performed by: HOSPITALIST

## 2021-08-06 PROCEDURE — 76705 ECHO EXAM OF ABDOMEN: CPT

## 2021-08-06 PROCEDURE — 85025 COMPLETE CBC W/AUTO DIFF WBC: CPT | Performed by: HOSPITALIST

## 2021-08-06 PROCEDURE — 99232 SBSQ HOSP IP/OBS MODERATE 35: CPT | Performed by: SURGERY

## 2021-08-06 RX ORDER — METOPROLOL TARTRATE 50 MG/1
100 TABLET, FILM COATED ORAL 3 TIMES DAILY
Status: DISCONTINUED | OUTPATIENT
Start: 2021-08-06 | End: 2021-08-09

## 2021-08-06 RX ORDER — PANTOPRAZOLE SODIUM 40 MG/1
40 TABLET, DELAYED RELEASE ORAL
Status: DISCONTINUED | OUTPATIENT
Start: 2021-08-06 | End: 2021-08-10 | Stop reason: HOSPADM

## 2021-08-06 RX ADMIN — PANTOPRAZOLE SODIUM 40 MG: 40 TABLET, DELAYED RELEASE ORAL at 18:12

## 2021-08-06 RX ADMIN — HYDROCORTISONE SODIUM SUCCINATE 100 MG: 100 INJECTION, POWDER, FOR SOLUTION INTRAMUSCULAR; INTRAVENOUS at 22:58

## 2021-08-06 RX ADMIN — HYDROCORTISONE SODIUM SUCCINATE 100 MG: 100 INJECTION, POWDER, FOR SOLUTION INTRAMUSCULAR; INTRAVENOUS at 12:30

## 2021-08-06 RX ADMIN — METOPROLOL TARTRATE 100 MG: 50 TABLET, FILM COATED ORAL at 18:08

## 2021-08-06 RX ADMIN — DIAZEPAM 2.5 MG: 5 TABLET ORAL at 07:15

## 2021-08-06 RX ADMIN — PANTOPRAZOLE SODIUM 40 MG: 40 TABLET, DELAYED RELEASE ORAL at 07:15

## 2021-08-06 RX ADMIN — METOPROLOL TARTRATE 75 MG: 25 TABLET, FILM COATED ORAL at 08:20

## 2021-08-06 RX ADMIN — Medication 5 MG/HR: at 04:23

## 2021-08-06 NOTE — PLAN OF CARE
Goal Outcome Evaluation:  Plan of Care Reviewed With: patient           Outcome Summary: Bms today and good bowel sounds. ted. CL diet. Encouraged activity. HR improved with cardizem at 10 mg/hr. No distress. Lasix given with diuresing in progress.

## 2021-08-06 NOTE — PLAN OF CARE
Goal Outcome Evaluation:  Plan of Care Reviewed With: patient        Progress: improving  Outcome Summary: HR continues to increase to 150-160 with very little activity- sporadically sometimes while resting. Diltiazem currently running at 10mg/hr, was at 5mg but was not offering enough coverage. Pt up with SBA and tolerates well. Cardiology not wanting to start lovenox until GI has seen and cleared her. Metoprolol increased today. Safety maintained. Will continue to monitor.,

## 2021-08-06 NOTE — PROGRESS NOTES
"   LOS: 3 days   Patient Care Team:  Roni Mckenzie MD as PCP - General  Roni Mckenzie MD as PCP - Family Medicine    Chief Complaint: AF     Interval History: No complaints. Reports good response to furosemide yesterday and no more orthopnea or \"wheezing.\"     Objective   Vital Signs  Temp:  [97.2 °F (36.2 °C)-98.5 °F (36.9 °C)] 97.4 °F (36.3 °C)  Heart Rate:  [] 103  Resp:  [18-20] 18  BP: (111-138)/(77-95) 111/87    Intake/Output Summary (Last 24 hours) at 8/6/2021 1352  Last data filed at 8/6/2021 1336  Gross per 24 hour   Intake 430 ml   Output 750 ml   Net -320 ml       Last Weight and Admission Weight        08/04/21  1111   Weight: 61.2 kg (135 lb)     Flowsheet Rows      First Filed Value   Admission Height  157.5 cm (62\") Documented at 08/03/2021 0830   Admission Weight  61.2 kg (135 lb) Documented at 08/03/2021 0830                  Physical Exam  Vitals reviewed.   Constitutional:       Appearance: She is well-developed.   HENT:      Head: Normocephalic.      Nose: Nose normal.   Eyes:      Conjunctiva/sclera: Conjunctivae normal.   Neck:      Vascular: No JVD.   Cardiovascular:      Rate and Rhythm: Normal rate. Rhythm irregular.      Pulses: Normal pulses.      Heart sounds: Normal heart sounds.   Pulmonary:      Effort: Pulmonary effort is normal.      Breath sounds: No rales.   Abdominal:      Palpations: Abdomen is soft.      Tenderness: There is no abdominal tenderness.   Musculoskeletal:         General: Normal range of motion.      Cervical back: Normal range of motion.   Skin:     General: Skin is warm and dry.      Findings: No erythema.   Neurological:      General: No focal deficit present.      Mental Status: She is alert and oriented to person, place, and time.      Cranial Nerves: No cranial nerve deficit.   Psychiatric:         Mood and Affect: Mood normal.         Behavior: Behavior normal.         Thought Content: Thought content normal.         Results Review:      Results " from last 7 days   Lab Units 08/06/21  0345 08/05/21  0501 08/05/21  0501 08/04/21  0530 08/04/21  0530   SODIUM mmol/L 144  --  143  --  141   POTASSIUM mmol/L 3.8  --  4.2  --  4.3   CHLORIDE mmol/L 108*  --  112*  --  108*   CO2 mmol/L 22.8  --  18.1*  --  22.6   BUN mg/dL 19  --  20  --  14   CREATININE mg/dL 0.73  --  0.81  --  0.87   GLUCOSE mg/dL 102*   < > 109*   < > 103*   CALCIUM mg/dL 8.5*  --  8.4*  --  8.5*    < > = values in this interval not displayed.     Results from last 7 days   Lab Units 08/04/21  1206 08/04/21  0530 08/03/21  0917   TROPONIN T ng/mL 0.103* 0.097* <0.010     Results from last 7 days   Lab Units 08/06/21  0345 08/05/21  0501 08/04/21  0530   WBC 10*3/mm3 14.71* 12.74* 16.26*   HEMOGLOBIN g/dL 11.1* 11.4* 12.9   HEMATOCRIT % 33.7* 34.2 38.9   PLATELETS 10*3/mm3 233 219 240         Results from last 7 days   Lab Units 08/04/21  0530   CHOLESTEROL mg/dL 148     Results from last 7 days   Lab Units 08/05/21  0501   MAGNESIUM mg/dL 2.3     Results from last 7 days   Lab Units 08/04/21  0530   CHOLESTEROL mg/dL 148   TRIGLYCERIDES mg/dL 85   HDL CHOL mg/dL 61*   LDL CHOL mg/dL 71       I reviewed the patient's new clinical results.  I personally viewed and interpreted the patient's EKG/Telemetry data        Medication Review:   diazePAM, 2.5 mg, Oral, QAM  hydrocortisone sodium succinate, 100 mg, Intravenous, Q12H  metoprolol tartrate, 75 mg, Oral, TID  pantoprazole, 40 mg, Oral, Q AM        dilTIAZem, 5-15 mg/hr, Last Rate: 5 mg/hr (08/06/21 0423)        Assessment/Plan     1. Atrial fibrillation -- of unclear duration, asymptomatic. Increase beta blocker again today, try to come off. Echo shows normal LVSF. I'm reticent to start full dose anticoagulation given high dose steroid usage. I'll touch base with Dr Ricketts.     2. Cough/wheeze/orthopnea -- resolved after one dose of furosemide and cessation of IVF. I don't think she needs daily diuretics.     3. Elevated Tn -- no chest pain,  normal LVEF/wall motion.  Likely demand from acute illness; may consider outpatient stress testing vs empiric medical therapy.  No aspirin given high dose steroids.    4. SBO -- seems to be improving.    Will follow.     Juan Damon MD  08/06/21  13:52 EDT

## 2021-08-06 NOTE — PROGRESS NOTES
"Reevaluation    Chief complaint  Doing better   New complaints  Tolerating liquid diet  Family at bedside    History of present illness  80-year-old white female with history of Crohn's disease hypertension hyperlipidemia chronic anemia and gastroesophageal reflux disease who also has had atrial fibrillation several years ago but no follow-up as she has been in sinus rhythm presented to [facility] emergency room with abdominal pain for last 2 days followed by nausea and started vomiting in the ER.  Patient also have palpitations but no chest pain shortness of breath.  Patient last BM was 2 days ago and was passing gas until today.  Patient work-up in ER revealed small bowel obstruction and found to be in atrial fibrillation with rapid ventricular rate admit for management.  Patient has no fever chills night sweats weight loss or weight gain.     REVIEW OF SYSTEMS  Unremarkable     PHYSICAL EXAM   Blood pressure 137/77, pulse 110, temperature 98.5 °F (36.9 °C), temperature source Oral, resp. rate 18, height 157.5 cm (62\"), weight 61.2 kg (135 lb), SpO2 93 %.    General: Awake, alert, appears uncomfortable but nontoxic  HEENT: Mucous membranes moist, atraumatic, EOMI  Neck: Full ROM  Pulm: Symmetric chest rise, nonlabored, lungs CTAB  Cardiovascular: Tachy S1-S2 irregular  GI: Soft, epigastric tenderness to palpation, hypoactive bowel sounds  MSK: Full ROM, no deformity  Skin: Warm, dry  Neuro: Alert and oriented x 3, GCS 15, moving all extremities, no focal deficits  Psych: Calm, cooperative     LAB RESULTS  Lab Results (last 24 hours)     Procedure Component Value Units Date/Time    Comprehensive Metabolic Panel [[id number]]  (Abnormal) Collected: 08/06/21 0345    Specimen: Blood Updated: 08/06/21 0557     Glucose 102 mg/dL      BUN 19 mg/dL      Creatinine 0.73 mg/dL      Sodium 144 mmol/L      Potassium 3.8 mmol/L      Chloride 108 mmol/L      CO2 22.8 mmol/L      Calcium 8.5 mg/dL      Total Protein 5.9 " g/dL      Albumin 3.40 g/dL      ALT (SGPT) 127 U/L      AST (SGOT) 41 U/L      Alkaline Phosphatase 85 U/L      Total Bilirubin 0.6 mg/dL      eGFR Non African Amer 77 mL/min/1.73      Globulin 2.5 gm/dL      A/G Ratio 1.4 g/dL      BUN/Creatinine Ratio 26.0     Anion Gap 13.2 mmol/L     Narrative:      GFR Normal >60  Chronic Kidney Disease <60  Kidney Failure <15      CBC & Differential [[id number]]  (Abnormal) Collected: 08/06/21 0345    Specimen: Blood Updated: 08/06/21 0530    Narrative:      The following orders were created for panel order CBC & Differential.  Procedure                               Abnormality         Status                     ---------                               -----------         ------                     CBC Auto Differential[[id number]]        Abnormal            Final result                 Please view results for these tests on the individual orders.    CBC Auto Differential [[id number]]  (Abnormal) Collected: 08/06/21 0345    Specimen: Blood Updated: 08/06/21 0530     WBC 14.71 10*3/mm3      RBC 3.86 10*6/mm3      Hemoglobin 11.1 g/dL      Hematocrit 33.7 %      MCV 87.3 fL      MCH 28.8 pg      MCHC 32.9 g/dL      RDW 13.6 %      RDW-SD 42.9 fl      MPV 10.5 fL      Platelets 233 10*3/mm3      Neutrophil % 82.7 %      Lymphocyte % 10.9 %      Monocyte % 5.4 %      Eosinophil % 0.1 %      Basophil % 0.2 %      Immature Grans % 0.7 %      Neutrophils, Absolute 12.17 10*3/mm3      Lymphocytes, Absolute 1.61 10*3/mm3      Monocytes, Absolute 0.79 10*3/mm3      Eosinophils, Absolute 0.01 10*3/mm3      Basophils, Absolute 0.03 10*3/mm3      Immature Grans, Absolute 0.10 10*3/mm3      nRBC 0.1 /100 WBC         Imaging Results (Last 24 Hours)     ** No results found for the last 24 hours. **        ECG 12 Lead  Component   Ref Range & Units 8/3/21 1308 8/3/21 0521   QT Interval   ms 261 P  380 P         HEART RATE= 135  bpm  RR Interval= 417  ms  IL Interval=   ms  P Horizontal Axis=    deg  P Front Axis=   deg  QRSD Interval= 73  ms  QT Interval= 261  ms  QRS Axis= -2  deg  T Wave Axis= 164  deg  - ABNORMAL ECG -  Atrial fibrillation with rapid V-rate  Low voltage, precordial leads  Repolarization abnormality, prob rate related             Current Facility-Administered Medications:   •  diazePAM (VALIUM) tablet 2.5 mg, 2.5 mg, Oral, QABrendan WINTERS Aftab, MD, 2.5 mg at 08/06/21 0715  •  dilTIAZem (CARDIZEM) 125 mg in 125 mL 0.7% sodium chloride  infusion, 5-15 mg/hr, Intravenous, Titrated, Gildardo Clinton MD, Last Rate: 5 mL/hr at 08/06/21 0423, 5 mg/hr at 08/06/21 0423  •  hydrocortisone sodium succinate (Solu-CORTEF) injection 100 mg, 100 mg, Intravenous, Q12H, Power Singh MD, 100 mg at 08/05/21 2202  •  HYDROmorphone (DILAUDID) injection 0.5 mg, 0.5 mg, Intravenous, Q4H PRN, Drew Cardona MD  •  metoprolol tartrate (LOPRESSOR) injection 5 mg, 5 mg, Intravenous, Q6H PRN, Drew Cardona MD, 5 mg at 08/03/21 1940  •  metoprolol tartrate (LOPRESSOR) tablet 75 mg, 75 mg, Oral, TID, Juan Damon MD, 75 mg at 08/06/21 0820  •  ondansetron (ZOFRAN) injection 4 mg, 4 mg, Intravenous, Q6H PRN, Drew Cardona MD  •  pantoprazole (PROTONIX) EC tablet 40 mg, 40 mg, Oral, Q AM, Drew Cardona MD, 40 mg at 08/06/21 0715  •  sodium chloride 0.9 % flush 10 mL, 10 mL, Intravenous, PRN, Emergency, Triage Protocol, MD     ASSESSMENT  Partial small bowel obstruction resolving  New onset atrial fibrillation with rapid ventricular rate  Elevated troponin no chest pain  Crohn's disease  Hypertension  Vitamin B12 deficiency  leukocytosis secondary to steroids  Gastroesophageal reflux disease    PLAN  CPM  Control the heart rate  Protonix  IV fluid  Steroids per GI  Clear liquid diet and advance as tolerated  General surgery consult appreciated  Cardiology and gastroenterology to follow patient  Adjust home medications  Stress ulcer DVT prophylaxis  Supportive care  Patient is full code  Discussed with  nursing staff and family  Follow closely further recommendation according to hospital course    CESILIA YUAN MD

## 2021-08-06 NOTE — PLAN OF CARE
Goal Outcome Evaluation:  Plan of Care Reviewed With: patient           Outcome Summary: REMAINS IN AFIB WITH HR NOW 90'S -110'S,  WITH CARDIZEM DRIP AT 5MG/HR .

## 2021-08-06 NOTE — PROGRESS NOTES
Follow-up partial small bowel obstruction     Subjective:  No abdominal complaints, no pain currently, tolerating clear liquids, 3 bowel movements yesterday.     Review of systems:  Constitutional: Positive for weakness, no fever or chills  Respiratory: Patient denies cough or wheezing     Physical exam:  Afebrile, heart rate irregular from 100s to 130s in A. fib, blood pressure 137/77  General: Awake and alert, no distress, chronic ill appearance  Eyes: Extraocular movements are intact without icterus  Neck: Supple, trachea midline  Respiratory: No use of accessory muscles, good bilateral chest expansion  Gastrointestinal: Abdomen is soft, minimal if any tenderness, mildly distended, no guarding, no hernia  Extremities: Minimal peripheral edema  Psychiatric: Judgment is intact     Labs are reviewed,  White blood cell count 14.7 hemoglobin 11.1 chemistries show mild elevation of liver function studies although they are improving        Assessment and plan:  -Partial small bowel obstruction, apparently resolved at this time  -Diet as tolerated  -History of Crohn's disease, unclear to what extent this is responsible for her current issue, seems more like adhesive disease to me  -No plans for surgical intervention at this time  -Okay for discharge from surgical standpoint once felt to be medically stable, follow-up as needed  -Atrial fibrillation, per cardiology, okay for anticoagulation from my standpoint if that is felt to be appropriate     Parrish Victor MD  General and Endoscopic Surgery  [facility]     [address], [address]  [place], [place], [address]  P: [phone]  F: [phone]

## 2021-08-06 NOTE — NURSING NOTE
Up SBA to bathroom. No soa, pain, or imbalance noted. HR up to 130 for 1s while ambulating. HR leveled out to 103-110 once situated.

## 2021-08-07 LAB
ANION GAP SERPL CALCULATED.3IONS-SCNC: 12.2 MMOL/L (ref 5–15)
BASOPHILS # BLD AUTO: 0.02 10*3/MM3 (ref 0–0.2)
BASOPHILS NFR BLD AUTO: 0.2 % (ref 0–1.5)
BUN SERPL-MCNC: 16 MG/DL (ref 8–23)
BUN/CREAT SERPL: 23.2 (ref 7–25)
CALCIUM SPEC-SCNC: 8.4 MG/DL (ref 8.6–10.5)
CHLORIDE SERPL-SCNC: 104 MMOL/L (ref 98–107)
CO2 SERPL-SCNC: 23.8 MMOL/L (ref 22–29)
CREAT SERPL-MCNC: 0.69 MG/DL (ref 0.57–1)
DEPRECATED RDW RBC AUTO: 43.1 FL (ref 37–54)
EOSINOPHIL # BLD AUTO: 0 10*3/MM3 (ref 0–0.4)
EOSINOPHIL NFR BLD AUTO: 0 % (ref 0.3–6.2)
ERYTHROCYTE [DISTWIDTH] IN BLOOD BY AUTOMATED COUNT: 13.2 % (ref 12.3–15.4)
GFR SERPL CREATININE-BSD FRML MDRD: 82 ML/MIN/1.73
GLUCOSE SERPL-MCNC: 104 MG/DL (ref 65–99)
HCT VFR BLD AUTO: 36.6 % (ref 34–46.6)
HGB BLD-MCNC: 11.9 G/DL (ref 12–15.9)
IMM GRANULOCYTES # BLD AUTO: 0.04 10*3/MM3 (ref 0–0.05)
IMM GRANULOCYTES NFR BLD AUTO: 0.4 % (ref 0–0.5)
LYMPHOCYTES # BLD AUTO: 1.39 10*3/MM3 (ref 0.7–3.1)
LYMPHOCYTES NFR BLD AUTO: 12.9 % (ref 19.6–45.3)
MCH RBC QN AUTO: 28.9 PG (ref 26.6–33)
MCHC RBC AUTO-ENTMCNC: 32.5 G/DL (ref 31.5–35.7)
MCV RBC AUTO: 88.8 FL (ref 79–97)
MONOCYTES # BLD AUTO: 0.55 10*3/MM3 (ref 0.1–0.9)
MONOCYTES NFR BLD AUTO: 5.1 % (ref 5–12)
NEUTROPHILS NFR BLD AUTO: 8.75 10*3/MM3 (ref 1.7–7)
NEUTROPHILS NFR BLD AUTO: 81.4 % (ref 42.7–76)
NRBC BLD AUTO-RTO: 0.1 /100 WBC (ref 0–0.2)
PLATELET # BLD AUTO: 281 10*3/MM3 (ref 140–450)
PMV BLD AUTO: 10.6 FL (ref 6–12)
POTASSIUM SERPL-SCNC: 3.5 MMOL/L (ref 3.5–5.2)
RBC # BLD AUTO: 4.12 10*6/MM3 (ref 3.77–5.28)
SODIUM SERPL-SCNC: 140 MMOL/L (ref 136–145)
WBC # BLD AUTO: 10.75 10*3/MM3 (ref 3.4–10.8)

## 2021-08-07 PROCEDURE — 80048 BASIC METABOLIC PNL TOTAL CA: CPT | Performed by: HOSPITALIST

## 2021-08-07 PROCEDURE — 99232 SBSQ HOSP IP/OBS MODERATE 35: CPT | Performed by: INTERNAL MEDICINE

## 2021-08-07 PROCEDURE — 25010000002 ENOXAPARIN PER 10 MG: Performed by: NURSE PRACTITIONER

## 2021-08-07 PROCEDURE — 99232 SBSQ HOSP IP/OBS MODERATE 35: CPT | Performed by: NURSE PRACTITIONER

## 2021-08-07 PROCEDURE — 99231 SBSQ HOSP IP/OBS SF/LOW 25: CPT | Performed by: SURGERY

## 2021-08-07 PROCEDURE — 63710000001 PREDNISONE PER 1 MG: Performed by: INTERNAL MEDICINE

## 2021-08-07 PROCEDURE — 85025 COMPLETE CBC W/AUTO DIFF WBC: CPT | Performed by: HOSPITALIST

## 2021-08-07 RX ORDER — PREDNISONE 20 MG/1
40 TABLET ORAL
Status: DISCONTINUED | OUTPATIENT
Start: 2021-08-07 | End: 2021-08-10 | Stop reason: HOSPADM

## 2021-08-07 RX ORDER — PREDNISONE 10 MG/1
TABLET ORAL
Qty: 70 TABLET | Refills: 0 | Status: SHIPPED | OUTPATIENT
Start: 2021-08-07 | End: 2021-08-19 | Stop reason: SDUPTHER

## 2021-08-07 RX ORDER — DILTIAZEM HYDROCHLORIDE 60 MG/1
60 TABLET, FILM COATED ORAL 3 TIMES DAILY
Status: DISCONTINUED | OUTPATIENT
Start: 2021-08-07 | End: 2021-08-09

## 2021-08-07 RX ADMIN — PANTOPRAZOLE SODIUM 40 MG: 40 TABLET, DELAYED RELEASE ORAL at 18:01

## 2021-08-07 RX ADMIN — PREDNISONE 40 MG: 20 TABLET ORAL at 11:08

## 2021-08-07 RX ADMIN — PANTOPRAZOLE SODIUM 40 MG: 40 TABLET, DELAYED RELEASE ORAL at 06:44

## 2021-08-07 RX ADMIN — DILTIAZEM HYDROCHLORIDE 60 MG: 60 TABLET, FILM COATED ORAL at 18:01

## 2021-08-07 RX ADMIN — DILTIAZEM HYDROCHLORIDE 60 MG: 60 TABLET, FILM COATED ORAL at 20:21

## 2021-08-07 RX ADMIN — METOPROLOL TARTRATE 100 MG: 50 TABLET, FILM COATED ORAL at 00:26

## 2021-08-07 RX ADMIN — DIAZEPAM 2.5 MG: 5 TABLET ORAL at 06:44

## 2021-08-07 RX ADMIN — Medication 5 MG/HR: at 05:43

## 2021-08-07 RX ADMIN — ENOXAPARIN SODIUM 60 MG: 60 INJECTION, SOLUTION INTRAVENOUS; SUBCUTANEOUS at 23:44

## 2021-08-07 RX ADMIN — METOPROLOL TARTRATE 100 MG: 50 TABLET, FILM COATED ORAL at 18:01

## 2021-08-07 RX ADMIN — METOPROLOL TARTRATE 100 MG: 50 TABLET, FILM COATED ORAL at 11:05

## 2021-08-07 NOTE — PROGRESS NOTES
Follow-up partial small bowel obstruction    Subjective:  No abdominal pain.  Continues with bowel function.  Tolerating solid food.    Objective:  Afebrile, heart rate varies from 90s to 130s in A. fib, blood pressure 135/92  General: Awake and alert, ambulating on room, appears comfortable  Abdomen: Soft and benign, nontender    Labs reviewed.  White blood cell count 10.7 hemoglobin 11.9.    Assessment and plan:  -Resolved partial small bowel obstruction  -Unclear if this was related to adhesive disease or Crohn's flare  -Noted plans for CT enterography  -No plans for surgical intervention at this time, okay for discharge once felt to be medically stable, follow-up with surgery as needed  -Okay for anticoagulation from our standpoint    Parrish Victor MD  General and Endoscopic Surgery  [facility]    [address], [address]  [place], [place], [address]  P: [phone]  F: [phone]

## 2021-08-07 NOTE — PROGRESS NOTES
[facility]  Inpatient Progress Note    Reason for Follow Up:  Crohn's dz, with sbo    Subjective     Interval History:   She feeling much better overall.  She is tolerating p.o. without difficulty.  No abdominal pain nausea vomiting.  She is having small bowel movements.    Current Facility-Administered Medications:   •  diazePAM (VALIUM) tablet 2.5 mg, 2.5 mg, Oral, QABrendan WINTERS Aftab, MD, 2.5 mg at 08/07/21 0644  •  dilTIAZem (CARDIZEM) 125 mg in 125 mL 0.7% sodium chloride  infusion, 5-15 mg/hr, Intravenous, Titrated, Gildardo Clinton MD, Last Rate: 5 mL/hr at 08/07/21 0543, 5 mg/hr at 08/07/21 0543  •  hydrocortisone sodium succinate (Solu-CORTEF) injection 100 mg, 100 mg, Intravenous, Q12H, Power Singh MD, 100 mg at 08/06/21 2258  •  HYDROmorphone (DILAUDID) injection 0.5 mg, 0.5 mg, Intravenous, Q4H PRN, Drew Cardona MD  •  metoprolol tartrate (LOPRESSOR) injection 5 mg, 5 mg, Intravenous, Q6H PRN, Drew Cardona MD, 5 mg at 08/03/21 1940  •  metoprolol tartrate (LOPRESSOR) tablet 100 mg, 100 mg, Oral, TID, Juan Damon MD, 100 mg at 08/07/21 0026  •  ondansetron (ZOFRAN) injection 4 mg, 4 mg, Intravenous, Q6H PRN, Drew Cardona MD  •  pantoprazole (PROTONIX) EC tablet 40 mg, 40 mg, Oral, BID AC, Marcel Ricketts MD, 40 mg at 08/07/21 0644  •  sodium chloride 0.9 % flush 10 mL, 10 mL, Intravenous, PRN, Emergency, Triage Protocol, MD  Review of Systems:    The following systems were reviewed and negative;  constitution and gastrointestinal    Objective     Vital Signs  Temp:  [97.4 °F (36.3 °C)-98.4 °F (36.9 °C)] 98.4 °F (36.9 °C)  Heart Rate:  [] 100  Resp:  [18] 18  BP: (102-135)/(70-92) 135/92  Body mass index is 24.69 kg/m².    Intake/Output Summary (Last 24 hours) at 8/7/2021 0819  Last data filed at 8/7/2021 0600  Gross per 24 hour   Intake 520 ml   Output 1000 ml   Net -480 ml     No intake/output data recorded.     Physical Exam:   General: patient awake,  alert and cooperative   Eyes: Normal lids and lashes, no scleral icterus   Neck: supple, normal ROM   Skin: warm and dry, not jaundiced   Cardiovascular: Irregularly irregular   Pulm:   regular and unlabored   Abdomen: soft, nontender, distended; normal bowel sounds   Extremities: no rash or edema   Psychiatric: Normal mood and behavior; memory intact     Results Review:     I reviewed the patient's new clinical results.    Results from last 7 days   Lab Units 08/07/21  0353 08/06/21  0345 08/05/21  0501   WBC 10*3/mm3 10.75 14.71* 12.74*   HEMOGLOBIN g/dL 11.9* 11.1* 11.4*   HEMATOCRIT % 36.6 33.7* 34.2   PLATELETS 10*3/mm3 281 233 219     Results from last 7 days   Lab Units 08/07/21  0353 08/06/21  0345 08/05/21  0501 08/04/21  0530 08/04/21  0530   SODIUM mmol/L 140 144 143   < > 141   POTASSIUM mmol/L 3.5 3.8 4.2   < > 4.3   CHLORIDE mmol/L 104 108* 112*   < > 108*   CO2 mmol/L 23.8 22.8 18.1*   < > 22.6   BUN mg/dL 16 19 20   < > 14   CREATININE mg/dL 0.69 0.73 0.81   < > 0.87   CALCIUM mg/dL 8.4* 8.5* 8.4*   < > 8.5*   BILIRUBIN mg/dL  --  0.6 0.6  --  1.0   ALK PHOS U/L  --  85 96  --  121*   ALT (SGPT) U/L  --  127* 175*  --  317*   AST (SGOT) U/L  --  41* 78*  --  223*   GLUCOSE mg/dL 104* 102* 109*   < > 103*    < > = values in this interval not displayed.         Lab Results   Lab Value Date/Time    LIPASE 43 08/03/2021 0530    LIPASE 37 03/08/2021 1145    LIPASE 49 12/20/2019 0418    LIPASE 15 04/28/2019 0714    LIPASE 42 04/27/2019 0557    LIPASE 29 04/19/2016 0925    LIPASE 60 09/01/2014 1000    LIPASE 22 01/14/2014 0611    LIPASE 49 01/12/2014 1340       Radiology:  US Liver   Final Result       Status post cholecystectomy. No biliary ductal dilatation.       This report was finalized on 8/6/2021 4:07 PM by Dr. Jeanne Holley M.D.          XR Abdomen KUB   Final Result      XR Abdomen KUB   Final Result      XR Abdomen KUB   Final Result      CT Abdomen Pelvis With Contrast   Final Result   CT  findings consistent with distal small bowel obstruction   as described. The exact site of the obstruction is not identified, but   is suspected to be in the right side of the pelvis.       This report was finalized on 8/3/2021 10:44 AM by Dr. Honorio Montemayor M.D.          XR Chest 2 View   Final Result          Assessment/Plan     Patient Active Problem List   Diagnosis   • Partial small bowel obstruction (CMS/HCC)   • Generalized abdominal pain   • Crohn's disease of small intestine with other complication (CMS/HCC)   • Crohn disease (CMS/HCC)   • Bloating   • Small bowel obstruction (CMS/HCC)   • Atrial fibrillation, persistent (CMS/HCC)   • Essential hypertension     All problems are new to me today  Assessment:  1. Crohn's disease: Small bowel, managed with mesalamine  2. Small bowel obstruction resolving: May be of a mechanical nature with adhesions or scar tissue, cannot rule out exacerbation of the Crohn's disease.    3. Atrial fibrillation w/rvr   4. Elevated LFT's-normal appearing liver on ultrasound and normal biliary tree.  May be related to hepatic congestion given A. fib with RVR      Plan:  · Change to p.o. steroids  · Low residue diet  · Okay to start full dose Lovenox from GI standpoint  · Plan for CT enterography today.  · Stable for discharge after CT from GI standpoint-she will need close follow-up with Dr. Ricketts    I discussed the patients findings and my recommendations with patient.    Sheri Cerna MD

## 2021-08-07 NOTE — PROGRESS NOTES
HOSPITAL PROGRESS NOTE    Date of Service: 21  LOS:  LOS: 4 days   Patient Name: Michela Aguilar  Age/Sex: 80 y.o. female  : 1940  MRN: [id number]  Primary Cardiologist: Dr. Juan Damon     Subjective:     Chief Complaint/Follow up:   Atrial fibrillation    Interval History:   She remains in atrial fibrillation with heart rate around 100 bpm.  She remains on diltiazem drip and her beta-blocker was increased yesterday.  Echocardiogram shows normal LVEF.      Dr. Damon was reluctant to start anticoagulation due to high-dose steroid usage and was going to touch base with Dr. Ricketts.  Not felt to need daily diuretic therapy.  Troponin elevated likely due to demand from acute illness. May consider outpatient stress testing.    Patient resting in bed.  She denies palpitations unless she lies on her left side at nighttime.  She denies shortness of breath or edema.  She had some mild chest tightness earlier this morning which has resolved.      Objective:     Objective:  Temp:  [98 °F (36.7 °C)-98.4 °F (36.9 °C)] 98 °F (36.7 °C)  Heart Rate:  [] 101  Resp:  [18] 18  BP: (102-135)/(70-95) 124/95  Body mass index is 24.69 kg/m².    Intake/Output Summary (Last 24 hours) at 2021 1520  Last data filed at 2021 1256  Gross per 24 hour   Intake 730 ml   Output 800 ml   Net -70 ml         21  0830 21  1111   Weight: 61.2 kg (135 lb) 61.2 kg (135 lb)     Weight change:     Physical Exam:   General Appearance: Alert, cooperative, in no acute distress. AAOx4.   HEENT: Normocephalic.  Neck: Supple. No JVD. No carotid bruit. No thyromegaly  Lungs: Clear. Normal respiratory effort and rate.  Heart: Irregularly, irregular with RVR.  Normal S1 and S2, no murmurs, gallops or rubs.  Abdomen: Soft, nontender, nondistended. Positive bowel sounds  Extremities: Warm, no cyanosis, or clubbing. No edema.     Lab Review:   Results from last 7 days   Lab Units 21  0353 21  9764  08/05/21  0501 08/05/21  0501   SODIUM mmol/L 140 144   < > 143   POTASSIUM mmol/L 3.5 3.8   < > 4.2   CHLORIDE mmol/L 104 108*   < > 112*   CO2 mmol/L 23.8 22.8   < > 18.1*   BUN mg/dL 16 19   < > 20   CREATININE mg/dL 0.69 0.73   < > 0.81   GLUCOSE mg/dL 104* 102*   < > 109*   CALCIUM mg/dL 8.4* 8.5*   < > 8.4*   AST (SGOT) U/L  --  41*  --  78*   ALT (SGPT) U/L  --  127*  --  175*    < > = values in this interval not displayed.     Results from last 7 days   Lab Units 08/04/21  1206 08/04/21  0530 08/03/21  0917 08/03/21  0530   TROPONIN T ng/mL 0.103* 0.097* <0.010 <0.010     Results from last 7 days   Lab Units 08/07/21  0353 08/06/21  0345   WBC 10*3/mm3 10.75 14.71*   HEMOGLOBIN g/dL 11.9* 11.1*   HEMATOCRIT % 36.6 33.7*   PLATELETS 10*3/mm3 281 233         Results from last 7 days   Lab Units 08/05/21  0501 08/04/21  0530   MAGNESIUM mg/dL 2.3 2.1     Results from last 7 days   Lab Units 08/04/21  0530   CHOLESTEROL mg/dL 148   TRIGLYCERIDES mg/dL 85   HDL CHOL mg/dL 61*     Results from last 7 days   Lab Units 08/04/21  0530   PROBNP pg/mL 4,564.0*     Results from last 7 days   Lab Units 08/04/21  0530   TSH uIU/mL 0.744       Results for orders placed during the hospital encounter of 08/03/21    Adult Transthoracic Echo Complete W/ Cont if Necessary Per Protocol    Interpretation Summary  · Estimated left ventricular EF = 60% Left ventricular systolic function is normal.  · Left ventricular diastolic function was indeterminate.    I reviewed the patient's new clinical results.  I personally viewed and interpreted the patient's EKG/Telemetry data/Labs/Test Results.     Current Medications:   Scheduled Meds:diazePAM, 2.5 mg, Oral, QAM  metoprolol tartrate, 100 mg, Oral, TID  pantoprazole, 40 mg, Oral, BID AC  predniSONE, 40 mg, Oral, Daily With Breakfast      Continuous Infusions:dilTIAZem, 5-15 mg/hr, Last Rate: 5 mg/hr (08/07/21 0543)        Allergies:  Allergies   Allergen Reactions   • Amitriptyline  Confusion   • Mysoline [Primidone] Confusion       Assessment:       Small bowel obstruction (CMS/HCC)    Atrial fibrillation, persistent (CMS/HCC)    Essential hypertension        Plan:   Assessment/Plan       1.  Atrial fibrillation.  Asymptomatic.  Currently on high-dose beta-blocker and diltiazem drip and not adequately rate controlled.  I discussed the plan of care with Dr. Juan Damon.  We will stop the IV diltiazem drip and start diltiazem HCl 60 mg 3 times daily.  Per GI okay to start full dose enoxaparin 1 mg/kg subcu twice daily.  2.  Hypertension.  Blood pressure stable.  3.  Small bowel obstruction.  Appreciate GI following.  Changed to p.o. steroids today and plans for CT enterography today.  4.  Chest Tightness.  She had a mild episode of chest tightness earlier today.  There were no wall motion abnormalities noted on echocardiogram.  However troponin levels have been abnormal.  We may need an ischemic evaluation if the chest tightness continues.  5.  Cardiology will continue to follow along.      YANY Garnett  [facility]   08/07/21  15:20 EDT

## 2021-08-07 NOTE — PROGRESS NOTES
"DAILY PROGRESS NOTE  [facility]    Patient Identification:  Name: Michela Aguilar  Age: 80 y.o.  Sex: female  :  1940  MRN: [id number]         Primary Care Physician: Roni Mckenzie MD    Subjective: patient is resting; has moved her bowels  Interval History: follow up for a fib, hypertension, sbo, crohns disease    Objective:    Scheduled Meds:diazePAM, 2.5 mg, Oral, QAM  dilTIAZem, 60 mg, Oral, TID  enoxaparin, 1 mg/kg, Subcutaneous, Q12H  metoprolol tartrate, 100 mg, Oral, TID  pantoprazole, 40 mg, Oral, BID AC  predniSONE, 40 mg, Oral, Daily With Breakfast      Continuous Infusions:     Vital signs in last 24 hours:  Temp:  [98 °F (36.7 °C)-98.4 °F (36.9 °C)] 98 °F (36.7 °C)  Heart Rate:  [] 101  Resp:  [18] 18  BP: (102-135)/(70-95) 124/95    Intake/Output:    Intake/Output Summary (Last 24 hours) at 2021 1800  Last data filed at 2021 1635  Gross per 24 hour   Intake 730 ml   Output 1200 ml   Net -470 ml       Exam:  /95 (BP Location: Right arm, Patient Position: Lying)   Pulse 101   Temp 98 °F (36.7 °C) (Oral)   Resp 18   Ht 157.5 cm (62\")   Wt 61.2 kg (135 lb)   SpO2 94%   BMI 24.69 kg/m²     General Appearance:    Alert, cooperative, no distress, AAOx3                          Head:    Normocephalic, without obvious abnormality, atraumatic                           Eyes:    PERRL, conjunctivae/corneas clear, EOM's intact, both eyes                         Throat:   Lips, tongue, gums normal; oral mucosa pink and moist                           Neck:   Supple, symmetrical, trachea midline, no JVD                         Lungs:    Decreased breath sounds bilaterally, respirations unlabored                 Chest Wall:    No tenderness or deformity                          Heart:    Regular rate and rhythm, S1 and S2 normal, no murmur,no  rub or gallop                  Abdomen:     Soft, nontender, bowel sounds active, no masses, no organomegaly                 "  Extremities:   Extremities normal, atraumatic, no cyanosis or edema                        Pulses:   Pulses palpable in all extremities                            Skin:   Skin is warm and dry,  no rashes or palpable lesions                  Neurologic:   CNII-XII intact, motor strength grossly intact, sensation grossly intact to light touch, no focal deficits noted       Data Review:  Labs in chart were reviewed.  WBC   Date Value Ref Range Status   08/07/2021 10.75 3.40 - 10.80 10*3/mm3 Final     Hemoglobin   Date Value Ref Range Status   08/07/2021 11.9 (L) 12.0 - 15.9 g/dL Final     Hematocrit   Date Value Ref Range Status   08/07/2021 36.6 34.0 - 46.6 % Final     Platelets   Date Value Ref Range Status   08/07/2021 281 140 - 450 10*3/mm3 Final     Sodium   Date Value Ref Range Status   08/07/2021 140 136 - 145 mmol/L Final     Potassium   Date Value Ref Range Status   08/07/2021 3.5 3.5 - 5.2 mmol/L Final     Chloride   Date Value Ref Range Status   08/07/2021 104 98 - 107 mmol/L Final     CO2   Date Value Ref Range Status   08/07/2021 23.8 22.0 - 29.0 mmol/L Final     BUN   Date Value Ref Range Status   08/07/2021 16 8 - 23 mg/dL Final     Creatinine   Date Value Ref Range Status   08/07/2021 0.69 0.57 - 1.00 mg/dL Final     Glucose   Date Value Ref Range Status   08/07/2021 104 (H) 65 - 99 mg/dL Final     Calcium   Date Value Ref Range Status   08/07/2021 8.4 (L) 8.6 - 10.5 mg/dL Final     Magnesium   Date Value Ref Range Status   08/05/2021 2.3 1.6 - 2.4 mg/dL Final     AST (SGOT)   Date Value Ref Range Status   08/06/2021 41 (H) 1 - 32 U/L Final     ALT (SGPT)   Date Value Ref Range Status   08/06/2021 127 (H) 1 - 33 U/L Final     Alkaline Phosphatase   Date Value Ref Range Status   08/06/2021 85 39 - 117 U/L Final     Patient Active Problem List   Diagnosis Code   • Partial small bowel obstruction (CMS/HCC) K56.600   • Generalized abdominal pain R10.84   • Crohn's disease of small intestine with other  complication (CMS/HCC) K50.018   • Crohn disease (CMS/HCC) K50.90   • Bloating R14.0   • Small bowel obstruction (CMS/HCC) K56.609   • Atrial fibrillation, persistent (CMS/HCC) I48.19   • Essential hypertension I10       Assessment:    Small bowel obstruction (CMS/HCC)    Atrial fibrillation, persistent (CMS/HCC)    Essential hypertension  crohns disease  Atrial fibrillation    Plan:  On cardizem drip   Ct enteroscopy planned  Doing well overall  Heart rate is still high   Trend labs  Monitor on telemetry  D.w patient and nurse  Medium risk  Notes and labs reviewed  Rekha Chavez MD  8/7/2021  18:00 EDT

## 2021-08-08 PROCEDURE — 99232 SBSQ HOSP IP/OBS MODERATE 35: CPT | Performed by: NURSE PRACTITIONER

## 2021-08-08 PROCEDURE — 99232 SBSQ HOSP IP/OBS MODERATE 35: CPT | Performed by: INTERNAL MEDICINE

## 2021-08-08 PROCEDURE — 63710000001 PREDNISONE PER 1 MG: Performed by: INTERNAL MEDICINE

## 2021-08-08 RX ORDER — PREDNISONE 10 MG/1
TABLET ORAL
Qty: 73 TABLET | Refills: 0 | Status: SHIPPED | OUTPATIENT
Start: 2021-08-08 | End: 2021-08-19 | Stop reason: SDUPTHER

## 2021-08-08 RX ADMIN — PANTOPRAZOLE SODIUM 40 MG: 40 TABLET, DELAYED RELEASE ORAL at 18:26

## 2021-08-08 RX ADMIN — METOPROLOL TARTRATE 100 MG: 50 TABLET, FILM COATED ORAL at 01:15

## 2021-08-08 RX ADMIN — DILTIAZEM HYDROCHLORIDE 60 MG: 60 TABLET, FILM COATED ORAL at 20:59

## 2021-08-08 RX ADMIN — APIXABAN 5 MG: 5 TABLET, FILM COATED ORAL at 13:15

## 2021-08-08 RX ADMIN — DIAZEPAM 2.5 MG: 5 TABLET ORAL at 06:56

## 2021-08-08 RX ADMIN — METOPROLOL TARTRATE 100 MG: 50 TABLET, FILM COATED ORAL at 20:59

## 2021-08-08 RX ADMIN — METOPROLOL TARTRATE 100 MG: 50 TABLET, FILM COATED ORAL at 08:26

## 2021-08-08 RX ADMIN — PREDNISONE 40 MG: 20 TABLET ORAL at 08:26

## 2021-08-08 RX ADMIN — DILTIAZEM HYDROCHLORIDE 60 MG: 60 TABLET, FILM COATED ORAL at 16:00

## 2021-08-08 RX ADMIN — DILTIAZEM HYDROCHLORIDE 60 MG: 60 TABLET, FILM COATED ORAL at 08:26

## 2021-08-08 RX ADMIN — METOPROLOL TARTRATE 100 MG: 50 TABLET, FILM COATED ORAL at 16:00

## 2021-08-08 RX ADMIN — PANTOPRAZOLE SODIUM 40 MG: 40 TABLET, DELAYED RELEASE ORAL at 06:56

## 2021-08-08 RX ADMIN — APIXABAN 5 MG: 5 TABLET, FILM COATED ORAL at 20:59

## 2021-08-08 NOTE — PROGRESS NOTES
[facility]  Inpatient Progress Note    Reason for Follow Up:  Crohn's dz, with sbo    Subjective     Interval History:   Feels better from a GI standpoint.  No abdominal pain.  Distention is better.  No nausea or vomiting.  She is having bowel movements.  She still getting tachycardic with activity.    Current Facility-Administered Medications:   •  diazePAM (VALIUM) tablet 2.5 mg, 2.5 mg, Oral, QAM, Drew Cardona MD, 2.5 mg at 08/08/21 0656  •  dilTIAZem (CARDIZEM) tablet 60 mg, 60 mg, Oral, TID, Calista Mcclure, APRN, 60 mg at 08/08/21 0826  •  enoxaparin (LOVENOX) syringe 60 mg, 1 mg/kg, Subcutaneous, Q12H, Drew Cardona MD  •  HYDROmorphone (DILAUDID) injection 0.5 mg, 0.5 mg, Intravenous, Q4H PRN, Drew Cardona MD  •  metoprolol tartrate (LOPRESSOR) injection 5 mg, 5 mg, Intravenous, Q6H PRN, Drew Cardona MD, 5 mg at 08/03/21 1940  •  metoprolol tartrate (LOPRESSOR) tablet 100 mg, 100 mg, Oral, TID, Juan Damon MD, 100 mg at 08/08/21 0826  •  ondansetron (ZOFRAN) injection 4 mg, 4 mg, Intravenous, Q6H PRN, Drew Cardona MD  •  pantoprazole (PROTONIX) EC tablet 40 mg, 40 mg, Oral, BID AC, Marcel Ricketts MD, 40 mg at 08/08/21 0656  •  predniSONE (DELTASONE) tablet 40 mg, 40 mg, Oral, Daily With Breakfast, Sheri Cerna MD, 40 mg at 08/08/21 0826  •  sodium chloride 0.9 % flush 10 mL, 10 mL, Intravenous, PRN, Emergency, Triage Protocol, MD  Review of Systems:    The following systems were reviewed and negative;  constitution and gastrointestinal    Objective     Vital Signs  Temp:  [97 °F (36.1 °C)-98 °F (36.7 °C)] 97.4 °F (36.3 °C)  Heart Rate:  [] 99  Resp:  [18] 18  BP: (106-147)/(66-95) 135/89  Body mass index is 24.69 kg/m².    Intake/Output Summary (Last 24 hours) at 8/8/2021 0842  Last data filed at 8/8/2021 0700  Gross per 24 hour   Intake 630 ml   Output 1600 ml   Net -970 ml     No intake/output data recorded.     Physical Exam:   General: patient awake, alert and  cooperative   Eyes: Normal lids and lashes, no scleral icterus   Neck: supple, normal ROM   Skin: warm and dry, not jaundiced   Cardiovascular: Irregularly irregular   Pulm:   regular and unlabored   Abdomen: soft, nontender, nondistended    Extremities: no rash or edema   Psychiatric: Normal mood and behavior; memory intact     Results Review:     I reviewed the patient's new clinical results.    Results from last 7 days   Lab Units 08/07/21  0353 08/06/21  0345 08/05/21  0501   WBC 10*3/mm3 10.75 14.71* 12.74*   HEMOGLOBIN g/dL 11.9* 11.1* 11.4*   HEMATOCRIT % 36.6 33.7* 34.2   PLATELETS 10*3/mm3 281 233 219     Results from last 7 days   Lab Units 08/07/21  0353 08/06/21  0345 08/05/21  0501 08/04/21  0530 08/04/21  0530   SODIUM mmol/L 140 144 143   < > 141   POTASSIUM mmol/L 3.5 3.8 4.2   < > 4.3   CHLORIDE mmol/L 104 108* 112*   < > 108*   CO2 mmol/L 23.8 22.8 18.1*   < > 22.6   BUN mg/dL 16 19 20   < > 14   CREATININE mg/dL 0.69 0.73 0.81   < > 0.87   CALCIUM mg/dL 8.4* 8.5* 8.4*   < > 8.5*   BILIRUBIN mg/dL  --  0.6 0.6  --  1.0   ALK PHOS U/L  --  85 96  --  121*   ALT (SGPT) U/L  --  127* 175*  --  317*   AST (SGOT) U/L  --  41* 78*  --  223*   GLUCOSE mg/dL 104* 102* 109*   < > 103*    < > = values in this interval not displayed.         Lab Results   Lab Value Date/Time    LIPASE 43 08/03/2021 0530    LIPASE 37 03/08/2021 1145    LIPASE 49 12/20/2019 0418    LIPASE 15 04/28/2019 0714    LIPASE 42 04/27/2019 0557    LIPASE 29 04/19/2016 0925    LIPASE 60 09/01/2014 1000    LIPASE 22 01/14/2014 0611    LIPASE 49 01/12/2014 1340       Radiology:  US Liver   Final Result       Status post cholecystectomy. No biliary ductal dilatation.       This report was finalized on 8/6/2021 4:07 PM by Dr. Jeanne Holley M.D.          XR Abdomen KUB   Final Result      XR Abdomen KUB   Final Result      XR Abdomen KUB   Final Result      CT Abdomen Pelvis With Contrast   Final Result   CT findings consistent with distal  small bowel obstruction   as described. The exact site of the obstruction is not identified, but   is suspected to be in the right side of the pelvis.       This report was finalized on 8/3/2021 10:44 AM by Dr. Honorio Montemayor M.D.          XR Chest 2 View   Final Result      CT Enterography Abdomen Pelvis w Contrast    (Results Pending)       Assessment/Plan     Patient Active Problem List   Diagnosis   • Partial small bowel obstruction (CMS/HCC)   • Generalized abdominal pain   • Crohn's disease of small intestine with other complication (CMS/HCC)   • Crohn disease (CMS/HCC)   • Bloating   • Small bowel obstruction (CMS/HCC)   • Atrial fibrillation, persistent (CMS/HCC)   • Essential hypertension       Assessment:  1. Crohn's disease: Small bowel, managed with mesalamine  2. Small bowel obstruction resolving: May be of a mechanical nature with adhesions or scar tissue, cannot rule out exacerbation of the Crohn's disease.    3. Atrial fibrillation w/rvr   4. Elevated LFT's-normal appearing liver on ultrasound and normal biliary tree.  May be related to hepatic congestion given A. fib with RVR         Plan:  · Continue p.o. steroids-steroid taper sent to pharmacy  · Low residue diet  · Okay to start full dose Lovenox from GI standpoint  · Plan for CT enterography hopefully today  · Stable for discharge after CT from GI standpoint-she will need close follow-up with Dr. Ricketts.  Our office will contact her to schedule.    I discussed the patients findings and my recommendations with patient.    Sheri Cerna MD

## 2021-08-08 NOTE — NURSING NOTE
Moments of afib tachycardia jumping as high as 150's with any exertion in bed. Heart rate comes down immediately after.  p denies chest pain, chest discomfort, dizziness or light-headedness.

## 2021-08-08 NOTE — PROGRESS NOTES
"DAILY PROGRESS NOTE  [facility]    Patient Identification:  Name: Michela Aguilar  Age: 80 y.o.  Sex: female  :  1940  MRN: [id number]         Primary Care Physician: Roni Mckenzie MD    Subjective: patient is sitting up; feels better  Interval History: follow up for crohns disease, a fib with rvr, hypertension, sbo    Objective:    Scheduled Meds:apixaban, 5 mg, Oral, Q12H  diazePAM, 2.5 mg, Oral, QAM  dilTIAZem, 60 mg, Oral, TID  metoprolol tartrate, 100 mg, Oral, TID  pantoprazole, 40 mg, Oral, BID AC  predniSONE, 40 mg, Oral, Daily With Breakfast      Continuous Infusions:     Vital signs in last 24 hours:  Temp:  [97 °F (36.1 °C)-97.4 °F (36.3 °C)] 97.4 °F (36.3 °C)  Heart Rate:  [] 99  Resp:  [18] 18  BP: (106-147)/(66-89) 135/89    Intake/Output:    Intake/Output Summary (Last 24 hours) at 2021 1259  Last data filed at 2021 0910  Gross per 24 hour   Intake 330 ml   Output 1500 ml   Net -1170 ml       Exam:  /89 (BP Location: Left arm, Patient Position: Lying)   Pulse 99   Temp 97.4 °F (36.3 °C) (Oral)   Resp 18   Ht 157.5 cm (62\")   Wt 61.2 kg (135 lb)   SpO2 93%   BMI 24.69 kg/m²     General Appearance:    Alert, cooperative, no distress, AAOx3                          Head:    Normocephalic, without obvious abnormality, atraumatic                           Eyes:    PERRL, conjunctivae/corneas clear, EOM's intact, both eyes                         Throat:   Lips, tongue, gums normal; oral mucosa pink and moist                           Neck:   Supple, symmetrical, trachea midline, no JVD                         Lungs:    Decreased breath sounds bilaterally, respirations unlabored                 Chest Wall:    No tenderness or deformity                          Heart:    Regular rate and rhythm, S1 and S2 normal, no murmur,no  rub or gallop                  Abdomen:     Soft, nontender, bowel sounds active, no masses, no organomegaly                  " Extremities:   Extremities normal, atraumatic, no cyanosis or edema                        Pulses:   Pulses palpable in all extremities                            Skin:   Skin is warm and dry,  no rashes or palpable lesions                  Neurologic:   CNII-XII intact, motor strength grossly intact, sensation grossly intact to light touch, no focal deficits noted       Data Review:  Labs in chart were reviewed.  WBC   Date Value Ref Range Status   08/07/2021 10.75 3.40 - 10.80 10*3/mm3 Final     Hemoglobin   Date Value Ref Range Status   08/07/2021 11.9 (L) 12.0 - 15.9 g/dL Final     Hematocrit   Date Value Ref Range Status   08/07/2021 36.6 34.0 - 46.6 % Final     Platelets   Date Value Ref Range Status   08/07/2021 281 140 - 450 10*3/mm3 Final     Sodium   Date Value Ref Range Status   08/07/2021 140 136 - 145 mmol/L Final     Potassium   Date Value Ref Range Status   08/07/2021 3.5 3.5 - 5.2 mmol/L Final     Chloride   Date Value Ref Range Status   08/07/2021 104 98 - 107 mmol/L Final     CO2   Date Value Ref Range Status   08/07/2021 23.8 22.0 - 29.0 mmol/L Final     BUN   Date Value Ref Range Status   08/07/2021 16 8 - 23 mg/dL Final     Creatinine   Date Value Ref Range Status   08/07/2021 0.69 0.57 - 1.00 mg/dL Final     Glucose   Date Value Ref Range Status   08/07/2021 104 (H) 65 - 99 mg/dL Final     Calcium   Date Value Ref Range Status   08/07/2021 8.4 (L) 8.6 - 10.5 mg/dL Final     AST (SGOT)   Date Value Ref Range Status   08/06/2021 41 (H) 1 - 32 U/L Final     ALT (SGPT)   Date Value Ref Range Status   08/06/2021 127 (H) 1 - 33 U/L Final     Alkaline Phosphatase   Date Value Ref Range Status   08/06/2021 85 39 - 117 U/L Final     No results found for: APTT, INR  Patient Active Problem List   Diagnosis Code   • Partial small bowel obstruction (CMS/HCC) K56.600   • Generalized abdominal pain R10.84   • Crohn's disease of small intestine with other complication (CMS/HCC) K50.018   • Crohn disease  (CMS/HCC) K50.90   • Bloating R14.0   • Small bowel obstruction (CMS/HCC) K56.609   • Atrial fibrillation, persistent (CMS/HCC) I48.19   • Essential hypertension I10       Assessment:    Small bowel obstruction (CMS/HCC)    Atrial fibrillation, persistent (CMS/HCC)    Essential hypertension  crohns disease    Plan:  Cardiology input reviewed  On eliquis  Will need to landon prior to discharge  Also had a run of nsvt and pause so will continue to monitor  Ct enteroscopy planned  Medium risk    Rekha Enrrique Chavez MD  8/8/2021  12:59 EDT

## 2021-08-08 NOTE — PROGRESS NOTES
HOSPITAL PROGRESS NOTE    Date of Service: 21  LOS:  LOS: 5 days   Patient Name: Michela Aguilar  Age/Sex: 80 y.o. female  : 1940  MRN: [id number]  Primary Cardiologist: Dr. Juan Damon     Subjective:     Chief Complaint/Follow up:   Atrial fibrillation    Interval History:   She remains in atrial fibrillation with heart rates in the 70s-90s.  Heart rate better controlled with current combination of diltiazem and metoprolol.  She denies any further chest tightness.  She denies shortness of breath, palpitations, or dizziness.      Objective:     Objective:  Temp:  [97 °F (36.1 °C)-98 °F (36.7 °C)] 97.4 °F (36.3 °C)  Heart Rate:  [] 99  Resp:  [18] 18  BP: (106-147)/(66-95) 135/89  Body mass index is 24.69 kg/m².    Intake/Output Summary (Last 24 hours) at 2021 1205  Last data filed at 2021 0910  Gross per 24 hour   Intake 540 ml   Output 1500 ml   Net -960 ml         21  0830 21  1111   Weight: 61.2 kg (135 lb) 61.2 kg (135 lb)     Weight change:     Physical Exam:   General Appearance: Alert, cooperative, in no acute distress. AAOx4.   HEENT: Normocephalic.  Neck: Supple. No JVD. No carotid bruit. No thyromegaly  Lungs: Clear. Normal respiratory effort and rate.  Heart: Irregularly, irregular.  Normal S1 and S2, no murmurs, gallops or rubs.  Abdomen: Soft, nontender, nondistended. Positive bowel sounds  Extremities: Warm, no cyanosis, or clubbing. No edema.     Lab Review:   Results from last 7 days   Lab Units 21  0353 21  0345 21  0501 21  0501   SODIUM mmol/L 140 144   < > 143   POTASSIUM mmol/L 3.5 3.8   < > 4.2   CHLORIDE mmol/L 104 108*   < > 112*   CO2 mmol/L 23.8 22.8   < > 18.1*   BUN mg/dL 16 19   < > 20   CREATININE mg/dL 0.69 0.73   < > 0.81   GLUCOSE mg/dL 104* 102*   < > 109*   CALCIUM mg/dL 8.4* 8.5*   < > 8.4*   AST (SGOT) U/L  --  41*  --  78*   ALT (SGPT) U/L  --  127*  --  175*    < > = values in this interval not  displayed.     Results from last 7 days   Lab Units 08/04/21  1206 08/04/21  0530 08/03/21  0917 08/03/21  0530   TROPONIN T ng/mL 0.103* 0.097* <0.010 <0.010     Results from last 7 days   Lab Units 08/07/21  0353 08/06/21  0345   WBC 10*3/mm3 10.75 14.71*   HEMOGLOBIN g/dL 11.9* 11.1*   HEMATOCRIT % 36.6 33.7*   PLATELETS 10*3/mm3 281 233         Results from last 7 days   Lab Units 08/05/21  0501 08/04/21  0530   MAGNESIUM mg/dL 2.3 2.1     Results from last 7 days   Lab Units 08/04/21  0530   CHOLESTEROL mg/dL 148   TRIGLYCERIDES mg/dL 85   HDL CHOL mg/dL 61*     Results from last 7 days   Lab Units 08/04/21  0530   PROBNP pg/mL 4,564.0*     Results from last 7 days   Lab Units 08/04/21  0530   TSH uIU/mL 0.744       Results for orders placed during the hospital encounter of 08/03/21    Adult Transthoracic Echo Complete W/ Cont if Necessary Per Protocol    Interpretation Summary  · Estimated left ventricular EF = 60% Left ventricular systolic function is normal.  · Left ventricular diastolic function was indeterminate.    I reviewed the patient's new clinical results.  I personally viewed and interpreted the patient's EKG/Telemetry data/Labs/Test Results.     Current Medications:   Scheduled Meds:diazePAM, 2.5 mg, Oral, QAM  dilTIAZem, 60 mg, Oral, TID  enoxaparin, 1 mg/kg, Subcutaneous, Q12H  metoprolol tartrate, 100 mg, Oral, TID  pantoprazole, 40 mg, Oral, BID AC  predniSONE, 40 mg, Oral, Daily With Breakfast        Allergies:  Allergies   Allergen Reactions   • Amitriptyline Confusion   • Mysoline [Primidone] Confusion       Assessment:       Small bowel obstruction (CMS/HCC)    Atrial fibrillation, persistent (CMS/HCC)    Essential hypertension        Plan:   Assessment/Plan       1.  Atrial fibrillation.  Asymptomatic.  Yesterday I stopped her diltiazem drip.  I recommended continuing with beta-blocker and we added diltiazem HCl 60 mg 3 times daily.  I spoke with Dr. Sheri Cerna and she agreed that it is  okay to send the patient home on apixaban 5 mg twice per day.  2.  Hypertension.  Blood pressure stable.  3.  Small bowel obstruction.  Appreciate GI following.  4.  Chest Tightness.  She recently had mild chest tightness episode x1.  Echocardiogram showed no wall motion abnormalities.  Troponin mildly abnormal.  We will determine if she needs to have an ischemic evaluation outpatient.  She denies chest tightness or shortness of breath today.    5.  Okay to discharge from a cardiac standpoint.  I would discharge her on current dose of metoprolol, apixaban, and diltiazem.  She will need a 1 week hospital follow-up with me in the office and our office will call to arrange.    Addendum: RN showed me a telemetry strip.  Earlier this morning she had a 1 second pause followed by 4 beats of nonsustained ventricular tachycardia.  We will continue to monitor.    Thank you for the consultation.      YANY Garnett  [facility]   08/08/21  12:05 EDT

## 2021-08-09 ENCOUNTER — APPOINTMENT (OUTPATIENT)
Dept: CT IMAGING | Facility: HOSPITAL | Age: 81
End: 2021-08-09

## 2021-08-09 LAB
QT INTERVAL: 335 MS
QT INTERVAL: 380 MS

## 2021-08-09 PROCEDURE — 99232 SBSQ HOSP IP/OBS MODERATE 35: CPT | Performed by: NURSE PRACTITIONER

## 2021-08-09 PROCEDURE — 63710000001 PREDNISONE PER 1 MG: Performed by: INTERNAL MEDICINE

## 2021-08-09 PROCEDURE — 25010000002 DIGOXIN PER 500 MCG: Performed by: INTERNAL MEDICINE

## 2021-08-09 PROCEDURE — 25010000002 IOPAMIDOL 61 % SOLUTION: Performed by: HOSPITALIST

## 2021-08-09 PROCEDURE — 74177 CT ABD & PELVIS W/CONTRAST: CPT

## 2021-08-09 RX ORDER — DIGOXIN 0.25 MG/ML
250 INJECTION INTRAMUSCULAR; INTRAVENOUS ONCE
Status: DISCONTINUED | OUTPATIENT
Start: 2021-08-10 | End: 2021-08-10 | Stop reason: HOSPADM

## 2021-08-09 RX ORDER — HYDROCODONE BITARTRATE AND ACETAMINOPHEN 5; 325 MG/1; MG/1
1 TABLET ORAL EVERY 6 HOURS PRN
Status: DISCONTINUED | OUTPATIENT
Start: 2021-08-09 | End: 2021-08-10

## 2021-08-09 RX ORDER — NITROGLYCERIN 0.4 MG/1
0.4 TABLET SUBLINGUAL
Status: DISCONTINUED | OUTPATIENT
Start: 2021-08-09 | End: 2021-08-10

## 2021-08-09 RX ORDER — DIGOXIN 0.25 MG/ML
500 INJECTION INTRAMUSCULAR; INTRAVENOUS ONCE
Status: COMPLETED | OUTPATIENT
Start: 2021-08-09 | End: 2021-08-09

## 2021-08-09 RX ORDER — ATENOLOL 50 MG/1
50 TABLET ORAL EVERY 8 HOURS
Status: DISCONTINUED | OUTPATIENT
Start: 2021-08-09 | End: 2021-08-10 | Stop reason: HOSPADM

## 2021-08-09 RX ORDER — DIGOXIN 0.25 MG/ML
250 INJECTION INTRAMUSCULAR; INTRAVENOUS ONCE
Status: DISCONTINUED | OUTPATIENT
Start: 2021-08-09 | End: 2021-08-10 | Stop reason: HOSPADM

## 2021-08-09 RX ADMIN — PANTOPRAZOLE SODIUM 40 MG: 40 TABLET, DELAYED RELEASE ORAL at 17:12

## 2021-08-09 RX ADMIN — APIXABAN 5 MG: 5 TABLET, FILM COATED ORAL at 08:50

## 2021-08-09 RX ADMIN — DIGOXIN 500 MCG: 250 INJECTION, SOLUTION INTRAMUSCULAR; INTRAVENOUS; PARENTERAL at 17:10

## 2021-08-09 RX ADMIN — METOPROLOL TARTRATE 100 MG: 50 TABLET, FILM COATED ORAL at 08:50

## 2021-08-09 RX ADMIN — DILTIAZEM HYDROCHLORIDE 90 MG: 30 TABLET, FILM COATED ORAL at 17:11

## 2021-08-09 RX ADMIN — PANTOPRAZOLE SODIUM 40 MG: 40 TABLET, DELAYED RELEASE ORAL at 06:51

## 2021-08-09 RX ADMIN — DIAZEPAM 2.5 MG: 5 TABLET ORAL at 06:51

## 2021-08-09 RX ADMIN — IOPAMIDOL 85 ML: 612 INJECTION, SOLUTION INTRAVENOUS at 12:53

## 2021-08-09 RX ADMIN — ATENOLOL 50 MG: 50 TABLET ORAL at 19:33

## 2021-08-09 RX ADMIN — APIXABAN 5 MG: 5 TABLET, FILM COATED ORAL at 21:42

## 2021-08-09 RX ADMIN — PREDNISONE 40 MG: 20 TABLET ORAL at 08:47

## 2021-08-09 RX ADMIN — Medication 237 ML: at 12:55

## 2021-08-09 RX ADMIN — DILTIAZEM HYDROCHLORIDE 60 MG: 60 TABLET, FILM COATED ORAL at 08:50

## 2021-08-09 NOTE — PLAN OF CARE
Goal Outcome Evaluation:   Pt  remained on RA, no complaints of chest pain, up with standby assist, safety maintained. Pt having afib, with periods of tachycardia/RVR when exerting self, even if slightly. HR would jump to 130's, 140's even low 150's, and quickly come back down to 100's.

## 2021-08-09 NOTE — PROGRESS NOTES
"[facility]    Patient Name: Michela Aguilar  :1940  80 y.o.  LOS: 6  Encounter Provider: YANY Ruffin      Patient Care Team:  Roni Mckenzie MD as PCP - General  Roni Mckenzie MD as PCP - Family Medicine    Chief Complaint: Atrial fibrillation    Interval History: Heart rate currently in the 100s-130s on bedside monitor while at rest.  Patient currently asymptomatic.  CCP nurse at bedside stating that apixaban will cost patient $96 per month out of pocket, patient is agreeable.       Objective   Vital Signs  Temp:  [97.3 °F (36.3 °C)-97.9 °F (36.6 °C)] 97.7 °F (36.5 °C)  Heart Rate:  [] 100  Resp:  [16-18] 18  BP: (127-143)/(83-99) 143/93    Intake/Output Summary (Last 24 hours) at 2021 1408  Last data filed at 2021 1300  Gross per 24 hour   Intake 506 ml   Output --   Net 506 ml     Flowsheet Rows      First Filed Value   Admission Height  157.5 cm (62\") Documented at 2021 0830   Admission Weight  61.2 kg (135 lb) Documented at 2021 0830            Vitals and nursing note reviewed.   Constitutional:       Appearance: Normal appearance. Well-developed.   Eyes:      Conjunctiva/sclera: Conjunctivae normal.   Neck:      Vascular: No carotid bruit.   Pulmonary:      Breath sounds: Normal breath sounds.   Cardiovascular:      Tachycardia present. Irregularly irregular rhythm. Normal S1 with normal intensity. Normal S2 with normal intensity.      Murmurs: There is no murmur.      No gallop. No click. No rub.   Edema:     Peripheral edema absent.   Musculoskeletal: Normal range of motion. Skin:     General: Skin is warm and dry.   Neurological:      Mental Status: Alert and oriented to person, place, and time.      GCS: GCS eye subscore is 4. GCS verbal subscore is 5. GCS motor subscore is 6.   Psychiatric:         Speech: Speech normal.         Behavior: Behavior normal.         Thought Content: Thought content normal.         Judgment: Judgment " normal.           Pertinent Test Results:  Results from last 7 days   Lab Units 08/07/21  0353 08/06/21  0345 08/05/21  0501 08/04/21  0530 08/03/21  0530   SODIUM mmol/L 140 144 143 141 141   POTASSIUM mmol/L 3.5 3.8 4.2 4.3 4.0   CHLORIDE mmol/L 104 108* 112* 108* 103   CO2 mmol/L 23.8 22.8 18.1* 22.6 25.1   BUN mg/dL 16 19 20 14 11   CREATININE mg/dL 0.69 0.73 0.81 0.87 0.84   GLUCOSE mg/dL 104* 102* 109* 103* 107*   CALCIUM mg/dL 8.4* 8.5* 8.4* 8.5* 9.8   AST (SGOT) U/L  --  41* 78* 223* 29   ALT (SGPT) U/L  --  127* 175* 317* 29     Results from last 7 days   Lab Units 08/04/21  1206 08/04/21  0530 08/03/21  0917 08/03/21  0530   TROPONIN T ng/mL 0.103* 0.097* <0.010 <0.010     Results from last 7 days   Lab Units 08/07/21  0353 08/06/21  0345 08/05/21  0501 08/04/21  0530 08/03/21  0530   WBC 10*3/mm3 10.75 14.71* 12.74* 16.26* 11.67*   HEMOGLOBIN g/dL 11.9* 11.1* 11.4* 12.9 14.6   HEMATOCRIT % 36.6 33.7* 34.2 38.9 44.4   PLATELETS 10*3/mm3 281 233 219 240 252         Results from last 7 days   Lab Units 08/05/21  0501 08/04/21  0530   MAGNESIUM mg/dL 2.3 2.1     Results from last 7 days   Lab Units 08/04/21  0530   CHOLESTEROL mg/dL 148   TRIGLYCERIDES mg/dL 85   HDL CHOL mg/dL 61*     Results from last 7 days   Lab Units 08/04/21  0530   PROBNP pg/mL 4,564.0*     Results from last 7 days   Lab Units 08/04/21  0530   TSH uIU/mL 0.744           Medication Review:   apixaban, 5 mg, Oral, Q12H  diazePAM, 2.5 mg, Oral, QAM  dilTIAZem, 60 mg, Oral, TID  metoprolol tartrate, 100 mg, Oral, TID  pantoprazole, 40 mg, Oral, BID AC  predniSONE, 40 mg, Oral, Daily With Breakfast              Assessment/Plan   1. Atrial fibrillation  2. Hypertension  3. SBO: GI following  4. Episode of chest tightness    -Heart rate has been controlled, patient was planned for discharge yesterday however there was a monitor strip where patient had a 1 second pause followed by 4 beats of nonsustained ventricular tachycardia it was  determined that patient should be monitored overnight.    -Patient currently anticoagulated with apixaban.  Rate is still uncontrolled at rest.  Heart rates on bedside monitor 100s-130s.  Continue metoprolol tartrate 100 mg 3 times daily.  Increase diltiazem to 90 mg 3 times daily.        YANY Ruffin  [facility]  08/09/21  14:08 EDT    Addendum:    I also saw the patient.  Her rate is much too fast.  I changed her metoprolol to atenolol 50 mg 3 times daily.  I am also going to load her with IV digoxin.    Luis Alfredo Paige MD

## 2021-08-09 NOTE — PROGRESS NOTES
"Reevaluation    Chief complaint  Doing better   New complaints  Family at bedside    History of present illness  80-year-old white female with history of Crohn's disease hypertension hyperlipidemia chronic anemia and gastroesophageal reflux disease who also has had atrial fibrillation several years ago but no follow-up as she has been in sinus rhythm presented to [facility], [place], operated by [facility] emergency room with abdominal pain for last 2 days followed by nausea and started vomiting in the ER.  Patient also have palpitations but no chest pain shortness of breath.  Patient last BM was 2 days ago and was passing gas until today.  Patient work-up in ER revealed small bowel obstruction and found to be in atrial fibrillation with rapid ventricular rate admit for management.  Patient has no fever chills night sweats weight loss or weight gain.     REVIEW OF SYSTEMS  Unremarkable     PHYSICAL EXAM   Blood pressure 143/93, pulse 100, temperature 97.7 °F (36.5 °C), temperature source Oral, resp. rate 18, height 157.5 cm (62\"), weight 61.2 kg (135 lb), SpO2 92 %.    General: Awake, alert, appears uncomfortable but nontoxic  HEENT: Mucous membranes moist, atraumatic, EOMI  Neck: Full ROM  Pulm: Symmetric chest rise, nonlabored, lungs CTAB  Cardiovascular: Tachy S1-S2 irregular  GI: Soft, epigastric tenderness to palpation, hypoactive bowel sounds  MSK: Full ROM, no deformity  Skin: Warm, dry  Neuro: Alert and oriented x 3, GCS 15, moving all extremities, no focal deficits  Psych: Calm, cooperative     LAB RESULTS  Lab Results (last 24 hours)     ** No results found for the last 24 hours. **        Imaging Results (Last 24 Hours)     Procedure Component Value Units Date/Time    CT Enterography Abdomen Pelvis w Contrast [[id number]] Collected: 08/09/21 1324     Updated: 08/09/21 1340    Narrative:      CT ABDOMEN AND PELVIS WITH IV CONTRAST     HISTORY: Crohn's disease, recent small bowel obstruction     TECHNIQUE: Radiation dose " reduction techniques were utilized, including  automated exposure control and exposure modulation based on body size.   3 mm images were obtained through the abdomen and pelvis after the  administration of IV contrast.      COMPARISON: CT abdomen and pelvis 8/3/2021 and 12/14/2020     FINDINGS:     There are trace bilateral pleural effusions with overlying atelectasis.  There are no findings of small bowel obstruction. Short segment of  subtle hyperenhancement is present within the terminal ileum without  relative upstream dilation of small bowel loops. Subtle vascular  engorgement is present within the adjacent mesentery. The appendix and  spleen are surgically absent.     Mild intra and extrahepatic biliary ductal dilation is present status  post cholecystectomy.     The pancreas, adrenal glands and kidneys have an unremarkable  postcontrast CT appearance. There is no hydronephrosis.     No abdominopelvic adenopathy is present by size criteria. Ill-defined  fat stranding centered within the epigastrium is present, as seen over  multiple prior CTs. Multiple air-fluid levels are present within the  large bowel. There are segments of apparent mucosal hyperenhancement  within the rectum and large bowel without significant pericolonic or  perirectal fat stranding. A few segments of the rectum and sigmoid colon  have a mildly thickened appearance; however these areas are also  decompressed.. Small amount of free intraperitoneal fluid is present  pelvis. There is no free intraperitoneal air.     No suspicious lytic or blastic osseous lesions are present.     The bladder has an unremarkable postcontrast CT appearance for its  degree of distention. Presumed uterine fibroid is present and may have  submucosal extension, as before.       Impression:      1.  Short segment of mucosal hyperenhancement with adjacent subtle  vascular engorgement involving the terminal ileum which given patient  history is suggestive of active  inflammatory bowel disease. No upstream  small bowel dilation is present to suggest persistent stricture  formation.  2.  Findings suggestive of proctocolitis which are nonspecific and may  be related to underlying Crohn's disease versus an infectious cause and  correlation with patient history is recommended. Stool sample may be  helpful.  3.  Trace bilateral pleural effusions.  4.  Uterine fibroid, as before, which may have submucosal extension but  is not well evaluated with CT. Findings can be further evaluated with  pelvic ultrasound clinically indicated.  5.  Other findings as above.     This report was finalized on 8/9/2021 1:37 PM by Dr. Robert Rosario M.D.           ECG 12 Lead  Component   Ref Range & Units 8/3/21 1308 8/3/21 0521   QT Interval   ms 261 P  380 P         HEART RATE= 135  bpm  RR Interval= 417  ms  WV Interval=   ms  P Horizontal Axis=   deg  P Front Axis=   deg  QRSD Interval= 73  ms  QT Interval= 261  ms  QRS Axis= -2  deg  T Wave Axis= 164  deg  - ABNORMAL ECG -  Atrial fibrillation with rapid V-rate  Low voltage, precordial leads  Repolarization abnormality, prob rate related             Current Facility-Administered Medications:   •  apixaban (ELIQUIS) tablet 5 mg, 5 mg, Oral, Q12H, Calista Mcclure, APRN, 5 mg at 08/09/21 0850  •  diazePAM (VALIUM) tablet 2.5 mg, 2.5 mg, Oral, QABrendan WINTERS Aftab, MD, 2.5 mg at 08/09/21 0651  •  dilTIAZem (CARDIZEM) tablet 90 mg, 90 mg, Oral, TID, Sheri Ware APRN  •  HYDROmorphone (DILAUDID) injection 0.5 mg, 0.5 mg, Intravenous, Q4H PRN, Drew Cardona MD  •  metoprolol tartrate (LOPRESSOR) injection 5 mg, 5 mg, Intravenous, Q6H PRN, Drew Cardona MD, 5 mg at 08/03/21 1940  •  metoprolol tartrate (LOPRESSOR) tablet 100 mg, 100 mg, Oral, TID, Juan Damon MD, 100 mg at 08/09/21 0850  •  nitroglycerin (NITROSTAT) SL tablet 0.4 mg, 0.4 mg, Sublingual, Q5 Min PRN, Drew Cardona MD  •  ondansetron (ZOFRAN) injection 4 mg, 4 mg, Intravenous, Q6H PRN,  Cesilia Yuan MD  •  pantoprazole (PROTONIX) EC tablet 40 mg, 40 mg, Oral, BID AC, Marcel Ricketts MD, 40 mg at 08/09/21 0651  •  predniSONE (DELTASONE) tablet 40 mg, 40 mg, Oral, Daily With Breakfast, Sheri Cerna MD, 40 mg at 08/09/21 0847  •  sodium chloride 0.9 % flush 10 mL, 10 mL, Intravenous, PRN, Emergency, Triage Protocol, MD     ASSESSMENT  Partial small bowel obstruction resolved  New onset atrial fibrillation with rapid ventricular rate  Elevated troponin no chest pain  Crohn's disease  Hypertension  Vitamin B12 deficiency  leukocytosis secondary to steroids  Gastroesophageal reflux disease    PLAN  CPM  Monitor  Cardizem and Lopressor  Protonix  Steroids   Diet as tolerated  General surgery consult appreciated  Cardiology and gastroenterology to follow patient  Adjust home medications  Stress ulcer DVT prophylaxis  PT/OT  Supportive care  Discussed with nursing staff and family  Discharge planning    CESILIA YUAN MD

## 2021-08-09 NOTE — CASE MANAGEMENT/SOCIAL WORK
Continued Stay Note  [facility]     Patient Name: Michela Aguilar  MRN: [id number]  Today's Date: 8/9/2021    Admit Date: 8/3/2021    Discharge Plan     Row Name 08/09/21 1733       Plan    Plan  Return home via family    Patient/Family in Agreement with Plan  yes    Plan Comments  CCP met with patient at bedside to follow up about eliquis being prescribed at discharge. Discussed first 30 days for free and that subsequent months will be $96 a month. Patient is agreeable to cost and to enrolling in meds to beds. Patient confirms plans to return home via family and her sister can assist as needed. Ermelinda Gutierrez RN/CCP        Discharge Codes    No documentation.       Expected Discharge Date and Time     Expected Discharge Date Expected Discharge Time    Aug 11, 2021             Odilia Gutierrez

## 2021-08-10 VITALS
RESPIRATION RATE: 18 BRPM | BODY MASS INDEX: 24.84 KG/M2 | SYSTOLIC BLOOD PRESSURE: 129 MMHG | OXYGEN SATURATION: 95 % | DIASTOLIC BLOOD PRESSURE: 69 MMHG | HEIGHT: 62 IN | WEIGHT: 135 LBS | TEMPERATURE: 97.5 F | HEART RATE: 61 BPM

## 2021-08-10 LAB
ANION GAP SERPL CALCULATED.3IONS-SCNC: 10.4 MMOL/L (ref 5–15)
BASOPHILS # BLD AUTO: 0.01 10*3/MM3 (ref 0–0.2)
BASOPHILS NFR BLD AUTO: 0.1 % (ref 0–1.5)
BUN SERPL-MCNC: 18 MG/DL (ref 8–23)
BUN/CREAT SERPL: 26.1 (ref 7–25)
CALCIUM SPEC-SCNC: 8.5 MG/DL (ref 8.6–10.5)
CHLORIDE SERPL-SCNC: 100 MMOL/L (ref 98–107)
CO2 SERPL-SCNC: 25.6 MMOL/L (ref 22–29)
CREAT SERPL-MCNC: 0.69 MG/DL (ref 0.57–1)
DEPRECATED RDW RBC AUTO: 45.3 FL (ref 37–54)
EOSINOPHIL # BLD AUTO: 0.01 10*3/MM3 (ref 0–0.4)
EOSINOPHIL NFR BLD AUTO: 0.1 % (ref 0.3–6.2)
ERYTHROCYTE [DISTWIDTH] IN BLOOD BY AUTOMATED COUNT: 14.1 % (ref 12.3–15.4)
GFR SERPL CREATININE-BSD FRML MDRD: 82 ML/MIN/1.73
GLUCOSE SERPL-MCNC: 88 MG/DL (ref 65–99)
HCT VFR BLD AUTO: 37.4 % (ref 34–46.6)
HGB BLD-MCNC: 12.3 G/DL (ref 12–15.9)
IMM GRANULOCYTES # BLD AUTO: 0.09 10*3/MM3 (ref 0–0.05)
IMM GRANULOCYTES NFR BLD AUTO: 0.8 % (ref 0–0.5)
LYMPHOCYTES # BLD AUTO: 2.19 10*3/MM3 (ref 0.7–3.1)
LYMPHOCYTES NFR BLD AUTO: 19.5 % (ref 19.6–45.3)
MCH RBC QN AUTO: 29.1 PG (ref 26.6–33)
MCHC RBC AUTO-ENTMCNC: 32.9 G/DL (ref 31.5–35.7)
MCV RBC AUTO: 88.4 FL (ref 79–97)
MONOCYTES # BLD AUTO: 1.09 10*3/MM3 (ref 0.1–0.9)
MONOCYTES NFR BLD AUTO: 9.7 % (ref 5–12)
NEUTROPHILS NFR BLD AUTO: 69.8 % (ref 42.7–76)
NEUTROPHILS NFR BLD AUTO: 7.86 10*3/MM3 (ref 1.7–7)
NRBC BLD AUTO-RTO: 0.1 /100 WBC (ref 0–0.2)
PLATELET # BLD AUTO: 364 10*3/MM3 (ref 140–450)
PMV BLD AUTO: 10.4 FL (ref 6–12)
POTASSIUM SERPL-SCNC: 2.8 MMOL/L (ref 3.5–5.2)
RBC # BLD AUTO: 4.23 10*6/MM3 (ref 3.77–5.28)
SODIUM SERPL-SCNC: 136 MMOL/L (ref 136–145)
WBC # BLD AUTO: 11.25 10*3/MM3 (ref 3.4–10.8)

## 2021-08-10 PROCEDURE — 85025 COMPLETE CBC W/AUTO DIFF WBC: CPT | Performed by: HOSPITALIST

## 2021-08-10 PROCEDURE — 80048 BASIC METABOLIC PNL TOTAL CA: CPT | Performed by: HOSPITALIST

## 2021-08-10 PROCEDURE — 63710000001 PREDNISONE PER 1 MG: Performed by: INTERNAL MEDICINE

## 2021-08-10 PROCEDURE — 99232 SBSQ HOSP IP/OBS MODERATE 35: CPT | Performed by: INTERNAL MEDICINE

## 2021-08-10 RX ORDER — ATENOLOL 50 MG/1
50 TABLET ORAL EVERY 8 HOURS
Qty: 90 TABLET | Refills: 0 | Status: SHIPPED | OUTPATIENT
Start: 2021-08-10 | End: 2021-08-19 | Stop reason: SDUPTHER

## 2021-08-10 RX ORDER — PANTOPRAZOLE SODIUM 40 MG/1
40 TABLET, DELAYED RELEASE ORAL
Qty: 60 TABLET | Refills: 0 | Status: SHIPPED | OUTPATIENT
Start: 2021-08-10 | End: 2021-08-30 | Stop reason: SDUPTHER

## 2021-08-10 RX ORDER — DILTIAZEM HCL 90 MG
90 TABLET ORAL 3 TIMES DAILY
Qty: 90 TABLET | Refills: 0 | Status: SHIPPED | OUTPATIENT
Start: 2021-08-10 | End: 2021-08-19

## 2021-08-10 RX ORDER — POTASSIUM CHLORIDE 1.5 G/1.77G
40 POWDER, FOR SOLUTION ORAL AS NEEDED
Status: DISCONTINUED | OUTPATIENT
Start: 2021-08-10 | End: 2021-08-10 | Stop reason: HOSPADM

## 2021-08-10 RX ORDER — POTASSIUM CHLORIDE 750 MG/1
40 TABLET, FILM COATED, EXTENDED RELEASE ORAL AS NEEDED
Status: DISCONTINUED | OUTPATIENT
Start: 2021-08-10 | End: 2021-08-10 | Stop reason: HOSPADM

## 2021-08-10 RX ADMIN — ATENOLOL 50 MG: 50 TABLET ORAL at 08:51

## 2021-08-10 RX ADMIN — PREDNISONE 40 MG: 20 TABLET ORAL at 08:39

## 2021-08-10 RX ADMIN — DIAZEPAM 2.5 MG: 5 TABLET ORAL at 06:51

## 2021-08-10 RX ADMIN — DILTIAZEM HYDROCHLORIDE 90 MG: 30 TABLET, FILM COATED ORAL at 08:39

## 2021-08-10 RX ADMIN — POTASSIUM CHLORIDE 40 MEQ: 750 TABLET, EXTENDED RELEASE ORAL at 13:49

## 2021-08-10 RX ADMIN — APIXABAN 5 MG: 5 TABLET, FILM COATED ORAL at 08:39

## 2021-08-10 RX ADMIN — PANTOPRAZOLE SODIUM 40 MG: 40 TABLET, DELAYED RELEASE ORAL at 06:51

## 2021-08-10 RX ADMIN — POTASSIUM CHLORIDE 40 MEQ: 750 TABLET, EXTENDED RELEASE ORAL at 08:39

## 2021-08-10 NOTE — PLAN OF CARE
Goal Outcome Evaluation:              Outcome Summary: Pt HR converted to NSR after Digoxin given previous shift. PT was also given cardizem and atenolo previous shift. HR continued to stay in the low 50's and went down into the 40's at times during the shift. Called cardiology and got perameters from NP. Pt is very anxious at times. HR cont to be bradycardic this shift.Cont to monitor, safety maintained.

## 2021-08-10 NOTE — PROGRESS NOTES
Hospital Follow Up    LOS:  LOS: 7 days   Patient Name: Michela Aguilar  Age/Sex: 80 y.o. female  : 1940  MRN: [id number]    Day of Service: 08/10/21   Length of Stay: 7  Encounter Provider: Marcos Carter MD  Place of Service: [facility] CARDIOLOGY  Patient Care Team:  Roni Mckenzie MD as PCP - General  Roni Mckenzie MD as PCP - Family Medicine    Subjective:     Chief Complaint: Atrial fibrillation.    Interval History: Patient doing well with no complaints.    Objective:     Objective:  Temp:  [97.5 °F (36.4 °C)-98 °F (36.7 °C)] 97.5 °F (36.4 °C)  Heart Rate:  [] 61  Resp:  [18] 18  BP: (120-165)/(69-75) 129/69     Intake/Output Summary (Last 24 hours) at 8/10/2021 1346  Last data filed at 8/10/2021 1115  Gross per 24 hour   Intake 570 ml   Output 1000 ml   Net -430 ml     Body mass index is 24.69 kg/m².      21  0830 21  1111   Weight: 61.2 kg (135 lb) 61.2 kg (135 lb)     Weight change:       Physical Exam:   General : Alert, cooperative, in no acute distress.  Neuro: Alert,cooperative and oriented.  Lungs: CTAB. Normal respiratory effort and rate.  CV: regular rate and rhythm, normal S1 and S2, no murmurs, gallops or rubs.  ABD: Soft, nontender, nondistended. Positive bowel sounds.  Extr: No edema or cyanosis, moves all extremities.    Lab Review:   Results from last 7 days   Lab Units 08/10/21  0421 21  0353 21  0345 21  0345 21  0501 21  0501   SODIUM mmol/L 136 140   < > 144   < > 143   POTASSIUM mmol/L 2.8* 3.5   < > 3.8   < > 4.2   CHLORIDE mmol/L 100 104   < > 108*   < > 112*   CO2 mmol/L 25.6 23.8   < > 22.8   < > 18.1*   BUN mg/dL 18 16   < > 19   < > 20   CREATININE mg/dL 0.69 0.69   < > 0.73   < > 0.81   GLUCOSE mg/dL 88 104*   < > 102*   < > 109*   CALCIUM mg/dL 8.5* 8.4*   < > 8.5*   < > 8.4*   AST (SGOT) U/L  --   --   --  41*  --  78*   ALT (SGPT) U/L  --   --   --  127*  --  175*    < > = values  in this interval not displayed.     Results from last 7 days   Lab Units 08/04/21  1206 08/04/21  0530   TROPONIN T ng/mL 0.103* 0.097*     Results from last 7 days   Lab Units 08/10/21  0421 08/07/21  0353   WBC 10*3/mm3 11.25* 10.75   HEMOGLOBIN g/dL 12.3 11.9*   HEMATOCRIT % 37.4 36.6   PLATELETS 10*3/mm3 364 281         Results from last 7 days   Lab Units 08/05/21  0501 08/04/21  0530   MAGNESIUM mg/dL 2.3 2.1     Results from last 7 days   Lab Units 08/04/21  0530   CHOLESTEROL mg/dL 148   TRIGLYCERIDES mg/dL 85   HDL CHOL mg/dL 61*     Results from last 7 days   Lab Units 08/04/21  0530   PROBNP pg/mL 4,564.0*     Results from last 7 days   Lab Units 08/04/21  0530   TSH uIU/mL 0.744       Current Medications:   Scheduled Meds:apixaban, 5 mg, Oral, Q12H  atenolol, 50 mg, Oral, Q8H  diazePAM, 2.5 mg, Oral, QAM  digoxin, 250 mcg, Intravenous, Once  digoxin, 250 mcg, Intravenous, Once  dilTIAZem, 90 mg, Oral, TID  pantoprazole, 40 mg, Oral, BID AC  predniSONE, 40 mg, Oral, Daily With Breakfast      Continuous Infusions:     Allergies:  Allergies   Allergen Reactions   • Amitriptyline Confusion   • Mysoline [Primidone] Confusion       Assessment:       Small bowel obstruction (CMS/HCC)    Atrial fibrillation, persistent (CMS/HCC)    Essential hypertension        Plan:   1. Atrial fibrillation. Patient is on anticoagulation as well as rate control medications. Patient's heart rate did go up last night she received 1 dose of digoxin which converted her back to normal sinus rhythm. This point I still think she is okay to go home from my standpoint.  2. Partial small bowel obstruction resolved.  3. Patient still present tomorrow will reassess tomorrow morning. This point I would not send patient home on digoxin her heart rate was low after she received it.        Marcos Carter MD  08/10/21  13:46 EDT

## 2021-08-10 NOTE — PROGRESS NOTES
"Reevaluation    Chief complaint  Doing better   New complaints  Wants to go home  Denies any chest pain shortness of breath palpitation or dizziness  Family at bedside    History of present illness  80-year-old white female with history of Crohn's disease hypertension hyperlipidemia chronic anemia and gastroesophageal reflux disease who also has had atrial fibrillation several years ago but no follow-up as she has been in sinus rhythm presented to [facility] emergency room with abdominal pain for last 2 days followed by nausea and started vomiting in the ER.  Patient also have palpitations but no chest pain shortness of breath.  Patient last BM was 2 days ago and was passing gas until today.  Patient work-up in ER revealed small bowel obstruction and found to be in atrial fibrillation with rapid ventricular rate admit for management.  Patient has no fever chills night sweats weight loss or weight gain.     REVIEW OF SYSTEMS  Unremarkable     PHYSICAL EXAM   Blood pressure 129/69, pulse 61, temperature 97.5 °F (36.4 °C), temperature source Oral, resp. rate 18, height 157.5 cm (62\"), weight 61.2 kg (135 lb), SpO2 95 %.    General: Awake, alert, appears uncomfortable but nontoxic  HEENT: Mucous membranes moist, atraumatic, EOMI  Neck: Full ROM  Pulm: Symmetric chest rise, nonlabored, lungs CTAB  Cardiovascular: Tachy S1-S2 irregular  GI: Soft, epigastric tenderness to palpation, hypoactive bowel sounds  MSK: Full ROM, no deformity  Skin: Warm, dry  Neuro: Alert and oriented x 3, GCS 15, moving all extremities, no focal deficits  Psych: Calm, cooperative     LAB RESULTS  Lab Results (last 24 hours)     Procedure Component Value Units Date/Time    Basic Metabolic Panel [[id number]]  (Abnormal) Collected: 08/10/21 0421    Specimen: Blood Updated: 08/10/21 0526     Glucose 88 mg/dL      BUN 18 mg/dL      Creatinine 0.69 mg/dL      Sodium 136 mmol/L      Potassium 2.8 mmol/L      Chloride 100 mmol/L      CO2 25.6 " mmol/L      Calcium 8.5 mg/dL      eGFR Non African Amer 82 mL/min/1.73      BUN/Creatinine Ratio 26.1     Anion Gap 10.4 mmol/L     Narrative:      GFR Normal >60  Chronic Kidney Disease <60  Kidney Failure <15      CBC & Differential [[id number]]  (Abnormal) Collected: 08/10/21 0421    Specimen: Blood Updated: 08/10/21 0506    Narrative:      The following orders were created for panel order CBC & Differential.  Procedure                               Abnormality         Status                     ---------                               -----------         ------                     CBC Auto Differential[[id number]]        Abnormal            Final result                 Please view results for these tests on the individual orders.    CBC Auto Differential [[id number]]  (Abnormal) Collected: 08/10/21 0421    Specimen: Blood Updated: 08/10/21 0506     WBC 11.25 10*3/mm3      RBC 4.23 10*6/mm3      Hemoglobin 12.3 g/dL      Hematocrit 37.4 %      MCV 88.4 fL      MCH 29.1 pg      MCHC 32.9 g/dL      RDW 14.1 %      RDW-SD 45.3 fl      MPV 10.4 fL      Platelets 364 10*3/mm3      Neutrophil % 69.8 %      Lymphocyte % 19.5 %      Monocyte % 9.7 %      Eosinophil % 0.1 %      Basophil % 0.1 %      Immature Grans % 0.8 %      Neutrophils, Absolute 7.86 10*3/mm3      Lymphocytes, Absolute 2.19 10*3/mm3      Monocytes, Absolute 1.09 10*3/mm3      Eosinophils, Absolute 0.01 10*3/mm3      Basophils, Absolute 0.01 10*3/mm3      Immature Grans, Absolute 0.09 10*3/mm3      nRBC 0.1 /100 WBC         Imaging Results (Last 24 Hours)     ** No results found for the last 24 hours. **        ECG 12 Lead  Component   Ref Range & Units 8/3/21 1308 8/3/21 0521   QT Interval   ms 261 P  380 P         HEART RATE= 135  bpm  RR Interval= 417  ms  LA Interval=   ms  P Horizontal Axis=   deg  P Front Axis=   deg  QRSD Interval= 73  ms  QT Interval= 261  ms  QRS Axis= -2  deg  T Wave Axis= 164  deg  - ABNORMAL ECG -  Atrial fibrillation with  rapid V-rate  Low voltage, precordial leads  Repolarization abnormality, prob rate related             Current Facility-Administered Medications:   •  apixaban (ELIQUIS) tablet 5 mg, 5 mg, Oral, Q12H, Calista Mcclure APRN, 5 mg at 08/10/21 0839  •  atenolol (TENORMIN) tablet 50 mg, 50 mg, Oral, Q8H, Logan Paige MD, 50 mg at 08/10/21 0851  •  diazePAM (VALIUM) tablet 2.5 mg, 2.5 mg, Oral, QAM, Cesilia Cardona MD, 2.5 mg at 08/10/21 0651  •  digoxin (LANOXIN) injection 250 mcg, 250 mcg, Intravenous, Once, Logan Paige MD  •  digoxin (LANOXIN) injection 250 mcg, 250 mcg, Intravenous, Once, Logan Paige MD  •  dilTIAZem (CARDIZEM) tablet 90 mg, 90 mg, Oral, TID, Sheri Ware APRN, 90 mg at 08/10/21 0839  •  HYDROcodone-acetaminophen (NORCO) 5-325 MG per tablet 1 tablet, 1 tablet, Oral, Q6H PRN, Cesilia Cardona MD  •  nitroglycerin (NITROSTAT) SL tablet 0.4 mg, 0.4 mg, Sublingual, Q5 Min PRN, Cesilia Cardona MD  •  ondansetron (ZOFRAN) injection 4 mg, 4 mg, Intravenous, Q6H PRN, Cesilia Cardona MD  •  pantoprazole (PROTONIX) EC tablet 40 mg, 40 mg, Oral, BID AC, Marcel Ricketts MD, 40 mg at 08/10/21 0651  •  potassium chloride (K-DUR,KLOR-CON) ER tablet 40 mEq, 40 mEq, Oral, PRN, Cesilia Cardona MD, 40 mEq at 08/10/21 0839  •  potassium chloride (KLOR-CON) packet 40 mEq, 40 mEq, Oral, PRN, Cesilia Cardona MD  •  predniSONE (DELTASONE) tablet 40 mg, 40 mg, Oral, Daily With Breakfast, Sheri Cerna MD, 40 mg at 08/10/21 0839  •  sodium chloride 0.9 % flush 10 mL, 10 mL, Intravenous, PRN, Emergency, Triage Protocol, MD     ASSESSMENT  Partial small bowel obstruction resolved  New onset atrial fibrillation with rapid ventricular rate  Elevated troponin no chest pain  Hypokalemia  Crohn's disease  Hypertension  Vitamin B12 deficiency  leukocytosis secondary to steroids  Gastroesophageal reflux disease    PLAN  Replace potassium  Discharge home  Discharge summary dictated    CESILIA CARDONA MD

## 2021-08-10 NOTE — DISCHARGE SUMMARY
Discharge summary     Date of admission8/10/2021   Date of discharge 8/10/2021                  Final diagnosis  Partial small bowel obstruction resolved  Paroxysmal atrial fibrillation with rapid ventricular rate  Elevated troponin no chest pain  Hypokalemia replaced  Crohn's disease  Hypertension  Gastroesophageal reflux disease    Discharge medications    Current Facility-Administered Medications:   •  apixaban (ELIQUIS) tablet 5 mg, 5 mg, Oral, Q12H, Calista Mcclure APRN, 5 mg at 08/10/21 0839  •  atenolol (TENORMIN) tablet 50 mg, 50 mg, Oral, Q8H, Logan Paige MD, 50 mg at 08/10/21 0851  •  diazePAM (VALIUM) tablet 2.5 mg, 2.5 mg, Oral, QABrendan WINTERS Aftab, MD, 2.5 mg at 08/10/21 0651  •  digoxin (LANOXIN) injection 250 mcg, 250 mcg, Intravenous, Once, Logan Paige MD  •  digoxin (LANOXIN) injection 250 mcg, 250 mcg, Intravenous, Once, Logan Paige MD  •  dilTIAZem (CARDIZEM) tablet 90 mg, 90 mg, Oral, TID, Sheri Ware APRN, 90 mg at 08/10/21 0839  •  pantoprazole (PROTONIX) EC tablet 40 mg, 40 mg, Oral, BID AC, Marcel Ricketts MD, 40 mg at 08/10/21 0651  •  potassium chloride (K-DUR,KLOR-CON) ER tablet 40 mEq, 40 mEq, Oral, PRN, Drew Cardona MD, 40 mEq at 08/10/21 1349  •  potassium chloride (KLOR-CON) packet 40 mEq, 40 mEq, Oral, PRN, Drew Cardona MD  •  predniSONE (DELTASONE) tablet 40 mg, 40 mg, Oral, Daily With Breakfast, Sheri Cerna MD, 40 mg at 08/10/21 0839  •  sodium chloride 0.9 % flush 10 mL, 10 mL, Intravenous, PRN, Emergency, Triage Protocol, MD     Consults obtained  Cardiology  Gastroenterology  General surgery    Procedures  None    Hospital course  80-year-old white female with history of Crohn's disease hypertension hyperlipidemia chronic anemia and gastroesophageal disease who has had atrial fibrillation in the past several years ago but remained in sinus rhythm for several years admitted to emergency room with abdominal pain.  Patient work-up revealed  partial small bowel obstruction and also found to be atrial fibrillation with rapid ventricular rate admit for management.  Patient admitted treated conservatively with IV fluid bowel rest and symptomatic treatment for abdominal pain nausea vomiting.  Patient bowel obstruction resolved and she started on liquid diet and advance as tolerated.  Patient followed by general surgery and gastroenterology for Crohn's disease.  Patient received IV steroid which is changed to by mouth and will be taper at home.  Patient heart rate control with Lopressor Cardizem and received Lanoxin and she converted to sinus rhythm.  Patient cleared from cardiology general surgery and gastroenterology will discharge home.  Patient has low potassium which is replaced.  Patient tolerating regular diet.  Patient started on anticoagulation during this hospitalization.    Discharge diet regular    Activity as tolerated    Medication as above    Follow-up with primary doctor in 1 week and follow with cardiology gastroenterology and general surgery per the instruction and take medication as directed.    CESILIA YUAN MD

## 2021-08-10 NOTE — SIGNIFICANT NOTE
08/10/21 1018   OTHER   Discipline occupational therapist   Rehab Time/Intention   Session Not Performed other (see comments)   Pt found ambulating independently around room. Screening completed for therapy needs, pt reports being indep and at baseline fxn, no OT needs at this time. OT will sign off. Please re-consult if change in condition.

## 2021-08-11 ENCOUNTER — TELEPHONE (OUTPATIENT)
Dept: GASTROENTEROLOGY | Facility: CLINIC | Age: 81
End: 2021-08-11

## 2021-08-11 ENCOUNTER — READMISSION MANAGEMENT (OUTPATIENT)
Dept: CALL CENTER | Facility: HOSPITAL | Age: 81
End: 2021-08-11

## 2021-08-11 NOTE — TELEPHONE ENCOUNTER
Called pt and pt reports that she was just dc'd home from [facility] yesterday.   Pt was in hospital for her crohns and Afib.    Pt reports that at [facility] they discontinued her mesalamine, colestipol and imodium.      Pt reports she would like to be off of the colestipol since it is hard to take with her other medications, but she is concerned about the other medications. Advised will send message to Dr Ricketts.  Verb understanding.

## 2021-08-11 NOTE — TELEPHONE ENCOUNTER
----- Message from Lo Mejias sent at 8/11/2021 10:12 AM EDT -----  Regarding: Questions from hospital discharge  Contact: [phone]  Pt had questions regarding medications to stop from hospital discharge.  Please contact pt regarding.  Thank You.

## 2021-08-11 NOTE — CASE MANAGEMENT/SOCIAL WORK
Case Management Discharge Note      Final Note: home via family         Selected Continued Care - Discharged on 8/10/2021 Admission date: 8/3/2021 - Discharge disposition: Home or Self Care    Destination    No services have been selected for the patient.              Durable Medical Equipment    No services have been selected for the patient.              Dialysis/Infusion    No services have been selected for the patient.              Home Medical Care    No services have been selected for the patient.              Therapy    No services have been selected for the patient.              Community Resources    No services have been selected for the patient.              Community & DME    No services have been selected for the patient.                  Transportation Services  Private: Car    Final Discharge Disposition Code: 01 - home or self-care

## 2021-08-12 NOTE — TELEPHONE ENCOUNTER
I reviewed her discharge summary from her recent hospitalization at [facility].  Please work her into see me in the office in 2 weeks.  I would tell her to stay off the mesalamine and the colestipol until she sees me in the office.  She can take Imodium as needed this is an over-the-counter antidiarrheal and she can take that as needed.  I would stay on the prednisone 40 mg p.o. daily until she sees me in the office.  We can talk about what medicines to put her back on, such as mesalamine versus something else? Laura. kjh

## 2021-08-12 NOTE — TELEPHONE ENCOUNTER
Call to pt.  Advise per DR Ricketts note.  Verb understanding.     Attempt overbook without success - message to Natacha LOPEZ

## 2021-08-19 ENCOUNTER — READMISSION MANAGEMENT (OUTPATIENT)
Dept: CALL CENTER | Facility: HOSPITAL | Age: 81
End: 2021-08-19

## 2021-08-19 ENCOUNTER — OFFICE VISIT (OUTPATIENT)
Dept: CARDIOLOGY | Facility: CLINIC | Age: 81
End: 2021-08-19

## 2021-08-19 VITALS
BODY MASS INDEX: 23.89 KG/M2 | SYSTOLIC BLOOD PRESSURE: 148 MMHG | WEIGHT: 129.8 LBS | DIASTOLIC BLOOD PRESSURE: 68 MMHG | HEART RATE: 47 BPM | HEIGHT: 62 IN

## 2021-08-19 DIAGNOSIS — I48.91 NEW ONSET ATRIAL FIBRILLATION (HCC): ICD-10-CM

## 2021-08-19 DIAGNOSIS — R00.1 SINUS BRADYCARDIA: ICD-10-CM

## 2021-08-19 DIAGNOSIS — R77.8 ELEVATED TROPONIN: ICD-10-CM

## 2021-08-19 DIAGNOSIS — K50.00 CROHN'S DISEASE OF SMALL INTESTINE WITHOUT COMPLICATION (HCC): ICD-10-CM

## 2021-08-19 DIAGNOSIS — K56.609 SMALL BOWEL OBSTRUCTION (HCC): ICD-10-CM

## 2021-08-19 DIAGNOSIS — Z09 HOSPITAL DISCHARGE FOLLOW-UP: Primary | ICD-10-CM

## 2021-08-19 DIAGNOSIS — I10 ESSENTIAL HYPERTENSION: ICD-10-CM

## 2021-08-19 PROCEDURE — 99214 OFFICE O/P EST MOD 30 MIN: CPT | Performed by: NURSE PRACTITIONER

## 2021-08-19 PROCEDURE — 93000 ELECTROCARDIOGRAM COMPLETE: CPT | Performed by: NURSE PRACTITIONER

## 2021-08-19 RX ORDER — ATENOLOL 50 MG/1
50 TABLET ORAL 2 TIMES DAILY
COMMUNITY
End: 2021-08-19 | Stop reason: SDUPTHER

## 2021-08-19 RX ORDER — PREDNISONE 10 MG/1
40 TABLET ORAL DAILY
COMMUNITY
End: 2021-08-30

## 2021-08-19 RX ORDER — ATENOLOL 50 MG/1
50 TABLET ORAL 2 TIMES DAILY
Qty: 180 TABLET | Refills: 3 | Status: SHIPPED | OUTPATIENT
Start: 2021-08-19 | End: 2021-12-06

## 2021-08-19 NOTE — OUTREACH NOTE
"Medical Week 2 Survey      Responses   [facility] patient discharged from?  [place]   Does the patient have one of the following disease processes/diagnoses(primary or secondary)?  Other   Week 2 attempt successful?  Yes   Call start time  1702   Discharge diagnosis  Partial small bowel obstruction resolved   Call end time  1709   Is patient permission given to speak with other caregiver?  Yes   List who call center can speak with  Karen \"Jackelin\" sister   Meds reviewed with patient/caregiver?  Yes   Is the patient having any side effects they believe may be caused by any medication additions or changes?  No   Does the patient have all medications ordered at discharge?  Yes   Is the patient taking all medications as directed (includes completed medication regime)?  Yes   Medication comments  HR was slow in the 40's medications adjusted   Does the patient have a primary care provider?   Yes   Does the patient have an appointment with their PCP within 7 days of discharge?  Yes   Has the patient kept scheduled appointments due by today?  Yes   Comments  Cardiology today 08/19/2021   PCP this week    Has home health visited the patient within 72 hours of discharge?  N/A   Psychosocial issues?  No   Did the patient receive a copy of their discharge instructions?  Yes   Nursing interventions  Reviewed instructions with patient   What is the patient's perception of their health status since discharge?  Improving   Is the patient/caregiver able to teach back signs and symptoms related to disease process for when to call PCP?  Yes   Is the patient/caregiver able to teach back signs and symptoms related to disease process for when to call 911?  Yes   Week 2 Call Completed?  Yes          Felicia Mcgregor RN  "

## 2021-08-19 NOTE — PROGRESS NOTES
Date of Office Visit: 2021  Encounter Provider: YANY Garnett  Place of Service: [facility] CARDIOLOGY  Patient Name: Michela Aguilar  :1940  Primary Cardiologist: Dr. Juan Damon     Chief Complaint   Patient presents with   • Atrial Fibrillation   • Hospital Follow Up Visit   :     HPI: Michela Aguilar is a pleasant 80 y.o. female who presents today for follow-up on atrial fibrillation and recent hospitalization.  I have reviewed her medical records.    She has been diagnosed with hypertension, hyperlipidemia, and Crohn's disease.    In 2021, she presented to [facility] ED with abdominal pain, nausea, and vomiting, she was noted to be in new onset atrial fibrillation with RVR and diagnosed with a small bowel obstruction.  Her atrial fibrillation was initially treated with a Cardizem drip and beta-blocker.  TSH was within normal limits.  Echocardiogram showed normal LVEF.  She had a mildly elevated troponin which was likely demand from acute illness.  Consider outpatient stress testing versus medical treatment outpatient.  She denied any chest pain. One telemetry strip showed a one second pause followed by 4 beats of nonsustained ventricular tachycardia. She was started on apixaban, oral diltiazem, and her metoprolol was increased.  She was discharged and recommended to follow-up in our office.    Today is her follow-up visit.  She explains that she had never been diagnosed with atrial fibrillation in the past.  She has seen Dr. Lim of [facility] in the past and was told that she had atrial tachycardia per Holter monitor.  If she ever had episodes of palpitations, she would cough and then her symptoms would resolve.  When she was in the emergency department she was experiencing chest pain.  She denied chest pain before the ED visit or afterwards.  She feels very fatigued and her legs are sore.  She says generally she is very energetic,  but she feels that the low heart rate is stopping her from doing her normal activities.  She saw her PCP this week and he decreased her atenolol to 50 mg twice per day and she kept the same dosage of diltiazem.  She denies shortness of breath, palpitations, edema, dizziness, syncope, bleeding, or GI symptoms.    ADDENDUM 8/25/2021:  · I received blood work from her PCP office collected 8/17/2021.  Hemoglobin and hematocrit normal.  WBC elevated at 16.  BMP normal except for glucose of 125.  Magnesium normal.    Past Medical History:   Diagnosis Date   • Anemia    • Arthritis    • Crohn disease (CMS/HCC)    • GERD (gastroesophageal reflux disease)    • History of transfusion    • Hyperlipidemia    • Hypertension    • Ocular migraine    • PAF (paroxysmal atrial fibrillation) (CMS/HCC)     with rapid ventricular rate   • TIA (transient ischemic attack)    • UTI (urinary tract infection) 07/21/2021       Past Surgical History:   Procedure Laterality Date   • APPENDECTOMY     • CHOLECYSTECTOMY     • COLECTOMY PARTIAL / TOTAL     • COLONOSCOPY  08/20/2015    s/p right hemicolectomy, recurrent Crohns, IH   • COLONOSCOPY N/A 8/9/2019    Crohns, IH.     • ENDOSCOPY  08/20/2015    erythematous mucosa in stomach, chronic gastritis,    • ENDOSCOPY N/A 4/28/2019    Erythematous mucosa in stomach, path benign   • SPLENECTOMY  2009       Social History     Socioeconomic History   • Marital status: Single     Spouse name: Not on file   • Number of children: Not on file   • Years of education: Not on file   • Highest education level: Not on file   Tobacco Use   • Smoking status: Never Smoker   • Smokeless tobacco: Never Used   Substance and Sexual Activity   • Alcohol use: No   • Drug use: No   • Sexual activity: Defer       History reviewed. No pertinent family history.    The following portion of the patient's history were reviewed and updated as appropriate: past medical history, past surgical history, past social history, past  "family history, allergies, current medications, and problem list.    Review of Systems   Constitutional: Negative.   Cardiovascular: Positive for irregular heartbeat and palpitations.   Respiratory: Negative.    Hematologic/Lymphatic: Negative.    Neurological: Negative.        Allergies   Allergen Reactions   • Amitriptyline Confusion   • Mysoline [Primidone] Confusion         Current Outpatient Medications:   •  apixaban (ELIQUIS) 5 MG tablet tablet, Take 1 tablet by mouth Every 12 (Twelve) Hours. Indications: Atrial Fibrillation, Disp: 180 tablet, Rfl: 3  •  atenolol (TENORMIN) 50 MG tablet, Take 1 tablet by mouth 2 (two) times a day., Disp: 180 tablet, Rfl: 3  •  Cyanocobalamin (VITAMIN B 12 PO), Take  by mouth., Disp: , Rfl:   •  diazepam (VALIUM) 5 MG tablet, Take 2.5 mg by mouth Every Morning., Disp: , Rfl:   •  dilTIAZem (CARDIZEM) 90 MG tablet, Take 1 tablet by mouth 3 (Three) Times a Day for 30 days., Disp: 90 tablet, Rfl: 0  •  Multiple Vitamins-Minerals (MULTI COMPLETE PO), Take  by mouth., Disp: , Rfl:   •  ondansetron ODT (ZOFRAN ODT) 4 MG disintegrating tablet, Take 1 tablet by mouth Every 8 (Eight) Hours As Needed for Nausea or Vomiting., Disp: 15 tablet, Rfl: 0  •  pantoprazole (PROTONIX) 40 MG EC tablet, Take 1 tablet by mouth 2 (Two) Times a Day Before Meals for 30 days., Disp: 60 tablet, Rfl: 0  •  potassium chloride (MICRO-K) 10 MEQ CR capsule, take 1 capsule by mouth once daily, Disp: , Rfl: 0  •  predniSONE (DELTASONE) 10 MG tablet, Take 40 mg by mouth Daily., Disp: , Rfl:         Objective:     Vitals:    08/19/21 1100   BP: 148/68   Pulse: (!) 47   Weight: 58.9 kg (129 lb 12.8 oz)   Height: 157.5 cm (62\")     Body mass index is 23.74 kg/m².    PHYSICAL EXAM:    Vitals Reviewed.   General Appearance: No acute distress, well developed and well nourished.  Thin.  Eyes: Conjunctiva and lids: No erythema, swelling, or discharge. Sclera non-icteric.  Wears glasses.  HENT: Atraumatic, " normocephalic. External eyes, ears, and nose normal. No hearing loss noted. Mucous membranes normal. Lips not cyanotic. Neck supple with no tenderness. Masked.   Respiratory: No signs of respiratory distress. Respiration rhythm and depth normal.   Clear to auscultation. No rales, crackles, rhonchi, or wheezing auscultated.   Cardiovascular:  Jugular Venous Pressure: Normal  Heart Rate and Rhythm: Sinus bradycardia.  Heart Sounds: Normal S1 and S2. No S3 or S4 noted.  Murmurs: No murmurs noted. No rubs, thrills, or gallops.   Lower Extremities: No edema noted.  Gastrointestinal:  Abdomen soft, non-distended, non-tender. Normal bowel sounds.    Musculoskeletal: Normal movement of extremities  Skin and Nails: General appearance normal. No pallor, cyanosis, diaphoresis. Skin temperature normal. No clubbing of fingernails.   Psychiatric: Patient alert and oriented to person, place, and time. Speech and behavior appropriate. Normal mood and affect.       ECG 12 Lead    Date/Time: 8/19/2021 11:09 AM  Performed by: Calista Mcclure APRN  Authorized by: Calista Mcclure APRN   Comparison: compared with previous ECG from 8/4/2021  Comparison to previous ECG: Atrial fibrillation, heart rate 117  Rhythm: sinus bradycardia  Rate: normal  BPM: 47  Conduction: conduction normal  ST Segments: ST segments normal  T Waves: T waves normal  QRS axis: normal  Other findings: non-specific ST-T wave changes    Clinical impression: non-specific ECG              Assessment:       Diagnosis Plan   1. Hospital discharge follow-up     2. New onset atrial fibrillation (CMS/HCC)     3. Sinus bradycardia     4. Elevated troponin     5. Essential hypertension     6. Small bowel obstruction (CMS/HCC)     7. Crohn's disease of small intestine without complication (CMS/HCC)            Plan:       1.  Hospital discharge follow-up: Stable.    2.  New Onset Atrial Fibrillation: Occurred in the setting of a small bowel obstruction.  I discussed with  Dr. Damon and she does not recommend any further testing.  We will continue with her apixaban.  As far as the bradycardia, continue atenolol 50 mg twice per day and stop the diltiazem.  All of this was well explained to the patient and written down for her.    Atrial Fibrillation and Atrial Flutter  Assessment  • The patient has atrial fibrillation-initial episode  • This is non-valvular in etiology  • The patient's CHADS2-VASc score is 6  • A TAT1GL5-CMNj score of 2 or more is considered a high risk for a thromboembolic event  • Apixaban prescribed    Plan  • Attempt to maintain sinus rhythm  • Continue apixaban for antithrombotic therapy, bleeding issues discussed      3.  Sinus Bradycardia: Stop diltiazem and continue atenolol 50 mg twice per day.    4.  Elevated Troponin: She had some chest pain in the ED, but denies chest pain before the visit and denies chest pain since the hospitalization.  Medical treatment recommended at this time.  Were not can to start aspirin because she is on apixaban and due to her age we can hold off on statin therapy.  If she has any further chest pain, we will arrange a stress test.    5.  Hypertension: Blood pressure borderline elevated today.  We will continue to monitor.    6/7.  Small Bowel Obstruction: Resolved.  She will follow up with Dr. Macrel Ricketts.     8.  She will call with any further concerns.  I asked her to call me with an update on her blood pressure and pulse in 1 month.  She will follow up with Dr. Juan Damon in 3 months.  I have refilled her cardiac medications.    As always, it has been a pleasure to participate in your patient's care. Thank you.       Sincerely,       YANY Chen  [facility] Cardiology      · COVID-19 Precautions - Patient was compliant in wearing a mask. When I saw the patient, I used appropriate personal protective equipment (PPE) including mask and eye shield (standard procedure).  Additionally, I used gown and  gloves if indicated.  Hand hygiene was completed before and after seeing the patient.  · Dictated utilizing Dragon Dictation  · I spent 35 minutes reviewing her medical records/testing/previous office notes/labs, face-to-face interaction with patient, physical examination, formulating the plan of care, and discussion of plan of care with patient.

## 2021-08-19 NOTE — TELEPHONE ENCOUNTER
Call from pt.  States wants to see Dr Ricketts only as instructed - needs to know about prednisone taper.      Pt has appt with SINGH Wang on 8/26, but understood that Dr Ricketts wants to see her, and pt wants to f/u only with DR Ricketts.     Message to Monet CARRINGTON

## 2021-08-20 NOTE — TELEPHONE ENCOUNTER
Call to pt.  States would like to begin tapering prednisone 40 mg daily because interfering with sleep.  States did not sleep a wink last night.  Tired.     Additionally, will need prednisone refill to last until sees Dr Ricketts 8/30.     Message to Dr Ricketts.

## 2021-08-20 NOTE — TELEPHONE ENCOUNTER
Pt returned call  Received: Today  Rosario Glass, RegSched Rep  [facility] [address]  Phone Number:  [phone] (Call me)   Pt returned call please call back.  Thank You     **Message to Bear Cook RN.

## 2021-08-20 NOTE — TELEPHONE ENCOUNTER
Talked to the PT about rescheduling her visit with , she agreed on seeing him on the August 30th at 1100am but wanted to cancel her appointment on the 26th. I cancel that appointment and she wants someone from the team to call her about scaling back off her medication doses.

## 2021-08-20 NOTE — TELEPHONE ENCOUNTER
"Have her decrease the prednisone to 30 mg/day for one week then go to 20 mg/day and stay on that till you see me in the office. Please send in a prescription for prednisone 10 mg, \"as directed\". #120 with 3 refills. Thx.kjh  "

## 2021-08-23 RX ORDER — PREDNISONE 10 MG/1
TABLET ORAL
Qty: 120 TABLET | Refills: 3 | Status: SHIPPED | OUTPATIENT
Start: 2021-08-23 | End: 2021-12-06

## 2021-08-23 NOTE — TELEPHONE ENCOUNTER
Called pt and advised of Dr Ricketts's note.   Script escribed as ordered per Dr Ricketts to pt's [facility].

## 2021-08-30 ENCOUNTER — OFFICE VISIT (OUTPATIENT)
Dept: GASTROENTEROLOGY | Facility: CLINIC | Age: 81
End: 2021-08-30

## 2021-08-30 VITALS — WEIGHT: 126.8 LBS | TEMPERATURE: 97.1 F | BODY MASS INDEX: 23.34 KG/M2 | HEIGHT: 62 IN

## 2021-08-30 DIAGNOSIS — K50.00 CROHN'S DISEASE OF SMALL INTESTINE WITHOUT COMPLICATION (HCC): ICD-10-CM

## 2021-08-30 DIAGNOSIS — R79.89 ELEVATED LFTS: Primary | ICD-10-CM

## 2021-08-30 DIAGNOSIS — K21.9 GASTROESOPHAGEAL REFLUX DISEASE WITHOUT ESOPHAGITIS: ICD-10-CM

## 2021-08-30 PROCEDURE — 99214 OFFICE O/P EST MOD 30 MIN: CPT | Performed by: INTERNAL MEDICINE

## 2021-08-30 RX ORDER — PANTOPRAZOLE SODIUM 40 MG/1
40 TABLET, DELAYED RELEASE ORAL
Qty: 60 TABLET | Refills: 11 | Status: SHIPPED | OUTPATIENT
Start: 2021-08-30 | End: 2021-09-29

## 2021-08-30 NOTE — PROGRESS NOTES
Chief Complaint   Patient presents with   • Crohn's Disease       History of Present Illness:   80 y.o. female  with a history of Crohn's dating back to 2014.  She had previous bowel resection by Dr. Bella Limon at that time involving the terminal ileum. She was admitted 8/3/21 thru 8/10/21 with abdominal pain and a partial small bowel obstruction.  Patient was put on steroids and seemed to do well.  It was unknown if her small bowel obstruction was from Crohn's or from scar tissue?  She did have a CT small bowel enterography on 8/9/2021 that showed:  IMPRESSION:  1.  Short segment of mucosal hyperenhancement with adjacent subtle  vascular engorgement involving the terminal ileum which given patient  history is suggestive of active inflammatory bowel disease. No upstream  small bowel dilation is present to suggest persistent stricture  formation.  2.  Findings suggestive of proctocolitis which are nonspecific and may  be related to underlying Crohn's disease versus an infectious cause and  correlation with patient history is recommended. Stool sample may be  helpful.  3.  Trace bilateral pleural effusions.  4.  Uterine fibroid, as before, which may have submucosal extension but  is not well evaluated with CT. Findings can be further evaluated with  pelvic ultrasound clinically indicated.  5.  Other findings as above.     This report was finalized on 8/9/2021 1:37 PM by Dr. Robert Rosario M.D.          She did have elevated liver function tests.  She had an MRCP in 4 of 2021 that was unrevealing.  No abdominal pain. No nausea or vomiting. She has lots of flatus. She has watery diarrhea in the am. She averages 2-3 BM/day. No rectal bleeding or melena. On prednisone 20 mg/day. The prednisone keeps her awake. She has lost a few pounds since discharge from hospital. She lives with her older sister.     Past Medical History:   Diagnosis Date   • Anemia    • Arthritis    • Crohn disease (CMS/HCC)    • GERD  (gastroesophageal reflux disease)    • History of transfusion    • Hyperlipidemia    • Hypertension    • Ocular migraine    • PAF (paroxysmal atrial fibrillation) (CMS/HCC)     with rapid ventricular rate   • TIA (transient ischemic attack)    • UTI (urinary tract infection) 07/21/2021       Past Surgical History:   Procedure Laterality Date   • APPENDECTOMY     • CHOLECYSTECTOMY     • COLECTOMY PARTIAL / TOTAL     • COLONOSCOPY  08/20/2015    s/p right hemicolectomy, recurrent Crohns, IH   • COLONOSCOPY N/A 8/9/2019    Crohns, IH.     • ENDOSCOPY  08/20/2015    erythematous mucosa in stomach, chronic gastritis,    • ENDOSCOPY N/A 4/28/2019    Erythematous mucosa in stomach, path benign   • SPLENECTOMY  2009         Current Outpatient Medications:   •  apixaban (ELIQUIS) 5 MG tablet tablet, Take 1 tablet by mouth Every 12 (Twelve) Hours. Indications: Atrial Fibrillation, Disp: 180 tablet, Rfl: 3  •  atenolol (TENORMIN) 50 MG tablet, Take 1 tablet by mouth 2 (two) times a day., Disp: 180 tablet, Rfl: 3  •  Cyanocobalamin (VITAMIN B 12 PO), Take  by mouth., Disp: , Rfl:   •  diazepam (VALIUM) 5 MG tablet, Take 2.5 mg by mouth Every Morning., Disp: , Rfl:   •  Multiple Vitamins-Minerals (MULTI COMPLETE PO), Take  by mouth., Disp: , Rfl:   •  pantoprazole (PROTONIX) 40 MG EC tablet, Take 1 tablet by mouth 2 (Two) Times a Day Before Meals for 30 days., Disp: 60 tablet, Rfl: 11  •  potassium chloride (MICRO-K) 10 MEQ CR capsule, take 1 capsule by mouth once daily, Disp: , Rfl: 0  •  predniSONE (DELTASONE) 10 MG tablet, Take 30mg daily for one week .  Then stay on 20mg by mouth daily, Disp: 120 tablet, Rfl: 3  •  ondansetron ODT (ZOFRAN ODT) 4 MG disintegrating tablet, Take 1 tablet by mouth Every 8 (Eight) Hours As Needed for Nausea or Vomiting., Disp: 15 tablet, Rfl: 0    Allergies   Allergen Reactions   • Amitriptyline Confusion   • Mysoline [Primidone] Confusion       History reviewed. No pertinent family  history.    Social History     Socioeconomic History   • Marital status: Single     Spouse name: Not on file   • Number of children: Not on file   • Years of education: Not on file   • Highest education level: Not on file   Tobacco Use   • Smoking status: Never Smoker   • Smokeless tobacco: Never Used   Substance and Sexual Activity   • Alcohol use: No   • Drug use: No   • Sexual activity: Defer       Review of Systems   Gastrointestinal: Negative for abdominal pain.   All other systems reviewed and are negative.    Pertinent positives and negatives documented in the HPI and all other systems reviewed and were found to be negative.  Vitals:    08/30/21 1223   Temp: 97.1 °F (36.2 °C)       Physical Exam  Vitals reviewed.   Constitutional:       General: She is not in acute distress.     Appearance: Normal appearance. She is well-developed. She is not diaphoretic.   HENT:      Head: Normocephalic and atraumatic. Hair is normal.      Right Ear: Hearing, tympanic membrane, ear canal and external ear normal. No decreased hearing noted. No drainage.      Left Ear: Hearing, tympanic membrane, ear canal and external ear normal. No decreased hearing noted.      Nose: Nose normal. No nasal deformity.      Mouth/Throat:      Mouth: Mucous membranes are moist.   Eyes:      General: Lids are normal.         Right eye: No discharge.         Left eye: No discharge.      Extraocular Movements: Extraocular movements intact.      Conjunctiva/sclera: Conjunctivae normal.      Pupils: Pupils are equal, round, and reactive to light.   Neck:      Thyroid: No thyromegaly.      Vascular: No JVD.      Trachea: No tracheal deviation.   Cardiovascular:      Rate and Rhythm: Normal rate and regular rhythm.      Pulses: Normal pulses.      Heart sounds: Normal heart sounds. No murmur heard.   No friction rub. No gallop.    Pulmonary:      Effort: Pulmonary effort is normal. No respiratory distress.      Breath sounds: Normal breath sounds. No  wheezing or rales.   Chest:      Chest wall: No tenderness.   Abdominal:      General: Bowel sounds are normal. There is no distension.      Palpations: Abdomen is soft. There is no mass.      Tenderness: There is no abdominal tenderness. There is no guarding or rebound.      Hernia: No hernia is present.   Musculoskeletal:         General: No tenderness or deformity. Normal range of motion.      Cervical back: Normal range of motion and neck supple.   Lymphadenopathy:      Cervical: No cervical adenopathy.   Skin:     General: Skin is warm and dry.      Findings: No erythema or rash.   Neurological:      Mental Status: She is alert and oriented to person, place, and time.      Cranial Nerves: No cranial nerve deficit.      Motor: No abnormal muscle tone.      Coordination: Coordination normal.      Deep Tendon Reflexes: Reflexes are normal and symmetric. Reflexes normal.   Psychiatric:         Mood and Affect: Mood normal.         Behavior: Behavior normal.         Thought Content: Thought content normal.         Judgment: Judgment normal.         Diagnoses and all orders for this visit:    1. Elevated LFTs (Primary)  -     CBC & Differential  -     Comprehensive Metabolic Panel    2. Crohn's disease of small intestine without complication (CMS/HCC)  -     CBC & Differential  -     Comprehensive Metabolic Panel    3. Gastroesophageal reflux disease without esophagitis  -     CBC & Differential  -     Comprehensive Metabolic Panel    Other orders  -     pantoprazole (PROTONIX) 40 MG EC tablet; Take 1 tablet by mouth 2 (Two) Times a Day Before Meals for 30 days.  Dispense: 60 tablet; Refill: 11      Assessment:  1. history of Crohn's dating back to 2014.  She had previous bowel resection by Dr. Bella Limon at that time involving the terminal ileum.  2.  History of elevated liver function test.  She had an MRCP in 4 of 2021 that was unrevealing.  3.  History of recent small bowel obstruction.  Was this from  Crohn's disease versus adhesions.  Prior to her last admission she was on mesalamine only. Do I want to start her on something more aggressive like Humira at 80 yrs of age?  4.  Patient is on Eliquis because of atrial fibrillation.  5.    Recommendations:  1. Colonoscopy. I would want her off the Eliquis for 3 days prior. She doesn't want it now because of her heart troubles.   2. We discussed what to put her on. We may put her back on Mesalamine because it is well tolerated and doesn't have lots of the side effects that the biologics can have.   3. Restart the Mesalamine 4/day.  4. Start weaning the prednisone.   5. F/u 3 mos. We will talk to her about colestipol - should we restart it?  6. Labs:    Return in about 3 months (around 11/30/2021).    Marcel Ricketts MD  8/30/2021

## 2021-08-31 LAB
ALBUMIN SERPL-MCNC: 4.5 G/DL (ref 3.5–5.2)
ALBUMIN/GLOB SERPL: 2.1 G/DL
ALP SERPL-CCNC: 77 U/L (ref 39–117)
ALT SERPL-CCNC: 21 U/L (ref 1–33)
AST SERPL-CCNC: 20 U/L (ref 1–32)
BASOPHILS # BLD AUTO: 0.03 10*3/MM3 (ref 0–0.2)
BASOPHILS NFR BLD AUTO: 0.3 % (ref 0–1.5)
BILIRUB SERPL-MCNC: 0.4 MG/DL (ref 0–1.2)
BUN SERPL-MCNC: 14 MG/DL (ref 8–23)
BUN/CREAT SERPL: 13.6 (ref 7–25)
CALCIUM SERPL-MCNC: 9.4 MG/DL (ref 8.6–10.5)
CHLORIDE SERPL-SCNC: 102 MMOL/L (ref 98–107)
CO2 SERPL-SCNC: 27.3 MMOL/L (ref 22–29)
CREAT SERPL-MCNC: 1.03 MG/DL (ref 0.57–1)
EOSINOPHIL # BLD AUTO: 0.05 10*3/MM3 (ref 0–0.4)
EOSINOPHIL NFR BLD AUTO: 0.4 % (ref 0.3–6.2)
ERYTHROCYTE [DISTWIDTH] IN BLOOD BY AUTOMATED COUNT: 14.5 % (ref 12.3–15.4)
GLOBULIN SER CALC-MCNC: 2.1 GM/DL
GLUCOSE SERPL-MCNC: 93 MG/DL (ref 65–99)
HCT VFR BLD AUTO: 41.2 % (ref 34–46.6)
HGB BLD-MCNC: 13.7 G/DL (ref 12–15.9)
IMM GRANULOCYTES # BLD AUTO: 0.08 10*3/MM3 (ref 0–0.05)
IMM GRANULOCYTES NFR BLD AUTO: 0.7 % (ref 0–0.5)
LYMPHOCYTES # BLD AUTO: 1.87 10*3/MM3 (ref 0.7–3.1)
LYMPHOCYTES NFR BLD AUTO: 15.7 % (ref 19.6–45.3)
MCH RBC QN AUTO: 29.6 PG (ref 26.6–33)
MCHC RBC AUTO-ENTMCNC: 33.3 G/DL (ref 31.5–35.7)
MCV RBC AUTO: 89 FL (ref 79–97)
MONOCYTES # BLD AUTO: 0.25 10*3/MM3 (ref 0.1–0.9)
MONOCYTES NFR BLD AUTO: 2.1 % (ref 5–12)
NEUTROPHILS # BLD AUTO: 9.66 10*3/MM3 (ref 1.7–7)
NEUTROPHILS NFR BLD AUTO: 80.8 % (ref 42.7–76)
NRBC BLD AUTO-RTO: 0 /100 WBC (ref 0–0.2)
PLATELET # BLD AUTO: 152 10*3/MM3 (ref 140–450)
POTASSIUM SERPL-SCNC: 3.8 MMOL/L (ref 3.5–5.2)
PROT SERPL-MCNC: 6.6 G/DL (ref 6–8.5)
RBC # BLD AUTO: 4.63 10*6/MM3 (ref 3.77–5.28)
SODIUM SERPL-SCNC: 142 MMOL/L (ref 136–145)
WBC # BLD AUTO: 11.94 10*3/MM3 (ref 3.4–10.8)

## 2021-09-02 ENCOUNTER — TELEPHONE (OUTPATIENT)
Dept: GASTROENTEROLOGY | Facility: CLINIC | Age: 81
End: 2021-09-02

## 2021-09-02 DIAGNOSIS — K50.00 CROHN'S DISEASE OF SMALL INTESTINE WITHOUT COMPLICATION (HCC): Primary | ICD-10-CM

## 2021-09-02 NOTE — TELEPHONE ENCOUNTER
----- Message from Marcel Ricketts MD sent at 9/2/2021  3:23 PM EDT -----  09/02/21       Tell her that her lab work shows a mildly elevated white blood count of 11.9.  Prednisone can cause your white count to be elevated.  Her hemoglobin and platelet count are normal, which is good.  Her comprehensive metabolic profile shows that her liver function tests are normal.  Her serum creatinine is just minimally elevated at 1.03.  Please fax a copy of this report to her PCP.  Dai law

## 2021-09-02 NOTE — TELEPHONE ENCOUNTER
Call to pt.  Advise per DR Ricketts note. Verb understanding.     Advise stool kit at .     Stool study orders placed - message to DR Ricketts.

## 2021-09-02 NOTE — TELEPHONE ENCOUNTER
Call to pt.  Advise per DR Ricketts note. Verb understanding.     States has been having watery diarrhea in am, so has taken imodium x1 in am with relief.  Wants to be sure this ok with DR Ricketts -question to DR Ricketts.     Labs faxed via epic to Dr Roni Mckenzie.

## 2021-09-02 NOTE — TELEPHONE ENCOUNTER
Tell her that taking imodium prn should be fine.        Why don't we get some stool studies just to make sure that there is no infection that we would treat differently? I would have her come by the office to have the following stool studies done:  - clostridium dificile EIA, O and P, Stool culture, Giardia antigen, fecal leukocytes, qualitatitve fecal fat, stool calprotectin.  Thx. kjh

## 2021-09-09 LAB
C DIFF TOX A+B STL QL IA: NEGATIVE
G LAMBLIA AG STL QL IA: NEGATIVE

## 2021-09-10 LAB
FA STL QL: NORMAL
NEUTRAL FAT STL QL: NORMAL
O+P SPEC MICRO: NORMAL
O+P STL CONC: NORMAL
WBC STL QL MICRO: NORMAL
WBC STL QL MICRO: NORMAL

## 2021-09-12 LAB
BACTERIA SPEC CULT: NORMAL
BACTERIA SPEC CULT: NORMAL
CAMPYLOBACTER STL CULT: NORMAL
E COLI SXT STL QL IA: NEGATIVE
SALM + SHIG STL CULT: NORMAL

## 2021-09-14 LAB — CALPROTECTIN STL-MCNT: 36 UG/G (ref 0–120)

## 2021-09-15 ENCOUNTER — TELEPHONE (OUTPATIENT)
Dept: GASTROENTEROLOGY | Facility: CLINIC | Age: 81
End: 2021-09-15

## 2021-09-15 NOTE — TELEPHONE ENCOUNTER
Call to pt.  Advise per DR Ricketts note.  Verb understanding.    How Severe Are Your Spot(S)?: moderate What Is The Reason For Today's Visit?: Full Body Skin Examination What Is The Reason For Today's Visit? (Being Monitored For X): the risk of recurrence of previously treated lesion(s)

## 2021-09-15 NOTE — TELEPHONE ENCOUNTER
----- Message from Marcel Ricketts MD sent at 9/15/2021  6:51 AM EDT -----  09/15/21  Tell her that her stool studies came back normal.  Please fax a copy of this report to her PCP.  Laura. [email or web address]

## 2021-09-15 NOTE — TELEPHONE ENCOUNTER
09/15/21  Tell her that her stool studies came back normal.  Please fax a copy of this report to her PCP.  Dai law

## 2021-09-15 NOTE — TELEPHONE ENCOUNTER
VM to pt with request to contact office.     Stool study results faxed via epic to DR Roni Mckenzie.

## 2021-09-28 ENCOUNTER — TELEPHONE (OUTPATIENT)
Dept: CARDIOLOGY | Facility: CLINIC | Age: 81
End: 2021-09-28

## 2021-09-28 NOTE — TELEPHONE ENCOUNTER
Pt called with bp update over the past month.  Pt reports that her average has been 120- 140's/ 60's - 70's.  HR has been in the 60's.  Pt is still taking the Atenolol 50 mg bid, she states that she has been feeling well.

## 2021-09-29 NOTE — TELEPHONE ENCOUNTER
Reviewed. Let's continue to monitor and she will follow up with Dr. Damon as scheduled. Thanks.    Wartpeel Counseling:  I discussed with the patient the risks of Wartpeel including but not limited to erythema, scaling, itching, weeping, crusting, and pain.

## 2021-11-18 ENCOUNTER — HOSPITAL ENCOUNTER (EMERGENCY)
Facility: HOSPITAL | Age: 81
Discharge: HOME | End: 2021-11-19
Attending: EMERGENCY MEDICINE | Admitting: EMERGENCY MEDICINE

## 2021-11-18 DIAGNOSIS — R19.7 NAUSEA VOMITING AND DIARRHEA: Primary | ICD-10-CM

## 2021-11-18 DIAGNOSIS — R11.2 NAUSEA VOMITING AND DIARRHEA: Primary | ICD-10-CM

## 2021-11-18 DIAGNOSIS — K52.9 COLITIS PRESUMED INFECTIOUS: ICD-10-CM

## 2021-11-18 LAB
ALBUMIN SERPL-MCNC: 4.3 G/DL (ref 3.5–5.2)
ALBUMIN/GLOB SERPL: 1.9 G/DL
ALP SERPL-CCNC: 91 U/L (ref 39–117)
ALT SERPL W P-5'-P-CCNC: 17 U/L (ref 1–33)
ANION GAP SERPL CALCULATED.3IONS-SCNC: 11.7 MMOL/L (ref 5–15)
AST SERPL-CCNC: 25 U/L (ref 1–32)
BASOPHILS # BLD AUTO: 0.06 10*3/MM3 (ref 0–0.2)
BASOPHILS NFR BLD AUTO: 0.3 % (ref 0–1.5)
BILIRUB SERPL-MCNC: 0.6 MG/DL (ref 0–1.2)
BILIRUB UR QL STRIP: NEGATIVE
BUN SERPL-MCNC: 15 MG/DL (ref 8–23)
BUN/CREAT SERPL: 16.5 (ref 7–25)
CALCIUM SPEC-SCNC: 9 MG/DL (ref 8.6–10.5)
CHLORIDE SERPL-SCNC: 108 MMOL/L (ref 98–107)
CLARITY UR: CLEAR
CO2 SERPL-SCNC: 23.3 MMOL/L (ref 22–29)
COLOR UR: YELLOW
CREAT SERPL-MCNC: 0.91 MG/DL (ref 0.57–1)
DEPRECATED RDW RBC AUTO: 44.4 FL (ref 37–54)
EOSINOPHIL # BLD AUTO: 0.15 10*3/MM3 (ref 0–0.4)
EOSINOPHIL NFR BLD AUTO: 0.8 % (ref 0.3–6.2)
ERYTHROCYTE [DISTWIDTH] IN BLOOD BY AUTOMATED COUNT: 14 % (ref 12.3–15.4)
GFR SERPL CREATININE-BSD FRML MDRD: 59 ML/MIN/1.73
GLOBULIN UR ELPH-MCNC: 2.3 GM/DL
GLUCOSE SERPL-MCNC: 102 MG/DL (ref 65–99)
GLUCOSE UR STRIP-MCNC: NEGATIVE MG/DL
HCT VFR BLD AUTO: 40.7 % (ref 34–46.6)
HGB BLD-MCNC: 13.7 G/DL (ref 12–15.9)
HGB UR QL STRIP.AUTO: NEGATIVE
HOLD SPECIMEN: NORMAL
HOLD SPECIMEN: NORMAL
IMM GRANULOCYTES # BLD AUTO: 0.09 10*3/MM3 (ref 0–0.05)
IMM GRANULOCYTES NFR BLD AUTO: 0.5 % (ref 0–0.5)
KETONES UR QL STRIP: ABNORMAL
LEUKOCYTE ESTERASE UR QL STRIP.AUTO: NEGATIVE
LIPASE SERPL-CCNC: 24 U/L (ref 13–60)
LYMPHOCYTES # BLD AUTO: 1.18 10*3/MM3 (ref 0.7–3.1)
LYMPHOCYTES NFR BLD AUTO: 6.2 % (ref 19.6–45.3)
MCH RBC QN AUTO: 29.5 PG (ref 26.6–33)
MCHC RBC AUTO-ENTMCNC: 33.7 G/DL (ref 31.5–35.7)
MCV RBC AUTO: 87.5 FL (ref 79–97)
MONOCYTES # BLD AUTO: 1.29 10*3/MM3 (ref 0.1–0.9)
MONOCYTES NFR BLD AUTO: 6.8 % (ref 5–12)
NEUTROPHILS NFR BLD AUTO: 16.21 10*3/MM3 (ref 1.7–7)
NEUTROPHILS NFR BLD AUTO: 85.4 % (ref 42.7–76)
NITRITE UR QL STRIP: NEGATIVE
NRBC BLD AUTO-RTO: 0.1 /100 WBC (ref 0–0.2)
PH UR STRIP.AUTO: <=5 [PH] (ref 5–8)
PLATELET # BLD AUTO: 224 10*3/MM3 (ref 140–450)
PMV BLD AUTO: 10.3 FL (ref 6–12)
POTASSIUM SERPL-SCNC: 3.7 MMOL/L (ref 3.5–5.2)
PROT SERPL-MCNC: 6.6 G/DL (ref 6–8.5)
PROT UR QL STRIP: NEGATIVE
RBC # BLD AUTO: 4.65 10*6/MM3 (ref 3.77–5.28)
SODIUM SERPL-SCNC: 143 MMOL/L (ref 136–145)
SP GR UR STRIP: 1.02 (ref 1–1.03)
UROBILINOGEN UR QL STRIP: ABNORMAL
WBC NRBC COR # BLD: 18.98 10*3/MM3 (ref 3.4–10.8)
WHOLE BLOOD HOLD SPECIMEN: NORMAL
WHOLE BLOOD HOLD SPECIMEN: NORMAL

## 2021-11-18 PROCEDURE — 25010000002 ONDANSETRON PER 1 MG: Performed by: EMERGENCY MEDICINE

## 2021-11-18 PROCEDURE — 99283 EMERGENCY DEPT VISIT LOW MDM: CPT

## 2021-11-18 PROCEDURE — 83690 ASSAY OF LIPASE: CPT | Performed by: EMERGENCY MEDICINE

## 2021-11-18 PROCEDURE — 96374 THER/PROPH/DIAG INJ IV PUSH: CPT

## 2021-11-18 PROCEDURE — 85025 COMPLETE CBC W/AUTO DIFF WBC: CPT

## 2021-11-18 PROCEDURE — 36415 COLL VENOUS BLD VENIPUNCTURE: CPT

## 2021-11-18 PROCEDURE — 81003 URINALYSIS AUTO W/O SCOPE: CPT

## 2021-11-18 PROCEDURE — 80053 COMPREHEN METABOLIC PANEL: CPT | Performed by: EMERGENCY MEDICINE

## 2021-11-18 PROCEDURE — 96361 HYDRATE IV INFUSION ADD-ON: CPT

## 2021-11-18 RX ORDER — ONDANSETRON 2 MG/ML
4 INJECTION INTRAMUSCULAR; INTRAVENOUS ONCE
Status: COMPLETED | OUTPATIENT
Start: 2021-11-18 | End: 2021-11-18

## 2021-11-18 RX ORDER — SODIUM CHLORIDE 0.9 % (FLUSH) 0.9 %
10 SYRINGE (ML) INJECTION AS NEEDED
Status: DISCONTINUED | OUTPATIENT
Start: 2021-11-18 | End: 2021-11-19 | Stop reason: HOSPADM

## 2021-11-18 RX ORDER — SODIUM CHLORIDE 9 MG/ML
125 INJECTION, SOLUTION INTRAVENOUS CONTINUOUS
Status: DISCONTINUED | OUTPATIENT
Start: 2021-11-18 | End: 2021-11-19 | Stop reason: HOSPADM

## 2021-11-18 RX ADMIN — SODIUM CHLORIDE 125 ML/HR: 9 INJECTION, SOLUTION INTRAVENOUS at 23:04

## 2021-11-18 RX ADMIN — SODIUM CHLORIDE 500 ML: 9 INJECTION, SOLUTION INTRAVENOUS at 22:20

## 2021-11-18 RX ADMIN — ONDANSETRON 4 MG: 2 INJECTION INTRAMUSCULAR; INTRAVENOUS at 22:27

## 2021-11-19 ENCOUNTER — APPOINTMENT (OUTPATIENT)
Dept: CT IMAGING | Facility: HOSPITAL | Age: 81
End: 2021-11-19

## 2021-11-19 VITALS
OXYGEN SATURATION: 99 % | DIASTOLIC BLOOD PRESSURE: 68 MMHG | WEIGHT: 125 LBS | SYSTOLIC BLOOD PRESSURE: 136 MMHG | TEMPERATURE: 97.3 F | RESPIRATION RATE: 16 BRPM | HEART RATE: 72 BPM | BODY MASS INDEX: 23 KG/M2 | HEIGHT: 62 IN

## 2021-11-19 PROCEDURE — 96361 HYDRATE IV INFUSION ADD-ON: CPT

## 2021-11-19 PROCEDURE — 74177 CT ABD & PELVIS W/CONTRAST: CPT

## 2021-11-19 PROCEDURE — 25010000002 IOPAMIDOL 61 % SOLUTION: Performed by: EMERGENCY MEDICINE

## 2021-11-19 RX ORDER — ONDANSETRON 8 MG/1
8 TABLET, ORALLY DISINTEGRATING ORAL EVERY 8 HOURS PRN
Qty: 20 TABLET | Refills: 0 | Status: SHIPPED | OUTPATIENT
Start: 2021-11-19 | End: 2022-06-09 | Stop reason: ALTCHOICE

## 2021-11-19 RX ORDER — METRONIDAZOLE 250 MG/1
250 TABLET ORAL 3 TIMES DAILY
Qty: 30 TABLET | Refills: 0 | Status: SHIPPED | OUTPATIENT
Start: 2021-11-19 | End: 2022-06-09 | Stop reason: ALTCHOICE

## 2021-11-19 RX ADMIN — IOPAMIDOL 85 ML: 612 INJECTION, SOLUTION INTRAVENOUS at 00:48

## 2021-11-19 NOTE — ED PROVIDER NOTES
EMERGENCY DEPARTMENT ENCOUNTER    Room Number:  25/25  Date of encounter:  11/19/2021  PCP: Roni Mckenzie MD  Historian: Patient      HPI:  Chief Complaint: Abdominal pain, nausea, vomiting and diarrhea  A complete HPI/ROS/PMH/PSH/SH/FH are unobtainable due to: N/A    Context: Michela Aguilar is a 81 y.o. female who presents to the ED c/o gradual onset of epigastric pain, nausea, vomiting or diarrhea around 3:00 this afternoon.  She was out doing errands and shopping with a friend, for lunch she had a hot dog, glass of milk and some Halloween candy.  Shortly afterwards she developed the symptoms.  No aggravating or alleviating factors.  Currently, her symptoms are improving.  Pain is now mild whereas before it was severe and cramping.  Emesis was nonbloody.  Diarrhea was loose and brown.  She does have a history of Crohn's disease and has had partial small bowel obstructions in the past.  No fevers or chills.  No recent illnesses.      The patient was placed in a mask in triage, hand hygiene was performed before and after my interaction with the patient.  I wore a mask, safety glasses and gloves during my entire interaction with the patient.    PAST MEDICAL HISTORY  Active Ambulatory Problems     Diagnosis Date Noted   • Partial small bowel obstruction (HCC) 04/19/2016   • Generalized abdominal pain 04/27/2019   • Crohn's disease of small intestine with other complication (HCC) 04/27/2019   • Crohn disease (HCC) 06/25/2019   • Bloating 06/25/2019   • Small bowel obstruction (HCC) 08/03/2021   • Atrial fibrillation, persistent (HCC) 08/05/2021   • Essential hypertension 08/05/2021     Resolved Ambulatory Problems     Diagnosis Date Noted   • No Resolved Ambulatory Problems     Past Medical History:   Diagnosis Date   • Anemia    • Arthritis    • GERD (gastroesophageal reflux disease)    • History of transfusion    • Hyperlipidemia    • Hypertension    • Ocular migraine    • PAF (paroxysmal atrial fibrillation)  (HCC)    • TIA (transient ischemic attack)    • UTI (urinary tract infection) 07/21/2021         PAST SURGICAL HISTORY  Past Surgical History:   Procedure Laterality Date   • APPENDECTOMY     • CHOLECYSTECTOMY     • COLECTOMY PARTIAL / TOTAL     • COLONOSCOPY  08/20/2015    s/p right hemicolectomy, recurrent Crohns, IH   • COLONOSCOPY N/A 8/9/2019    Crohns, IH.     • ENDOSCOPY  08/20/2015    erythematous mucosa in stomach, chronic gastritis,    • ENDOSCOPY N/A 4/28/2019    Erythematous mucosa in stomach, path benign   • SPLENECTOMY  2009         FAMILY HISTORY  History reviewed. No pertinent family history.      SOCIAL HISTORY  Social History     Socioeconomic History   • Marital status: Single   Tobacco Use   • Smoking status: Never Smoker   • Smokeless tobacco: Never Used   Substance and Sexual Activity   • Alcohol use: No   • Drug use: No   • Sexual activity: Defer         ALLERGIES  Amitriptyline and Mysoline [primidone]        REVIEW OF SYSTEMS  Review of Systems   Constitutional: Negative for chills and fever.   Respiratory: Negative for chest tightness and shortness of breath.    Cardiovascular: Negative for chest pain.   Gastrointestinal: Positive for abdominal pain, diarrhea, nausea and vomiting. Negative for blood in stool.   Musculoskeletal: Negative for arthralgias and myalgias.        All systems reviewed and negative except for those discussed in HPI.       PHYSICAL EXAM    I have reviewed the triage vital signs and nursing notes.    ED Triage Vitals   Temp Heart Rate Resp BP SpO2   11/18/21 1808 11/18/21 1808 11/18/21 1808 11/18/21 1846 11/18/21 1808   97.3 °F (36.3 °C) 85 16 129/76 98 %      Temp src Heart Rate Source Patient Position BP Location FiO2 (%)   -- -- -- -- --              Physical Exam   Constitutional: Pt. is oriented to person, place, and time and well-developed, well-nourished, and in no distress.   HENT: Normocephalic and atraumatic.   Neck: Normal range of motion. Neck supple. No  JVD present.   Cardiovascular: Normal rate, regular rhythm and normal heart sounds. Exam reveals no gallop and no friction rub.   No murmur heard.  Pulmonary/Chest: Effort normal and breath sounds normal. No stridor. No respiratory distress. No wheezes, no rales.   Abdominal: Soft. Bowel sounds are normal. No distension. There is no tenderness. There is no rebound and no guarding.   Musculoskeletal: Normal range of motion. No edema, tenderness or deformity.   Neurological: Pt. is alert and oriented to person, place, and time.  She has no focal neurologic deficits  Skin: Skin is warm and dry. No rash noted. Pt. is not diaphoretic. No erythema.   Psychiatric: Mood, affect and judgment normal.  She is pleasant and cooperative.  Nursing note and vitals reviewed.        LAB RESULTS  Recent Results (from the past 24 hour(s))   Comprehensive Metabolic Panel    Collection Time: 11/18/21  9:39 PM    Specimen: Blood   Result Value Ref Range    Glucose 102 (H) 65 - 99 mg/dL    BUN 15 8 - 23 mg/dL    Creatinine 0.91 0.57 - 1.00 mg/dL    Sodium 143 136 - 145 mmol/L    Potassium 3.7 3.5 - 5.2 mmol/L    Chloride 108 (H) 98 - 107 mmol/L    CO2 23.3 22.0 - 29.0 mmol/L    Calcium 9.0 8.6 - 10.5 mg/dL    Total Protein 6.6 6.0 - 8.5 g/dL    Albumin 4.30 3.50 - 5.20 g/dL    ALT (SGPT) 17 1 - 33 U/L    AST (SGOT) 25 1 - 32 U/L    Alkaline Phosphatase 91 39 - 117 U/L    Total Bilirubin 0.6 0.0 - 1.2 mg/dL    eGFR Non African Amer 59 (L) >60 mL/min/1.73    Globulin 2.3 gm/dL    A/G Ratio 1.9 g/dL    BUN/Creatinine Ratio 16.5 7.0 - 25.0    Anion Gap 11.7 5.0 - 15.0 mmol/L   Lipase    Collection Time: 11/18/21  9:39 PM    Specimen: Blood   Result Value Ref Range    Lipase 24 13 - 60 U/L   Green Top (Gel)    Collection Time: 11/18/21  9:39 PM   Result Value Ref Range    Extra Tube Hold for add-ons.    Lavender Top    Collection Time: 11/18/21  9:39 PM   Result Value Ref Range    Extra Tube hold for add-on    Gold Top - SST    Collection Time:  11/18/21  9:39 PM   Result Value Ref Range    Extra Tube Hold for add-ons.    Light Blue Top    Collection Time: 11/18/21  9:39 PM   Result Value Ref Range    Extra Tube hold for add-on    CBC Auto Differential    Collection Time: 11/18/21  9:39 PM    Specimen: Blood   Result Value Ref Range    WBC 18.98 (H) 3.40 - 10.80 10*3/mm3    RBC 4.65 3.77 - 5.28 10*6/mm3    Hemoglobin 13.7 12.0 - 15.9 g/dL    Hematocrit 40.7 34.0 - 46.6 %    MCV 87.5 79.0 - 97.0 fL    MCH 29.5 26.6 - 33.0 pg    MCHC 33.7 31.5 - 35.7 g/dL    RDW 14.0 12.3 - 15.4 %    RDW-SD 44.4 37.0 - 54.0 fl    MPV 10.3 6.0 - 12.0 fL    Platelets 224 140 - 450 10*3/mm3    Neutrophil % 85.4 (H) 42.7 - 76.0 %    Lymphocyte % 6.2 (L) 19.6 - 45.3 %    Monocyte % 6.8 5.0 - 12.0 %    Eosinophil % 0.8 0.3 - 6.2 %    Basophil % 0.3 0.0 - 1.5 %    Immature Grans % 0.5 0.0 - 0.5 %    Neutrophils, Absolute 16.21 (H) 1.70 - 7.00 10*3/mm3    Lymphocytes, Absolute 1.18 0.70 - 3.10 10*3/mm3    Monocytes, Absolute 1.29 (H) 0.10 - 0.90 10*3/mm3    Eosinophils, Absolute 0.15 0.00 - 0.40 10*3/mm3    Basophils, Absolute 0.06 0.00 - 0.20 10*3/mm3    Immature Grans, Absolute 0.09 (H) 0.00 - 0.05 10*3/mm3    nRBC 0.1 0.0 - 0.2 /100 WBC   Urinalysis With Microscopic If Indicated (No Culture) - Urine, Clean Catch    Collection Time: 11/18/21  9:46 PM    Specimen: Urine, Clean Catch   Result Value Ref Range    Color, UA Yellow Yellow, Straw    Appearance, UA Clear Clear    pH, UA <=5.0 5.0 - 8.0    Specific Gravity, UA 1.017 1.005 - 1.030    Glucose, UA Negative Negative    Ketones, UA 15 mg/dL (1+) (A) Negative    Bilirubin, UA Negative Negative    Blood, UA Negative Negative    Protein, UA Negative Negative    Leuk Esterase, UA Negative Negative    Nitrite, UA Negative Negative    Urobilinogen, UA 0.2 E.U./dL 0.2 - 1.0 E.U./dL       Ordered the above labs and independently reviewed the results.        RADIOLOGY  CT Abdomen Pelvis With Contrast    Result Date: 11/19/2021  CT OF THE  ABDOMEN AND PELVIS WITH CONTRAST  HISTORY: Crohn's disease.  COMPARISON: 08/09/2021  TECHNIQUE: Axial CT imaging was obtained through the abdomen and pelvis. IV contrast was administered.  FINDINGS: Images through the lung bases demonstrate some dependent atelectasis. There is atherosclerotic involvement of the abdominal aorta. There is a small hiatal hernia. Duodenum appears unremarkable. Gallbladder is surgically absent. There is an intrauterine extrahepatic biliary dilatation. Common bile duct measures up to 1.1 cm. This has actually increased when compared to prior exam. Correlation with liver function tests is suggested. Adrenal glands are normal. Pancreas is atrophic. The kidneys enhance symmetrically. There is no hydronephrosis. Spleen is absent. There is calcification of the aorta. Urinary bladder appears unremarkable. Probable fibroid is seen within the uterus. Liquid stool is noted throughout the colon keeping with history of diarrhea. I do think there is a thick-walled appearance to the colon, as well as some patulous loops of small bowel. There is no convincing evidence of mechanical small bowel obstruction. Trace amount of free fluid is seen within the pelvis. No pneumatosis or free air is seen. No acute osseous abnormalities are seen. Prominent intra-abdominal lymph nodes are stable.       1. Overall somewhat thick-walled appearance to the colon, with mucosal hyperenhancement and liquid stool noted throughout the colon. There are also some mildly patulous loops of small bowel as well. FINDINGS may represent nonspecific enterocolitis. It certainly may be related to this patient's known Crohn's disease. There is no evidence of bowel obstruction. 2. The patient has increasing intra and extrahepatic biliary dilatation when compared to prior study. Correlation with liver function tests is suggested. MRCP or ERCP could be considered for further assessment.  Radiation dose reduction techniques were utilized,  including automated exposure control and exposure modulation based on body size.  This report was finalized on 11/19/2021 1:19 AM by Dr. Jacquie Funez M.D.        I ordered the above noted radiological studies. Reviewed by me and discussed with radiologist.  See dictation for official radiology interpretation.      PROCEDURES    Procedures      MEDICATIONS GIVEN IN ER    Medications   sodium chloride 0.9 % flush 10 mL (has no administration in time range)   sodium chloride 0.9 % infusion (125 mL/hr Intravenous Currently Infusing 11/19/21 0115)   sodium chloride 0.9 % bolus 500 mL (0 mL Intravenous Stopped 11/18/21 2304)   ondansetron (ZOFRAN) injection 4 mg (4 mg Intravenous Given 11/18/21 2227)   iopamidol (ISOVUE-300) 61 % injection 100 mL (85 mL Intravenous Given 11/19/21 0048)         PROGRESS, DATA ANALYSIS, CONSULTS, AND MEDICAL DECISION MAKING    Any/all labs have been independently reviewed by me.  Any/all radiology studies have been reviewed by me and discussed with radiologist dictating the report.   EKG's independently viewed and interpreted by me.  Discussion below represents my analysis of pertinent findings related to patient's condition, differential diagnosis, treatment plan and final disposition.    Number of Diagnoses or Management Options     Amount and/or Complexity of Data Reviewed  Clinical lab tests:  Yes  Tests in the radiology section of CPT®:  Yes  Tests in the medicine section of CPT®:  Yes  Review and summarize past medical records: yes (no recent hospitalizations)  Independent visualization of images, tracings, or specimens: yes (see below)      ED Course as of 11/19/21 0131   Thu Nov 18, 2021   2204 Abd Pain MDM includes but is not limited to: Cholecystitis, choledocholithiasis, cholangitis, peritonitis, pancreatitis/pseudocyst, peptic ulcer, bowel obstruction/ileus, constipation, diverticulitis/perforation/abscess, inflammatory bowel disease, renal colic/stone, urinary tract  infection/pyelonephritis, prostatitis, mesenteric ischemia, expanding/leaking AAA, testicular/ovarian torsion. [WC]   2222 WBC(!): 18.98  Elevated, CBC is otherwise unremarkable. [WC]   2222 CMP and lipase are unremarkable. [WC]   2222 Urinalysis with slight ketonuria-otherwise unremarkable.  IV fluids infusing. [WC]   2222 Given patient's history of ulcerative colitis, current white count 19, a CT of the abdomen pelvis will be obtained. [WC]   Fri Nov 19, 2021   0126 CT of the abdomen pelvis unrevealing viewed by me and discussed with Dr. Funez.  See dictated report for official interpretation. [WC]   0128 Discussed all lab and/or imaging with the patient.  The patient's abdominal exam has improved.  I explained that the patient should return to the emergency department should the pain recur or for any new symptoms (fever, blood in emesis/stool).  I also advised the patient to follow up with their PMD for further evaluation.  There is nothing on workup to suggest appendicitis, diverticulitis, cholecystitis, pancreatitis, peritonitis, mesenteric ischemia, ovarian/testicular torsion, ureteral stones or any other emergent intra-abdominal process.  My clinical suspicion for serious intra-abdominal pathology is low, and the patient can be safely discharged home for outpatient follow up.   [WC]      ED Course User Index  [WC] Deon Mercado MD       AS OF 01:31 EST VITALS:    BP - 136/68  HR - 72  TEMP - 97.3 °F (36.3 °C)  02 SATS - 99%        DIAGNOSIS  Final diagnoses:   Nausea vomiting and diarrhea   Colitis presumed infectious         DISPOSITION  Discharged           Deon Mercado MD  11/19/21 0131

## 2021-11-19 NOTE — ED NOTES
Pt presents to ED w/ reports of N/V/D since approx 1600 today. Pt states that she vomited multiple times at home prior to arrival and once after arrival. Has not vomited since approx 1830. Pt states vomit looked like food, denies blood in vomit or diarrhea. No abd pain. Pt w/ history of bowel obstruction in August.     Kanika Leon, RN  11/18/21 8122

## 2021-12-01 ENCOUNTER — OFFICE VISIT (OUTPATIENT)
Dept: GASTROENTEROLOGY | Facility: CLINIC | Age: 81
End: 2021-12-01

## 2021-12-01 VITALS
HEIGHT: 62 IN | BODY MASS INDEX: 23.74 KG/M2 | HEART RATE: 62 BPM | SYSTOLIC BLOOD PRESSURE: 157 MMHG | TEMPERATURE: 96.9 F | WEIGHT: 129 LBS | OXYGEN SATURATION: 98 % | DIASTOLIC BLOOD PRESSURE: 80 MMHG

## 2021-12-01 DIAGNOSIS — K21.9 GASTROESOPHAGEAL REFLUX DISEASE WITHOUT ESOPHAGITIS: ICD-10-CM

## 2021-12-01 DIAGNOSIS — D72.828 OTHER ELEVATED WHITE BLOOD CELL (WBC) COUNT: Primary | ICD-10-CM

## 2021-12-01 DIAGNOSIS — K50.00 CROHN'S DISEASE OF SMALL INTESTINE WITHOUT COMPLICATION (HCC): ICD-10-CM

## 2021-12-01 PROCEDURE — 99214 OFFICE O/P EST MOD 30 MIN: CPT | Performed by: INTERNAL MEDICINE

## 2021-12-01 RX ORDER — EZETIMIBE 10 MG/1
10 TABLET ORAL DAILY
COMMUNITY
Start: 2021-10-12

## 2021-12-01 RX ORDER — MESALAMINE 400 MG/1
CAPSULE, DELAYED RELEASE ORAL
COMMUNITY
Start: 2021-10-11 | End: 2021-12-07 | Stop reason: ALTCHOICE

## 2021-12-01 RX ORDER — LOPERAMIDE HYDROCHLORIDE 2 MG/1
2 CAPSULE ORAL 4 TIMES DAILY PRN
COMMUNITY

## 2021-12-01 RX ORDER — PANTOPRAZOLE SODIUM 40 MG/1
40 TABLET, DELAYED RELEASE ORAL DAILY
COMMUNITY
End: 2023-02-22

## 2021-12-01 NOTE — SIGNIFICANT NOTE
12/01/21 1250   COVID-19 Testing   Have you had a positive COVID test anywhere else in the last 90 days? No   COVID-19 Outbreak Screening   Do you currently have a new onset of the following symptoms? No   In the last 14 days, have you had contact with anyone who is ill, has shown any of the symptoms listed above and/or has been diagnosed with 2019 Novel Coronavirus? This includes any immediate household member but excludes any patients with whom you have No   Extended Care Facility Screening   Have you been a resident in an extended care facility OR hospitalized outside of the [place] within the past 12 months? No

## 2021-12-01 NOTE — PROGRESS NOTES
Chief Complaint   Patient presents with   • Follow-up     Crohns   • Fatigue   • Vomiting   • Diarrhea   • mucous in stool       History of Present Illness:   81 y.o. female with a history of Crohn's dating back to 2014.  She had previous bowel resection by Dr. Bella Limon at that time involving the terminal ileum. She was admitted 8/3/21 thru 8/10/21 with abdominal pain and a partial small bowel obstruction.  Patient was put on steroids and seemed to do well.  It was unknown if her small bowel obstruction was from Crohn's or from scar tissue?  I last saw her in the office on 8/30/2021.  My assessment and plan at that time was as follows:  Assessment:  1. history of Crohn's dating back to 2014.  She had previous bowel resection by Dr. Bella Limon at that time involving the terminal ileum.  2.  History of elevated liver function test.  She had an MRCP in 4 of 2021 that was unrevealing.  3.  History of recent small bowel obstruction.  Was this from Crohn's disease versus adhesions.  Prior to her last admission she was on mesalamine only. Do I want to start her on something more aggressive like Humira at 80 yrs of age?  4.  Patient is on Eliquis because of atrial fibrillation.  5.     Recommendations:  1. Colonoscopy. I would want her off the Eliquis for 3 days prior. She doesn't want it now because of her heart troubles.   2. We discussed what to put her on. We may put her back on Mesalamine because it is well tolerated and doesn't have lots of the side effects that the biologics can have.   3. Restart the Mesalamine 4/day.  4. Start weaning the prednisone.   5. F/u 3 mos. We will talk to her about colestipol - should we restart it?  6. Labs:       She was seen in the emergency room on 11/19/2021 with abdominal pain nausea vomiting and diarrhea.  CAT scan of the abdomen and pelvis was unrevealing except for colitis. She was put on flagyl..  Lab work was unrevealing except Her white count was 18.9.  She  finished the prednisone sometime in 9/21.        Now she is feeling fine. She has her regular watery diarrhea: 2-4 BM/day. No rectal bleeding or melena. No abdominal pain. No naussea or vomting. No fevers, chills. Weight stable.       She last had an EGD and colonoscopy in 2019.     Past Medical History:   Diagnosis Date   • Anemia    • Arthritis    • Crohn disease (HCC)    • GERD (gastroesophageal reflux disease)    • History of transfusion    • Hyperlipidemia    • Hypertension    • Ocular migraine    • PAF (paroxysmal atrial fibrillation) (HCC)     with rapid ventricular rate   • TIA (transient ischemic attack)    • UTI (urinary tract infection) 07/21/2021       Past Surgical History:   Procedure Laterality Date   • APPENDECTOMY     • CHOLECYSTECTOMY     • COLECTOMY PARTIAL / TOTAL     • COLONOSCOPY  08/20/2015    s/p right hemicolectomy, recurrent Crohns, IH   • COLONOSCOPY N/A 8/9/2019    Crohns, IH.     • ENDOSCOPY  08/20/2015    erythematous mucosa in stomach, chronic gastritis,    • ENDOSCOPY N/A 4/28/2019    Erythematous mucosa in stomach, path benign   • SPLENECTOMY  2009         Current Outpatient Medications:   •  apixaban (ELIQUIS) 5 MG tablet tablet, Take 1 tablet by mouth Every 12 (Twelve) Hours. Indications: Atrial Fibrillation, Disp: 180 tablet, Rfl: 3  •  atenolol (TENORMIN) 50 MG tablet, Take 1 tablet by mouth 2 (two) times a day., Disp: 180 tablet, Rfl: 3  •  Cyanocobalamin (VITAMIN B 12 PO), Take  by mouth., Disp: , Rfl:   •  diazepam (VALIUM) 5 MG tablet, Take 2.5 mg by mouth Every Morning., Disp: , Rfl:   •  ezetimibe (ZETIA) 10 MG tablet, Take 10 mg by mouth Daily., Disp: , Rfl:   •  loperamide (Imodium A-D) 2 MG capsule, Take 2 mg by mouth 4 (Four) Times a Day As Needed for Diarrhea. prn, Disp: , Rfl:   •  mesalamine (DELZICOL) 400 MG capsule delayed-release delayed release capsule, , Disp: , Rfl:   •  Multiple Vitamins-Minerals (MULTI COMPLETE PO), Take  by mouth., Disp: , Rfl:   •   ondansetron ODT (ZOFRAN ODT) 4 MG disintegrating tablet, Take 1 tablet by mouth Every 8 (Eight) Hours As Needed for Nausea or Vomiting., Disp: 15 tablet, Rfl: 0  •  pantoprazole (PROTONIX) 40 MG EC tablet, Take 40 mg by mouth Daily., Disp: , Rfl:   •  potassium chloride (MICRO-K) 10 MEQ CR capsule, take 1 capsule by mouth once daily, Disp: , Rfl: 0  •  metroNIDAZOLE (FLAGYL) 250 MG tablet, Take 1 tablet by mouth 3 (Three) Times a Day., Disp: 30 tablet, Rfl: 0  •  ondansetron ODT (ZOFRAN-ODT) 8 MG disintegrating tablet, Place 1 tablet under the tongue Every 8 (Eight) Hours As Needed for Nausea or Vomiting., Disp: 20 tablet, Rfl: 0  •  predniSONE (DELTASONE) 10 MG tablet, Take 30mg daily for one week .  Then stay on 20mg by mouth daily, Disp: 120 tablet, Rfl: 3    Allergies   Allergen Reactions   • Amitriptyline Confusion   • Mysoline [Primidone] Confusion       History reviewed. No pertinent family history.    Social History     Socioeconomic History   • Marital status: Single   Tobacco Use   • Smoking status: Never Smoker   • Smokeless tobacco: Never Used   Substance and Sexual Activity   • Alcohol use: No   • Drug use: No   • Sexual activity: Defer       Review of Systems   Gastrointestinal: Negative for abdominal pain.   All other systems reviewed and are negative.    Pertinent positives and negatives documented in the HPI and all other systems reviewed and were found to be negative.  Vitals:    12/01/21 1312   BP: 157/80   Pulse: 62   Temp: 96.9 °F (36.1 °C)   SpO2: 98%       Physical Exam  Vitals reviewed.   Constitutional:       General: She is not in acute distress.     Appearance: Normal appearance. She is well-developed. She is not diaphoretic.   HENT:      Head: Normocephalic and atraumatic. Hair is normal.      Right Ear: Hearing, tympanic membrane, ear canal and external ear normal. No decreased hearing noted. No drainage.      Left Ear: Hearing, tympanic membrane, ear canal and external ear normal. No  decreased hearing noted.      Nose: Nose normal. No nasal deformity.      Mouth/Throat:      Mouth: Mucous membranes are moist.   Eyes:      General: Lids are normal.         Right eye: No discharge.         Left eye: No discharge.      Extraocular Movements: Extraocular movements intact.      Conjunctiva/sclera: Conjunctivae normal.      Pupils: Pupils are equal, round, and reactive to light.   Neck:      Thyroid: No thyromegaly.      Vascular: No JVD.      Trachea: No tracheal deviation.   Cardiovascular:      Rate and Rhythm: Normal rate and regular rhythm.      Pulses: Normal pulses.      Heart sounds: Normal heart sounds. No murmur heard.  No friction rub. No gallop.    Pulmonary:      Effort: Pulmonary effort is normal. No respiratory distress.      Breath sounds: Normal breath sounds. No wheezing or rales.   Chest:      Chest wall: No tenderness.   Abdominal:      General: Bowel sounds are normal. There is no distension.      Palpations: Abdomen is soft. There is no mass.      Tenderness: There is no abdominal tenderness. There is no guarding or rebound.      Hernia: No hernia is present.   Musculoskeletal:         General: No tenderness or deformity. Normal range of motion.      Cervical back: Normal range of motion and neck supple.   Lymphadenopathy:      Cervical: No cervical adenopathy.   Skin:     General: Skin is warm and dry.      Findings: No erythema or rash.   Neurological:      Mental Status: She is alert and oriented to person, place, and time.      Cranial Nerves: No cranial nerve deficit.      Motor: No abnormal muscle tone.      Coordination: Coordination normal.      Deep Tendon Reflexes: Reflexes are normal and symmetric. Reflexes normal.   Psychiatric:         Mood and Affect: Mood normal.         Behavior: Behavior normal.         Thought Content: Thought content normal.         Judgment: Judgment normal.         Diagnoses and all orders for this visit:    1. Other elevated white blood cell  (WBC) count (Primary)  -     CBC & Differential    2. Crohn's disease of small intestine without complication (HCC)  -     CBC & Differential    3. Gastroesophageal reflux disease without esophagitis  -     CBC & Differential      Assessment:  1. history of Crohn's dating back to 2014.  She had previous bowel resection by Dr. Bella Limon at that time involving the terminal ileum. She is on mesalamine 2 BID.  2. Elevated WBC  3. On Eliquis  4.     Recommendations:  1. Take imodium 1 BID.  2. Continue the Mesalamine 2 BID  3. CBC  4. F/u 3 mos.     Return in about 3 months (around 3/1/2022).    Marcel Ricketts MD  12/1/2021

## 2021-12-02 LAB
BASOPHILS # BLD AUTO: 0.1 X10E3/UL (ref 0–0.2)
BASOPHILS NFR BLD AUTO: 1 %
EOSINOPHIL # BLD AUTO: 0.2 X10E3/UL (ref 0–0.4)
EOSINOPHIL NFR BLD AUTO: 3 %
ERYTHROCYTE [DISTWIDTH] IN BLOOD BY AUTOMATED COUNT: 13.4 % (ref 11.7–15.4)
HCT VFR BLD AUTO: 36.8 % (ref 34–46.6)
HGB BLD-MCNC: 12.2 G/DL (ref 11.1–15.9)
IMM GRANULOCYTES # BLD AUTO: 0 X10E3/UL (ref 0–0.1)
IMM GRANULOCYTES NFR BLD AUTO: 0 %
LYMPHOCYTES # BLD AUTO: 1.9 X10E3/UL (ref 0.7–3.1)
LYMPHOCYTES NFR BLD AUTO: 23 %
MCH RBC QN AUTO: 29.1 PG (ref 26.6–33)
MCHC RBC AUTO-ENTMCNC: 33.2 G/DL (ref 31.5–35.7)
MCV RBC AUTO: 88 FL (ref 79–97)
MONOCYTES # BLD AUTO: 0.6 X10E3/UL (ref 0.1–0.9)
MONOCYTES NFR BLD AUTO: 8 %
NEUTROPHILS # BLD AUTO: 5.4 X10E3/UL (ref 1.4–7)
NEUTROPHILS NFR BLD AUTO: 65 %
PLATELET # BLD AUTO: 290 X10E3/UL (ref 150–450)
RBC # BLD AUTO: 4.19 X10E6/UL (ref 3.77–5.28)
WBC # BLD AUTO: 8.3 X10E3/UL (ref 3.4–10.8)

## 2021-12-06 ENCOUNTER — TELEPHONE (OUTPATIENT)
Dept: GASTROENTEROLOGY | Facility: CLINIC | Age: 81
End: 2021-12-06

## 2021-12-06 ENCOUNTER — OFFICE VISIT (OUTPATIENT)
Dept: CARDIOLOGY | Facility: CLINIC | Age: 81
End: 2021-12-06

## 2021-12-06 VITALS
SYSTOLIC BLOOD PRESSURE: 138 MMHG | BODY MASS INDEX: 23.74 KG/M2 | WEIGHT: 129 LBS | DIASTOLIC BLOOD PRESSURE: 80 MMHG | HEIGHT: 62 IN | HEART RATE: 60 BPM

## 2021-12-06 DIAGNOSIS — I48.19 ATRIAL FIBRILLATION, PERSISTENT (HCC): Primary | ICD-10-CM

## 2021-12-06 DIAGNOSIS — I10 ESSENTIAL HYPERTENSION: ICD-10-CM

## 2021-12-06 PROBLEM — R10.84 GENERALIZED ABDOMINAL PAIN: Status: RESOLVED | Noted: 2019-04-27 | Resolved: 2021-12-06

## 2021-12-06 PROBLEM — K56.609 SMALL BOWEL OBSTRUCTION (HCC): Status: RESOLVED | Noted: 2021-08-03 | Resolved: 2021-12-06

## 2021-12-06 PROBLEM — R14.0 BLOATING: Status: RESOLVED | Noted: 2019-06-25 | Resolved: 2021-12-06

## 2021-12-06 PROCEDURE — 93000 ELECTROCARDIOGRAM COMPLETE: CPT | Performed by: INTERNAL MEDICINE

## 2021-12-06 PROCEDURE — 99214 OFFICE O/P EST MOD 30 MIN: CPT | Performed by: INTERNAL MEDICINE

## 2021-12-06 RX ORDER — METOPROLOL TARTRATE 50 MG/1
50 TABLET, FILM COATED ORAL 2 TIMES DAILY
Qty: 180 TABLET | Refills: 3
Start: 2021-12-06 | End: 2022-11-22 | Stop reason: HOSPADM

## 2021-12-06 NOTE — PROGRESS NOTES
12/06/21  Please tell her that her lab work from 12/1/2021 showed that she has a normal CBC with a normal white count.  This is good.  Please send a copy of this report to her PCP.  Dai law

## 2021-12-06 NOTE — TELEPHONE ENCOUNTER
----- Message from Lo Alfaro Rep sent at 12/6/2021 11:45 AM EST -----  Alyssa from [facility] pharmacy, [phone], calling in regards to a med this pt takes, will need to speak to someone.

## 2021-12-06 NOTE — PROGRESS NOTES
Date of Office Visit: 21  Encounter Provider: Juan Damon MD  Place of Service: [facility] CARDIOLOGY  Patient Name: Michela Aguilar  :1940    Chief Complaint   Patient presents with   • Atrial Fibrillation     3 month f/u    :     HPI:     Ms. Aguilar is 81 y.o. and presents today to follow up.  I have reviewed prior notes and there are no changes except for any new updates described below. I have also reviewed any information entered into the medical record by the patient or by ancillary staff.     In 2021, she presented to [facility] ED with a small bowel obstruction; she was incidentally noted to be in atrial fibrillation, which was asymptomatic. An echo was unremarkable. She was started on apixaban and diltiazem and atenolol. She had been on metoprolol before surgery.    She denies any complaints at all. She denies bleeding. She is having vivid dreams on atenolol and would like to switch back to metoprolol. We have stopped diltiazem due to low heart rate.    Past Medical History:   Diagnosis Date   • Anemia    • Arthritis    • Crohn disease (HCC)    • GERD (gastroesophageal reflux disease)    • History of transfusion    • Hyperlipidemia    • Hypertension    • Ocular migraine    • PAF (paroxysmal atrial fibrillation) (HCC)     with rapid ventricular rate   • Small bowel obstruction (HCC) 8/3/2021   • TIA (transient ischemic attack)    • UTI (urinary tract infection) 2021       Past Surgical History:   Procedure Laterality Date   • APPENDECTOMY     • CHOLECYSTECTOMY     • COLECTOMY PARTIAL / TOTAL     • COLONOSCOPY  2015    s/p right hemicolectomy, recurrent Crohns, IH   • COLONOSCOPY N/A 2019    Crohns, IH.     • ENDOSCOPY  2015    erythematous mucosa in stomach, chronic gastritis,    • ENDOSCOPY N/A 2019    Erythematous mucosa in stomach, path benign   • SPLENECTOMY         Social History     Socioeconomic History   • Marital  status: Single   Tobacco Use   • Smoking status: Never Smoker   • Smokeless tobacco: Never Used   Substance and Sexual Activity   • Alcohol use: No   • Drug use: No   • Sexual activity: Defer       History reviewed. No pertinent family history.    Review of Systems   All other systems reviewed and are negative.      Allergies   Allergen Reactions   • Amitriptyline Confusion   • Mysoline [Primidone] Confusion         Current Outpatient Medications:   •  apixaban (ELIQUIS) 5 MG tablet tablet, Take 1 tablet by mouth Every 12 (Twelve) Hours. Indications: Atrial Fibrillation, Disp: 180 tablet, Rfl: 3  •  atenolol (TENORMIN) 50 MG tablet, Take 1 tablet by mouth 2 (two) times a day., Disp: 180 tablet, Rfl: 3  •  Cyanocobalamin (VITAMIN B 12 PO), Take  by mouth., Disp: , Rfl:   •  diazepam (VALIUM) 5 MG tablet, Take 2.5 mg by mouth Every Morning., Disp: , Rfl:   •  ezetimibe (ZETIA) 10 MG tablet, Take 10 mg by mouth Daily., Disp: , Rfl:   •  loperamide (Imodium A-D) 2 MG capsule, Take 2 mg by mouth 4 (Four) Times a Day As Needed for Diarrhea. prn, Disp: , Rfl:   •  mesalamine (DELZICOL) 400 MG capsule delayed-release delayed release capsule, , Disp: , Rfl:   •  metroNIDAZOLE (FLAGYL) 250 MG tablet, Take 1 tablet by mouth 3 (Three) Times a Day., Disp: 30 tablet, Rfl: 0  •  Multiple Vitamins-Minerals (MULTI COMPLETE PO), Take  by mouth., Disp: , Rfl:   •  ondansetron ODT (ZOFRAN ODT) 4 MG disintegrating tablet, Take 1 tablet by mouth Every 8 (Eight) Hours As Needed for Nausea or Vomiting., Disp: 15 tablet, Rfl: 0  •  ondansetron ODT (ZOFRAN-ODT) 8 MG disintegrating tablet, Place 1 tablet under the tongue Every 8 (Eight) Hours As Needed for Nausea or Vomiting., Disp: 20 tablet, Rfl: 0  •  pantoprazole (PROTONIX) 40 MG EC tablet, Take 40 mg by mouth Daily., Disp: , Rfl:   •  potassium chloride (MICRO-K) 10 MEQ CR capsule, take 1 capsule by mouth once daily, Disp: , Rfl: 0  •  predniSONE (DELTASONE) 10 MG tablet, Take 30mg daily  "for one week .  Then stay on 20mg by mouth daily, Disp: 120 tablet, Rfl: 3      Objective:     Vitals:    12/06/21 1048   BP: 138/80   BP Location: Left arm   Patient Position: Sitting   Cuff Size: Adult   Pulse: 60   Weight: 58.5 kg (129 lb)   Height: 157.5 cm (62\")     Body mass index is 23.59 kg/m².    Vitals reviewed.   Constitutional:       Appearance: Healthy appearance. Well-developed and not in distress.   Eyes:      Conjunctiva/sclera: Conjunctivae normal.      Pupils: Pupils are equal, round, and reactive to light.   HENT:      Head: Normocephalic.      Nose: Nose normal.         Comments: Masked  Neck:      Vascular: No JVD.      Lymphadenopathy: No cervical adenopathy.   Pulmonary:      Effort: Pulmonary effort is normal.      Breath sounds: Normal breath sounds.   Cardiovascular:      Normal rate. Regular rhythm.      Murmurs: There is no murmur.   Pulses:     Intact distal pulses.   Edema:     Peripheral edema absent.   Abdominal:      Palpations: Abdomen is soft.      Tenderness: There is no abdominal tenderness.   Musculoskeletal: Normal range of motion.      Cervical back: Normal range of motion. Skin:     General: Skin is warm and dry.      Findings: No rash.   Neurological:      General: No focal deficit present.      Mental Status: Alert, oriented to person, place, and time and oriented to person, place and time.      Cranial Nerves: No cranial nerve deficit.   Psychiatric:         Behavior: Behavior normal.         Thought Content: Thought content normal.         Judgment: Judgment normal.           ECG 12 Lead    Date/Time: 12/6/2021 11:04 AM  Performed by: Juan Damon MD  Authorized by: Juan Damon MD   Comparison: compared with previous ECG   Similar to previous ECG  Rhythm: sinus rhythm  Conduction: conduction normal  ST Segments: ST segments normal  T Waves: T waves normal  QRS axis: normal  Other: no other findings    Clinical impression: normal ECG              Assessment:       " Diagnosis Plan   1. Atrial fibrillation, persistent (HCC)     2. Essential hypertension            Plan:       She has converted to SR. I will switch her from atenolol to metoprolol given her vivid dreams. She will remain on apixaban (5mg BID is the correct dose given her weight/normal renal function).  Her BP is within goal.     Sincerely,       Juan Damon MD

## 2021-12-06 NOTE — TELEPHONE ENCOUNTER
"Please call \"Alyssa\" and ask since Mesalamine is no longer available could we use Pentasa 500 mg 2 po BID. If so then you could send this prescription to whichever pharmacy the patient wants. Also please call the patient and tell her that her insurance is saying that her Mesalamine will no longer be covered. I would recommend that we try the above Pentasa. Pentasa is not the greatest treatment for crohn's but the better treatments (Humira, etc) have too many side effects for an 80 yo. I would like to see if Pentasa would work. It doesn't have many side effects. Thx.kjh  "

## 2021-12-07 RX ORDER — MESALAMINE 500 MG/1
CAPSULE, EXTENDED RELEASE ORAL
Qty: 360 CAPSULE | Refills: 3 | Status: SHIPPED | OUTPATIENT
Start: 2021-12-07 | End: 2022-11-22 | Stop reason: HOSPADM

## 2021-12-07 NOTE — TELEPHONE ENCOUNTER
Call to Alyssa.  Advise per DR Ricketts note.  States pentasa will be covered without need for any tier exception.      States send to [facility] - much cheaper.      Alyssa will contact pt to advise of all of above.      Escribe completed for pentasa per Dr Ricketts order, #

## 2021-12-08 ENCOUNTER — TELEPHONE (OUTPATIENT)
Dept: GASTROENTEROLOGY | Facility: CLINIC | Age: 81
End: 2021-12-08

## 2021-12-08 NOTE — TELEPHONE ENCOUNTER
----- Message from Marcel Ricketts MD sent at 12/6/2021  5:16 PM EST -----  12/06/21  Please tell her that her lab work from 12/1/2021 showed that she has a normal CBC with a normal white count.  This is good.  Please send a copy of this report to her PCP.  Dai kjerwin

## 2021-12-08 NOTE — TELEPHONE ENCOUNTER
Call to pt.  Advise per DR Ricketts note  Verb understanding.     Lab faxed via epic to Dr Roni Mckenzie.

## 2021-12-29 ENCOUNTER — TELEPHONE (OUTPATIENT)
Dept: CARDIOLOGY | Facility: CLINIC | Age: 81
End: 2021-12-29

## 2021-12-29 NOTE — TELEPHONE ENCOUNTER
Pt LVM to retun call, reporting bp log after medication change, missed most days and has multiple readings because she wasn't satisfied with them. Pt is usually is busy and then takes her bp. Explained to her proper ways to take bp after relaxing, continue you log and we will call her back with advise.    DA    12/29/21 130/73 61  Tuesday 12/28/21  131/77 64  Saturday 12/25/21 156/81 60  Friday 12/24/21 130/73 61  Thursday 12/23/21 144/79 61    At last PCP visit on 12/22/21 118/72

## 2021-12-29 NOTE — TELEPHONE ENCOUNTER
Agree.  Check 3 times per week, never within 2 hours of rising. Don't repeat multiple times. After two weeks, call us with an update.    TOOTIE

## 2022-01-17 RX ORDER — AMLODIPINE BESYLATE 2.5 MG/1
2.5 TABLET ORAL DAILY
Qty: 30 TABLET | Refills: 11 | Status: SHIPPED | OUTPATIENT
Start: 2022-01-17 | End: 2022-11-22 | Stop reason: HOSPADM

## 2022-02-07 ENCOUNTER — TELEPHONE (OUTPATIENT)
Dept: CARDIOLOGY | Facility: CLINIC | Age: 82
End: 2022-02-07

## 2022-02-07 NOTE — TELEPHONE ENCOUNTER
Pt called to give an update with her bp since starting the Amlodipine 2.5 mg daily.     01/25 at 11:00 131/78 hr 61     01/27 at 10:50 am 136/78 hr 62     1/29 at 11:00 am 119/71 hr 59    01/31 at 9:55 am 132/76 hr 63     02/02 at 10:40 am 132/76 hr 60     02/4 at 11:00 am 129/78 hr 59

## 2022-03-02 ENCOUNTER — OFFICE VISIT (OUTPATIENT)
Dept: GASTROENTEROLOGY | Facility: CLINIC | Age: 82
End: 2022-03-02

## 2022-03-02 VITALS
WEIGHT: 129 LBS | DIASTOLIC BLOOD PRESSURE: 80 MMHG | TEMPERATURE: 96.5 F | OXYGEN SATURATION: 98 % | HEART RATE: 62 BPM | HEIGHT: 62 IN | SYSTOLIC BLOOD PRESSURE: 136 MMHG | BODY MASS INDEX: 23.74 KG/M2

## 2022-03-02 DIAGNOSIS — K21.9 GASTROESOPHAGEAL REFLUX DISEASE WITHOUT ESOPHAGITIS: ICD-10-CM

## 2022-03-02 DIAGNOSIS — K50.00 CROHN'S DISEASE OF SMALL INTESTINE WITHOUT COMPLICATION: Primary | ICD-10-CM

## 2022-03-02 PROCEDURE — 99213 OFFICE O/P EST LOW 20 MIN: CPT | Performed by: INTERNAL MEDICINE

## 2022-03-02 NOTE — PROGRESS NOTES
Chief Complaint   Patient presents with   • Follow-up     Other elevated white blood cell (WBC) count       History of Present Illness:   81 y.o. female with a history of Crohn's dating back to 2014.  She had previous bowel resection by Dr. Bella Limon at that time involving the terminal ileum. She was admitted 8/3/21 thru 8/10/21 with abdominal pain and a partial small bowel obstruction.  Patient was put on steroids and seemed to do well.  It was unknown if her small bowel obstruction was from Crohn's or from scar tissue?  I last saw her in the office on 12/21:  Assessment:  1. history of Crohn's dating back to 2014.  She had previous bowel resection by Dr. Bella Limon at that time involving the terminal ileum. She is on mesalamine 2 BID.  2. Elevated WBC  3. On Eliquis  4.      Recommendations:  1. Take imodium 1 BID.  2. Continue the Mesalamine 2 BID  3. CBC  4. F/u 3 mos.        She feels good. She has frequent BM's. She has 1-3 BM/day. Stool is formed to watery. No abdominal or chest pain. No melena or recctal bleeding. NO fevers, chills. Weight stable. On Pentasa 500 mg 2 BID. On imodium 1 q AM, may take a second dose later in the day. On Eliquis for a fib.     Past Medical History:   Diagnosis Date   • Anemia    • Arthritis    • Crohn disease (HCC)    • GERD (gastroesophageal reflux disease)    • History of transfusion    • Hyperlipidemia    • Hypertension    • Ocular migraine    • PAF (paroxysmal atrial fibrillation) (HCC)     with rapid ventricular rate   • Small bowel obstruction (HCC) 8/3/2021   • TIA (transient ischemic attack)    • UTI (urinary tract infection) 07/21/2021       Past Surgical History:   Procedure Laterality Date   • APPENDECTOMY     • CHOLECYSTECTOMY     • COLECTOMY PARTIAL / TOTAL     • COLONOSCOPY  08/20/2015    s/p right hemicolectomy, recurrent Crohns, IH   • COLONOSCOPY N/A 8/9/2019    Crohns, IH.     • ENDOSCOPY  08/20/2015    erythematous mucosa in stomach, chronic  gastritis,    • ENDOSCOPY N/A 4/28/2019    Erythematous mucosa in stomach, path benign   • SPLENECTOMY  2009         Current Outpatient Medications:   •  amLODIPine (NORVASC) 2.5 MG tablet, Take 1 tablet by mouth Daily., Disp: 30 tablet, Rfl: 11  •  apixaban (ELIQUIS) 5 MG tablet tablet, Take 1 tablet by mouth 2 (Two) Times a Day. Indications: Atrial Fibrillation, Disp: 180 tablet, Rfl: 3  •  Cyanocobalamin (VITAMIN B 12 PO), Take  by mouth., Disp: , Rfl:   •  diazepam (VALIUM) 5 MG tablet, Take 2.5 mg by mouth Every Morning., Disp: , Rfl:   •  ezetimibe (ZETIA) 10 MG tablet, Take 10 mg by mouth Daily., Disp: , Rfl:   •  loperamide (Imodium A-D) 2 MG capsule, Take 2 mg by mouth 4 (Four) Times a Day As Needed for Diarrhea. prn, Disp: , Rfl:   •  mesalamine (PENTASA) 500 MG CR capsule, Take 2 tablets by mouth twice a day, Disp: 360 capsule, Rfl: 3  •  metoprolol tartrate (LOPRESSOR) 50 MG tablet, Take 1 tablet by mouth 2 (Two) Times a Day., Disp: 180 tablet, Rfl: 3  •  Multiple Vitamins-Minerals (MULTI COMPLETE PO), Take  by mouth., Disp: , Rfl:   •  ondansetron ODT (ZOFRAN ODT) 4 MG disintegrating tablet, Take 1 tablet by mouth Every 8 (Eight) Hours As Needed for Nausea or Vomiting., Disp: 15 tablet, Rfl: 0  •  pantoprazole (PROTONIX) 40 MG EC tablet, Take 40 mg by mouth Daily., Disp: , Rfl:   •  potassium chloride (MICRO-K) 10 MEQ CR capsule, take 1 capsule by mouth once daily, Disp: , Rfl: 0  •  metroNIDAZOLE (FLAGYL) 250 MG tablet, Take 1 tablet by mouth 3 (Three) Times a Day., Disp: 30 tablet, Rfl: 0  •  ondansetron ODT (ZOFRAN-ODT) 8 MG disintegrating tablet, Place 1 tablet under the tongue Every 8 (Eight) Hours As Needed for Nausea or Vomiting., Disp: 20 tablet, Rfl: 0    Allergies   Allergen Reactions   • Amitriptyline Confusion   • Mysoline [Primidone] Confusion       History reviewed. No pertinent family history.    Social History     Socioeconomic History   • Marital status: Single   Tobacco Use   • Smoking  status: Never Smoker   • Smokeless tobacco: Never Used   Substance and Sexual Activity   • Alcohol use: No   • Drug use: No   • Sexual activity: Defer       Review of Systems   Gastrointestinal: Negative for abdominal pain.   All other systems reviewed and are negative.    Pertinent positives and negatives documented in the HPI and all other systems reviewed and were found to be negative.  Vitals:    03/02/22 1310   BP: 136/80   Pulse: 62   Temp: 96.5 °F (35.8 °C)   SpO2: 98%       Physical Exam  Vitals reviewed.   Constitutional:       General: She is not in acute distress.     Appearance: Normal appearance. She is well-developed. She is not diaphoretic.   HENT:      Head: Normocephalic and atraumatic. Hair is normal.      Right Ear: Hearing, tympanic membrane, ear canal and external ear normal. No decreased hearing noted. No drainage.      Left Ear: Hearing, tympanic membrane, ear canal and external ear normal. No decreased hearing noted.      Nose: Nose normal. No nasal deformity.      Mouth/Throat:      Mouth: Mucous membranes are moist.   Eyes:      General: Lids are normal.         Right eye: No discharge.         Left eye: No discharge.      Extraocular Movements: Extraocular movements intact.      Conjunctiva/sclera: Conjunctivae normal.      Pupils: Pupils are equal, round, and reactive to light.   Neck:      Thyroid: No thyromegaly.      Vascular: No JVD.      Trachea: No tracheal deviation.   Cardiovascular:      Rate and Rhythm: Normal rate and regular rhythm.      Pulses: Normal pulses.      Heart sounds: Normal heart sounds. No murmur heard.  No friction rub. No gallop.    Pulmonary:      Effort: Pulmonary effort is normal. No respiratory distress.      Breath sounds: Normal breath sounds. No wheezing or rales.   Chest:      Chest wall: No tenderness.   Abdominal:      General: Bowel sounds are normal. There is no distension.      Palpations: Abdomen is soft. There is no mass.      Tenderness: There is  no abdominal tenderness. There is no guarding or rebound.      Hernia: No hernia is present.   Musculoskeletal:         General: No tenderness or deformity. Normal range of motion.      Cervical back: Normal range of motion and neck supple.   Lymphadenopathy:      Cervical: No cervical adenopathy.   Skin:     General: Skin is warm and dry.      Findings: No erythema or rash.   Neurological:      Mental Status: She is alert and oriented to person, place, and time.      Cranial Nerves: No cranial nerve deficit.      Motor: No abnormal muscle tone.      Coordination: Coordination normal.      Deep Tendon Reflexes: Reflexes are normal and symmetric. Reflexes normal.   Psychiatric:         Mood and Affect: Mood normal.         Behavior: Behavior normal.         Thought Content: Thought content normal.         Judgment: Judgment normal.         Diagnoses and all orders for this visit:    1. Crohn's disease of small intestine without complication (HCC) (Primary)    2. Gastroesophageal reflux disease without esophagitis      Assessment:  1. A fib - on Eliquis  2.  history of Crohn's dating back to 2014.  She had previous bowel resection by Dr. Bella Limon at that time involving the terminal ileum. She is on mesalamine 2 BID.  3. GERD - On pantoprazole 40 mg/day  4.     Recommendations:  1. Continue pentasa and Pantoprazole 40 mg/day.  2. F/u 6 mos    Return in about 6 months (around 9/2/2022).    Marcel Ricketts MD  3/2/2022

## 2022-06-09 ENCOUNTER — OFFICE VISIT (OUTPATIENT)
Dept: CARDIOLOGY | Facility: CLINIC | Age: 82
End: 2022-06-09

## 2022-06-09 VITALS
SYSTOLIC BLOOD PRESSURE: 120 MMHG | DIASTOLIC BLOOD PRESSURE: 72 MMHG | WEIGHT: 129 LBS | HEART RATE: 68 BPM | BODY MASS INDEX: 23.74 KG/M2 | HEIGHT: 62 IN

## 2022-06-09 DIAGNOSIS — I48.0 PAF (PAROXYSMAL ATRIAL FIBRILLATION): Primary | ICD-10-CM

## 2022-06-09 DIAGNOSIS — I10 ESSENTIAL HYPERTENSION: ICD-10-CM

## 2022-06-09 PROBLEM — I48.19 ATRIAL FIBRILLATION, PERSISTENT (HCC): Status: RESOLVED | Noted: 2021-08-05 | Resolved: 2022-06-09

## 2022-06-09 PROCEDURE — 99214 OFFICE O/P EST MOD 30 MIN: CPT | Performed by: NURSE PRACTITIONER

## 2022-06-09 PROCEDURE — 93000 ELECTROCARDIOGRAM COMPLETE: CPT | Performed by: NURSE PRACTITIONER

## 2022-06-09 NOTE — PROGRESS NOTES
Date of Office Visit: 2022  Encounter Provider: YANY Garnett  Place of Service: [facility] CARDIOLOGY  Patient Name: Michela Aguilar  :1940  Primary Cardiologist: Dr. Juan Damon     Chief Complaint   Patient presents with   • Atrial Fibrillation   • Follow-up   :     HPI: Michela Aguilar is a pleasant 81 y.o. female who presents today for follow-up on paroxysmal atrial fibrillation.  I have reviewed her medical records.    In 2021, she presented the [facility] ED with a small bowel obstruction.  Incidentally she was noted to be in asymptomatic atrial fibrillation.  Echocardiogram was unremarkable.  She was started on apixaban, diltiazem, and atenolol.  Her metoprolol was discontinued.    In 2021, she followed up with Dr. Damon.  She was having vivid dreams on atenolol and wanted to switch back to metoprolol.  Diltiazem was discontinued due to low heart rate.  She remained in normal sinus rhythm and was recommended continue with apixaban.    Today is her 6-month follow-up visit.  She says on occasion she will feel some brief skipped heartbeats.  On , she was feeling skipped heartbeats and her blood pressure remained elevated for about 4 hours in the 180-190s/upper 80s.  She checked the following day and her blood pressure was 123/69 and again the following day 125/69.  She occasionally experiences some shortness of breath if she walks too far.  She denies chest pain, edema, dizziness, syncope, or bleeding.      Past Medical History:   Diagnosis Date   • Anemia    • Arthritis    • Crohn disease (HCC)    • GERD (gastroesophageal reflux disease)    • History of transfusion    • Hyperlipidemia    • Hypertension    • Ocular migraine    • PAF (paroxysmal atrial fibrillation) ([facility])     with rapid ventricular rate   • Small bowel obstruction (HCC) 8/3/2021   • TIA (transient ischemic attack)    • UTI (urinary tract infection) 2021       Past  Surgical History:   Procedure Laterality Date   • APPENDECTOMY     • CHOLECYSTECTOMY     • COLECTOMY PARTIAL / TOTAL     • COLONOSCOPY  08/20/2015    s/p right hemicolectomy, recurrent Crohns, IH   • COLONOSCOPY N/A 8/9/2019    Crohns, IH.     • ENDOSCOPY  08/20/2015    erythematous mucosa in stomach, chronic gastritis,    • ENDOSCOPY N/A 4/28/2019    Erythematous mucosa in stomach, path benign   • SPLENECTOMY  2009       Social History     Socioeconomic History   • Marital status: Single   Tobacco Use   • Smoking status: Never Smoker   • Smokeless tobacco: Never Used   Substance and Sexual Activity   • Alcohol use: No     Comment: caffiene use coffee daily   • Drug use: No   • Sexual activity: Defer       History reviewed. No pertinent family history.    The following portion of the patient's history were reviewed and updated as appropriate: past medical history, past surgical history, past social history, past family history, allergies, current medications, and problem list.    Review of Systems   Constitutional: Negative.   Cardiovascular: Positive for dyspnea on exertion, irregular heartbeat and palpitations.   Respiratory: Negative.    Hematologic/Lymphatic: Negative.    Neurological: Negative.        Allergies   Allergen Reactions   • Amitriptyline Confusion   • Mysoline [Primidone] Confusion         Current Outpatient Medications:   •  amLODIPine (NORVASC) 2.5 MG tablet, Take 1 tablet by mouth Daily., Disp: 30 tablet, Rfl: 11  •  apixaban (ELIQUIS) 5 MG tablet tablet, Take 1 tablet by mouth 2 (Two) Times a Day. Indications: Atrial Fibrillation, Disp: 180 tablet, Rfl: 3  •  Cyanocobalamin (VITAMIN B 12 PO), Take  by mouth., Disp: , Rfl:   •  diazepam (VALIUM) 5 MG tablet, Take 2.5 mg by mouth Every Morning., Disp: , Rfl:   •  ezetimibe (ZETIA) 10 MG tablet, Take 10 mg by mouth Daily., Disp: , Rfl:   •  loperamide (IMODIUM) 2 MG capsule, Take 2 mg by mouth 4 (Four) Times a Day As Needed for Diarrhea. prn, Disp:  ", Rfl:   •  mesalamine (PENTASA) 500 MG CR capsule, Take 2 tablets by mouth twice a day, Disp: 360 capsule, Rfl: 3  •  metoprolol tartrate (LOPRESSOR) 50 MG tablet, Take 1 tablet by mouth 2 (Two) Times a Day., Disp: 180 tablet, Rfl: 3  •  Multiple Vitamins-Minerals (MULTI COMPLETE PO), Take  by mouth., Disp: , Rfl:   •  ondansetron ODT (ZOFRAN ODT) 4 MG disintegrating tablet, Take 1 tablet by mouth Every 8 (Eight) Hours As Needed for Nausea or Vomiting., Disp: 15 tablet, Rfl: 0  •  pantoprazole (PROTONIX) 40 MG EC tablet, Take 40 mg by mouth Daily., Disp: , Rfl:   •  potassium chloride (MICRO-K) 10 MEQ CR capsule, take 1 capsule by mouth once daily, Disp: , Rfl: 0        Objective:     Vitals:    06/09/22 1046 06/09/22 1059 06/09/22 1112   BP: 140/70 148/70 120/72   BP Location: Left arm Right arm Left arm   Patient Position:   Sitting   Pulse: 68     Weight: 58.5 kg (129 lb)     Height: 157.5 cm (62\")       Body mass index is 23.59 kg/m².    PHYSICAL EXAM:    Vitals Reviewed.   General Appearance: No acute distress, well developed and well nourished.    Eyes: Conjunctiva and lids: No erythema, swelling, or discharge. Sclera non-icteric. Glasses.   HENT: Atraumatic, normocephalic. External eyes, ears, and nose normal. No hearing loss noted. Mucous membranes normal. Lips not cyanotic. Neck supple with no tenderness. Wearing mask.   Respiratory: No signs of respiratory distress. Respiration rhythm and depth normal.   Clear to auscultation. No rales, crackles, rhonchi, or wheezing auscultated.   Cardiovascular:  Jugular Venous Pressure: Normal  Heart Rate and Rhythm: Normal, Heart Sounds: Normal S1 and S2. No S3 or S4 noted.  Murmurs: No murmurs noted. No rubs, thrills, or gallops.   Lower Extremities: No edema noted.  Gastrointestinal:  Abdomen soft, non-distended, non-tender.    Musculoskeletal: Normal movement of extremities.  Skin and Nails: General appearance normal. No pallor, cyanosis, diaphoresis. Skin " temperature normal. No clubbing of fingernails.   Psychiatric: Patient alert and oriented to person, place, and time. Speech and behavior appropriate. Normal mood and affect.       ECG 12 Lead    Date/Time: 6/9/2022 10:56 AM  Performed by: Calista Mcclure APRN  Authorized by: Calista Mcclure APRN   Comparison: compared with previous ECG from 12/6/2021  Similar to previous ECG  Rhythm: sinus rhythm  Rate: normal  BPM: 68  Conduction: conduction normal  ST Segments: ST segments normal  T Waves: T waves normal  QRS axis: normal  Other: no other findings    Clinical impression: normal ECG              Assessment:       Diagnosis Plan   1. PAF (paroxysmal atrial fibrillation) ([facility])     2. Essential hypertension            Plan:       1.  Paroxysmal Atrial Fibrillation: Remains in normal sinus rhythm.  Continue with metoprolol and apixaban.  Denies bleeding.    Atrial Fibrillation and Atrial Flutter  Assessment  • The patient has paroxysmal atrial fibrillation  • This is non-valvular in etiology  • The patient's CHADS2-VASc score is 4  • A [id number] score of 2 or more is considered a high risk for a thromboembolic event  • Apixaban prescribed      2.  Hypertension: Blood pressure normal on second check.    3.  I recommended a 1 year follow-up visit with Dr. Juan Damon.       As always, it has been a pleasure to participate in your patient's care. Thank you.       Sincerely,         YANY Chen  [facility] Cardiology      · Dictated utilizing Dragon Dictation  · COVID-19 Precautions - Patient was compliant in wearing a mask. When I saw the patient, I used appropriate personal protective equipment (PPE) including mask and eye shield (standard procedure).  Additionally, I used gown and gloves if indicated.  Hand hygiene was completed before and after seeing the patient.  · I spent  minutes reviewing her medical records/testing/previous office notes/labs, face-to-face interaction with  patient, physical examination, formulating the plan of care, and discussion of plan of care with patient.

## 2022-09-07 ENCOUNTER — OFFICE VISIT (OUTPATIENT)
Dept: GASTROENTEROLOGY | Facility: CLINIC | Age: 82
End: 2022-09-07

## 2022-09-07 VITALS
HEART RATE: 64 BPM | WEIGHT: 130 LBS | TEMPERATURE: 97.7 F | SYSTOLIC BLOOD PRESSURE: 125 MMHG | OXYGEN SATURATION: 98 % | HEIGHT: 62 IN | BODY MASS INDEX: 23.92 KG/M2 | DIASTOLIC BLOOD PRESSURE: 80 MMHG

## 2022-09-07 DIAGNOSIS — K21.9 GASTROESOPHAGEAL REFLUX DISEASE WITHOUT ESOPHAGITIS: ICD-10-CM

## 2022-09-07 DIAGNOSIS — K50.00 CROHN'S DISEASE OF SMALL INTESTINE WITHOUT COMPLICATION: Primary | ICD-10-CM

## 2022-09-07 PROCEDURE — 99213 OFFICE O/P EST LOW 20 MIN: CPT | Performed by: INTERNAL MEDICINE

## 2022-09-07 NOTE — PROGRESS NOTES
Chief Complaint   Patient presents with   • Crohn's Disease       History of Present Illness:   81 y.o. female with a history of Crohn's dating back to 2014.  She had previous bowel resection by Dr. Bella Limon at that time involving the terminal ileum. She was admitted 8/3/21 thru 8/10/21 with abdominal pain and a partial small bowel obstruction.  Patient was put on steroids and seemed to do well.  It was unknown if her small bowel obstruction was from Crohn's or from scar tissue?  I last saw her in the office 3/22:  Assessment:  1. A fib - on Eliquis  2.  history of Crohn's dating back to 2014.  She had previous bowel resection by Dr. Bella Limon at that time involving the terminal ileum. She is on mesalamine 2 BID.  3. GERD - On pantoprazole 40 mg/day  4.      Recommendations:  1. Continue pentasa and Pantoprazole 40 mg/day.  2. F/u 6 mos       She last had a colonoscopy in 8/19 and last had an EGD in 4/19.          She is doing well. NO abdominal or chest pain. No nausea or vomiting. No diarrhea. She may have 3 BM/day. No rectal bleeding or melena. She takes one imodium/day. On Pentasa 2 BID. On protonix 40 mg/day and it helps. No fevers, chills. Weight stable.     Past Medical History:   Diagnosis Date   • Anemia    • Arthritis    • Crohn disease (HCC)    • GERD (gastroesophageal reflux disease)    • History of transfusion    • Hyperlipidemia    • Hypertension    • Ocular migraine    • PAF (paroxysmal atrial fibrillation) (HCC)     with rapid ventricular rate   • Small bowel obstruction (HCC) 8/3/2021   • TIA (transient ischemic attack)    • UTI (urinary tract infection) 07/21/2021       Past Surgical History:   Procedure Laterality Date   • APPENDECTOMY     • CHOLECYSTECTOMY     • COLECTOMY PARTIAL / TOTAL     • COLONOSCOPY  08/20/2015    s/p right hemicolectomy, recurrent Crohns, IH   • COLONOSCOPY N/A 8/9/2019    Crohns, IH.     • ENDOSCOPY  08/20/2015    erythematous mucosa in stomach, chronic  gastritis,    • ENDOSCOPY N/A 4/28/2019    Erythematous mucosa in stomach, path benign   • SPLENECTOMY  2009         Current Outpatient Medications:   •  amLODIPine (NORVASC) 2.5 MG tablet, Take 1 tablet by mouth Daily., Disp: 30 tablet, Rfl: 11  •  apixaban (ELIQUIS) 5 MG tablet tablet, Take 1 tablet by mouth 2 (Two) Times a Day. Indications: Atrial Fibrillation, Disp: 180 tablet, Rfl: 3  •  Cyanocobalamin (VITAMIN B 12 PO), Take  by mouth., Disp: , Rfl:   •  diazepam (VALIUM) 5 MG tablet, Take 2.5 mg by mouth Every Morning., Disp: , Rfl:   •  ezetimibe (ZETIA) 10 MG tablet, Take 10 mg by mouth Daily., Disp: , Rfl:   •  loperamide (IMODIUM) 2 MG capsule, Take 2 mg by mouth 4 (Four) Times a Day As Needed for Diarrhea. prn, Disp: , Rfl:   •  mesalamine (PENTASA) 500 MG CR capsule, Take 2 tablets by mouth twice a day, Disp: 360 capsule, Rfl: 3  •  metoprolol tartrate (LOPRESSOR) 50 MG tablet, Take 1 tablet by mouth 2 (Two) Times a Day., Disp: 180 tablet, Rfl: 3  •  Multiple Vitamins-Minerals (MULTI COMPLETE PO), Take  by mouth., Disp: , Rfl:   •  pantoprazole (PROTONIX) 40 MG EC tablet, Take 40 mg by mouth Daily., Disp: , Rfl:   •  potassium chloride (MICRO-K) 10 MEQ CR capsule, take 1 capsule by mouth once daily, Disp: , Rfl: 0  •  ondansetron ODT (ZOFRAN ODT) 4 MG disintegrating tablet, Take 1 tablet by mouth Every 8 (Eight) Hours As Needed for Nausea or Vomiting., Disp: 15 tablet, Rfl: 0    Allergies   Allergen Reactions   • Amitriptyline Confusion   • Mysoline [Primidone] Confusion       History reviewed. No pertinent family history.    Social History     Socioeconomic History   • Marital status: Single   Tobacco Use   • Smoking status: Never Smoker   • Smokeless tobacco: Never Used   Substance and Sexual Activity   • Alcohol use: No     Comment: caffiene use coffee daily   • Drug use: No   • Sexual activity: Defer       Review of Systems   Gastrointestinal: Negative for abdominal pain.   All other systems  reviewed and are negative.    Pertinent positives and negatives documented in the HPI and all other systems reviewed and were found to be negative.  Vitals:    09/07/22 1254   BP: 125/80   Pulse: 64   Temp: 97.7 °F (36.5 °C)   SpO2: 98%       Physical Exam  Vitals reviewed.   Constitutional:       General: She is not in acute distress.     Appearance: Normal appearance. She is well-developed. She is not diaphoretic.   HENT:      Head: Normocephalic and atraumatic. Hair is normal.      Right Ear: Hearing, tympanic membrane, ear canal and external ear normal. No decreased hearing noted. No drainage.      Left Ear: Hearing, tympanic membrane, ear canal and external ear normal. No decreased hearing noted.      Nose: Nose normal. No nasal deformity.      Mouth/Throat:      Mouth: Mucous membranes are moist.   Eyes:      General: Lids are normal.         Right eye: No discharge.         Left eye: No discharge.      Extraocular Movements: Extraocular movements intact.      Conjunctiva/sclera: Conjunctivae normal.      Pupils: Pupils are equal, round, and reactive to light.   Neck:      Thyroid: No thyromegaly.      Vascular: No JVD.      Trachea: No tracheal deviation.   Cardiovascular:      Rate and Rhythm: Normal rate and regular rhythm.      Pulses: Normal pulses.      Heart sounds: Normal heart sounds. No murmur heard.    No friction rub. No gallop.   Pulmonary:      Effort: Pulmonary effort is normal. No respiratory distress.      Breath sounds: Normal breath sounds. No wheezing or rales.   Chest:      Chest wall: No tenderness.   Abdominal:      General: Bowel sounds are normal. There is no distension.      Palpations: Abdomen is soft. There is no mass.      Tenderness: There is no abdominal tenderness. There is no guarding or rebound.      Hernia: No hernia is present.   Musculoskeletal:         General: No tenderness or deformity. Normal range of motion.      Cervical back: Normal range of motion and neck supple.    Lymphadenopathy:      Cervical: No cervical adenopathy.   Skin:     General: Skin is warm and dry.      Findings: No erythema or rash.   Neurological:      Mental Status: She is alert and oriented to person, place, and time.      Cranial Nerves: No cranial nerve deficit.      Motor: No abnormal muscle tone.      Coordination: Coordination normal.      Deep Tendon Reflexes: Reflexes are normal and symmetric. Reflexes normal.   Psychiatric:         Mood and Affect: Mood normal.         Behavior: Behavior normal.         Thought Content: Thought content normal.         Judgment: Judgment normal.         Diagnoses and all orders for this visit:    1. Crohn's disease of small intestine without complication (HCC) (Primary)    2. Gastroesophageal reflux disease without esophagitis      Assessment:  1. A fib - on Eliquis  2.  history of Crohn's dating back to 2014.  She had previous bowel resection by Dr. Bella Limon at that time involving the terminal ileum. She is on mesalamine 2 BID.  3. GERD - On pantoprazole 40 mg/day  4.       Recommendations:  1. She prefers no colonoscopy for now.   2. Continue the pentasa and protonix.   3. F/u 6 mos.     Return in about 6 months (around 3/7/2023).    Marcel Ricketts MD  9/7/2022

## 2022-11-11 RX ORDER — APIXABAN 5 MG/1
TABLET, FILM COATED ORAL
Qty: 180 TABLET | Refills: 3 | Status: SHIPPED | OUTPATIENT
Start: 2022-11-11

## 2022-11-19 ENCOUNTER — APPOINTMENT (OUTPATIENT)
Dept: CT IMAGING | Facility: HOSPITAL | Age: 82
End: 2022-11-19

## 2022-11-19 ENCOUNTER — INPATIENT HOSPITAL (OUTPATIENT)
Dept: URBAN - METROPOLITAN AREA HOSPITAL 113 | Facility: HOSPITAL | Age: 82
End: 2022-11-19
Payer: MEDICARE

## 2022-11-19 ENCOUNTER — HOSPITAL ENCOUNTER (INPATIENT)
Facility: HOSPITAL | Age: 82
LOS: 3 days | Discharge: HOME | End: 2022-11-22
Attending: EMERGENCY MEDICINE | Admitting: HOSPITALIST

## 2022-11-19 ENCOUNTER — APPOINTMENT (OUTPATIENT)
Dept: GENERAL RADIOLOGY | Facility: HOSPITAL | Age: 82
End: 2022-11-19

## 2022-11-19 DIAGNOSIS — R11.10 VOMITING, UNSPECIFIED: ICD-10-CM

## 2022-11-19 DIAGNOSIS — K56.600 PARTIAL SMALL BOWEL OBSTRUCTION: Primary | ICD-10-CM

## 2022-11-19 DIAGNOSIS — I48.91 ATRIAL FIBRILLATION WITH RVR: ICD-10-CM

## 2022-11-19 DIAGNOSIS — R10.84 GENERALIZED ABDOMINAL PAIN: ICD-10-CM

## 2022-11-19 LAB
ALBUMIN SERPL-MCNC: 4.4 G/DL (ref 3.5–5.2)
ALBUMIN/GLOB SERPL: 1.6 G/DL
ALP SERPL-CCNC: 128 U/L (ref 39–117)
ALT SERPL W P-5'-P-CCNC: 122 U/L (ref 1–33)
ANION GAP SERPL CALCULATED.3IONS-SCNC: 15 MMOL/L (ref 5–15)
AST SERPL-CCNC: 243 U/L (ref 1–32)
BASOPHILS # BLD AUTO: 0.06 10*3/MM3 (ref 0–0.2)
BASOPHILS NFR BLD AUTO: 0.4 % (ref 0–1.5)
BILIRUB SERPL-MCNC: 0.5 MG/DL (ref 0–1.2)
BUN SERPL-MCNC: 12 MG/DL (ref 8–23)
BUN/CREAT SERPL: 12.1 (ref 7–25)
CALCIUM SPEC-SCNC: 10.3 MG/DL (ref 8.6–10.5)
CHLORIDE SERPL-SCNC: 103 MMOL/L (ref 98–107)
CO2 SERPL-SCNC: 24 MMOL/L (ref 22–29)
CREAT SERPL-MCNC: 0.99 MG/DL (ref 0.57–1)
D-LACTATE SERPL-SCNC: 1.7 MMOL/L (ref 0.5–2)
D-LACTATE SERPL-SCNC: 3.3 MMOL/L (ref 0.5–2)
D-LACTATE SERPL-SCNC: 4.7 MMOL/L (ref 0.5–2)
DEPRECATED RDW RBC AUTO: 42.4 FL (ref 37–54)
EGFRCR SERPLBLD CKD-EPI 2021: 57.4 ML/MIN/1.73
EOSINOPHIL # BLD AUTO: 0.05 10*3/MM3 (ref 0–0.4)
EOSINOPHIL NFR BLD AUTO: 0.4 % (ref 0.3–6.2)
ERYTHROCYTE [DISTWIDTH] IN BLOOD BY AUTOMATED COUNT: 13.2 % (ref 12.3–15.4)
FERRITIN SERPL-MCNC: 155 NG/ML (ref 13–150)
GLOBULIN UR ELPH-MCNC: 2.7 GM/DL
GLUCOSE SERPL-MCNC: 125 MG/DL (ref 65–99)
HCT VFR BLD AUTO: 44.8 % (ref 34–46.6)
HGB BLD-MCNC: 14.9 G/DL (ref 12–15.9)
IMM GRANULOCYTES # BLD AUTO: 0.05 10*3/MM3 (ref 0–0.05)
IMM GRANULOCYTES NFR BLD AUTO: 0.4 % (ref 0–0.5)
LIPASE SERPL-CCNC: 47 U/L (ref 13–60)
LYMPHOCYTES # BLD AUTO: 1.78 10*3/MM3 (ref 0.7–3.1)
LYMPHOCYTES NFR BLD AUTO: 12.9 % (ref 19.6–45.3)
MAGNESIUM SERPL-MCNC: 2.2 MG/DL (ref 1.6–2.4)
MCH RBC QN AUTO: 29 PG (ref 26.6–33)
MCHC RBC AUTO-ENTMCNC: 33.3 G/DL (ref 31.5–35.7)
MCV RBC AUTO: 87.2 FL (ref 79–97)
MONOCYTES # BLD AUTO: 0.88 10*3/MM3 (ref 0.1–0.9)
MONOCYTES NFR BLD AUTO: 6.4 % (ref 5–12)
NEUTROPHILS NFR BLD AUTO: 10.95 10*3/MM3 (ref 1.7–7)
NEUTROPHILS NFR BLD AUTO: 79.5 % (ref 42.7–76)
NRBC BLD AUTO-RTO: 0 /100 WBC (ref 0–0.2)
PLATELET # BLD AUTO: 249 10*3/MM3 (ref 140–450)
PMV BLD AUTO: 10.4 FL (ref 6–12)
POTASSIUM SERPL-SCNC: 3.9 MMOL/L (ref 3.5–5.2)
PROT SERPL-MCNC: 7.1 G/DL (ref 6–8.5)
QT INTERVAL: 294 MS
QT INTERVAL: 299 MS
QT INTERVAL: 370 MS
RBC # BLD AUTO: 5.14 10*6/MM3 (ref 3.77–5.28)
SODIUM SERPL-SCNC: 142 MMOL/L (ref 136–145)
TROPONIN T SERPL-MCNC: <0.01 NG/ML (ref 0–0.03)
WBC NRBC COR # BLD: 13.77 10*3/MM3 (ref 3.4–10.8)

## 2022-11-19 PROCEDURE — 87040 BLOOD CULTURE FOR BACTERIA: CPT | Performed by: EMERGENCY MEDICINE

## 2022-11-19 PROCEDURE — 86235 NUCLEAR ANTIGEN ANTIBODY: CPT | Performed by: NURSE PRACTITIONER

## 2022-11-19 PROCEDURE — 74018 RADEX ABDOMEN 1 VIEW: CPT

## 2022-11-19 PROCEDURE — 99285 EMERGENCY DEPT VISIT HI MDM: CPT

## 2022-11-19 PROCEDURE — 80053 COMPREHEN METABOLIC PANEL: CPT | Performed by: EMERGENCY MEDICINE

## 2022-11-19 PROCEDURE — 99222 1ST HOSP IP/OBS MODERATE 55: CPT | Performed by: INTERNAL MEDICINE

## 2022-11-19 PROCEDURE — 84484 ASSAY OF TROPONIN QUANT: CPT | Performed by: EMERGENCY MEDICINE

## 2022-11-19 PROCEDURE — 82728 ASSAY OF FERRITIN: CPT | Performed by: NURSE PRACTITIONER

## 2022-11-19 PROCEDURE — 74177 CT ABD & PELVIS W/CONTRAST: CPT

## 2022-11-19 PROCEDURE — 93010 ELECTROCARDIOGRAM REPORT: CPT | Performed by: INTERNAL MEDICINE

## 2022-11-19 PROCEDURE — 25010000002 HYDROCORTISONE SOD SUC (PF) 100 MG RECONSTITUTED SOLUTION: Performed by: NURSE PRACTITIONER

## 2022-11-19 PROCEDURE — 83690 ASSAY OF LIPASE: CPT | Performed by: EMERGENCY MEDICINE

## 2022-11-19 PROCEDURE — 25010000002 PROMETHAZINE PER 50 MG: Performed by: EMERGENCY MEDICINE

## 2022-11-19 PROCEDURE — 93005 ELECTROCARDIOGRAM TRACING: CPT | Performed by: EMERGENCY MEDICINE

## 2022-11-19 PROCEDURE — 36415 COLL VENOUS BLD VENIPUNCTURE: CPT | Performed by: NURSE PRACTITIONER

## 2022-11-19 PROCEDURE — 25010000002 SODIUM CHLORIDE 0.9 % WITH KCL 20 MEQ 20-0.9 MEQ/L-% SOLUTION: Performed by: HOSPITALIST

## 2022-11-19 PROCEDURE — 99221 1ST HOSP IP/OBS SF/LOW 40: CPT | Performed by: STUDENT IN AN ORGANIZED HEALTH CARE EDUCATION/TRAINING PROGRAM

## 2022-11-19 PROCEDURE — 25010000002 DIGOXIN PER 500 MCG: Performed by: INTERNAL MEDICINE

## 2022-11-19 PROCEDURE — 25010000002 PIPERACILLIN SOD-TAZOBACTAM PER 1 G: Performed by: EMERGENCY MEDICINE

## 2022-11-19 PROCEDURE — 25010000002 ONDANSETRON PER 1 MG: Performed by: EMERGENCY MEDICINE

## 2022-11-19 PROCEDURE — 83735 ASSAY OF MAGNESIUM: CPT | Performed by: EMERGENCY MEDICINE

## 2022-11-19 PROCEDURE — 25010000002 METOCLOPRAMIDE PER 10 MG: Performed by: EMERGENCY MEDICINE

## 2022-11-19 PROCEDURE — 86225 DNA ANTIBODY NATIVE: CPT | Performed by: NURSE PRACTITIONER

## 2022-11-19 PROCEDURE — 83605 ASSAY OF LACTIC ACID: CPT | Performed by: EMERGENCY MEDICINE

## 2022-11-19 PROCEDURE — 85025 COMPLETE CBC W/AUTO DIFF WBC: CPT | Performed by: EMERGENCY MEDICINE

## 2022-11-19 PROCEDURE — 99221 1ST HOSP IP/OBS SF/LOW 40: CPT | Performed by: NURSE PRACTITIONER

## 2022-11-19 PROCEDURE — 25010000002 IOPAMIDOL 61 % SOLUTION: Performed by: EMERGENCY MEDICINE

## 2022-11-19 PROCEDURE — 25010000002 MORPHINE PER 10 MG: Performed by: EMERGENCY MEDICINE

## 2022-11-19 PROCEDURE — 93005 ELECTROCARDIOGRAM TRACING: CPT | Performed by: NURSE PRACTITIONER

## 2022-11-19 RX ORDER — AMLODIPINE BESYLATE 5 MG/1
2.5 TABLET ORAL DAILY
Status: DISCONTINUED | OUTPATIENT
Start: 2022-11-19 | End: 2022-11-19

## 2022-11-19 RX ORDER — DIGOXIN 0.25 MG/ML
125 INJECTION INTRAMUSCULAR; INTRAVENOUS EVERY 8 HOURS
Status: ACTIVE | OUTPATIENT
Start: 2022-11-19 | End: 2022-11-20

## 2022-11-19 RX ORDER — ONDANSETRON 2 MG/ML
4 INJECTION INTRAMUSCULAR; INTRAVENOUS ONCE
Status: DISCONTINUED | OUTPATIENT
Start: 2022-11-19 | End: 2022-11-19

## 2022-11-19 RX ORDER — HYDROMORPHONE HYDROCHLORIDE 1 MG/ML
0.5 INJECTION, SOLUTION INTRAMUSCULAR; INTRAVENOUS; SUBCUTANEOUS ONCE
Status: DISCONTINUED | OUTPATIENT
Start: 2022-11-19 | End: 2022-11-19

## 2022-11-19 RX ORDER — SODIUM CHLORIDE 0.9 % (FLUSH) 0.9 %
10 SYRINGE (ML) INJECTION AS NEEDED
Status: DISCONTINUED | OUTPATIENT
Start: 2022-11-19 | End: 2022-11-22 | Stop reason: HOSPADM

## 2022-11-19 RX ORDER — HYDROMORPHONE HYDROCHLORIDE 1 MG/ML
0.5 INJECTION, SOLUTION INTRAMUSCULAR; INTRAVENOUS; SUBCUTANEOUS 4 TIMES DAILY PRN
Status: DISCONTINUED | OUTPATIENT
Start: 2022-11-19 | End: 2022-11-22

## 2022-11-19 RX ORDER — MORPHINE SULFATE 2 MG/ML
2 INJECTION, SOLUTION INTRAMUSCULAR; INTRAVENOUS EVERY 4 HOURS PRN
Status: DISCONTINUED | OUTPATIENT
Start: 2022-11-19 | End: 2022-11-19

## 2022-11-19 RX ORDER — ONDANSETRON 2 MG/ML
4 INJECTION INTRAMUSCULAR; INTRAVENOUS EVERY 6 HOURS PRN
Status: DISCONTINUED | OUTPATIENT
Start: 2022-11-19 | End: 2022-11-22

## 2022-11-19 RX ORDER — METOCLOPRAMIDE HYDROCHLORIDE 5 MG/ML
10 INJECTION INTRAMUSCULAR; INTRAVENOUS ONCE
Status: DISCONTINUED | OUTPATIENT
Start: 2022-11-19 | End: 2022-11-19

## 2022-11-19 RX ORDER — SODIUM CHLORIDE AND POTASSIUM CHLORIDE 150; 900 MG/100ML; MG/100ML
75 INJECTION, SOLUTION INTRAVENOUS CONTINUOUS
Status: DISCONTINUED | OUTPATIENT
Start: 2022-11-19 | End: 2022-11-20

## 2022-11-19 RX ORDER — ONDANSETRON 2 MG/ML
4 INJECTION INTRAMUSCULAR; INTRAVENOUS ONCE
Status: COMPLETED | OUTPATIENT
Start: 2022-11-19 | End: 2022-11-19

## 2022-11-19 RX ORDER — MORPHINE SULFATE 2 MG/ML
2 INJECTION, SOLUTION INTRAMUSCULAR; INTRAVENOUS ONCE
Status: COMPLETED | OUTPATIENT
Start: 2022-11-19 | End: 2022-11-19

## 2022-11-19 RX ORDER — DIGOXIN 0.25 MG/ML
250 INJECTION INTRAMUSCULAR; INTRAVENOUS ONCE
Status: COMPLETED | OUTPATIENT
Start: 2022-11-19 | End: 2022-11-19

## 2022-11-19 RX ORDER — METOPROLOL TARTRATE 50 MG/1
50 TABLET, FILM COATED ORAL 2 TIMES DAILY
Status: DISCONTINUED | OUTPATIENT
Start: 2022-11-19 | End: 2022-11-21

## 2022-11-19 RX ORDER — METOCLOPRAMIDE HYDROCHLORIDE 5 MG/ML
10 INJECTION INTRAMUSCULAR; INTRAVENOUS ONCE
Status: COMPLETED | OUTPATIENT
Start: 2022-11-19 | End: 2022-11-19

## 2022-11-19 RX ADMIN — PROMETHAZINE HYDROCHLORIDE 12.5 MG: 25 INJECTION, SOLUTION INTRAMUSCULAR; INTRAVENOUS at 09:55

## 2022-11-19 RX ADMIN — SODIUM CHLORIDE 200 ML: 9 INJECTION, SOLUTION INTRAVENOUS at 06:35

## 2022-11-19 RX ADMIN — HYDROCORTISONE SODIUM SUCCINATE 100 MG: 100 INJECTION, POWDER, FOR SOLUTION INTRAMUSCULAR; INTRAVENOUS at 16:45

## 2022-11-19 RX ADMIN — DIGOXIN 250 MCG: 0.25 INJECTION INTRAMUSCULAR; INTRAVENOUS at 15:24

## 2022-11-19 RX ADMIN — IOPAMIDOL 85 ML: 612 INJECTION, SOLUTION INTRAVENOUS at 06:40

## 2022-11-19 RX ADMIN — POTASSIUM CHLORIDE AND SODIUM CHLORIDE 75 ML/HR: 900; 150 INJECTION, SOLUTION INTRAVENOUS at 13:25

## 2022-11-19 RX ADMIN — METOPROLOL TARTRATE 5 MG: 1 INJECTION, SOLUTION INTRAVENOUS at 09:00

## 2022-11-19 RX ADMIN — METOCLOPRAMIDE 10 MG: 5 INJECTION, SOLUTION INTRAMUSCULAR; INTRAVENOUS at 07:57

## 2022-11-19 RX ADMIN — SODIUM CHLORIDE 1000 ML: 9 INJECTION, SOLUTION INTRAVENOUS at 06:52

## 2022-11-19 RX ADMIN — SODIUM CHLORIDE 500 ML: 9 INJECTION, SOLUTION INTRAVENOUS at 05:26

## 2022-11-19 RX ADMIN — METOPROLOL TARTRATE 50 MG: 25 TABLET, FILM COATED ORAL at 17:15

## 2022-11-19 RX ADMIN — POTASSIUM CHLORIDE AND SODIUM CHLORIDE 75 ML/HR: 900; 150 INJECTION, SOLUTION INTRAVENOUS at 15:31

## 2022-11-19 RX ADMIN — MORPHINE SULFATE 2 MG: 2 INJECTION, SOLUTION INTRAMUSCULAR; INTRAVENOUS at 08:27

## 2022-11-19 RX ADMIN — ONDANSETRON 4 MG: 2 INJECTION INTRAMUSCULAR; INTRAVENOUS at 05:27

## 2022-11-19 RX ADMIN — TAZOBACTAM SODIUM AND PIPERACILLIN SODIUM 3.38 G: 375; 3 INJECTION, SOLUTION INTRAVENOUS at 06:36

## 2022-11-19 NOTE — ED NOTES
Nursing report ED to floor  Michela Aguilar  82 y.o.  female    HPI :   Chief Complaint   Patient presents with    Abdominal Pain    Nausea       Admitting doctor:   Drew Cardona MD    Admitting diagnosis:   The primary encounter diagnosis was Partial small bowel obstruction (HCC). A diagnosis of Atrial fibrillation with RVR (HCC) was also pertinent to this visit.    Code status:   Current Code Status       Date Active Code Status Order ID Comments User Context       11/19/2022 1228 CPR (Attempt to Resuscitate) [id number]  Drew Cardona MD ED        Question Answer    Code Status (Patient has no pulse and is not breathing) CPR (Attempt to Resuscitate)    Medical Interventions (Patient has pulse or is breathing) Full Support    Level Of Support Discussed With Patient                    Allergies:   Amitriptyline and Mysoline [primidone]    Isolation:   No active isolations    Intake and Output  No intake or output data in the 24 hours ending 11/19/22 1552    Weight:       11/19/22  0451   Weight: 57.2 kg (126 lb)       Most recent vitals:   Vitals:    11/19/22 1300 11/19/22 1403 11/19/22 1524 11/19/22 1538   BP: 106/92 100/71 92/63    BP Location: Left arm      Patient Position: Sitting      Pulse: (!) 144  (!) 151 (!) 129   Resp: 16      Temp: 97.6 °F (36.4 °C)      TempSrc: Oral      SpO2: 94%   96%   Weight:       Height:           Active LDAs/IV Access:   Lines, Drains & Airways       Active LDAs       Name Placement date Placement time Site Days    Peripheral IV 11/19/22 1003 Anterior;Right Forearm 11/19/22  1003  Forearm  less than 1    NG/OG Tube Nasogastric Right nostril 11/19/22  1230  Right nostril  less than 1                    Labs (abnormal labs have a star):   Labs Reviewed   COMPREHENSIVE METABOLIC PANEL - Abnormal; Notable for the following components:       Result Value    Glucose 125 (*)     ALT (SGPT) 122 (*)     AST (SGOT) 243 (*)     Alkaline Phosphatase 128 (*)     eGFR 57.4 (*)     All other  components within normal limits    Narrative:     GFR Normal >60  Chronic Kidney Disease <60  Kidney Failure <15    The GFR formula is only valid for adults with stable renal function between ages 18 and 70.   LACTIC ACID, PLASMA - Abnormal; Notable for the following components:    Lactate 4.7 (*)     All other components within normal limits   CBC WITH AUTO DIFFERENTIAL - Abnormal; Notable for the following components:    WBC 13.77 (*)     Neutrophil % 79.5 (*)     Lymphocyte % 12.9 (*)     Neutrophils, Absolute 10.95 (*)     All other components within normal limits   LACTIC ACID, REFLEX - Abnormal; Notable for the following components:    Lactate 3.3 (*)     All other components within normal limits   LIPASE - Normal   MAGNESIUM - Normal   TROPONIN (IN-HOUSE) - Normal    Narrative:     Troponin T Reference Range:  <= 0.03 ng/mL-   Negative for AMI  >0.03 ng/mL-     Abnormal for myocardial necrosis.  Clinicians would have to utilize clinical acumen, EKG, Troponin and serial changes to determine if it is an Acute Myocardial Infarction or myocardial injury due to an underlying chronic condition.       Results may be falsely decreased if patient taking Biotin.     BLOOD CULTURE   BLOOD CULTURE   URINALYSIS W/ MICROSCOPIC IF INDICATED (NO CULTURE)   LACTIC ACID, REFLEX   CBC AND DIFFERENTIAL    Narrative:     The following orders were created for panel order CBC & Differential.  Procedure                               Abnormality         Status                     ---------                               -----------         ------                     CBC Auto Differential[[id number]]        Abnormal            Final result                 Please view results for these tests on the individual orders.       EKG:   ECG 12 Lead Tachycardia   Preliminary Result   HEART RATE= 146  bpm   RR Interval= 411  ms   OH Interval=   ms   P Horizontal Axis=   deg   P Front Axis=   deg   QRSD Interval= 78  ms   QT Interval= 294  ms    QRS Axis= 1  deg   T Wave Axis= 1  deg   - ABNORMAL ECG -   Atrial fibrillation with rapid V-rate   ST depression, probably rate related   Electronically Signed By:    Date and Time of Study: 2022-11-19 15:20:09      ECG 12 Lead Rhythm Change   Preliminary Result   HEART RATE= 149  bpm   RR Interval= 403  ms   DE Interval= 88  ms   P Horizontal Axis=   deg   P Front Axis= -21  deg   QRSD Interval= 75  ms   QT Interval= 299  ms   QRS Axis= 56  deg   T Wave Axis= 236  deg   - ABNORMAL ECG -   Supraventricular tachycardia   Repolarization abnormality, prob rate related   Baseline wander in lead(s) I,V2,V3,V4   Electronically Signed By:    Date and Time of Study: 2022-11-19 08:56:59      ECG 12 Lead Other; nausea, vomiting   Final Result   HEART RATE= 73  bpm   RR Interval= 822  ms   DE Interval= 155  ms   P Horizontal Axis= -5  deg   P Front Axis= 40  deg   QRSD Interval= 84  ms   QT Interval= 370  ms   QRS Axis= -15  deg   T Wave Axis= 38  deg   - OTHERWISE NORMAL ECG -   Sinus rhythm   Borderline left axis deviation   Low voltage, precordial leads   When compared with ECG of 04-Aug-2021 8:08:29,   Sinus rhythm has replaced atrial fibrillation   Electronically Signed By: Armani Tello ([facility]) 19-Nov-2022 06:45:53   Date and Time of Study: 2022-11-19 05:23:45          Meds given in ED:   Medications   sodium chloride 0.9 % flush 10 mL (has no administration in time range)   promethazine (PHENERGAN) 12.5 mg in sodium chloride 0.9 % 50 mL (0 mg Intravenous Stopped 11/19/22 1050)   mesalamine (PENTASA) CR capsule 500 mg (500 mg Oral Not Given 11/19/22 1513)   metoprolol tartrate (LOPRESSOR) tablet 50 mg (has no administration in time range)   ondansetron (ZOFRAN) injection 4 mg (has no administration in time range)   sodium chloride 0.9 % with KCl 20 mEq/L infusion (75 mL/hr Intravenous New Bag 11/19/22 1531)   metoprolol tartrate (LOPRESSOR) injection 2.5 mg (has no administration in time range)   HYDROmorphone  (DILAUDID) injection 0.5 mg (has no administration in time range)   digoxin (LANOXIN) injection 250 mcg (250 mcg Intravenous Given 11/19/22 1524)     Followed by   digoxin (LANOXIN) injection 125 mcg (has no administration in time range)   sodium chloride 0.9 % bolus 500 mL (0 mL Intravenous Stopped 11/19/22 0557)   morphine injection 2 mg (2 mg Intravenous Given 11/19/22 0827)   ondansetron (ZOFRAN) injection 4 mg (4 mg Intravenous Given 11/19/22 0527)   piperacillin-tazobactam (ZOSYN) 3.375 g in iso-osmotic dextrose 50 ml (premix) (0 g Intravenous Stopped 11/19/22 0828)   sodium chloride 0.9 % bolus 500 mL (0 mL Intravenous Stopped 11/19/22 0655)   sodium chloride 0.9 % bolus 1,716 mL (0 mL Intravenous Stopped 11/19/22 1327)   iopamidol (ISOVUE-300) 61 % injection 100 mL (85 mL Intravenous Given by Other 11/19/22 0640)   metoclopramide (REGLAN) injection 10 mg (10 mg Intravenous Given 11/19/22 0757)       Imaging results:  CT Abdomen Pelvis With Contrast    Result Date: 11/19/2022   Proximal small bowel is fluid-filled and shows borderline prominent caliber, gradually tapering at the mid aspect to collapsed caliber. This appearance may represent early or partial small bowel obstruction, close follow-up recommended.  This report was finalized on 11/19/2022 7:24 AM by Dr. Randy De Paz M.D.      XR Abdomen KUB    Result Date: 11/19/2022   As described.  This report was finalized on 11/19/2022 1:18 PM by Dr. Randy De Paz M.D.       Ambulatory status:   - [facility] to [facility] [place]    Social issues:   Social History     Socioeconomic History    Marital status: Single   Tobacco Use    Smoking status: Never    Smokeless tobacco: Never   Vaping Use    Vaping Use: Never used   Substance and Sexual Activity    Alcohol use: No     Comment: caffiene use coffee daily    Drug use: No    Sexual activity: Defer       NIH Stroke Scale:         Jessica Wu RN  11/19/22 15:52 EST     Call Jessica #[phone] with any questions

## 2022-11-19 NOTE — CONSULTS
GI CONSULT  NOTE:    Referring Provider:  Dr. Castaneda    Chief complaint: abdominal pain, vomiting    Subjective .     History of present illness: Michela Aguilar is a 82 y.o. female with history of Crohn's disease s/p right hemicolectomy, A. fib, cholecystectomy, appendectomy, splenectomy, and previous partial SBO who presents with complaint of abdominal pain and vomiting for the past day.  Symptoms feel similar to previous bowel obstruction.  She has NG tube in place with bilious output.  No vomiting since NG tube placed.  She had a small bowel movement this morning.  Denies passing flatus.  Bowel sounds are quiet.  Abdomen distended.  She does not feel any better.  She follows with Dr. Serafin weinberg for management of Crohn's disease, maintained on Pentasa.  She had hemicolectomy with Dr. Limon in 2013. Home rx includes Eliquis.    Labs -lactate 3.3, alk phos 128, , , WBC 13.77  11/19/2022 CT AP W -partial small bowel obstruction      Past Medical History:  Past Medical History:   Diagnosis Date   • Anemia    • Arthritis    • Colitis    • Crohn disease (HCC)    • Crohn's disease (HCC)    • GERD (gastroesophageal reflux disease)    • History of transfusion    • Hyperlipidemia    • Hypertension    • Migraine    • Ocular migraine    • Ocular migraine    • PAF (paroxysmal atrial fibrillation) (HCC)     with rapid ventricular rate   • SBO (small bowel obstruction) (HCC)    • SBO (small bowel obstruction) (HCC)    • Small bowel obstruction (HCC) 08/03/2021   • TIA (transient ischemic attack)    • UTI (urinary tract infection) 07/21/2021   • UTI (urinary tract infection)        Past Surgical History:  Past Surgical History:   Procedure Laterality Date   • APPENDECTOMY     • CHOLECYSTECTOMY     • COLECTOMY PARTIAL / TOTAL     • COLONOSCOPY  08/20/2015    s/p right hemicolectomy, recurrent Crohns, IH   • COLONOSCOPY N/A 8/9/2019    Crohns, IH.     • ENDOSCOPY  08/20/2015    erythematous mucosa in stomach, chronic  gastritis,    • ENDOSCOPY N/A 4/28/2019    Erythematous mucosa in stomach, path benign   • SPLENECTOMY  2009       Social History:  Social History     Tobacco Use   • Smoking status: Never   • Smokeless tobacco: Never   Vaping Use   • Vaping Use: Never used   Substance Use Topics   • Alcohol use: No     Comment: caffiene use coffee daily   • Drug use: No       Family History:  History reviewed. No pertinent family history.    Medications:  (Not in a hospital admission)      Scheduled Meds:digoxin, 125 mcg, Intravenous, Q8H  Hydrocortisone Sod Suc (PF), 100 mg, Intravenous, Q8H  mesalamine, 500 mg, Oral, 4x Daily  metoprolol tartrate, 50 mg, Oral, BID      Continuous Infusions:sodium chloride 0.9 % with KCl 20 mEq, 75 mL/hr, Last Rate: 75 mL/hr (11/19/22 1531)      PRN Meds:.•  HYDROmorphone  •  metoprolol tartrate  •  ondansetron  •  promethazine  •  [COMPLETED] Insert Peripheral IV **AND** sodium chloride    ALLERGIES:  Amitriptyline and Mysoline [primidone]    Review of Systems:  Review of Systems   Constitutional: Positive for appetite change.   HENT: Negative.    Respiratory: Negative.    Cardiovascular: Negative.    Gastrointestinal: Positive for abdominal distention, abdominal pain, constipation, nausea and vomiting.   Genitourinary: Negative.    Musculoskeletal: Negative.    Skin: Negative.    Neurological: Negative.    Psychiatric/Behavioral: Negative.          Objective     Vital Signs:   Vitals:    11/19/22 1300 11/19/22 1403 11/19/22 1524 11/19/22 1538   BP: 106/92 100/71 92/63    BP Location: Left arm      Patient Position: Sitting      Pulse: (!) 144  (!) 151 (!) 129   Resp: 16      Temp: 97.6 °F (36.4 °C)      TempSrc: Oral      SpO2: 94%   96%   Weight:       Height:           Physical Exam: resting in bed    General Appearance:    Awake and alert, in no acute distress   Head:    Normocephalic, without obvious abnormality   Throat:   No oral lesions, no thrush, oral mucosa moist   Lungs:      Respirations regular, even and unlabored   Chest Wall:    No abnormalities observed   Abdomen:     Soft, non-tender, distended, hypoactive bowel sounds, no rebound or guarding, no hepatosplenomegaly. Ng tube   Rectal:     Deferred   Extremities:   Moves all extremities, no edema, no cyanosis   Pulses:   Pulses palpable and equal bilaterally   Skin:   No bruising, no rash, no jaundice, normal palpation   Lymph nodes:   No cervical, supraclavicular or submandibular palpable adenopathy   Neurologic:   No asterixis       Results Review:  I reviewed the patient's labs and imaging.     CBC  Results from last 7 days   Lab Units 11/19/22  0521   RBC 10*6/mm3 5.14   WBC 10*3/mm3 13.77*   HEMOGLOBIN g/dL 14.9   PLATELETS 10*3/mm3 249       CMP  Results from last 7 days   Lab Units 11/19/22  0521   SODIUM mmol/L 142   POTASSIUM mmol/L 3.9   CHLORIDE mmol/L 103   CO2 mmol/L 24.0   BUN mg/dL 12   CREATININE mg/dL 0.99   GLUCOSE mg/dL 125*   ALBUMIN g/dL 4.40   BILIRUBIN mg/dL 0.5   ALK PHOS U/L 128*   AST (SGOT) U/L 243*   ALT (SGPT) U/L 122*       Amylase and Lipase  Results from last 7 days   Lab Units 11/19/22  0521   LIPASE U/L 47       CRP         Imaging Results (Last 24 Hours)     Procedure Component Value Units Date/Time    XR Abdomen KUB [[id number]] Collected: 11/19/22 1316     Updated: 11/19/22 1321    Narrative:      XR ABDOMEN KUB-     INDICATIONS: NG tube placement     TECHNIQUE: Frontal view the abdomen     COMPARISON:  image from CT from 07/19/2022     FINDINGS:     NG tube extends to the mid stomach. The bowel gas pattern is  nonspecific. Follow-up as clinically indicated.       Impression:         As described.     This report was finalized on 11/19/2022 1:18 PM by Dr. Randy De Paz M.D.       CT Abdomen Pelvis With Contrast [[id number]] Collected: 11/19/22 0719     Updated: 11/19/22 0727    Narrative:      CT ABDOMEN PELVIS W CONTRAST-     INDICATIONS: Abdominal pain, nausea, vomiting, history of  small bowel  obstruction     TECHNIQUE: Radiation dose reduction techniques were utilized, including  automated exposure control and exposure modulation based on body size.  Enhanced ABDOMEN AND PELVIS CT     COMPARISON: 11/18/2021     FINDINGS:     Pancreas is thinned.     The gallbladder is surgically absent, with mild intrahepatic and  extrahepatic biliary ductal prominence.           Otherwise unremarkable appearance of the liver, spleen, adrenal glands,  pancreas, kidneys, bladder.     Proximal small bowel is fluid-filled and shows borderline prominent  caliber, gradually tapering at the mid aspect to collapsed caliber. This  appearance may represent early or partial small bowel obstruction, close  follow-up recommended. The stomach is distended with fluid.     No free intraperitoneal gas. Mild pelvic free fluid.     Scattered small mesenteric and para-aortic lymph nodes are seen that are  not significant by size criteria.     Abdominal aorta is not aneurysmal. Aortic and other arterial  calcifications are present.     The lung bases show small atelectasis.     Degenerative changes are seen in the spine. No acute fracture is  identified.             Impression:         Proximal small bowel is fluid-filled and shows borderline prominent  caliber, gradually tapering at the mid aspect to collapsed caliber. This  appearance may represent early or partial small bowel obstruction, close  follow-up recommended.     This report was finalized on 11/19/2022 7:24 AM by Dr. Randy De Paz M.D.               ASSESSMENT:  82 y.o. female with history of Crohn's disease s/p right hemicolectomy, A. fib, cholecystectomy, appendectomy, splenectomy, and previous partial SBO who presents with complaint of abdominal pain and vomiting.  -Partial small bowel obstruction - d/t Crohn's disease or surgical history  -Crohn's disease -s/P right hemicolectomy (2013), rx Pentasa  -S/p appendectomy, cholecystectomy, splenectomy  -A. Fib  -Rx Eliquis  -Elevated liver enzymes - new      PLAN:  Begin hydrocortisone 100 mg IV every 8 hours.  Continue NG tube to low intermittent wall suction.  Continue IV fluids and n.p.o. status.  Monitor liver enzymes, check serologies, liver ultrasound.  KUB in the morning  General surgery following.  GI will follow.    We appreciate the referral.    Kaylee Mccullough, APRN  11/19/22  16:00 EST

## 2022-11-19 NOTE — ED PROVIDER NOTES
EMERGENCY DEPARTMENT ENCOUNTER    Room Number:  15/15  Date of encounter:  11/19/2022  PCP: Roni Mckenzie MD  Historian: Patient, sister      HPI:  Chief Complaint: Abdominal pain  A complete HPI/ROS/PMH/PSH/SH/FH are unobtainable due to: None    Context: Michela Aguilar is a 82 y.o. female who presents to the ED c/o abdominal pain.  This is upper abdominal pain that radiates across the upper abdomen.  It began around 11:30 PM last night.  Symptoms are constant but it is waning at this time.  She has had vomiting.  She had a bowel movement around 11 PM and has not passed flatus since then.  She has a history of prior bowel obstructions and has a history of Crohn's disease.  She has had prior intestinal resections.  No fever, precordial chest pain, shortness of breath.      PAST MEDICAL HISTORY  Active Ambulatory Problems     Diagnosis Date Noted   • Crohn's disease of small intestine with other complication (HCC) 04/27/2019   • Crohn disease (HCC) 06/25/2019   • Essential hypertension 08/05/2021   • PAF (paroxysmal atrial fibrillation) (formerly [facility])      Resolved Ambulatory Problems     Diagnosis Date Noted   • Partial small bowel obstruction (HCC) 04/19/2016   • Generalized abdominal pain 04/27/2019   • Bloating 06/25/2019   • Small bowel obstruction (HCC) 08/03/2021   • Atrial fibrillation, persistent (HCC) 08/05/2021     Past Medical History:   Diagnosis Date   • Anemia    • Arthritis    • GERD (gastroesophageal reflux disease)    • History of transfusion    • Hyperlipidemia    • Hypertension    • Ocular migraine    • TIA (transient ischemic attack)    • UTI (urinary tract infection) 07/21/2021         PAST SURGICAL HISTORY  Past Surgical History:   Procedure Laterality Date   • APPENDECTOMY     • CHOLECYSTECTOMY     • COLECTOMY PARTIAL / TOTAL     • COLONOSCOPY  08/20/2015    s/p right hemicolectomy, recurrent Crohns, IH   • COLONOSCOPY N/A 8/9/2019    Crohns, IH.     • ENDOSCOPY  08/20/2015    erythematous mucosa in  stomach, chronic gastritis,    • ENDOSCOPY N/A 4/28/2019    Erythematous mucosa in stomach, path benign   • SPLENECTOMY  2009         FAMILY HISTORY  History reviewed. No pertinent family history.      SOCIAL HISTORY  Social History     Socioeconomic History   • Marital status: Single   Tobacco Use   • Smoking status: Never   • Smokeless tobacco: Never   Vaping Use   • Vaping Use: Never used   Substance and Sexual Activity   • Alcohol use: No     Comment: caffiene use coffee daily   • Drug use: No   • Sexual activity: Defer         ALLERGIES  Amitriptyline and Mysoline [primidone]        REVIEW OF SYSTEMS  Review of Systems     All systems reviewed and negative except for those discussed in HPI.       PHYSICAL EXAM    I have reviewed the triage vital signs and nursing notes.    ED Triage Vitals [11/19/22 0451]   Temp Heart Rate Resp BP SpO2   97.4 °F (36.3 °C) 111 16 167/93 92 %      Temp src Heart Rate Source Patient Position BP Location FiO2 (%)   -- -- -- -- --       Physical Exam  GENERAL: not distressed  HENT: nares patent  EYES: no scleral icterus  CV: regular rhythm, regular rate  RESPIRATORY: normal effort, clear to auscultation bilaterally  ABDOMEN: soft, generalized abdominal tenderness without rebound or guarding  MUSCULOSKELETAL: no deformity  NEURO: alert, moves all extremities, follows commands  SKIN: warm, dry        LAB RESULTS  Recent Results (from the past 24 hour(s))   Comprehensive Metabolic Panel    Collection Time: 11/19/22  5:21 AM    Specimen: Arm, Left; Blood   Result Value Ref Range    Glucose 125 (H) 65 - 99 mg/dL    BUN 12 8 - 23 mg/dL    Creatinine 0.99 0.57 - 1.00 mg/dL    Sodium 142 136 - 145 mmol/L    Potassium 3.9 3.5 - 5.2 mmol/L    Chloride 103 98 - 107 mmol/L    CO2 24.0 22.0 - 29.0 mmol/L    Calcium 10.3 8.6 - 10.5 mg/dL    Total Protein 7.1 6.0 - 8.5 g/dL    Albumin 4.40 3.50 - 5.20 g/dL    ALT (SGPT) 122 (H) 1 - 33 U/L    AST (SGOT) 243 (H) 1 - 32 U/L    Alkaline Phosphatase  128 (H) 39 - 117 U/L    Total Bilirubin 0.5 0.0 - 1.2 mg/dL    Globulin 2.7 gm/dL    A/G Ratio 1.6 g/dL    BUN/Creatinine Ratio 12.1 7.0 - 25.0    Anion Gap 15.0 5.0 - 15.0 mmol/L    eGFR 57.4 (L) >60.0 mL/min/1.73   Lipase    Collection Time: 11/19/22  5:21 AM    Specimen: Arm, Left; Blood   Result Value Ref Range    Lipase 47 13 - 60 U/L   Magnesium    Collection Time: 11/19/22  5:21 AM    Specimen: Arm, Left; Blood   Result Value Ref Range    Magnesium 2.2 1.6 - 2.4 mg/dL   Lactic Acid, Plasma    Collection Time: 11/19/22  5:21 AM    Specimen: Arm, Left; Blood   Result Value Ref Range    Lactate 4.7 (C) 0.5 - 2.0 mmol/L   Troponin    Collection Time: 11/19/22  5:21 AM    Specimen: Arm, Left; Blood   Result Value Ref Range    Troponin T <0.010 0.000 - 0.030 ng/mL   CBC Auto Differential    Collection Time: 11/19/22  5:21 AM    Specimen: Arm, Left; Blood   Result Value Ref Range    WBC 13.77 (H) 3.40 - 10.80 10*3/mm3    RBC 5.14 3.77 - 5.28 10*6/mm3    Hemoglobin 14.9 12.0 - 15.9 g/dL    Hematocrit 44.8 34.0 - 46.6 %    MCV 87.2 79.0 - 97.0 fL    MCH 29.0 26.6 - 33.0 pg    MCHC 33.3 31.5 - 35.7 g/dL    RDW 13.2 12.3 - 15.4 %    RDW-SD 42.4 37.0 - 54.0 fl    MPV 10.4 6.0 - 12.0 fL    Platelets 249 140 - 450 10*3/mm3    Neutrophil % 79.5 (H) 42.7 - 76.0 %    Lymphocyte % 12.9 (L) 19.6 - 45.3 %    Monocyte % 6.4 5.0 - 12.0 %    Eosinophil % 0.4 0.3 - 6.2 %    Basophil % 0.4 0.0 - 1.5 %    Immature Grans % 0.4 0.0 - 0.5 %    Neutrophils, Absolute 10.95 (H) 1.70 - 7.00 10*3/mm3    Lymphocytes, Absolute 1.78 0.70 - 3.10 10*3/mm3    Monocytes, Absolute 0.88 0.10 - 0.90 10*3/mm3    Eosinophils, Absolute 0.05 0.00 - 0.40 10*3/mm3    Basophils, Absolute 0.06 0.00 - 0.20 10*3/mm3    Immature Grans, Absolute 0.05 0.00 - 0.05 10*3/mm3    nRBC 0.0 0.0 - 0.2 /100 WBC   ECG 12 Lead Other; nausea, vomiting    Collection Time: 11/19/22  5:23 AM   Result Value Ref Range    QT Interval 370 ms   ECG 12 Lead Rhythm Change    Collection  Time: 11/19/22  8:56 AM   Result Value Ref Range    QT Interval 299 ms       Ordered the above labs and independently reviewed the results.        RADIOLOGY  CT Abdomen Pelvis With Contrast    Result Date: 11/19/2022  CT ABDOMEN PELVIS W CONTRAST-  INDICATIONS: Abdominal pain, nausea, vomiting, history of small bowel obstruction  TECHNIQUE: Radiation dose reduction techniques were utilized, including automated exposure control and exposure modulation based on body size. Enhanced ABDOMEN AND PELVIS CT  COMPARISON: 11/18/2021  FINDINGS:  Pancreas is thinned.  The gallbladder is surgically absent, with mild intrahepatic and extrahepatic biliary ductal prominence.    Otherwise unremarkable appearance of the liver, spleen, adrenal glands, pancreas, kidneys, bladder.  Proximal small bowel is fluid-filled and shows borderline prominent caliber, gradually tapering at the mid aspect to collapsed caliber. This appearance may represent early or partial small bowel obstruction, close follow-up recommended. The stomach is distended with fluid.  No free intraperitoneal gas. Mild pelvic free fluid.  Scattered small mesenteric and para-aortic lymph nodes are seen that are not significant by size criteria.  Abdominal aorta is not aneurysmal. Aortic and other arterial calcifications are present.  The lung bases show small atelectasis.  Degenerative changes are seen in the spine. No acute fracture is identified.         Proximal small bowel is fluid-filled and shows borderline prominent caliber, gradually tapering at the mid aspect to collapsed caliber. This appearance may represent early or partial small bowel obstruction, close follow-up recommended.  This report was finalized on 11/19/2022 7:24 AM by Dr. Randy De Paz M.D.        I ordered the above noted radiological studies. Reviewed by me and discussed with radiologist.  See dictation for official radiology interpretation.      PROCEDURES    Critical Care  Performed by:  Joey Castaneda II, MD  Authorized by: Joey Castaneda II, MD     Critical care provider statement:     Critical care time (minutes):  40    Critical care was necessary to treat or prevent imminent or life-threatening deterioration of the following conditions:  Sepsis and cardiac failure    Critical care was time spent personally by me on the following activities:  Ordering and performing treatments and interventions, ordering and review of laboratory studies, ordering and review of radiographic studies, pulse oximetry, re-evaluation of patient's condition, review of old charts, obtaining history from patient or surrogate, development of treatment plan with patient or surrogate, discussions with primary provider, evaluation of patient's response to treatment and discussions with consultants          MEDICATIONS GIVEN IN ER    Medications   sodium chloride 0.9 % flush 10 mL (has no administration in time range)   HYDROmorphone (DILAUDID) injection 0.5 mg (0.5 mg Intravenous Not Given 11/19/22 0824)   metoprolol tartrate (LOPRESSOR) injection 5 mg (5 mg Intravenous Given 11/19/22 0900)   promethazine (PHENERGAN) 12.5 mg in sodium chloride 0.9 % 50 mL (has no administration in time range)   sodium chloride 0.9 % bolus 500 mL (0 mL Intravenous Stopped 11/19/22 0557)   morphine injection 2 mg (2 mg Intravenous Given 11/19/22 0827)   ondansetron (ZOFRAN) injection 4 mg (4 mg Intravenous Given 11/19/22 0527)   piperacillin-tazobactam (ZOSYN) 3.375 g in iso-osmotic dextrose 50 ml (premix) (0 g Intravenous Stopped 11/19/22 0828)   sodium chloride 0.9 % bolus 500 mL (0 mL Intravenous Stopped 11/19/22 0655)   sodium chloride 0.9 % bolus 1,716 mL (1,000 mL Intravenous New Bag 11/19/22 0652)   iopamidol (ISOVUE-300) 61 % injection 100 mL (85 mL Intravenous Given by Other 11/19/22 0640)   metoclopramide (REGLAN) injection 10 mg (10 mg Intravenous Given 11/19/22 0757)         PROGRESS, DATA ANALYSIS, CONSULTS, AND  MEDICAL DECISION MAKING    All labs have been independently reviewed by me.  All radiology studies have been reviewed by me and discussed with radiologist dictating the report.   EKG's independently viewed and interpreted by me.  Discussion below represents my analysis of pertinent findings related to patient's condition, differential diagnosis, treatment plan and final disposition.    Differential diagnosis includes but not limited to:  - hepatobiliary pathology such as cholecystitis, cholangitis, and symptomatic cholelithiasis  - Pancreatitis  - Dyspepsia  - Small bowel obstruction  - Appendicitis  - Diverticulitis  - UTI including pyelonephritis  - Ureteral stone  - Zoster  - Colitis, including infectious and ischemic  - Atypical ACS    ED Course as of 11/19/22 0939   Sat Nov 19, 2022   0517 First look: Patient presents with upper abdominal pain started around midnight.  Reports some nausea vomiting.  Denies chest pain no shortness of breath no chest pain no fevers or chills.  Patient prior history of small bowel obstruction.  States that she has had a normal bowel movement since the pain started.  Patient also with history of Crohn's. [TJ]   0606 Lactate(!!): 4.7 [TJ]   0610 Ordered antibiotics and fluid bolus.   [TJ]   0655 ALT (SGPT)(!): 122 [TD]   0656 AST (SGOT)(!): 243 [TD]   0656 Alkaline Phosphatase(!): 128 [TD]   0656 Total Bilirubin: 0.5 [TD]   0656 WBC(!): 13.77 [TD]   0703 Time 5:23 AM.  Sinus rhythm.  Heart rate 73.  Normal intervals and axis.  Low voltage in the precordial leads with prolonged R wave progression. [TD]   0832 Patient is beginning to throw up again.  She currently declines getting NG tube.  She has gone into atrial fibrillation with RVR. [TD]   0907 EKG interpreted by myself.  Time 8:56 AM.  Atrial fibrillation.  Heart 149.  Normal axis.  Rate related ST changes. [TD]      ED Course User Index  [TD] Joey Castaneda II, MD  [TJ] Joey Qureshi MD       Patient treated with  IV metoprolol.    I discussed the case Dr. Cottrell, general surgery will come down to see the patient.  She does recommend NG tube.    I then discussed the case with Dr. Cardona, hospitalist.  He will admit the patient.    PPE: The patient wore a surgical mask throughout the entire patient encounter. I wore an N95.    AS OF 09:39 EST VITALS:    BP - 101/62  HR - (!) 125  TEMP - 97.4 °F (36.3 °C)  O2 SATS - 96%        DIAGNOSIS  Final diagnoses:   Partial small bowel obstruction (HCC)   Atrial fibrillation with RVR (HCC)         DISPOSITION  Admit           Joey Castaneda II, MD  11/19/22 9049

## 2022-11-19 NOTE — PROGRESS NOTES
Patient in the ED holding area. She here for abdominal pain and nausea/vomiting.  Small bowel obstruction related to Crohn's disease.  Patient tachycardic and cardiology consulted.  I have notified Dr. Armani Tello.

## 2022-11-19 NOTE — CONSULTS
[facility] Consult Note       Encounter Date:22  Patient:Michela Aguilar  :1940  MRN:[id number]    Date of Admission: 2022  Date of Encounter Visit: 22  Encounter Provider: Armani Tello MD  Referring Provider: Drew Cardona MD  Place of Service: [facility]  Patient Care Team:  Roni Mckenzie MD as PCP - General (Internal Medicine)  Roni Mckenzie MD as PCP - Family Medicine  Marcel Ricketts MD as Consulting Physician (Gastroenterology)  Juan Damon MD as Consulting Physician (Cardiology)      Consulted for: Atrial fibrillation    Chief Complaint: Abdominal pain and bloating      History of Presenting Illness:      Ms. Vines is an 82 y.o. woman who follows with Dr. Damon with past medical history notable for paroxysmal atrial fibrillation, hypertension, history of TIA, Crohn's disease, and anemia who presents to the emergency room today with worsening abdominal bloating and discomfort.  She had severe abdominal discomfort with nausea and vomiting.  Symptoms would radiate across her abdomen and somewhat up into her chest.  Symptoms started around 11 PM the night before last and were constant but wax and wane slightly but never went fully away.  Given the severity of her symptoms she came to the emergency room.  In the emergency room she was noted to have a partial small bowel obstruction NG tube was placed she was given supportive medications with antiemetics and she is feeling better.  Unfortunately while in the emergency room she wanted atrial fibrillation with rapid ventricular response.  The good news is is that she is asymptomatic with her atrial fibrillation despite having fast rates.  Blood pressure is a little bit on the low side but in general she is doing fairly well.  She also had an exact same presentation back in 2021 with small bowel obstruction and paroxysmal atrial fibrillation.      Review of Systems:  Review of Systems    Constitutional: Positive for malaise/fatigue.   HENT: Negative.    Eyes: Negative.    Cardiovascular: Negative.    Respiratory: Negative.    Endocrine: Negative.    Hematologic/Lymphatic: Negative.    Skin: Negative.    Musculoskeletal: Negative.    Gastrointestinal: Positive for bloating, abdominal pain, nausea and vomiting.   Genitourinary: Negative.    Neurological: Negative.    Psychiatric/Behavioral: Negative.    Allergic/Immunologic: Negative.        Medications:    Current Facility-Administered Medications:   •  [COMPLETED] digoxin (LANOXIN) injection 250 mcg, 250 mcg, Intravenous, Once, 250 mcg at 11/19/22 1524 **FOLLOWED BY** digoxin (LANOXIN) injection 125 mcg, 125 mcg, Intravenous, Q8H, Armani Tello MD  •  Hydrocortisone Sod Suc (PF) (Solu-CORTEF) injection 100 mg, 100 mg, Intravenous, Q8H, Kaylee Mccullough APRN, 100 mg at 11/19/22 1645  •  HYDROmorphone (DILAUDID) injection 0.5 mg, 0.5 mg, Intravenous, 4x Daily PRN, Drew Cardona MD  •  mesalamine (PENTASA) CR capsule 500 mg, 500 mg, Oral, 4x Daily, Drew Cardona MD  •  metoprolol tartrate (LOPRESSOR) injection 2.5 mg, 2.5 mg, Intravenous, Q4H PRN, Drew Cardona MD  •  metoprolol tartrate (LOPRESSOR) tablet 50 mg, 50 mg, Oral, BID, Drew Cardona MD, 50 mg at 11/19/22 1715  •  ondansetron (ZOFRAN) injection 4 mg, 4 mg, Intravenous, Q6H PRN, Drew Cardona MD  •  promethazine (PHENERGAN) 12.5 mg in sodium chloride 0.9 % 50 mL, 12.5 mg, Intravenous, Q6H PRN, Joey Castaneda II, MD, Stopped at 11/19/22 1050  •  [COMPLETED] Insert Peripheral IV, , , Once **AND** sodium chloride 0.9 % flush 10 mL, 10 mL, Intravenous, PRN, Jeoy Qureshi MD  •  sodium chloride 0.9 % with KCl 20 mEq/L infusion, 75 mL/hr, Intravenous, Continuous, Drew Cardona MD, Last Rate: 75 mL/hr at 11/19/22 1531, 75 mL/hr at 11/19/22 1531    Current Outpatient Medications:   •  amLODIPine (NORVASC) 2.5 MG tablet, Take 1 tablet by mouth Daily., Disp: 30 tablet, Rfl: 11  •   Cyanocobalamin (VITAMIN B 12 PO), Take 1,000 mg by mouth Daily., Disp: , Rfl:   •  diazepam (VALIUM) 5 MG tablet, Take 2.5 mg by mouth Daily As Needed., Disp: , Rfl:   •  Eliquis 5 MG tablet tablet, TAKE 1 TABLET TWICE A DAY FOR ATRIAL FIBRILLATION, Disp: 180 tablet, Rfl: 3  •  ezetimibe (ZETIA) 10 MG tablet, Take 10 mg by mouth Daily., Disp: , Rfl:   •  loperamide (IMODIUM) 2 MG capsule, Take 2 mg by mouth 4 (Four) Times a Day As Needed for Diarrhea. prn, Disp: , Rfl:   •  mesalamine (PENTASA) 500 MG CR capsule, Take 2 tablets by mouth twice a day, Disp: 360 capsule, Rfl: 3  •  metoprolol tartrate (LOPRESSOR) 50 MG tablet, Take 1 tablet by mouth 2 (Two) Times a Day., Disp: 180 tablet, Rfl: 3  •  Multiple Vitamins-Minerals (MULTI COMPLETE PO), Take  by mouth Daily., Disp: , Rfl:   •  ondansetron ODT (ZOFRAN ODT) 4 MG disintegrating tablet, Take 1 tablet by mouth Every 8 (Eight) Hours As Needed for Nausea or Vomiting., Disp: 15 tablet, Rfl: 0  •  pantoprazole (PROTONIX) 40 MG EC tablet, Take 40 mg by mouth Daily., Disp: , Rfl:   •  potassium chloride (MICRO-K) 10 MEQ CR capsule, take 1 capsule by mouth once daily, Disp: , Rfl: 0    Allergies   Allergen Reactions   • Amitriptyline Confusion   • Mysoline [Primidone] Confusion       Past Medical History:   Diagnosis Date   • Anemia    • Arthritis    • Colitis    • Crohn disease (HCC)    • Crohn's disease (HCC)    • GERD (gastroesophageal reflux disease)    • History of transfusion    • Hyperlipidemia    • Hypertension    • Migraine    • Ocular migraine    • Ocular migraine    • PAF (paroxysmal atrial fibrillation) (HCC)     with rapid ventricular rate   • SBO (small bowel obstruction) (HCC)    • SBO (small bowel obstruction) (HCC)    • Small bowel obstruction (HCC) 08/03/2021   • TIA (transient ischemic attack)    • UTI (urinary tract infection) 07/21/2021   • UTI (urinary tract infection)        Past Surgical History:   Procedure Laterality Date   • APPENDECTOMY     •  CHOLECYSTECTOMY     • COLECTOMY PARTIAL / TOTAL     • COLONOSCOPY  08/20/2015    s/p right hemicolectomy, recurrent Crohns, IH   • COLONOSCOPY N/A 8/9/2019    Crohns, IH.     • ENDOSCOPY  08/20/2015    erythematous mucosa in stomach, chronic gastritis,    • ENDOSCOPY N/A 4/28/2019    Erythematous mucosa in stomach, path benign   • SPLENECTOMY  2009       Social History     Socioeconomic History   • Marital status: Single   Tobacco Use   • Smoking status: Never   • Smokeless tobacco: Never   Vaping Use   • Vaping Use: Never used   Substance and Sexual Activity   • Alcohol use: No     Comment: caffiene use coffee daily   • Drug use: No   • Sexual activity: Defer       History reviewed. No pertinent family history.    The following portions of the patient's history were reviewed and updated as appropriate: allergies, current medications, past family history, past medical history, past social history, past surgical history and problem list.         Objective:      Temp:  [97.4 °F (36.3 °C)-97.8 °F (36.6 °C)] 97.6 °F (36.4 °C)  Heart Rate:  [] 73  Resp:  [16] 16  BP: ()/(62-93) 130/70   No intake or output data in the 24 hours ending 11/19/22 1744  Body mass index is 23.05 kg/m².      11/19/22  0451   Weight: 57.2 kg (126 lb)           Physical Exam:  Constitutional: Well appearing, well developed, no acute distress   HENT: Oropharynx clear and membrane moist, NG tube in place  Eyes: Normal conjunctiva, no sclera icterus.  Neck: Supple, no carotid bruit bilaterally.  Cardiovascular: Irregularly irregular and Tachycardia rate and rhythm, No Murmur, No bilateral lower extremity edema.  Pulmonary: Normal respiratory effort, normal lung sounds, no wheezing.  Abdominal: Soft, nontender, no hepatosplenomegaly, liver is non-pulsatile.  Neurological: Alert and orient x 3.   Skin: Warm, dry, no ecchymosis, no rash.  Psych: Appropriate mood and affect. Normal judgment and insight.         Lab Review:   Results from  last 7 days   Lab Units 11/19/22  0521   SODIUM mmol/L 142   POTASSIUM mmol/L 3.9   CHLORIDE mmol/L 103   CO2 mmol/L 24.0   BUN mg/dL 12   CREATININE mg/dL 0.99   GLUCOSE mg/dL 125*   CALCIUM mg/dL 10.3   AST (SGOT) U/L 243*   ALT (SGPT) U/L 122*     Results from last 7 days   Lab Units 11/19/22  0521   TROPONIN T ng/mL <0.010     Results from last 7 days   Lab Units 11/19/22  0521   WBC 10*3/mm3 13.77*   HEMOGLOBIN g/dL 14.9   HEMATOCRIT % 44.8   PLATELETS 10*3/mm3 249         Results from last 7 days   Lab Units 11/19/22  0521   MAGNESIUM mg/dL 2.2           Invalid input(s): LDLCALC  The ASCVD Risk score (Ethel DE LA FUENTE, et al., 2019) failed to calculate for the following reasons:    The 2019 ASCVD risk score is only valid for ages 40 to 79               EKG 11/19/2022 with tracings reviewed myself:  · Atrial fibrillation has replaced sinus rhythm with rapid reticular response and repolarization abnormalities related rate    Echocardiogram 8/4/2021 with images reviewed by myself:  · Estimated left ventricular EF = 60% Left ventricular systolic function is normal.  · Left ventricular diastolic function was indeterminate.         Assessment:           Partial small bowel obstruction (HCC)         Plan:       Ms. Vines is an 82 y.o. woman who follows with Dr. Damon with past medical history notable for paroxysmal atrial fibrillation, hypertension, history of TIA, Crohn's disease, and anemia who presents to the emergency room today with worsening abdominal bloating and discomfort and was found to have a partial bowel obstruction as well as new onset atrial fibrillation with rapid ventricular response.  Unfortunately given her GI issues oral medications are not really an option at this point.  I did load her with digoxin IV and we can try and give this some time to work.  Her blood pressures are little soft but another option would be to add on Cardizem infusion to see if this will help improve her heart rates if they do not  improve with digoxin loading.      Paroxysmal atrial fibrillation:  · Anticoagulation on hold  · Unable to give oral medications currently  · We will start with a digoxin load to try and slow her heart rate down  · May need to add Cardizem infusion if heart rate does not respond to digoxin          Thank you for allowing me to participate in the care of Michela Aguilar. Feel free to contact me directly with any further questions or concerns.    Armani Tello MD  [facility]  11/19/22  17:44 EST

## 2022-11-19 NOTE — ED TRIAGE NOTES
Pt arrived to ED via pv from home. States when she went to bed last night she was nauseated and was beginning to have lower abdominal pain. Has a hx of bowel obstructions and Chrone's.  Woke up around 0000 and had a BM. BM was normal for her.

## 2022-11-19 NOTE — CONSULTS
General Surgery consult    Summary:    Mrs. Michela Aguilar is a 82 y.o. year old lady with Crohn's disease and a partial small bowel obstruction.  Recommend n.p.o., IV fluids, NG tube to low wall suction.  She is now agreeable to an NG tube.  Recommend consulting GI to evaluate for need for steroids.  In A. fib, has a history.  No indication for urgent surgical intervention.  Will continue to follow.    Chief Complaint:    Abdominal pain    History of Present Illness:    Mrs. Michela Aguilar is a 82 y.o. year old lady with a longstanding history of Crohn's disease on Pentasa and followed by Dr. Ricketts who presents with abdominal pain, nausea, and vomiting for the last 1 day.  She does states she had a small bowel movement this morning.  She has had several small bowel obstructions related to Crohn's disease in the past.  She had what sounds like a laparoscopic right hemicolectomy by Dr. Limon in 2013.    Past Medical History:   • Crohn's disease  • GERD  • Hypertension  • Hyperlipidemia  • A. fib  • History of TIA    Past Surgical History:    • Appendectomy  • Cholecystectomy  • Splenectomy  • Right hemicolectomy    Family History:    • No family history of colon cancer    Social History:    • Denies tobacco use  • Denies alcohol use    Allergies:   Allergies   Allergen Reactions   • Amitriptyline Confusion   • Mysoline [Primidone] Confusion       Medications:     Current Facility-Administered Medications:   •  HYDROmorphone (DILAUDID) injection 0.5 mg, 0.5 mg, Intravenous, Once, Joey Castaneda II, MD  •  metoprolol tartrate (LOPRESSOR) injection 5 mg, 5 mg, Intravenous, Q5 Min PRN, Joey Castaneda II, MD, 5 mg at 11/19/22 0900  •  promethazine (PHENERGAN) 12.5 mg in sodium chloride 0.9 % 50 mL, 12.5 mg, Intravenous, Q6H PRN, Joey Castaneda II, MD, 12.5 mg at 11/19/22 0955  •  [COMPLETED] Insert Peripheral IV, , , Once **AND** sodium chloride 0.9 % flush 10 mL, 10 mL, Intravenous, PRN, Titus  Joey JARRETT MD    Current Outpatient Medications:   •  amLODIPine (NORVASC) 2.5 MG tablet, Take 1 tablet by mouth Daily., Disp: 30 tablet, Rfl: 11  •  Cyanocobalamin (VITAMIN B 12 PO), Take 1,000 mg by mouth Daily., Disp: , Rfl:   •  diazepam (VALIUM) 5 MG tablet, Take 2.5 mg by mouth Daily As Needed., Disp: , Rfl:   •  Eliquis 5 MG tablet tablet, TAKE 1 TABLET TWICE A DAY FOR ATRIAL FIBRILLATION, Disp: 180 tablet, Rfl: 3  •  ezetimibe (ZETIA) 10 MG tablet, Take 10 mg by mouth Daily., Disp: , Rfl:   •  loperamide (IMODIUM) 2 MG capsule, Take 2 mg by mouth 4 (Four) Times a Day As Needed for Diarrhea. prn, Disp: , Rfl:   •  mesalamine (PENTASA) 500 MG CR capsule, Take 2 tablets by mouth twice a day, Disp: 360 capsule, Rfl: 3  •  metoprolol tartrate (LOPRESSOR) 50 MG tablet, Take 1 tablet by mouth 2 (Two) Times a Day., Disp: 180 tablet, Rfl: 3  •  Multiple Vitamins-Minerals (MULTI COMPLETE PO), Take  by mouth Daily., Disp: , Rfl:   •  ondansetron ODT (ZOFRAN ODT) 4 MG disintegrating tablet, Take 1 tablet by mouth Every 8 (Eight) Hours As Needed for Nausea or Vomiting., Disp: 15 tablet, Rfl: 0  •  pantoprazole (PROTONIX) 40 MG EC tablet, Take 40 mg by mouth Daily., Disp: , Rfl:   •  potassium chloride (MICRO-K) 10 MEQ CR capsule, take 1 capsule by mouth once daily, Disp: , Rfl: 0    Radiology/Endoscopy:    • CT abdomen pelvis reviewed: Large gastric distention, small bowel dilatation    Labs:    • White blood cell count 13 hemoglobin 14 platelets 249    Review of Systems:   Influenza-like illness: no fever, no  cough, no  sore throat, no  body aches, no loss of sense of taste or smell, no known exposure to person with Covid-19.  Constitutional: Negative for fevers or chills  HENT: Negative for hearing loss or runny nose  Eyes: Negative for vision changes or scleral icterus  Respiratory: Negative for cough or shortness of breath  Cardiovascular: Negative for chest pain or heart palpitations  Gastrointestinal: Positive  for abdominal pain, nausea, vomiting, negative for constipation, melena, or hematochezia  Genitourinary: Negative for hematuria or dysuria  Musculoskeletal: Negative for joint swelling or gait instability  Neurologic: Negative for tremors or seizures  Psychiatric: Negative for suicidal ideations or depression  All other systems reviewed and negative    Physical Exam:   • Constitutional: Well-developed, well-nourished, no acute distress  • Vital signs: Temperature 97.8 heart rate 125 respiratory rate 16 blood pressure 99/65 oxygen 95%  • Eyes:  Conjunctivae normal, sclerae nonicteric  • ENMT: Hearing grossly normal, oral mucosa moist  • Neck: Supple, trachea midline  • Respiratory: Clear to auscultation, normal inspiratory effort  • Cardiovascular: Regular rate, no peripheral edema, no jugular venous distention  • Gastrointestinal: Soft, nontender, mildly distended  • Skin:  Warm, dry, no rash on visualized skin surfaces  • Musculoskeletal: Symmetric strength, normal gait  • Psychiatric: Alert and oriented ×3, normal affect     OZZIE PARRISH M.D.  General and Endoscopic Surgery  [facility]    [address], [address]  [place], [place], [address]  P: [phone]  F: [phone]

## 2022-11-19 NOTE — H&P
History and physical    Primary care physician  Dr. Mckenzie    Chief complaint  Abdominal pain    History of present illness  82-year-old white female with history of Crohn's disease hypertension hyperlipidemia and paroxysmal atrial fibrillation presented to [facility] [place] emergency room with abdominal pain started this morning.  Patient developed nausea vomiting after that and had a BM but not passing gas anymore.  Patient work-up in ER revealed acute partial small bowel obstruction admit for management.     PAST MEDICAL HISTORY  • Anemia     • Arthritis     • GERD (gastroesophageal reflux disease)     • History of transfusion     • Hyperlipidemia     • Hypertension     • Ocular migraine     • TIA (transient ischemic attack)     • UTI (urinary tract infection) 07/21/2021      PAST SURGICAL HISTORY              Procedure Laterality Date   • APPENDECTOMY       • CHOLECYSTECTOMY       • COLECTOMY PARTIAL / TOTAL       • COLONOSCOPY   08/20/2015     s/p right hemicolectomy, recurrent Crohns, IH   • COLONOSCOPY N/A 8/9/2019     Crohns, IH.     • ENDOSCOPY   08/20/2015     erythematous mucosa in stomach, chronic gastritis,    • ENDOSCOPY N/A 4/28/2019     Erythematous mucosa in stomach, path benign   • SPLENECTOMY   2009         FAMILY HISTORY  History reviewed. No pertinent family history.     SOCIAL HISTORY                 Socioeconomic History   • Marital status: Single   Tobacco Use   • Smoking status: Never   • Smokeless tobacco: Never   Vaping Use   • Vaping Use: Never used   Substance and Sexual Activity   • Alcohol use: No       Comment: caffiene use coffee daily   • Drug use: No   • Sexual activity: Defer         ALLERGIES  Amitriptyline and Mysoline [primidone]  Home medications reviewed     REVIEW OF SYSTEMS  All systems reviewed and negative except for those discussed in HPI.     PHYSICAL EXAM   Blood pressure 99/65, pulse (!) 125, temperature 97.8 °F (36.6 °C), temperature source Oral, resp. rate 16,  "height 157.5 cm (62\"), weight 57.2 kg (126 lb), SpO2 95 %.    GENERAL: not distressed awake and alert  HEENT:  Unremarkable  NECK:  Supple  CV: regular rhythm, regular rate  RESPIRATORY: normal effort, clear to auscultation bilaterally  ABDOMEN: soft, generalized abdominal tenderness hypoactive bowel sounds  MUSCULOSKELETAL: no deformity  NEURO: alert, moves all extremities, follows commands  SKIN: warm, dry     LAB RESULTS  Lab Results (last 24 hours)     Procedure Component Value Units Date/Time    STAT Lactic Acid, Reflex [[id number]]  (Abnormal) Collected: 11/19/22 0953    Specimen: Blood Updated: 11/19/22 1019     Lactate 3.3 mmol/L     Blood Culture - Blood, Arm, Left [[id number]] Collected: 11/19/22 0633    Specimen: Blood from Arm, Left Updated: 11/19/22 0642    Blood Culture - Blood, Arm, Right [[id number]] Collected: 11/19/22 0633    Specimen: Blood from Arm, Right Updated: 11/19/22 0642    Lactic Acid, Plasma [[id number]]  (Abnormal) Collected: 11/19/22 0521    Specimen: Blood from Arm, Left Updated: 11/19/22 0605     Lactate 4.7 mmol/L     Comprehensive Metabolic Panel [[id number]]  (Abnormal) Collected: 11/19/22 0521    Specimen: Blood from Arm, Left Updated: 11/19/22 0602     Glucose 125 mg/dL      BUN 12 mg/dL      Creatinine 0.99 mg/dL      Sodium 142 mmol/L      Potassium 3.9 mmol/L      Chloride 103 mmol/L      CO2 24.0 mmol/L      Calcium 10.3 mg/dL      Total Protein 7.1 g/dL      Albumin 4.40 g/dL      ALT (SGPT) 122 U/L      AST (SGOT) 243 U/L      Alkaline Phosphatase 128 U/L      Total Bilirubin 0.5 mg/dL      Globulin 2.7 gm/dL      A/G Ratio 1.6 g/dL      BUN/Creatinine Ratio 12.1     Anion Gap 15.0 mmol/L      eGFR 57.4 mL/min/1.73      Comment: National Kidney Foundation and American Society of Nephrology (ASN) Task Force recommended calculation based on the Chronic Kidney Disease Epidemiology Collaboration (CKD-EPI) equation refit without adjustment for race.       Narrative:      GFR Normal " >60  Chronic Kidney Disease <60  Kidney Failure <15    The GFR formula is only valid for adults with stable renal function between ages 18 and 70.    Lipase [[id number]]  (Normal) Collected: 11/19/22 0521    Specimen: Blood from Arm, Left Updated: 11/19/22 0602     Lipase 47 U/L     Magnesium [[id number]]  (Normal) Collected: 11/19/22 0521    Specimen: Blood from Arm, Left Updated: 11/19/22 0602     Magnesium 2.2 mg/dL     Troponin [[id number]]  (Normal) Collected: 11/19/22 0521    Specimen: Blood from Arm, Left Updated: 11/19/22 0602     Troponin T <0.010 ng/mL     Narrative:      Troponin T Reference Range:  <= 0.03 ng/mL-   Negative for AMI  >0.03 ng/mL-     Abnormal for myocardial necrosis.  Clinicians would have to utilize clinical acumen, EKG, Troponin and serial changes to determine if it is an Acute Myocardial Infarction or myocardial injury due to an underlying chronic condition.       Results may be falsely decreased if patient taking Biotin.      CBC & Differential [[id number]]  (Abnormal) Collected: 11/19/22 0521    Specimen: Blood from Arm, Left Updated: 11/19/22 0546    Narrative:      The following orders were created for panel order CBC & Differential.  Procedure                               Abnormality         Status                     ---------                               -----------         ------                     CBC Auto Differential[[id number]]        Abnormal            Final result                 Please view results for these tests on the individual orders.    CBC Auto Differential [[id number]]  (Abnormal) Collected: 11/19/22 0521    Specimen: Blood from Arm, Left Updated: 11/19/22 0546     WBC 13.77 10*3/mm3      RBC 5.14 10*6/mm3      Hemoglobin 14.9 g/dL      Hematocrit 44.8 %      MCV 87.2 fL      MCH 29.0 pg      MCHC 33.3 g/dL      RDW 13.2 %      RDW-SD 42.4 fl      MPV 10.4 fL      Platelets 249 10*3/mm3      Neutrophil % 79.5 %      Lymphocyte % 12.9 %      Monocyte % 6.4 %       Eosinophil % 0.4 %      Basophil % 0.4 %      Immature Grans % 0.4 %      Neutrophils, Absolute 10.95 10*3/mm3      Lymphocytes, Absolute 1.78 10*3/mm3      Monocytes, Absolute 0.88 10*3/mm3      Eosinophils, Absolute 0.05 10*3/mm3      Basophils, Absolute 0.06 10*3/mm3      Immature Grans, Absolute 0.05 10*3/mm3      nRBC 0.0 /100 WBC         Imaging Results (Last 24 Hours)     Procedure Component Value Units Date/Time    CT Abdomen Pelvis With Contrast [[id number]] Collected: 11/19/22 0719     Updated: 11/19/22 0727    Narrative:      CT ABDOMEN PELVIS W CONTRAST-     INDICATIONS: Abdominal pain, nausea, vomiting, history of small bowel  obstruction     TECHNIQUE: Radiation dose reduction techniques were utilized, including  automated exposure control and exposure modulation based on body size.  Enhanced ABDOMEN AND PELVIS CT     COMPARISON: 11/18/2021     FINDINGS:     Pancreas is thinned.     The gallbladder is surgically absent, with mild intrahepatic and  extrahepatic biliary ductal prominence.           Otherwise unremarkable appearance of the liver, spleen, adrenal glands,  pancreas, kidneys, bladder.     Proximal small bowel is fluid-filled and shows borderline prominent  caliber, gradually tapering at the mid aspect to collapsed caliber. This  appearance may represent early or partial small bowel obstruction, close  follow-up recommended. The stomach is distended with fluid.     No free intraperitoneal gas. Mild pelvic free fluid.     Scattered small mesenteric and para-aortic lymph nodes are seen that are  not significant by size criteria.     Abdominal aorta is not aneurysmal. Aortic and other arterial  calcifications are present.     The lung bases show small atelectasis.     Degenerative changes are seen in the spine. No acute fracture is  identified.             Impression:         Proximal small bowel is fluid-filled and shows borderline prominent  caliber, gradually tapering at the mid aspect to  collapsed caliber. This  appearance may represent early or partial small bowel obstruction, close  follow-up recommended.     This report was finalized on 11/19/2022 7:24 AM by Dr. Randy De Paz M.D.           ECG 12 Lead Rhythm Change  Order: [id number]   Status: Preliminary result      Visible to patient: No (not released)      0 Result Notes  Component   Ref Range & Units 08:56   (11/19/22) 05:23   (11/19/22) 1 yr ago   (8/4/21) 1 yr ago   (8/3/21) 1 yr ago   (8/3/21)   QT Interval   ms 299 P  370  335  261  380    Resulting Agency [facility] ECG BH ECG BH ECG BH ECG BH ECG             HEART RATE= 149  bpm  RR Interval= 403  ms  NC Interval= 88  ms  P Horizontal Axis=   deg  P Front Axis= -21  deg  QRSD Interval= 75  ms  QT Interval= 299  ms  QRS Axis= 56  deg  T Wave Axis= 236  deg  - ABNORMAL ECG -  Supraventricular tachycardia  Repolarization abnormality, prob rate related  Baseline wander in lead(s) I,V2,V3,V4             Current Facility-Administered Medications:   •  mesalamine (PENTASA) CR capsule 500 mg, 500 mg, Oral, 4x Daily, Drew Cardona MD  •  metoprolol tartrate (LOPRESSOR) injection 2.5 mg, 2.5 mg, Intravenous, Q4H PRN, Drew Cardona MD  •  metoprolol tartrate (LOPRESSOR) tablet 50 mg, 50 mg, Oral, BID, Drew Cardona MD  •  morphine injection 2 mg, 2 mg, Intravenous, Q4H PRN, Drew Cardona MD  •  ondansetron (ZOFRAN) injection 4 mg, 4 mg, Intravenous, Q6H PRN, Drew Cardona MD  •  promethazine (PHENERGAN) 12.5 mg in sodium chloride 0.9 % 50 mL, 12.5 mg, Intravenous, Q6H PRN, Joey Castaneda II, MD, 12.5 mg at 11/19/22 0955  •  [COMPLETED] Insert Peripheral IV, , , Once **AND** sodium chloride 0.9 % flush 10 mL, 10 mL, Intravenous, PRN, Joey Qureshi MD  •  sodium chloride 0.9 % with KCl 20 mEq/L infusion, 75 mL/hr, Intravenous, Continuous, Drew Cardona MD    Current Outpatient Medications:   •  amLODIPine (NORVASC) 2.5 MG tablet, Take 1 tablet by mouth Daily., Disp: 30 tablet, Rfl:  11  •  Cyanocobalamin (VITAMIN B 12 PO), Take 1,000 mg by mouth Daily., Disp: , Rfl:   •  diazepam (VALIUM) 5 MG tablet, Take 2.5 mg by mouth Daily As Needed., Disp: , Rfl:   •  Eliquis 5 MG tablet tablet, TAKE 1 TABLET TWICE A DAY FOR ATRIAL FIBRILLATION, Disp: 180 tablet, Rfl: 3  •  ezetimibe (ZETIA) 10 MG tablet, Take 10 mg by mouth Daily., Disp: , Rfl:   •  loperamide (IMODIUM) 2 MG capsule, Take 2 mg by mouth 4 (Four) Times a Day As Needed for Diarrhea. prn, Disp: , Rfl:   •  mesalamine (PENTASA) 500 MG CR capsule, Take 2 tablets by mouth twice a day, Disp: 360 capsule, Rfl: 3  •  metoprolol tartrate (LOPRESSOR) 50 MG tablet, Take 1 tablet by mouth 2 (Two) Times a Day., Disp: 180 tablet, Rfl: 3  •  Multiple Vitamins-Minerals (MULTI COMPLETE PO), Take  by mouth Daily., Disp: , Rfl:   •  ondansetron ODT (ZOFRAN ODT) 4 MG disintegrating tablet, Take 1 tablet by mouth Every 8 (Eight) Hours As Needed for Nausea or Vomiting., Disp: 15 tablet, Rfl: 0  •  pantoprazole (PROTONIX) 40 MG EC tablet, Take 40 mg by mouth Daily., Disp: , Rfl:   •  potassium chloride (MICRO-K) 10 MEQ CR capsule, take 1 capsule by mouth once daily, Disp: , Rfl: 0     ASSESSMENT  Acute partial small bowel obstruction  Atrial fibrillation with rapid ventricular rate  Hypertension  Crohn's disease  Gastroesophageal reflux disease    PLAN  Admit  NPO  IVF  NG tube if needed  Control heart rate  Hold Eliquis  Symptomatic treatment for pain nausea vomiting  General surgery  consult  GI and cardiology to follow patient  Adjust home medications  Stress ulcer DVT prophylaxis  Supportive care  Patient is full code  Discussed with family nursing staff  Follow closely further recommendation current hospital course    CESILIA YUAN MD

## 2022-11-20 ENCOUNTER — APPOINTMENT (OUTPATIENT)
Dept: GENERAL RADIOLOGY | Facility: HOSPITAL | Age: 82
End: 2022-11-20

## 2022-11-20 ENCOUNTER — INPATIENT HOSPITAL (OUTPATIENT)
Dept: URBAN - METROPOLITAN AREA HOSPITAL 113 | Facility: HOSPITAL | Age: 82
End: 2022-11-20
Payer: MEDICARE

## 2022-11-20 ENCOUNTER — APPOINTMENT (OUTPATIENT)
Dept: ULTRASOUND IMAGING | Facility: HOSPITAL | Age: 82
End: 2022-11-20

## 2022-11-20 DIAGNOSIS — K56.600 PARTIAL INTESTINAL OBSTRUCTION, UNSPECIFIED AS TO CAUSE: ICD-10-CM

## 2022-11-20 DIAGNOSIS — Z90.49 ACQUIRED ABSENCE OF OTHER SPECIFIED PARTS OF DIGESTIVE TRACT: ICD-10-CM

## 2022-11-20 DIAGNOSIS — Z79.01 LONG TERM (CURRENT) USE OF ANTICOAGULANTS: ICD-10-CM

## 2022-11-20 DIAGNOSIS — R94.5 ABNORMAL RESULTS OF LIVER FUNCTION STUDIES: ICD-10-CM

## 2022-11-20 DIAGNOSIS — R10.84 GENERALIZED ABDOMINAL PAIN: ICD-10-CM

## 2022-11-20 DIAGNOSIS — R11.10 VOMITING, UNSPECIFIED: ICD-10-CM

## 2022-11-20 LAB
ALBUMIN SERPL-MCNC: 3.5 G/DL (ref 3.5–5.2)
ALBUMIN/GLOB SERPL: 1.5 G/DL
ALP SERPL-CCNC: 96 U/L (ref 39–117)
ALT SERPL W P-5'-P-CCNC: 135 U/L (ref 1–33)
ANION GAP SERPL CALCULATED.3IONS-SCNC: 11.3 MMOL/L (ref 5–15)
AST SERPL-CCNC: 134 U/L (ref 1–32)
BASOPHILS # BLD AUTO: 0.03 10*3/MM3 (ref 0–0.2)
BASOPHILS NFR BLD AUTO: 0.3 % (ref 0–1.5)
BILIRUB SERPL-MCNC: 0.6 MG/DL (ref 0–1.2)
BUN SERPL-MCNC: 15 MG/DL (ref 8–23)
BUN/CREAT SERPL: 16.5 (ref 7–25)
CALCIUM SPEC-SCNC: 8.8 MG/DL (ref 8.6–10.5)
CHLORIDE SERPL-SCNC: 113 MMOL/L (ref 98–107)
CHOLEST SERPL-MCNC: 173 MG/DL (ref 0–200)
CO2 SERPL-SCNC: 21.7 MMOL/L (ref 22–29)
CREAT SERPL-MCNC: 0.91 MG/DL (ref 0.57–1)
DEPRECATED RDW RBC AUTO: 42.6 FL (ref 37–54)
EGFRCR SERPLBLD CKD-EPI 2021: 63.5 ML/MIN/1.73
EOSINOPHIL # BLD AUTO: 0 10*3/MM3 (ref 0–0.4)
EOSINOPHIL NFR BLD AUTO: 0 % (ref 0.3–6.2)
ERYTHROCYTE [DISTWIDTH] IN BLOOD BY AUTOMATED COUNT: 13.5 % (ref 12.3–15.4)
GLOBULIN UR ELPH-MCNC: 2.3 GM/DL
GLUCOSE SERPL-MCNC: 104 MG/DL (ref 65–99)
HAV IGM SERPL QL IA: NORMAL
HBA1C MFR BLD: 5.3 % (ref 4.8–5.6)
HBV CORE IGM SERPL QL IA: NORMAL
HBV SURFACE AG SERPL QL IA: NORMAL
HCT VFR BLD AUTO: 35.7 % (ref 34–46.6)
HCV AB SER DONR QL: NORMAL
HDLC SERPL-MCNC: 61 MG/DL (ref 40–60)
HGB BLD-MCNC: 11.9 G/DL (ref 12–15.9)
IMM GRANULOCYTES # BLD AUTO: 0.05 10*3/MM3 (ref 0–0.05)
IMM GRANULOCYTES NFR BLD AUTO: 0.4 % (ref 0–0.5)
IRON 24H UR-MRATE: 102 MCG/DL (ref 37–145)
IRON SATN MFR SERPL: 24 % (ref 20–50)
LDLC SERPL CALC-MCNC: 97 MG/DL (ref 0–100)
LDLC/HDLC SERPL: 1.57 {RATIO}
LYMPHOCYTES # BLD AUTO: 1.78 10*3/MM3 (ref 0.7–3.1)
LYMPHOCYTES NFR BLD AUTO: 15.8 % (ref 19.6–45.3)
MCH RBC QN AUTO: 29.2 PG (ref 26.6–33)
MCHC RBC AUTO-ENTMCNC: 33.3 G/DL (ref 31.5–35.7)
MCV RBC AUTO: 87.5 FL (ref 79–97)
MONOCYTES # BLD AUTO: 0.36 10*3/MM3 (ref 0.1–0.9)
MONOCYTES NFR BLD AUTO: 3.2 % (ref 5–12)
NEUTROPHILS NFR BLD AUTO: 80.3 % (ref 42.7–76)
NEUTROPHILS NFR BLD AUTO: 9.05 10*3/MM3 (ref 1.7–7)
NRBC BLD AUTO-RTO: 0 /100 WBC (ref 0–0.2)
PLATELET # BLD AUTO: 228 10*3/MM3 (ref 140–450)
PMV BLD AUTO: 10.6 FL (ref 6–12)
POTASSIUM SERPL-SCNC: 4.1 MMOL/L (ref 3.5–5.2)
PROT SERPL-MCNC: 5.8 G/DL (ref 6–8.5)
RBC # BLD AUTO: 4.08 10*6/MM3 (ref 3.77–5.28)
SODIUM SERPL-SCNC: 146 MMOL/L (ref 136–145)
TIBC SERPL-MCNC: 432 MCG/DL (ref 298–536)
TRANSFERRIN SERPL-MCNC: 290 MG/DL (ref 200–360)
TRIGL SERPL-MCNC: 81 MG/DL (ref 0–150)
TSH SERPL DL<=0.05 MIU/L-ACNC: 0.75 UIU/ML (ref 0.27–4.2)
VLDLC SERPL-MCNC: 15 MG/DL (ref 5–40)
WBC NRBC COR # BLD: 11.27 10*3/MM3 (ref 3.4–10.8)

## 2022-11-20 PROCEDURE — 86381 MITOCHONDRIAL ANTIBODY EACH: CPT | Performed by: NURSE PRACTITIONER

## 2022-11-20 PROCEDURE — 83036 HEMOGLOBIN GLYCOSYLATED A1C: CPT | Performed by: HOSPITALIST

## 2022-11-20 PROCEDURE — 85025 COMPLETE CBC W/AUTO DIFF WBC: CPT | Performed by: HOSPITALIST

## 2022-11-20 PROCEDURE — 84443 ASSAY THYROID STIM HORMONE: CPT | Performed by: HOSPITALIST

## 2022-11-20 PROCEDURE — 99232 SBSQ HOSP IP/OBS MODERATE 35: CPT | Performed by: INTERNAL MEDICINE

## 2022-11-20 PROCEDURE — 99232 SBSQ HOSP IP/OBS MODERATE 35: CPT | Performed by: STUDENT IN AN ORGANIZED HEALTH CARE EDUCATION/TRAINING PROGRAM

## 2022-11-20 PROCEDURE — 86015 ACTIN ANTIBODY EACH: CPT | Performed by: NURSE PRACTITIONER

## 2022-11-20 PROCEDURE — 76705 ECHO EXAM OF ABDOMEN: CPT

## 2022-11-20 PROCEDURE — 80061 LIPID PANEL: CPT | Performed by: HOSPITALIST

## 2022-11-20 PROCEDURE — 25010000002 SODIUM CHLORIDE 0.9 % WITH KCL 20 MEQ 20-0.9 MEQ/L-% SOLUTION: Performed by: HOSPITALIST

## 2022-11-20 PROCEDURE — 74018 RADEX ABDOMEN 1 VIEW: CPT

## 2022-11-20 PROCEDURE — 80053 COMPREHEN METABOLIC PANEL: CPT | Performed by: HOSPITALIST

## 2022-11-20 PROCEDURE — 25010000002 HYDROCORTISONE SOD SUC (PF) 100 MG RECONSTITUTED SOLUTION: Performed by: NURSE PRACTITIONER

## 2022-11-20 PROCEDURE — 84466 ASSAY OF TRANSFERRIN: CPT | Performed by: NURSE PRACTITIONER

## 2022-11-20 PROCEDURE — 80074 ACUTE HEPATITIS PANEL: CPT | Performed by: NURSE PRACTITIONER

## 2022-11-20 PROCEDURE — 83540 ASSAY OF IRON: CPT | Performed by: NURSE PRACTITIONER

## 2022-11-20 PROCEDURE — 99232 SBSQ HOSP IP/OBS MODERATE 35: CPT | Performed by: NURSE PRACTITIONER

## 2022-11-20 PROCEDURE — 36415 COLL VENOUS BLD VENIPUNCTURE: CPT | Performed by: NURSE PRACTITIONER

## 2022-11-20 RX ORDER — SODIUM CHLORIDE 450 MG/100ML
75 INJECTION, SOLUTION INTRAVENOUS CONTINUOUS
Status: DISCONTINUED | OUTPATIENT
Start: 2022-11-20 | End: 2022-11-22

## 2022-11-20 RX ADMIN — POTASSIUM CHLORIDE AND SODIUM CHLORIDE 75 ML/HR: 900; 150 INJECTION, SOLUTION INTRAVENOUS at 05:39

## 2022-11-20 RX ADMIN — METOPROLOL TARTRATE 50 MG: 25 TABLET, FILM COATED ORAL at 20:39

## 2022-11-20 RX ADMIN — SODIUM CHLORIDE 75 ML/HR: 4.5 INJECTION, SOLUTION INTRAVENOUS at 13:40

## 2022-11-20 RX ADMIN — HYDROCORTISONE SODIUM SUCCINATE 100 MG: 100 INJECTION, POWDER, FOR SOLUTION INTRAMUSCULAR; INTRAVENOUS at 08:39

## 2022-11-20 RX ADMIN — MESALAMINE 500 MG: 250 CAPSULE ORAL at 20:39

## 2022-11-20 RX ADMIN — METOPROLOL TARTRATE 50 MG: 25 TABLET, FILM COATED ORAL at 08:48

## 2022-11-20 RX ADMIN — HYDROCORTISONE SODIUM SUCCINATE 100 MG: 100 INJECTION, POWDER, FOR SOLUTION INTRAMUSCULAR; INTRAVENOUS at 16:16

## 2022-11-20 RX ADMIN — HYDROCORTISONE SODIUM SUCCINATE 100 MG: 100 INJECTION, POWDER, FOR SOLUTION INTRAMUSCULAR; INTRAVENOUS at 01:29

## 2022-11-20 NOTE — PROGRESS NOTES
"Daily progress note    Primary care physician  Dr. Mckenzie    Chief complaint  Doing same still no BM or flatus but little better after placing NG tube and remains awake and alert.    History of present illness  82-year-old white female with history of Crohn's disease hypertension hyperlipidemia and paroxysmal atrial fibrillation presented to [facility] emergency room with abdominal pain started this morning.  Patient developed nausea vomiting after that and had a BM but not passing gas anymore.  Patient work-up in ER revealed acute partial small bowel obstruction admit for management.      REVIEW OF SYSTEMS  All systems reviewed and negative except for those discussed in HPI.     PHYSICAL EXAM   Blood pressure 161/60, pulse 61, temperature 98.1 °F (36.7 °C), temperature source Oral, resp. rate 16, height 157.5 cm (62\"), weight 58.4 kg (128 lb 12 oz), SpO2 99 %.    GENERAL: not distressed awake and alert  HEENT:  Unremarkable  NECK:  Supple  CV: regular rhythm, regular rate  RESPIRATORY: normal effort, clear to auscultation bilaterally  ABDOMEN: soft, generalized abdominal tenderness hypoactive bowel sounds  MUSCULOSKELETAL: no deformity  NEURO: alert, moves all extremities, follows commands  SKIN: warm, dry     LAB RESULTS  Lab Results (last 24 hours)     Procedure Component Value Units Date/Time    Comprehensive Metabolic Panel [[id number]]  (Abnormal) Collected: 11/20/22 0524    Specimen: Blood Updated: 11/20/22 0713     Glucose 104 mg/dL      BUN 15 mg/dL      Creatinine 0.91 mg/dL      Sodium 146 mmol/L      Potassium 4.1 mmol/L      Chloride 113 mmol/L      CO2 21.7 mmol/L      Calcium 8.8 mg/dL      Total Protein 5.8 g/dL      Albumin 3.50 g/dL      ALT (SGPT) 135 U/L      AST (SGOT) 134 U/L      Alkaline Phosphatase 96 U/L      Total Bilirubin 0.6 mg/dL      Globulin 2.3 gm/dL      A/G Ratio 1.5 g/dL      BUN/Creatinine Ratio 16.5     Anion Gap 11.3 mmol/L      eGFR 63.5 mL/min/1.73      Comment: " National Kidney Foundation and American Society of Nephrology (ASN) Task Force recommended calculation based on the Chronic Kidney Disease Epidemiology Collaboration (CKD-EPI) equation refit without adjustment for race.       Narrative:      GFR Normal >60  Chronic Kidney Disease <60  Kidney Failure <15    The GFR formula is only valid for adults with stable renal function between ages 18 and 70.    Hepatitis Panel, Acute [[id number]]  (Normal) Collected: 11/20/22 0524    Specimen: Blood Updated: 11/20/22 0712     Hepatitis B Surface Ag Non-Reactive     Hep A IgM Non-Reactive     Hep B C IgM Non-Reactive     Hepatitis C Ab Non-Reactive    Narrative:      Results may be falsely decreased if patient taking Biotin.     TSH [[id number]]  (Normal) Collected: 11/20/22 0524    Specimen: Blood Updated: 11/20/22 0712     TSH 0.749 uIU/mL     Iron Profile [[id number]]  (Normal) Collected: 11/20/22 0524    Specimen: Blood Updated: 11/20/22 0708     Iron 102 mcg/dL      Iron Saturation 24 %      Transferrin 290 mg/dL      TIBC 432 mcg/dL     Lipid Panel [[id number]]  (Abnormal) Collected: 11/20/22 0524    Specimen: Blood Updated: 11/20/22 0708     Total Cholesterol 173 mg/dL      Triglycerides 81 mg/dL      HDL Cholesterol 61 mg/dL      LDL Cholesterol  97 mg/dL      VLDL Cholesterol 15 mg/dL      LDL/HDL Ratio 1.57    Narrative:      Cholesterol Reference Ranges  (U.S. Department of Health and Human Services ATP III Classifications)    Desirable          <200 mg/dL  Borderline High    200-239 mg/dL  High Risk          >240 mg/dL      Triglyceride Reference Ranges  (U.S. Department of Health and Human Services ATP III Classifications)    Normal           <150 mg/dL  Borderline High  150-199 mg/dL  High             200-499 mg/dL  Very High        >500 mg/dL    HDL Reference Ranges  (U.S. Department of Health and Human Services ATP III Classifications)    Low     <40 mg/dl (major risk factor for CHD)  High    >60 mg/dl ('negative'  risk factor for CHD)        LDL Reference Ranges  (U.S. Department of Health and Human Services ATP III Classifications)    Optimal          <100 mg/dL  Near Optimal     100-129 mg/dL  Borderline High  130-159 mg/dL  High             160-189 mg/dL  Very High        >189 mg/dL    Hemoglobin A1c [[id number]]  (Normal) Collected: 11/20/22 0524    Specimen: Blood Updated: 11/20/22 0701     Hemoglobin A1C 5.30 %     Narrative:      Hemoglobin A1C Ranges:    Increased Risk for Diabetes  5.7% to 6.4%  Diabetes                     >= 6.5%  Diabetic Goal                < 7.0%    CBC & Differential [[id number]]  (Abnormal) Collected: 11/20/22 0524    Specimen: Blood Updated: 11/20/22 0651    Narrative:      The following orders were created for panel order CBC & Differential.  Procedure                               Abnormality         Status                     ---------                               -----------         ------                     CBC Auto Differential[[id number]]        Abnormal            Final result                 Please view results for these tests on the individual orders.    CBC Auto Differential [[id number]]  (Abnormal) Collected: 11/20/22 0524    Specimen: Blood Updated: 11/20/22 0651     WBC 11.27 10*3/mm3      RBC 4.08 10*6/mm3      Hemoglobin 11.9 g/dL      Hematocrit 35.7 %      MCV 87.5 fL      MCH 29.2 pg      MCHC 33.3 g/dL      RDW 13.5 %      RDW-SD 42.6 fl      MPV 10.6 fL      Platelets 228 10*3/mm3      Neutrophil % 80.3 %      Lymphocyte % 15.8 %      Monocyte % 3.2 %      Eosinophil % 0.0 %      Basophil % 0.3 %      Immature Grans % 0.4 %      Neutrophils, Absolute 9.05 10*3/mm3      Lymphocytes, Absolute 1.78 10*3/mm3      Monocytes, Absolute 0.36 10*3/mm3      Eosinophils, Absolute 0.00 10*3/mm3      Basophils, Absolute 0.03 10*3/mm3      Immature Grans, Absolute 0.05 10*3/mm3      nRBC 0.0 /100 WBC     Blood Culture - Blood, Arm, Left [[id number]]  (Normal) Collected: 11/19/22 0633     Specimen: Blood from Arm, Left Updated: 11/20/22 0646     Blood Culture No growth at 24 hours    Blood Culture - Blood, Arm, Right [[id number]]  (Normal) Collected: 11/19/22 0633    Specimen: Blood from Arm, Right Updated: 11/20/22 0646     Blood Culture No growth at 24 hours    Mitochondrial Antibodies, M2 [[id number]] Collected: 11/20/22 0524    Specimen: Blood Updated: 11/20/22 0639    Anti-Smooth Muscle Antibody Titer [[id number]] Collected: 11/20/22 0524    Specimen: Blood Updated: 11/20/22 0639    STAT Lactic Acid, Reflex [[id number]]  (Normal) Collected: 11/19/22 1845    Specimen: Blood from Arm, Left Updated: 11/19/22 1913     Lactate 1.7 mmol/L     MICHAELA Comprehensive Panel [[id number]] Collected: 11/19/22 1845    Specimen: Blood from Arm, Left Updated: 11/19/22 1851    Ferritin [[id number]]  (Abnormal) Collected: 11/19/22 0521    Specimen: Blood from Arm, Left Updated: 11/19/22 1806     Ferritin 155.00 ng/mL     Narrative:      Results may be falsely decreased if patient taking Biotin.          Imaging Results (Last 24 Hours)     Procedure Component Value Units Date/Time    XR Abdomen KUB [[id number]] Collected: 11/20/22 0716     Updated: 11/20/22 0721    Narrative:      XR ABDOMEN KUB-     INDICATIONS: Small bowel obstruction     TECHNIQUE: Supine views of the abdomen     COMPARISON: 11/19/2022     FINDINGS:      NG tube appears stable. The bowel gas pattern is nonspecific, and  appears similar to prior exam. Follow-up as indications persist.     Residual contrast material is seen in the urinary bladder.          Impression:         As described.     This report was finalized on 11/20/2022 7:17 AM by Dr. Randy De Paz M.D.           ECG 12 Lead Rhythm Change  Order: [id number]   Status: Preliminary result      Visible to patient: No (not released)      0 Result Notes  Component   Ref Range & Units 08:56   (11/19/22) 05:23   (11/19/22) 1 yr ago   (8/4/21) 1 yr ago   (8/3/21) 1 yr ago   (8/3/21)   QT Interval    ms 299 P  370  335  261  380    Resulting Agency [facility] ECG BH ECG BH ECG BH ECG BH ECG             HEART RATE= 149  bpm  RR Interval= 403  ms  GA Interval= 88  ms  P Horizontal Axis=   deg  P Front Axis= -21  deg  QRSD Interval= 75  ms  QT Interval= 299  ms  QRS Axis= 56  deg  T Wave Axis= 236  deg  - ABNORMAL ECG -  Supraventricular tachycardia  Repolarization abnormality, prob rate related  Baseline wander in lead(s) I,V2,V3,V4             Current Facility-Administered Medications:   •  [COMPLETED] digoxin (LANOXIN) injection 250 mcg, 250 mcg, Intravenous, Once, 250 mcg at 11/19/22 1524 **FOLLOWED BY** digoxin (LANOXIN) injection 125 mcg, 125 mcg, Intravenous, Q8H, Armani Tello MD  •  Hydrocortisone Sod Suc (PF) (Solu-CORTEF) injection 100 mg, 100 mg, Intravenous, Q8H, Kaylee Mccullough APRN, 100 mg at 11/20/22 0839  •  HYDROmorphone (DILAUDID) injection 0.5 mg, 0.5 mg, Intravenous, 4x Daily PRN, Drew Cardona MD  •  mesalamine (PENTASA) CR capsule 500 mg, 500 mg, Oral, 4x Daily, Drew Cardona MD  •  metoprolol tartrate (LOPRESSOR) injection 2.5 mg, 2.5 mg, Intravenous, Q4H PRN, Drew Cardona MD  •  metoprolol tartrate (LOPRESSOR) tablet 50 mg, 50 mg, Oral, BID, Drew Cardona MD, 50 mg at 11/20/22 0848  •  ondansetron (ZOFRAN) injection 4 mg, 4 mg, Intravenous, Q6H PRN, Drew Cardona MD  •  promethazine (PHENERGAN) 12.5 mg in sodium chloride 0.9 % 50 mL, 12.5 mg, Intravenous, Q6H PRN, Joey Castaneda II, MD, Stopped at 11/19/22 1050  •  [COMPLETED] Insert Peripheral IV, , , Once **AND** sodium chloride 0.9 % flush 10 mL, 10 mL, Intravenous, PRN, Joey Qureshi MD  •  sodium chloride 0.9 % with KCl 20 mEq/L infusion, 75 mL/hr, Intravenous, Continuous, Drew Cardona MD, Last Rate: 75 mL/hr at 11/20/22 0539, 75 mL/hr at 11/20/22 0539     ASSESSMENT  Acute partial small bowel obstruction  Atrial fibrillation with rapid ventricular rate  Hypertension  Crohn's disease  Elevated LFTs  Gastroesophageal  reflux disease    PLAN  CPM  NPO  IVF  NG tube   Control heart rate  Continue to hold Eliquis  Symptomatic treatment for pain nausea vomiting  General surgery  consult appreciated  GI and cardiology to follow patient  Adjust home medications  Stress ulcer DVT prophylaxis  Supportive care  Discussed with family nursing staff  Follow closely further recommendation current hospital course    CESILIA YUAN MD

## 2022-11-20 NOTE — PROGRESS NOTES
[facility] Progress Note       Encounter Date:22  Patient:Michela Aguilar  :1940  MRN:[id number]      Chief Complaint: Follow-up paroxysmal atrial fibrillation      Subjective:        Feeling better this morning converted back to sinus rhythm early this morning    Review of Systems:  Review of Systems   Constitutional: Positive for malaise/fatigue.   Cardiovascular: Negative for palpitations.   Gastrointestinal: Positive for bloating.       Medications:  Scheduled Meds:  digoxin, 125 mcg, Intravenous, Q8H  Hydrocortisone Sod Suc (PF), 100 mg, Intravenous, Q8H  mesalamine, 500 mg, Oral, 4x Daily  metoprolol tartrate, 50 mg, Oral, BID    Continuous Infusions:  sodium chloride 0.9 % with KCl 20 mEq, 75 mL/hr, Last Rate: 75 mL/hr (22 0539)    PRN Meds:  •  HYDROmorphone  •  metoprolol tartrate  •  ondansetron  •  promethazine  •  [COMPLETED] Insert Peripheral IV **AND** sodium chloride         Objective:       Vitals:    22 1800 22 2251 22 2300 22 0725   BP: 140/65  137/64 147/71   BP Location: Left arm  Right arm Left arm   Patient Position: Lying  Lying Lying   Pulse: 76 64 64 72   Resp: 16  18 17   Temp: 98 °F (36.7 °C)  98.4 °F (36.9 °C) 97.5 °F (36.4 °C)   TempSrc: Oral  Oral Oral   SpO2: 97%  95% 98%   Weight: 58.4 kg (128 lb 12 oz)      Height:               Physical Exam:  Constitutional: Well appearing, well developed, no acute distress   HENT: Oropharynx clear and membrane moist, NG tube in place  Eyes: Normal conjunctiva, no sclera icterus.  Neck: Supple, no carotid bruit bilaterally.  Cardiovascular: Regular rate and rhythm, No Murmur, No bilateral lower extremity edema.  Pulmonary: Normal respiratory effort , normal lung sounds, no wheezing.  Neurological: Alert and orient x 3.   Skin: Warm, dry, no ecchymosis, no rash.  Psych: Appropriate mood and affect. Normal judgment and insight.           Lab Review:   Results from last 7 days   Lab Units  11/20/22 0524 11/19/22  0521   SODIUM mmol/L 146* 142   POTASSIUM mmol/L 4.1 3.9   CHLORIDE mmol/L 113* 103   CO2 mmol/L 21.7* 24.0   BUN mg/dL 15 12   CREATININE mg/dL 0.91 0.99   GLUCOSE mg/dL 104* 125*   CALCIUM mg/dL 8.8 10.3   AST (SGOT) U/L 134* 243*   ALT (SGPT) U/L 135* 122*     Results from last 7 days   Lab Units 11/19/22  0521   TROPONIN T ng/mL <0.010     Results from last 7 days   Lab Units 11/20/22 0524 11/19/22 0521   WBC 10*3/mm3 11.27* 13.77*   HEMOGLOBIN g/dL 11.9* 14.9   HEMATOCRIT % 35.7 44.8   PLATELETS 10*3/mm3 228 249         Results from last 7 days   Lab Units 11/19/22 0521   MAGNESIUM mg/dL 2.2     Results from last 7 days   Lab Units 11/20/22 0524   CHOLESTEROL mg/dL 173   TRIGLYCERIDES mg/dL 81   HDL CHOL mg/dL 61*         Results from last 7 days   Lab Units 11/20/22  0524   TSH uIU/mL 0.749     Echocardiogram 8/4/2021 with images reviewed by myself:  • Estimated left ventricular EF = 60% Left ventricular systolic function is normal.  • Left ventricular diastolic function was indeterminate.          Assessment:          Diagnosis Plan   1. Partial small bowel obstruction (HCC)        2. Atrial fibrillation with RVR (HCC)               Plan:       Ms. Vines is an 82 y.o. woman who follows with Dr. Damon with past medical history notable for paroxysmal atrial fibrillation, hypertension, history of TIA, Crohn's disease, and anemia who presents to the emergency room 11/19/2022 with partial bowel obstruction and also converted from sinus rhythm into atrial fibrillation with rapid ventricular response.  Fortunately we loaded of the digoxin and she is converted back to sinus rhythm on 11/20/2022.  She seems to be able to tolerate oral medications and was given metoprolol this morning.  Would continue metoprolol alone but if need be can reload with digoxin if any recurrent atrial fibrillation.          Paroxysmal atrial fibrillation:  • Anticoagulation on hold would restart once no invasive  procedures are clearly needed  • Starting back on oral metoprolol monitor response   • Received digoxin load 11/19/2022 would hold off on continuing digoxin for the time being.                       Armani Tello MD  [facility]  11/20/22  10:34 EST

## 2022-11-20 NOTE — PROGRESS NOTES
LOS: 1 day   Patient Care Team:  Roni Mckenzie MD as PCP - General (Internal Medicine)  Roni Mckenzie MD as PCP - Family Medicine  Marcel Ricketts MD as Consulting Physician (Gastroenterology)  Juan Damon MD as Consulting Physician (Cardiology)      Subjective     Interval History: No new events overnight    Subjective: Patient states she feels a bit better.  However she has not had any bowel movements or passed gas.  She continues to have bilious output via NG tube.  She wants to walk around the unit.    Labs -, , TB and alk phos WNL, iron studies normal, Hgb 11.9, hepatitis panel nonreactive, additional serologies pending    KUB -no changes noted      ROS:   Review of Systems   Gastrointestinal: Positive for constipation. Negative for abdominal pain, nausea and vomiting.   All other systems reviewed and are negative.         Medication Review:     Current Facility-Administered Medications:   •  [COMPLETED] digoxin (LANOXIN) injection 250 mcg, 250 mcg, Intravenous, Once, 250 mcg at 11/19/22 1524 **FOLLOWED BY** digoxin (LANOXIN) injection 125 mcg, 125 mcg, Intravenous, Q8H, Armani Tello MD  •  Hydrocortisone Sod Suc (PF) (Solu-CORTEF) injection 100 mg, 100 mg, Intravenous, Q8H, Kaylee Mccullough APRN, 100 mg at 11/20/22 0839  •  HYDROmorphone (DILAUDID) injection 0.5 mg, 0.5 mg, Intravenous, 4x Daily PRN, Drew Carodna MD  •  mesalamine (PENTASA) CR capsule 500 mg, 500 mg, Oral, 4x Daily, Drew Cardona MD  •  metoprolol tartrate (LOPRESSOR) injection 2.5 mg, 2.5 mg, Intravenous, Q4H PRN, Drew Cardona MD  •  metoprolol tartrate (LOPRESSOR) tablet 50 mg, 50 mg, Oral, BID, Drew Cardona MD, 50 mg at 11/20/22 0848  •  ondansetron (ZOFRAN) injection 4 mg, 4 mg, Intravenous, Q6H PRN, Drew Cardona MD  •  promethazine (PHENERGAN) 12.5 mg in sodium chloride 0.9 % 50 mL, 12.5 mg, Intravenous, Q6H PRN, Joey Castaneda II, MD, Stopped at 11/19/22 1050  •  [COMPLETED] Insert Peripheral IV, ,  , Once **AND** sodium chloride 0.9 % flush 10 mL, 10 mL, Intravenous, PRN, Joey Qureshi MD  •  sodium chloride 0.9 % with KCl 20 mEq/L infusion, 75 mL/hr, Intravenous, Continuous, Drew Cardona MD, Last Rate: 75 mL/hr at 11/20/22 0539, 75 mL/hr at 11/20/22 0539      Objective     Vital Signs  Vitals:    11/19/22 1800 11/19/22 2251 11/19/22 2300 11/20/22 0725   BP: 140/65  137/64 147/71   BP Location: Left arm  Right arm Left arm   Patient Position: Lying  Lying Lying   Pulse: 76 64 64 72   Resp: 16  18 17   Temp: 98 °F (36.7 °C)  98.4 °F (36.9 °C) 97.5 °F (36.4 °C)   TempSrc: Oral  Oral Oral   SpO2: 97%  95% 98%   Weight: 58.4 kg (128 lb 12 oz)      Height:           Physical Exam: Sitting up in bed    General Appearance:    Awake and alert, in no acute distress   Head:    Normocephalic, without obvious abnormality   Eyes:          Conjunctivae normal, anicteric sclera   Throat:   No oral lesions, no thrush, oral mucosa moist   Neck:   No adenopathy, supple, no JVD   Lungs:     Respirations regular, even and unlabored   Abdomen:     Soft, non-tender, non-distended, no rebound or guarding, no hepatosplenomegaly, NG tube   Rectal:     Deferred   Extremities:   No edema, no cyanosis, moves equally bilaterally   Skin:   No bruising, no rash, no jaundice        Results Review:    CBC  Results from last 7 days   Lab Units 11/20/22 0524 11/19/22 0521   RBC 10*6/mm3 4.08 5.14   WBC 10*3/mm3 11.27* 13.77*   HEMOGLOBIN g/dL 11.9* 14.9   PLATELETS 10*3/mm3 228 249       CMP  Results from last 7 days   Lab Units 11/20/22 0524 11/19/22 0521   SODIUM mmol/L 146* 142   POTASSIUM mmol/L 4.1 3.9   CHLORIDE mmol/L 113* 103   CO2 mmol/L 21.7* 24.0   BUN mg/dL 15 12   CREATININE mg/dL 0.91 0.99   GLUCOSE mg/dL 104* 125*   ALBUMIN g/dL 3.50 4.40   BILIRUBIN mg/dL 0.6 0.5   ALK PHOS U/L 96 128*   AST (SGOT) U/L 134* 243*   ALT (SGPT) U/L 135* 122*       Amylase and Lipase  Results from last 7 days   Lab Units 11/19/22  0564    LIPASE U/L 47       CRP         Imaging Results (Last 24 Hours)     Procedure Component Value Units Date/Time    XR Abdomen KUB [[id number]] Collected: 11/20/22 0716     Updated: 11/20/22 0721    Narrative:      XR ABDOMEN KUB-     INDICATIONS: Small bowel obstruction     TECHNIQUE: Supine views of the abdomen     COMPARISON: 11/19/2022     FINDINGS:      NG tube appears stable. The bowel gas pattern is nonspecific, and  appears similar to prior exam. Follow-up as indications persist.     Residual contrast material is seen in the urinary bladder.          Impression:         As described.     This report was finalized on 11/20/2022 7:17 AM by Dr. Randy De Paz M.D.       XR Abdomen KUB [[id number]] Collected: 11/19/22 1316     Updated: 11/19/22 1321    Narrative:      XR ABDOMEN KUB-     INDICATIONS: NG tube placement     TECHNIQUE: Frontal view the abdomen     COMPARISON:  image from CT from 07/19/2022     FINDINGS:     NG tube extends to the mid stomach. The bowel gas pattern is  nonspecific. Follow-up as clinically indicated.       Impression:         As described.     This report was finalized on 11/19/2022 1:18 PM by Dr. Randy De Paz M.D.               ASSESSMENT:  82 y.o. female with history of Crohn's disease s/p right hemicolectomy, A. fib, cholecystectomy, appendectomy, splenectomy, and previous partial SBO who presents with complaint of abdominal pain and vomiting.  -Partial small bowel obstruction - d/t Crohn's disease or surgical history  -Crohn's disease -s/P right hemicolectomy (2013), rx Pentasa  -S/p appendectomy, cholecystectomy, splenectomy  -A. Fib -Rx Eliquis  -Elevated liver enzymes - new, hepatitis panel nonreactive      PLAN:  Continue hydrocortisone 100 mg IV every 8 hours.  Continue NG tube to low intermittent wall suction.  Okay to clamp so that she can ambulate around the bell.  Can clamp for longer periods of time when she begins passing gas or having bowel  movements  Continue IV fluids and n.p.o. status.  Monitor liver enzymes, serologies pending, check liver ultrasound.  General surgery following.  GI will follow        Kaylee Mccullough, YANY  11/20/22  12:45 EST

## 2022-11-20 NOTE — PLAN OF CARE
Goal Outcome Evaluation:            New admit to 4E for abdominal pain,N/V. Currently denies any symptoms.NG intact to low wall  suction. IVF, Up with 1 person assist.Med rec. Complete.VSS. WCTM.

## 2022-11-20 NOTE — PROGRESS NOTES
"General Surgery Progress Note    Summary: Mrs. Michela Aguilar is a 82 y.o. year old lady with Crohn's disease and a partial small bowel obstruction.  Continue supportive care.  N.p.o., NG to low wall suction, IV fluids.  Await return of bowel function.  Started on IV steroids and Pentasa per GI.  Will continue to follow.    Chief Complaint: Abdominal pain    Interval Events: No acute events overnight. Minimal abdominal pain. No nausea or vomiting. No flatus or BM.     Vitals: /71 (BP Location: Left arm, Patient Position: Lying)   Pulse 72   Temp 97.5 °F (36.4 °C) (Oral)   Resp 17   Ht 157.5 cm (62\")   Wt 58.4 kg (128 lb 12 oz)   SpO2 98%   BMI 23.55 kg/m²     GENERAL: alert, well appearing, and in no distress  HEENT: normocephalic, atraumatic, moist mucous membranes, clear sclerae, NG in place, output 850cc  CHEST: no increased work of breathing, symmetric air entry  CARDIAC: regular rate and rhythm    ABDOMEN: Soft, nondistended, nontender  EXTREMITIES: no cyanosis, clubbing, or edema   SKIN: Warm and moist, no rashes    Labs:  Results from last 7 days   Lab Units 11/20/22  0524 11/19/22  0521   WBC 10*3/mm3 11.27* 13.77*   HEMOGLOBIN g/dL 11.9* 14.9   HEMATOCRIT % 35.7 44.8   PLATELETS 10*3/mm3 228 249     Results from last 7 days   Lab Units 11/20/22 0524 11/19/22  0521   SODIUM mmol/L 146* 142   POTASSIUM mmol/L 4.1 3.9   CHLORIDE mmol/L 113* 103   CO2 mmol/L 21.7* 24.0   BUN mg/dL 15 12   CREATININE mg/dL 0.91 0.99   CALCIUM mg/dL 8.8 10.3   BILIRUBIN mg/dL 0.6 0.5   ALK PHOS U/L 96 128*   ALT (SGPT) U/L 135* 122*   AST (SGOT) U/L 134* 243*   GLUCOSE mg/dL 104* 125*           OZZIE PARRISH MD  General and Endoscopic Surgery  [facility], [place], operated by [facility]    [address], [address]  [place], [place], [facility]  P: [phone]  F: [phone]     "

## 2022-11-20 NOTE — PLAN OF CARE
Goal Outcome Evaluation:Receives patient with NGT to low wall intermittent suction ,with dark greenish output coming out. Dose of lanoxin not given her pulse only 64 beat / min . She is sinus rhythm on the monitor. For portable KUB at 0600 AM. VS stable.

## 2022-11-21 LAB
ALBUMIN SERPL-MCNC: 3.2 G/DL (ref 3.5–5.2)
ALBUMIN/GLOB SERPL: 1.4 G/DL
ALP SERPL-CCNC: 81 U/L (ref 39–117)
ALT SERPL W P-5'-P-CCNC: 84 U/L (ref 1–33)
ANION GAP SERPL CALCULATED.3IONS-SCNC: 11.6 MMOL/L (ref 5–15)
AST SERPL-CCNC: 70 U/L (ref 1–32)
BASOPHILS # BLD AUTO: 0.02 10*3/MM3 (ref 0–0.2)
BASOPHILS NFR BLD AUTO: 0.2 % (ref 0–1.5)
BILIRUB SERPL-MCNC: 0.4 MG/DL (ref 0–1.2)
BUN SERPL-MCNC: 22 MG/DL (ref 8–23)
BUN/CREAT SERPL: 30.1 (ref 7–25)
CALCIUM SPEC-SCNC: 8.4 MG/DL (ref 8.6–10.5)
CHLORIDE SERPL-SCNC: 110 MMOL/L (ref 98–107)
CO2 SERPL-SCNC: 20.4 MMOL/L (ref 22–29)
CREAT SERPL-MCNC: 0.73 MG/DL (ref 0.57–1)
DEPRECATED RDW RBC AUTO: 42.9 FL (ref 37–54)
EGFRCR SERPLBLD CKD-EPI 2021: 82.7 ML/MIN/1.73
EOSINOPHIL # BLD AUTO: 0.01 10*3/MM3 (ref 0–0.4)
EOSINOPHIL NFR BLD AUTO: 0.1 % (ref 0.3–6.2)
ERYTHROCYTE [DISTWIDTH] IN BLOOD BY AUTOMATED COUNT: 13.3 % (ref 12.3–15.4)
GLOBULIN UR ELPH-MCNC: 2.3 GM/DL
GLUCOSE SERPL-MCNC: 99 MG/DL (ref 65–99)
HCT VFR BLD AUTO: 32.6 % (ref 34–46.6)
HGB BLD-MCNC: 10.7 G/DL (ref 12–15.9)
IMM GRANULOCYTES # BLD AUTO: 0.06 10*3/MM3 (ref 0–0.05)
IMM GRANULOCYTES NFR BLD AUTO: 0.5 % (ref 0–0.5)
LYMPHOCYTES # BLD AUTO: 1.74 10*3/MM3 (ref 0.7–3.1)
LYMPHOCYTES NFR BLD AUTO: 14.1 % (ref 19.6–45.3)
MCH RBC QN AUTO: 28.8 PG (ref 26.6–33)
MCHC RBC AUTO-ENTMCNC: 32.8 G/DL (ref 31.5–35.7)
MCV RBC AUTO: 87.6 FL (ref 79–97)
MONOCYTES # BLD AUTO: 0.59 10*3/MM3 (ref 0.1–0.9)
MONOCYTES NFR BLD AUTO: 4.8 % (ref 5–12)
NEUTROPHILS NFR BLD AUTO: 80.3 % (ref 42.7–76)
NEUTROPHILS NFR BLD AUTO: 9.94 10*3/MM3 (ref 1.7–7)
NRBC BLD AUTO-RTO: 0 /100 WBC (ref 0–0.2)
PLATELET # BLD AUTO: 209 10*3/MM3 (ref 140–450)
PMV BLD AUTO: 10.5 FL (ref 6–12)
POTASSIUM SERPL-SCNC: 3.7 MMOL/L (ref 3.5–5.2)
PROT SERPL-MCNC: 5.5 G/DL (ref 6–8.5)
RBC # BLD AUTO: 3.72 10*6/MM3 (ref 3.77–5.28)
SODIUM SERPL-SCNC: 142 MMOL/L (ref 136–145)
WBC NRBC COR # BLD: 12.36 10*3/MM3 (ref 3.4–10.8)

## 2022-11-21 PROCEDURE — 99232 SBSQ HOSP IP/OBS MODERATE 35: CPT | Performed by: STUDENT IN AN ORGANIZED HEALTH CARE EDUCATION/TRAINING PROGRAM

## 2022-11-21 PROCEDURE — 85025 COMPLETE CBC W/AUTO DIFF WBC: CPT | Performed by: NURSE PRACTITIONER

## 2022-11-21 PROCEDURE — 80053 COMPREHEN METABOLIC PANEL: CPT | Performed by: NURSE PRACTITIONER

## 2022-11-21 PROCEDURE — 99232 SBSQ HOSP IP/OBS MODERATE 35: CPT | Performed by: INTERNAL MEDICINE

## 2022-11-21 PROCEDURE — 25010000002 HYDROCORTISONE SOD SUC (PF) 100 MG RECONSTITUTED SOLUTION: Performed by: NURSE PRACTITIONER

## 2022-11-21 RX ORDER — PANTOPRAZOLE SODIUM 40 MG/1
40 TABLET, DELAYED RELEASE ORAL
Status: DISCONTINUED | OUTPATIENT
Start: 2022-11-22 | End: 2022-11-22 | Stop reason: HOSPADM

## 2022-11-21 RX ORDER — POLYETHYLENE GLYCOL 3350 17 G/17G
17 POWDER, FOR SOLUTION ORAL DAILY
Status: DISCONTINUED | OUTPATIENT
Start: 2022-11-21 | End: 2022-11-22 | Stop reason: HOSPADM

## 2022-11-21 RX ADMIN — SODIUM CHLORIDE 75 ML/HR: 4.5 INJECTION, SOLUTION INTRAVENOUS at 16:50

## 2022-11-21 RX ADMIN — HYDROCORTISONE SODIUM SUCCINATE 100 MG: 100 INJECTION, POWDER, FOR SOLUTION INTRAMUSCULAR; INTRAVENOUS at 01:14

## 2022-11-21 RX ADMIN — POLYETHYLENE GLYCOL 3350 17 G: 17 POWDER, FOR SOLUTION ORAL at 09:14

## 2022-11-21 RX ADMIN — METOPROLOL TARTRATE 25 MG: 25 TABLET, FILM COATED ORAL at 21:04

## 2022-11-21 RX ADMIN — SODIUM CHLORIDE 75 ML/HR: 4.5 INJECTION, SOLUTION INTRAVENOUS at 03:26

## 2022-11-21 RX ADMIN — HYDROCORTISONE SODIUM SUCCINATE 100 MG: 100 INJECTION, POWDER, FOR SOLUTION INTRAMUSCULAR; INTRAVENOUS at 16:50

## 2022-11-21 RX ADMIN — MESALAMINE 500 MG: 250 CAPSULE ORAL at 21:04

## 2022-11-21 RX ADMIN — HYDROCORTISONE SODIUM SUCCINATE 100 MG: 100 INJECTION, POWDER, FOR SOLUTION INTRAMUSCULAR; INTRAVENOUS at 23:34

## 2022-11-21 RX ADMIN — HYDROCORTISONE SODIUM SUCCINATE 100 MG: 100 INJECTION, POWDER, FOR SOLUTION INTRAMUSCULAR; INTRAVENOUS at 09:13

## 2022-11-21 RX ADMIN — MESALAMINE 500 MG: 250 CAPSULE ORAL at 09:15

## 2022-11-21 NOTE — NURSING NOTE
Pt reports some discomfort to throat r/t NG tube.Slept most of the night.Minimal NG output,Still NPO ,continues on IVF,reports + flatus WCTM.   no abdominal pain/no nausea/no vomiting

## 2022-11-21 NOTE — PROGRESS NOTES
"General Surgery Progress Note    Summary: Mrs. Michela Aguilar is a 82 y.o. year old lady with Crohn's disease and a partial small bowel obstruction.  Continue supportive care. Passing flatus, no BM, NG output low. Will dc NG tube and start CLD. Started on IV steroids and Pentasa per GI.  Will continue to follow.    Chief Complaint: Abdominal pain    Interval Events: No acute events overnight. Passed flatus several times, no BM. Pain controlled. No nausea or vomiting.     Vitals: /68 (BP Location: Right arm, Patient Position: Lying)   Pulse 51   Temp 97.8 °F (36.6 °C) (Oral)   Resp 18   Ht 157.5 cm (62\")   Wt 58.4 kg (128 lb 12 oz)   SpO2 96%   BMI 23.55 kg/m²     GENERAL: alert, well appearing, and in no distress  HEENT: normocephalic, atraumatic, moist mucous membranes, clear sclerae, NG in place, output 300cc  CHEST: no increased work of breathing, symmetric air entry  CARDIAC: regular rate and rhythm    ABDOMEN: Soft, nondistended, nontender  EXTREMITIES: no cyanosis, clubbing, or edema   SKIN: Warm and moist, no rashes    Labs:  Results from last 7 days   Lab Units 11/21/22 0441 11/20/22 0524 11/19/22  0521   WBC 10*3/mm3 12.36* 11.27* 13.77*   HEMOGLOBIN g/dL 10.7* 11.9* 14.9   HEMATOCRIT % 32.6* 35.7 44.8   PLATELETS 10*3/mm3 209 228 249     Results from last 7 days   Lab Units 11/21/22 0441 11/20/22 0524 11/19/22  0521   SODIUM mmol/L 142 146* 142   POTASSIUM mmol/L 3.7 4.1 3.9   CHLORIDE mmol/L 110* 113* 103   CO2 mmol/L 20.4* 21.7* 24.0   BUN mg/dL 22 15 12   CREATININE mg/dL 0.73 0.91 0.99   CALCIUM mg/dL 8.4* 8.8 10.3   BILIRUBIN mg/dL 0.4 0.6 0.5   ALK PHOS U/L 81 96 128*   ALT (SGPT) U/L 84* 135* 122*   AST (SGOT) U/L 70* 134* 243*   GLUCOSE mg/dL 99 104* 125*           OZZIE PARRISH MD  General and Endoscopic Surgery  [facility]    [address], [address]  [place], [place], [address]  P: [phone]  F: [phone]     "

## 2022-11-21 NOTE — PROGRESS NOTES
"Daily progress note    Primary care physician  Dr. Mckenzie    Chief complaint  Doing doing better and passing gas with no BM yet.  Patient denies any nausea vomiting abdominal pain and wants to take clear liquids.    History of present illness  82-year-old white female with history of Crohn's disease hypertension hyperlipidemia and paroxysmal atrial fibrillation presented to [facility] emergency room with abdominal pain started this morning.  Patient developed nausea vomiting after that and had a BM but not passing gas anymore.  Patient work-up in ER revealed acute partial small bowel obstruction admit for management.      REVIEW OF SYSTEMS  Unremarkable except weakness    PHYSICAL EXAM   Blood pressure 155/87, pulse 65, temperature 97.7 °F (36.5 °C), temperature source Oral, resp. rate 18, height 157.5 cm (62\"), weight 58.4 kg (128 lb 12 oz), SpO2 100 %.    GENERAL: not distressed awake and alert  HEENT:  Unremarkable  NECK:  Supple  CV: regular rhythm, regular rate  RESPIRATORY: normal effort, clear to auscultation bilaterally  ABDOMEN: soft, generalized abdominal tenderness hypoactive bowel sounds  MUSCULOSKELETAL: no deformity  NEURO: alert, moves all extremities, follows commands  SKIN: warm, dry     LAB RESULTS  Lab Results (last 24 hours)     Procedure Component Value Units Date/Time    Blood Culture - Blood, Arm, Left [[id number]]  (Normal) Collected: 11/19/22 0633    Specimen: Blood from Arm, Left Updated: 11/21/22 0645     Blood Culture No growth at 2 days    Blood Culture - Blood, Arm, Right [[id number]]  (Normal) Collected: 11/19/22 0633    Specimen: Blood from Arm, Right Updated: 11/21/22 0645     Blood Culture No growth at 2 days    Comprehensive Metabolic Panel [[id number]]  (Abnormal) Collected: 11/21/22 0441    Specimen: Blood Updated: 11/21/22 0630     Glucose 99 mg/dL      BUN 22 mg/dL      Creatinine 0.73 mg/dL      Sodium 142 mmol/L      Potassium 3.7 mmol/L      Chloride 110 mmol/L      " CO2 20.4 mmol/L      Calcium 8.4 mg/dL      Total Protein 5.5 g/dL      Albumin 3.20 g/dL      ALT (SGPT) 84 U/L      AST (SGOT) 70 U/L      Alkaline Phosphatase 81 U/L      Total Bilirubin 0.4 mg/dL      Globulin 2.3 gm/dL      A/G Ratio 1.4 g/dL      BUN/Creatinine Ratio 30.1     Anion Gap 11.6 mmol/L      eGFR 82.7 mL/min/1.73      Comment: National Kidney Foundation and American Society of Nephrology (ASN) Task Force recommended calculation based on the Chronic Kidney Disease Epidemiology Collaboration (CKD-EPI) equation refit without adjustment for race.       Narrative:      GFR Normal >60  Chronic Kidney Disease <60  Kidney Failure <15    The GFR formula is only valid for adults with stable renal function between ages 18 and 70.    CBC & Differential [[id number]]  (Abnormal) Collected: 11/21/22 0441    Specimen: Blood Updated: 11/21/22 0515    Narrative:      The following orders were created for panel order CBC & Differential.  Procedure                               Abnormality         Status                     ---------                               -----------         ------                     CBC Auto Differential[[id number]]        Abnormal            Final result                 Please view results for these tests on the individual orders.    CBC Auto Differential [[id number]]  (Abnormal) Collected: 11/21/22 0441    Specimen: Blood Updated: 11/21/22 0515     WBC 12.36 10*3/mm3      RBC 3.72 10*6/mm3      Hemoglobin 10.7 g/dL      Hematocrit 32.6 %      MCV 87.6 fL      MCH 28.8 pg      MCHC 32.8 g/dL      RDW 13.3 %      RDW-SD 42.9 fl      MPV 10.5 fL      Platelets 209 10*3/mm3      Neutrophil % 80.3 %      Lymphocyte % 14.1 %      Monocyte % 4.8 %      Eosinophil % 0.1 %      Basophil % 0.2 %      Immature Grans % 0.5 %      Neutrophils, Absolute 9.94 10*3/mm3      Lymphocytes, Absolute 1.74 10*3/mm3      Monocytes, Absolute 0.59 10*3/mm3      Eosinophils, Absolute 0.01 10*3/mm3      Basophils,  Absolute 0.02 10*3/mm3      Immature Grans, Absolute 0.06 10*3/mm3      nRBC 0.0 /100 WBC         Imaging Results (Last 24 Hours)     Procedure Component Value Units Date/Time    US Liver [[id number]] Collected: 11/20/22 1629     Updated: 11/20/22 1642    Narrative:      RIGHT UPPER QUADRANT ULTRASOUND     HISTORY:  Transaminitis.     COMPARISON: CT abdomen and pelvis 11/19/2022.     FINDINGS:  The liver measures 13.6 cm in cranial caudal dimension and no focal  hepatic abnormality is demonstrated. There is mild intrahepatic biliary  ductal dilatation. The common duct measures 1 cm diameter which is also  dilated and this may be compensatory to previous cholecystectomy.  Visualized segments the pancreas appear within normal limits.  The right  kidney measures 10.3 cm in length and there is no hydronephrosis. There  is no ascites.       Impression:      Mild biliary ductal dilatation. This may be compensatory to  previous cholecystectomy. Common duct measures 1 cm diameter. Otherwise,  negative exam.     This report was finalized on 11/20/2022 4:39 PM by Dr. Javier Marin M.D.           ECG 12 Lead Rhythm Change  Order: [id number]   Status: Preliminary result      Visible to patient: No (not released)      0 Result Notes  Component   Ref Range & Units 08:56   (11/19/22) 05:23   (11/19/22) 1 yr ago   (8/4/21) 1 yr ago   (8/3/21) 1 yr ago   (8/3/21)   QT Interval   ms 299 P  370  335  261  380    Resulting Agency BH ECG BH ECG BH ECG BH ECG BH ECG             HEART RATE= 149  bpm  RR Interval= 403  ms  MO Interval= 88  ms  P Horizontal Axis=   deg  P Front Axis= -21  deg  QRSD Interval= 75  ms  QT Interval= 299  ms  QRS Axis= 56  deg  T Wave Axis= 236  deg  - ABNORMAL ECG -  Supraventricular tachycardia  Repolarization abnormality, prob rate related  Baseline wander in lead(s) I,V2,V3,V4             Current Facility-Administered Medications:   •  Hydrocortisone Sod Suc (PF) (Solu-CORTEF) injection 100 mg, 100 mg,  Intravenous, Q8H, Kaylee Mccullough APRN, 100 mg at 11/21/22 0913  •  HYDROmorphone (DILAUDID) injection 0.5 mg, 0.5 mg, Intravenous, 4x Daily PRN, Cesilia Cardona MD  •  mesalamine (PENTASA) CR capsule 500 mg, 500 mg, Oral, 4x Daily, Cesilia Cardona MD, 500 mg at 11/21/22 0915  •  metoprolol tartrate (LOPRESSOR) injection 2.5 mg, 2.5 mg, Intravenous, Q4H PRN, Cesilia Cardona MD  •  metoprolol tartrate (LOPRESSOR) tablet 50 mg, 50 mg, Oral, BID, Cesilia Cardona MD, 50 mg at 11/20/22 2039  •  ondansetron (ZOFRAN) injection 4 mg, 4 mg, Intravenous, Q6H PRN, Cesilia Cardona MD  •  polyethylene glycol (MIRALAX) packet 17 g, 17 g, Oral, Daily, Shahida Cottrell MD, 17 g at 11/21/22 0914  •  promethazine (PHENERGAN) 12.5 mg in sodium chloride 0.9 % 50 mL, 12.5 mg, Intravenous, Q6H PRN, Joey Castaneda II, MD, Stopped at 11/19/22 1050  •  sodium chloride 0.45 % infusion, 75 mL/hr, Intravenous, Continuous, Cesilia Cardona MD, Last Rate: 75 mL/hr at 11/21/22 0326, 75 mL/hr at 11/21/22 0326  •  [COMPLETED] Insert Peripheral IV, , , Once **AND** sodium chloride 0.9 % flush 10 mL, 10 mL, Intravenous, PRN, Joey Qureshi MD     ASSESSMENT  Acute partial small bowel obstruction  Atrial fibrillation with rapid ventricular rate  Hypertension  Crohn's disease  Elevated LFTs  Gastroesophageal reflux disease    PLAN  CPM  Discontinue NG tube  Continue IVF  Continue IV steroids  Clear liquid diet  Control heart rate  Continue to hold Eliquis  Symptomatic treatment for pain nausea vomiting  General surgery  consult appreciated  GI and cardiology to follow patient  Adjust home medications  Stress ulcer DVT prophylaxis  Supportive care  Discussed with family nursing staff  Follow closely further recommendation current hospital course    CESILIA CARDONA MD

## 2022-11-21 NOTE — PROGRESS NOTES
[facility] Progress Note       Encounter Date:22  Patient:Michela Aguilar  :1940  MRN:[id number]      Chief Complaint: Follow-up paroxysmal atrial fibrillation      Subjective:        Mildly bradycardic on metoprolol 50 twice daily.  Asymptomatic related to the bradycardia.    Review of Systems:  Review of Systems   Constitutional: Positive for malaise/fatigue.   Cardiovascular: Negative for palpitations.   Gastrointestinal: Positive for bloating.       Medications:  Scheduled Meds:  Hydrocortisone Sod Suc (PF), 100 mg, Intravenous, Q8H  mesalamine, 500 mg, Oral, 4x Daily  metoprolol tartrate, 50 mg, Oral, BID    Continuous Infusions:  sodium chloride, 75 mL/hr, Last Rate: 75 mL/hr (22 0326)    PRN Meds:  •  HYDROmorphone  •  metoprolol tartrate  •  ondansetron  •  promethazine  •  [COMPLETED] Insert Peripheral IV **AND** sodium chloride         Objective:       Vitals:    22 1253 22 1900 22 2254 22 0722   BP: 161/60 173/73 152/66 160/68   BP Location: Left arm Right arm Right arm Right arm   Patient Position: Lying Lying Lying Lying   Pulse: 61 71 59 51   Resp: 16 20 16 18   Temp: 98.1 °F (36.7 °C) 97 °F (36.1 °C) 97.2 °F (36.2 °C) 97.8 °F (36.6 °C)   TempSrc: Oral   Oral   SpO2: 99% 98% 99% 96%   Weight:       Height:               Physical Exam:  Constitutional: Well appearing, well developed, no acute distress   HENT: Oropharynx clear and membrane moist, NG tube in place  Eyes: Normal conjunctiva, no sclera icterus.  Neck: Supple, no carotid bruit bilaterally.  Cardiovascular: Regular rate and rhythm mild sinus bradycardia, No Murmur, No bilateral lower extremity edema.  Pulmonary: Normal respiratory effort , normal lung sounds, no wheezing.  Neurological: Alert and orient x 3.   Skin: Warm, dry, no ecchymosis, no rash.  Psych: Appropriate mood and affect. Normal judgment and insight.           Lab Review:   Results from last 7 days   Lab Units  11/21/22 0441 11/20/22 0524 11/19/22  0521   SODIUM mmol/L 142 146* 142   POTASSIUM mmol/L 3.7 4.1 3.9   CHLORIDE mmol/L 110* 113* 103   CO2 mmol/L 20.4* 21.7* 24.0   BUN mg/dL 22 15 12   CREATININE mg/dL 0.73 0.91 0.99   GLUCOSE mg/dL 99 104* 125*   CALCIUM mg/dL 8.4* 8.8 10.3   AST (SGOT) U/L 70* 134* 243*   ALT (SGPT) U/L 84* 135* 122*     Results from last 7 days   Lab Units 11/19/22  0521   TROPONIN T ng/mL <0.010     Results from last 7 days   Lab Units 11/21/22 0441 11/20/22 0524 11/19/22  0521   WBC 10*3/mm3 12.36* 11.27* 13.77*   HEMOGLOBIN g/dL 10.7* 11.9* 14.9   HEMATOCRIT % 32.6* 35.7 44.8   PLATELETS 10*3/mm3 209 228 249         Results from last 7 days   Lab Units 11/19/22  0521   MAGNESIUM mg/dL 2.2     Results from last 7 days   Lab Units 11/20/22  0524   CHOLESTEROL mg/dL 173   TRIGLYCERIDES mg/dL 81   HDL CHOL mg/dL 61*         Results from last 7 days   Lab Units 11/20/22  0524   TSH uIU/mL 0.749     Echocardiogram 8/4/2021 with images reviewed by myself:  • Estimated left ventricular EF = 60% Left ventricular systolic function is normal.  • Left ventricular diastolic function was indeterminate.          Assessment:          Diagnosis Plan   1. Partial small bowel obstruction (HCC)        2. Atrial fibrillation with RVR (HCC)               Plan:       Ms. Vines is an 82 y.o. woman who follows with Dr. Damon with past medical history notable for paroxysmal atrial fibrillation, hypertension, history of TIA, Crohn's disease, and anemia who presents to the emergency room 11/19/2022 with partial bowel obstruction and also converted from sinus rhythm into atrial fibrillation with rapid ventricular response.  Fortunately we loaded of the digoxin and she is converted back to sinus rhythm on 11/20/2022.  She seems to be able to tolerate oral medications and was given metoprolol this morning.  Would continue metoprolol alone but if need be can reload with digoxin if any recurrent atrial fibrillation.           Paroxysmal atrial fibrillation:  • Anticoagulation on hold would restart once no invasive procedures are clearly needed  • She has been tolerating her metoprolol 50 twice daily chronically as an outpatient.  She does have mild bradycardia today which is likely related to the digoxin that she received to for acute rate control in the setting of n.p.o. status.  I would continue her metoprolol 25 twice daily, this way can be in her system should she develop A. fib again, which she is at risk for redeveloping at any time.  Unable to do a rhythm control strategy given the inability to anticoagulate related to her GI pathology.  • Unless she is having symptomatic bradycardia, with weakness lightheadedness or hyportension, or heart rate in the low 40s do not hold metoprolol  • Received digoxin load 11/19/2022 would hold off on continuing digoxin if she is able to take p.o., would continue metoprolol as above.  • A. fib is probably also worsening related to stress dose steroids, which needed due to her IBD          Thank you for the consult, continue metoprolol 25 twice daily with instructions per above.  Monitor closely for recurrence of A. fib, and would start back her anticoagulation is soon as possible from the procedural perspective.           11/21/22  07:53 EST

## 2022-11-21 NOTE — PROGRESS NOTES
Discharge Planning Assessment  [facility]     Patient Name: Michela Aguilar  MRN: [id number]  Today's Date: 11/21/2022    Admit Date: 11/19/2022    Plan: Return home lives with sister no discharge needs   Discharge Needs Assessment     Row Name 11/21/22 1458       Living Environment    People in Home sibling(s)    Name(s) of People in Home sister Karen Aguilar [phone]    Current Living Arrangements home    Primary Care Provided by self    Family Caregiver if Needed sibling(s)    Quality of Family Relationships involved;supportive;helpful    Able to Return to Prior Arrangements yes       Resource/Environmental Concerns    Transportation Concerns no car       Transition Planning    Patient/Family Anticipates Transition to home with family    Patient/Family Anticipated Services at Transition none    Transportation Anticipated family or friend will provide       Discharge Needs Assessment    Equipment Currently Used at Home none    Concerns to be Addressed no discharge needs identified               Discharge Plan     Row Name 11/21/22 3043       Plan    Plan Return home lives with sister no discharge needs    Plan Comments Spoke with patient at bedside face sheet verified. She stated she is independent with ADL's does not use any medical equipment. Plans to return home at discharge and denies any needs, does not want to use Meds to Bed. Family will transport at discharge. Yisel Anna RN              Continued Care and Services - Admitted Since 11/19/2022    Coordination has not been started for this encounter.       Expected Discharge Date and Time     Expected Discharge Date Expected Discharge Time    Nov 23, 2022          Demographic Summary     Row Name 11/21/22 1452       General Information    Admission Type inpatient    Arrived From home    Referral Source admission list    Reason for Consult discharge planning    Preferred Language English               Functional Status     Row Name 11/21/22 145        Functional Status    Usual Activity Tolerance good    Current Activity Tolerance good       Functional Status, IADL    Medications independent    Meal Preparation independent    Housekeeping independent    Laundry independent    Shopping independent               Psychosocial    No documentation.                Abuse/Neglect    No documentation.                Legal    No documentation.                Substance Abuse    No documentation.                Patient Forms    No documentation.                   Yisel Anna, RN

## 2022-11-21 NOTE — PROGRESS NOTES
[facility]  Inpatient Progress Note    Reason for Follow Up:   Crohn's disease with a partial small bowel obstruction    Subjective     Interval History:   Feels better today, passing flatus, general surgery has been in already and she tells me they are considering removing the tube today    Current Facility-Administered Medications:   •  Hydrocortisone Sod Suc (PF) (Solu-CORTEF) injection 100 mg, 100 mg, Intravenous, Q8H, Kaylee Mccullough APRN, 100 mg at 11/21/22 0114  •  HYDROmorphone (DILAUDID) injection 0.5 mg, 0.5 mg, Intravenous, 4x Daily PRN, Drew Cardona MD  •  mesalamine (PENTASA) CR capsule 500 mg, 500 mg, Oral, 4x Daily, Drew Cardona MD, 500 mg at 11/20/22 2039  •  metoprolol tartrate (LOPRESSOR) injection 2.5 mg, 2.5 mg, Intravenous, Q4H PRN, Drew Cardona MD  •  metoprolol tartrate (LOPRESSOR) tablet 50 mg, 50 mg, Oral, BID, Drew Cardona MD, 50 mg at 11/20/22 2039  •  ondansetron (ZOFRAN) injection 4 mg, 4 mg, Intravenous, Q6H PRN, Drew Cardona MD  •  promethazine (PHENERGAN) 12.5 mg in sodium chloride 0.9 % 50 mL, 12.5 mg, Intravenous, Q6H PRN, Joey Castaneda II, MD, Stopped at 11/19/22 1050  •  sodium chloride 0.45 % infusion, 75 mL/hr, Intravenous, Continuous, Drew Cardona MD, Last Rate: 75 mL/hr at 11/21/22 0326, 75 mL/hr at 11/21/22 0326  •  [COMPLETED] Insert Peripheral IV, , , Once **AND** sodium chloride 0.9 % flush 10 mL, 10 mL, Intravenous, PRN, Joey Qureshi MD  Review of Systems:    There is weakness of fatigue all other systems reviewed and negative    Objective     Vital Signs  Temp:  [97 °F (36.1 °C)-98.1 °F (36.7 °C)] 97.8 °F (36.6 °C)  Heart Rate:  [51-71] 51  Resp:  [16-20] 18  BP: (152-173)/(60-73) 160/68  Body mass index is 23.55 kg/m².    Intake/Output Summary (Last 24 hours) at 11/21/2022 0833  Last data filed at 11/21/2022 0326  Gross per 24 hour   Intake --   Output 550 ml   Net -550 ml     No intake/output data recorded.     Physical  Exam:   General: patient awake, alert and cooperative   Eyes: Normal lids and lashes, no scleral icterus   Neck: supple, normal ROM   Skin: warm and dry, not jaundiced   Cardiovascular: regular rhythm and rate, no murmurs auscultated   Pulm: clear to auscultation bilaterally, regular and unlabored   Abdomen: soft, nontender, nondistended; normal bowel sounds   Extremities: no rash or edema   Psychiatric: Normal mood and behavior; memory intact     Results Review:     I reviewed the patient's new clinical results.    Results from last 7 days   Lab Units 11/21/22 0441 11/20/22 0524 11/19/22  0521   WBC 10*3/mm3 12.36* 11.27* 13.77*   HEMOGLOBIN g/dL 10.7* 11.9* 14.9   HEMATOCRIT % 32.6* 35.7 44.8   PLATELETS 10*3/mm3 209 228 249     Results from last 7 days   Lab Units 11/21/22 0441 11/20/22 0524 11/19/22  0521   SODIUM mmol/L 142 146* 142   POTASSIUM mmol/L 3.7 4.1 3.9   CHLORIDE mmol/L 110* 113* 103   CO2 mmol/L 20.4* 21.7* 24.0   BUN mg/dL 22 15 12   CREATININE mg/dL 0.73 0.91 0.99   CALCIUM mg/dL 8.4* 8.8 10.3   BILIRUBIN mg/dL 0.4 0.6 0.5   ALK PHOS U/L 81 96 128*   ALT (SGPT) U/L 84* 135* 122*   AST (SGOT) U/L 70* 134* 243*   GLUCOSE mg/dL 99 104* 125*         Lab Results   Lab Value Date/Time    LIPASE 47 11/19/2022 0521    LIPASE 24 11/18/2021 2139    LIPASE 43 08/03/2021 0530    LIPASE 37 03/08/2021 1145    LIPASE 49 12/20/2019 0418    LIPASE 15 04/28/2019 0714    LIPASE 42 04/27/2019 0557    LIPASE 29 04/19/2016 0925    LIPASE 60 09/01/2014 1000    LIPASE 22 01/14/2014 0611    LIPASE 49 01/12/2014 1340       Radiology:  US Liver   Final Result   Mild biliary ductal dilatation. This may be compensatory to   previous cholecystectomy. Common duct measures 1 cm diameter. Otherwise,   negative exam.       This report was finalized on 11/20/2022 4:39 PM by Dr. Javier Marin M.D.          XR Abdomen KUB   Final Result       As described.       This report was finalized on 11/20/2022 7:17 AM by Dr. Padilla  VICENTE De Paz M.D.          XR Abdomen KUB   Final Result       As described.       This report was finalized on 11/19/2022 1:18 PM by Dr. Randy De Paz M.D.          CT Abdomen Pelvis With Contrast   Final Result       Proximal small bowel is fluid-filled and shows borderline prominent   caliber, gradually tapering at the mid aspect to collapsed caliber. This   appearance may represent early or partial small bowel obstruction, close   follow-up recommended.       This report was finalized on 11/19/2022 7:24 AM by Dr. Randy De Paz M.D.              Assessment & Plan     Patient Active Problem List   Diagnosis   • Crohn's disease of small intestine with other complication (HCC)   • Crohn disease (HCC)   • Essential hypertension   • PAF (paroxysmal atrial fibrillation) (HCC)   • Partial small bowel obstruction (HCC)       A   ASSESSMENT:  82 y.o. female with history of Crohn's disease s/p right hemicolectomy, A. fib, cholecystectomy, appendectomy, splenectomy, and previous partial SBO who presents with complaint of abdominal pain and vomiting.  -Partial small bowel obstruction - d/t Crohn's disease or surgical history  -Crohn's disease -s/P right hemicolectomy (2013), rx Pentasa  -S/p appendectomy, cholecystectomy, splenectomy  -A. Fib -Rx Eliquis  -Elevated liver enzymes - new, hepatitis panel nonreactive        PLAN:  Continue hydrocortisone 100 mg IV every 8 hours.  Continue Pentasa 500mg 4 times daily  Continue NG tube to low intermittent wall suction.  Okay to clamp so that she can ambulate around the bell.  Can clamp for longer periods of time when she begins passing gas or having bowel movements  Tube removal per general surgery possibly today  Continue IV fluids and n.p.o. status.  Ultrasound essentially unrevealing, CBD 1 cm postcholecystectomy, liver tests improved today    I discussed the patients findings and my recommendations with patient and nursing staff.    Armani Orellana,  MD

## 2022-11-21 NOTE — PLAN OF CARE
Goal Outcome Evaluation:      83 yo female admitted 11/19 with partial bowel obstruction. NG tube to LWS. Reports she passed some flatus today. US liver done. VS stable, room air, A&Ox4.

## 2022-11-22 VITALS
TEMPERATURE: 97.5 F | BODY MASS INDEX: 23.69 KG/M2 | SYSTOLIC BLOOD PRESSURE: 152 MMHG | HEIGHT: 62 IN | WEIGHT: 128.75 LBS | OXYGEN SATURATION: 98 % | RESPIRATION RATE: 18 BRPM | DIASTOLIC BLOOD PRESSURE: 50 MMHG | HEART RATE: 73 BPM

## 2022-11-22 LAB
ANION GAP SERPL CALCULATED.3IONS-SCNC: 10.4 MMOL/L (ref 5–15)
BASOPHILS # BLD AUTO: 0.01 10*3/MM3 (ref 0–0.2)
BASOPHILS NFR BLD AUTO: 0.1 % (ref 0–1.5)
BUN SERPL-MCNC: 17 MG/DL (ref 8–23)
BUN/CREAT SERPL: 24.6 (ref 7–25)
CALCIUM SPEC-SCNC: 8.8 MG/DL (ref 8.6–10.5)
CENTROMERE B AB SER-ACNC: <0.2 AI (ref 0–0.9)
CHLORIDE SERPL-SCNC: 107 MMOL/L (ref 98–107)
CHROMATIN AB SERPL-ACNC: <0.2 AI (ref 0–0.9)
CO2 SERPL-SCNC: 23.6 MMOL/L (ref 22–29)
CREAT SERPL-MCNC: 0.69 MG/DL (ref 0.57–1)
DEPRECATED RDW RBC AUTO: 44.9 FL (ref 37–54)
DSDNA AB SER-ACNC: <1 IU/ML (ref 0–9)
EGFRCR SERPLBLD CKD-EPI 2021: 87.3 ML/MIN/1.73
ENA JO1 AB SER-ACNC: <0.2 AI (ref 0–0.9)
ENA RNP AB SER-ACNC: 0.2 AI (ref 0–0.9)
ENA SCL70 AB SER-ACNC: <0.2 AI (ref 0–0.9)
ENA SM AB SER-ACNC: <0.2 AI (ref 0–0.9)
ENA SS-A AB SER-ACNC: <0.2 AI (ref 0–0.9)
ENA SS-B AB SER-ACNC: <0.2 AI (ref 0–0.9)
EOSINOPHIL # BLD AUTO: 0 10*3/MM3 (ref 0–0.4)
EOSINOPHIL NFR BLD AUTO: 0 % (ref 0.3–6.2)
ERYTHROCYTE [DISTWIDTH] IN BLOOD BY AUTOMATED COUNT: 13.7 % (ref 12.3–15.4)
GLUCOSE SERPL-MCNC: 100 MG/DL (ref 65–99)
HCT VFR BLD AUTO: 33.1 % (ref 34–46.6)
HGB BLD-MCNC: 10.9 G/DL (ref 12–15.9)
IMM GRANULOCYTES # BLD AUTO: 0.08 10*3/MM3 (ref 0–0.05)
IMM GRANULOCYTES NFR BLD AUTO: 0.6 % (ref 0–0.5)
LYMPHOCYTES # BLD AUTO: 1.92 10*3/MM3 (ref 0.7–3.1)
LYMPHOCYTES NFR BLD AUTO: 14.7 % (ref 19.6–45.3)
Lab: NORMAL
MCH RBC QN AUTO: 29.4 PG (ref 26.6–33)
MCHC RBC AUTO-ENTMCNC: 32.9 G/DL (ref 31.5–35.7)
MCV RBC AUTO: 89.2 FL (ref 79–97)
MITOCHONDRIA M2 IGG SER-ACNC: <20 UNITS (ref 0–20)
MONOCYTES # BLD AUTO: 0.87 10*3/MM3 (ref 0.1–0.9)
MONOCYTES NFR BLD AUTO: 6.6 % (ref 5–12)
NEUTROPHILS NFR BLD AUTO: 10.22 10*3/MM3 (ref 1.7–7)
NEUTROPHILS NFR BLD AUTO: 78 % (ref 42.7–76)
NRBC BLD AUTO-RTO: 0.1 /100 WBC (ref 0–0.2)
PLATELET # BLD AUTO: 213 10*3/MM3 (ref 140–450)
PMV BLD AUTO: 10.8 FL (ref 6–12)
POTASSIUM SERPL-SCNC: 3.8 MMOL/L (ref 3.5–5.2)
RBC # BLD AUTO: 3.71 10*6/MM3 (ref 3.77–5.28)
SMA IGG SER-ACNC: 18 UNITS (ref 0–19)
SODIUM SERPL-SCNC: 141 MMOL/L (ref 136–145)
WBC NRBC COR # BLD: 13.1 10*3/MM3 (ref 3.4–10.8)

## 2022-11-22 PROCEDURE — 85025 COMPLETE CBC W/AUTO DIFF WBC: CPT | Performed by: HOSPITALIST

## 2022-11-22 PROCEDURE — 99232 SBSQ HOSP IP/OBS MODERATE 35: CPT | Performed by: INTERNAL MEDICINE

## 2022-11-22 PROCEDURE — 63710000001 PREDNISONE PER 1 MG: Performed by: INTERNAL MEDICINE

## 2022-11-22 PROCEDURE — 99232 SBSQ HOSP IP/OBS MODERATE 35: CPT | Performed by: STUDENT IN AN ORGANIZED HEALTH CARE EDUCATION/TRAINING PROGRAM

## 2022-11-22 PROCEDURE — 97530 THERAPEUTIC ACTIVITIES: CPT

## 2022-11-22 PROCEDURE — 80048 BASIC METABOLIC PNL TOTAL CA: CPT | Performed by: HOSPITALIST

## 2022-11-22 PROCEDURE — 97161 PT EVAL LOW COMPLEX 20 MIN: CPT

## 2022-11-22 RX ORDER — PREDNISONE 20 MG/1
40 TABLET ORAL
Qty: 14 TABLET | Refills: 0 | Status: SHIPPED | OUTPATIENT
Start: 2022-11-23 | End: 2022-11-28 | Stop reason: ALTCHOICE

## 2022-11-22 RX ORDER — PREDNISONE 20 MG/1
40 TABLET ORAL
Status: DISCONTINUED | OUTPATIENT
Start: 2022-11-22 | End: 2022-11-22 | Stop reason: HOSPADM

## 2022-11-22 RX ORDER — POLYETHYLENE GLYCOL 3350 17 G/17G
17 POWDER, FOR SOLUTION ORAL DAILY
Qty: 30 PACKET | Refills: 0 | Status: SHIPPED | OUTPATIENT
Start: 2022-11-23 | End: 2022-12-20 | Stop reason: ALTCHOICE

## 2022-11-22 RX ADMIN — METOPROLOL TARTRATE 25 MG: 25 TABLET, FILM COATED ORAL at 08:11

## 2022-11-22 RX ADMIN — SODIUM CHLORIDE 75 ML/HR: 4.5 INJECTION, SOLUTION INTRAVENOUS at 05:16

## 2022-11-22 RX ADMIN — MESALAMINE 500 MG: 250 CAPSULE ORAL at 08:10

## 2022-11-22 RX ADMIN — POLYETHYLENE GLYCOL 3350 17 G: 17 POWDER, FOR SOLUTION ORAL at 08:11

## 2022-11-22 RX ADMIN — PANTOPRAZOLE SODIUM 40 MG: 40 TABLET, DELAYED RELEASE ORAL at 05:14

## 2022-11-22 RX ADMIN — PREDNISONE 40 MG: 20 TABLET ORAL at 11:12

## 2022-11-22 NOTE — PROGRESS NOTES
[facility] Progress Note       Encounter Date:22  Patient:Michela Aguilar  :1940  MRN:[id number]      Chief Complaint: Follow-up paroxysmal atrial fibrillation      Subjective:        Mildly bradycardic on metoprolol 50 twice daily, so dose was reduced.  Patient denies any recurrence of palpitations or shortness of breath.  She is overall feeling well      Medications:  Scheduled Meds:  mesalamine, 500 mg, Oral, 4x Daily  metoprolol tartrate, 25 mg, Oral, BID  pantoprazole, 40 mg, Oral, Q AM  polyethylene glycol, 17 g, Oral, Daily  predniSONE, 40 mg, Oral, Daily With Breakfast    Continuous Infusions:  sodium chloride, 75 mL/hr, Last Rate: 75 mL/hr (22 0516)    PRN Meds:  •  HYDROmorphone  •  metoprolol tartrate  •  ondansetron  •  promethazine  •  [COMPLETED] Insert Peripheral IV **AND** sodium chloride         Objective:       Vitals:    22 2104 22 0024 22 0732 22 0811   BP:  160/79 179/79    BP Location:  Right arm Right arm    Patient Position:  Lying Lying    Pulse: 58 56 51 53   Resp:  18 18    Temp:  97.6 °F (36.4 °C) 97.7 °F (36.5 °C)    TempSrc:  Oral Oral    SpO2:  94% 93%    Weight:       Height:               Physical Exam:  Constitutional: Well appearing, well developed, no acute distress   HENT: Oropharynx clear and membrane moist, NG tube in place  Eyes: Normal conjunctiva, no sclera icterus.  Neck: Supple, no carotid bruit bilaterally.  Cardiovascular: Regular rate and rhythm mild sinus bradycardia, No Murmur, No bilateral lower extremity edema.  Pulmonary: Normal respiratory effort , normal lung sounds, no wheezing.  Neurological: Alert and orient x 3.   Skin: Warm, dry, no ecchymosis, no rash.  Psych: Appropriate mood and affect. Normal judgment and insight.           Lab Review:   Results from last 7 days   Lab Units 22  0513 22  0441 22  0524 22  0521   SODIUM mmol/L 141 142 146* 142   POTASSIUM mmol/L 3.8 3.7  4.1 3.9   CHLORIDE mmol/L 107 110* 113* 103   CO2 mmol/L 23.6 20.4* 21.7* 24.0   BUN mg/dL 17 22 15 12   CREATININE mg/dL 0.69 0.73 0.91 0.99   GLUCOSE mg/dL 100* 99 104* 125*   CALCIUM mg/dL 8.8 8.4* 8.8 10.3   AST (SGOT) U/L  --  70* 134* 243*   ALT (SGPT) U/L  --  84* 135* 122*     Results from last 7 days   Lab Units 11/19/22  0521   TROPONIN T ng/mL <0.010     Results from last 7 days   Lab Units 11/22/22  0513 11/21/22  0441 11/20/22  0524 11/19/22  0521   WBC 10*3/mm3 13.10* 12.36* 11.27* 13.77*   HEMOGLOBIN g/dL 10.9* 10.7* 11.9* 14.9   HEMATOCRIT % 33.1* 32.6* 35.7 44.8   PLATELETS 10*3/mm3 213 209 228 249         Results from last 7 days   Lab Units 11/19/22  0521   MAGNESIUM mg/dL 2.2     Results from last 7 days   Lab Units 11/20/22  0524   CHOLESTEROL mg/dL 173   TRIGLYCERIDES mg/dL 81   HDL CHOL mg/dL 61*         Results from last 7 days   Lab Units 11/20/22  0524   TSH uIU/mL 0.749     Echocardiogram 8/4/2021 with images reviewed by myself:  • Estimated left ventricular EF = 60% Left ventricular systolic function is normal.  • Left ventricular diastolic function was indeterminate.          Assessment:          Diagnosis Plan   1. Partial small bowel obstruction (HCC)        2. Atrial fibrillation with RVR (HCC)               Plan:       Ms. Vines is an 82 y.o. woman who follows with Dr. Damon with past medical history notable for paroxysmal atrial fibrillation, hypertension, history of TIA, Crohn's disease, and anemia who presents to the emergency room 11/19/2022 with partial bowel obstruction and also converted from sinus rhythm into atrial fibrillation with rapid ventricular response.  Fortunately we loaded of the digoxin and she is converted back to sinus rhythm on 11/20/2022.  She seems to be able to tolerate oral medications and was given metoprolol this morning.  Would continue metoprolol alone but if need be can reload with digoxin if any recurrent atrial fibrillation.          Paroxysmal atrial  fibrillation:  • She has been tolerating her metoprolol 50 twice daily chronically as an outpatient.  She does have mild bradycardia today which is likely related to the digoxin that she received to for acute rate control in the setting of n.p.o. status.  I would continue her metoprolol 25 twice daily, this way can be in her system should she develop A. fib again, which she is at risk for redeveloping at any time.  Unable to do a rhythm control strategy given the inability to anticoagulate related to her GI pathology.  • Unless she is having symptomatic bradycardia, with weakness lightheadedness or hyportension, or heart rate in the low 40s do not hold metoprolol  • Received digoxin load 11/19/2022 would hold off on continuing digoxin if she is able to take p.o., would continue metoprolol as above.  • A. fib is probably also worsening related to stress dose steroids, which needed due to her IBD  • Please resume the Eliquis okayed by GI.          Thank you for the consult, continue metoprolol 25 twice daily with instructions per above.  Patient can follow-up in our clinic within 1 month of discharge to further evaluate for any rate control strategy for A. fib.  She was instructed to call our office should she develop any symptomatic palpitations associate with shortness of breath or chest pain, to determine if she needs to increase her metoprolol back to 50 twice daily.  Monitor closely for recurrence of A. fib, and continue therapeutic anticoagulation.  Cardiology will sign off at this time.           11/22/22  11:57 EST

## 2022-11-22 NOTE — THERAPY EVALUATION
Patient Name: Michela Aguilar  : 1940    MRN: [id number]                              Today's Date: 2022       Admit Date: 2022    Visit Dx:     ICD-10-CM ICD-9-CM   1. Partial small bowel obstruction (HCC)  K56.600 560.9   2. Atrial fibrillation with RVR (HCC)  I48.91 427.31     Patient Active Problem List   Diagnosis   • Crohn's disease of small intestine with other complication (HCC)   • Crohn disease (HCC)   • Essential hypertension   • PAF (paroxysmal atrial fibrillation) (HCC)   • Partial small bowel obstruction (HCC)     Past Medical History:   Diagnosis Date   • Anemia    • Arthritis    • Colitis    • Crohn disease (HCC)    • Crohn's disease (HCC)    • GERD (gastroesophageal reflux disease)    • History of transfusion    • Hyperlipidemia    • Hypertension    • Migraine    • Ocular migraine    • Ocular migraine    • PAF (paroxysmal atrial fibrillation) (HCC)     with rapid ventricular rate   • SBO (small bowel obstruction) (HCC)    • SBO (small bowel obstruction) (HCC)    • Small bowel obstruction (HCC) 2021   • TIA (transient ischemic attack)    • UTI (urinary tract infection) 2021   • UTI (urinary tract infection)      Past Surgical History:   Procedure Laterality Date   • APPENDECTOMY     • CHOLECYSTECTOMY     • COLECTOMY PARTIAL / TOTAL     • COLONOSCOPY  2015    s/p right hemicolectomy, recurrent Crohns, IH   • COLONOSCOPY N/A 2019    Crohns, IH.     • ENDOSCOPY  2015    erythematous mucosa in stomach, chronic gastritis,    • ENDOSCOPY N/A 2019    Erythematous mucosa in stomach, path benign   • SPLENECTOMY        General Information     Row Name 22 1057          Physical Therapy Time and Intention    Document Type evaluation;discharge evaluation/summary  -     Mode of Treatment individual therapy;physical therapy  -     Row Name 22 1057          General Information    Prior Level of Function independent:;all household  mobility;gait;transfer;bed mobility  -     Existing Precautions/Restrictions no known precautions/restrictions  -     Barriers to Rehab none identified  -     Row Name 11/22/22 1057          Living Environment    People in Home sibling(s)  -[facility] Name 11/22/22 1057          Cognition    Orientation Status (Cognition) oriented x 4  -           User Key  (r) = Recorded By, (t) = Taken By, (c) = Cosigned By    Initials Name Provider Type     Eileen Bae, PT Physical Therapist               Mobility     Row Name 11/22/22 1058          Bed Mobility    Bed Mobility supine-sit;sit-supine  -     Supine-Sit [place] (Bed Mobility) supervision  -     Sit-Supine [place] (Bed Mobility) supervision  -     Assistive Device (Bed Mobility) head of bed elevated  -     Row Name 11/22/22 1058          Sit-Stand Transfer    Sit-Stand [place] (Transfers) supervision  -     Row Name 11/22/22 1058          Gait/Stairs (Locomotion)    [place] Level (Gait) supervision  -     Distance in Feet (Gait) 300  -     Deviations/Abnormal Patterns (Gait) ana decreased  -     Comment, (Gait/Stairs) no LOB with ambulation, pt ambuated 200 ft pushing IV pole and 100 ft without pole, no increased assistance required  -           User Key  (r) = Recorded By, (t) = Taken By, (c) = Cosigned By    Initials Name Provider Type     Eileen Bae, PT Physical Therapist               Obj/Interventions     [facility] Name 11/22/22 1100          Range of Motion Comprehensive    General Range of Motion no range of motion deficits identified  -[facility] Name 11/22/22 1100          Strength Comprehensive (MMT)    General Manual Muscle Testing (MMT) Assessment no strength deficits identified  -[facility] Name 11/22/22 1100          Balance    Balance Assessment standing static balance;standing dynamic balance  -     Static Standing Balance supervision  -     Dynamic Standing Balance supervision  -            User Key  (r) = Recorded By, (t) = Taken By, (c) = Cosigned By    Initials Name Provider Type    Eileen Cheung, PT Physical Therapist               Goals/Plan    No documentation.                Clinical Impression     Row Name 11/22/22 1100          Pain    Pretreatment Pain Rating 0/10 - no pain  -     Posttreatment Pain Rating 0/10 - no pain  -     Row Name 11/22/22 1100          Plan of Care Review    Plan of Care Reviewed With patient  -     Outcome Evaluation Pt is a 81 yo F who was admitted with a partial small bowel obstructions. Pt has a history of crohn's disease and multiple abdominal surgeries. Pt demonstrates adequate strength and balance to perform functional mobility and gait independently. Pt was able to ambulate 300 ft in the bell without difficulty. PT will sign off at this time as pt appears to be near her baseline function.  -     Row Name 11/22/22 1100          Therapy Assessment/Plan (PT)    Patient/Family Therapy Goals Statement (PT) to go home  -     Criteria for Skilled Interventions Met (PT) no problems identified which require skilled intervention  -     Therapy Frequency (PT) evaluation only  -     Row Name 11/22/22 1100          Positioning and Restraints    Pre-Treatment Position in bed  -     Post Treatment Position bed  -     In Bed supine;call light within reach;encouraged to call for assist;exit alarm on  -           User Key  (r) = Recorded By, (t) = Taken By, (c) = Cosigned By    Initials Name Provider Type    Eileen Cheung PT Physical Therapist               Outcome Measures     Row Name 11/22/22 1103          How much help from another person do you currently need...    Turning from your back to your side while in flat bed without using bedrails? 4  -CH     Moving from lying on back to sitting on the side of a flat bed without bedrails? 4  -CH     Moving to and from a bed to a chair (including a wheelchair)? 4  -CH     Standing up  from a chair using your arms (e.g., wheelchair, bedside chair)? 4  -CH     Climbing 3-5 steps with a railing? 3  -CH     To walk in hospital room? 4  -CH     AM-PAC 6 Clicks Score (PT) 23  -     Highest level of mobility 7 --> Walked 25 feet or more  -     Row Name 11/22/22 1103          Functional Assessment    Outcome Measure Options AM-PAC 6 Clicks Basic Mobility (PT)  -           User Key  (r) = Recorded By, (t) = Taken By, (c) = Cosigned By    Initials Name Provider Type     Eileen Bae PT Physical Therapist                             Physical Therapy Education     Title: PT OT SLP Therapies (In Progress)     Topic: Physical Therapy (In Progress)     Point: Mobility training (Done)     Learning Progress Summary           Patient Acceptance, E,TB,D, VU,DU by  at 11/22/2022 1103                   Point: Home exercise program (Not Started)     Learner Progress:  Not documented in this visit.          Point: Body mechanics (Done)     Learning Progress Summary           Patient Acceptance, E,TB,D, VU,DU by  at 11/22/2022 1103                   Point: Precautions (Done)     Learning Progress Summary           Patient Acceptance, E,TB,D, VU,DU by  at 11/22/2022 1103                               User Key     Initials Effective Dates Name Provider Type [facility] 06/16/21 -  Eileen Bae PT Physical Therapist PT              PT Recommendation and Plan     Plan of Care Reviewed With: patient  Outcome Evaluation: Pt is a 83 yo F who was admitted with a partial small bowel obstructions. Pt has a history of crohn's disease and multiple abdominal surgeries. Pt demonstrates adequate strength and balance to perform functional mobility and gait independently. Pt was able to ambulate 300 ft in the bell without difficulty. PT will sign off at this time as pt appears to be near her baseline function.     Time Calculation:    PT Charges     Row Name 11/22/22 1103             Time Calculation     Start Time 0945  -      Stop Time 0958  -      Time Calculation (min) 13 min  -      PT Received On 11/22/22  -         Time Calculation- PT    Total Timed Code Minutes- PT 8 minute(s)  -         Timed Charges    90022 - PT Therapeutic Activity Minutes 8  -CH         Total Minutes    Timed Charges Total Minutes 8  -CH       Total Minutes 8  -CH            User Key  (r) = Recorded By, (t) = Taken By, (c) = Cosigned By    Initials Name Provider Type     Eileen Bae, PT Physical Therapist              Therapy Charges for Today     Code Description Service Date Service Provider Modifiers Qty    51581010534  PT THERAPEUTIC ACT EA 15 MIN 11/22/2022 Eileen Bae, PT GP 1    [id number]  PT EVAL LOW COMPLEXITY 1 11/22/2022 Eileen Bae, PT GP 1          PT G-Codes  Outcome Measure Options: AM-PAC 6 Clicks Basic Mobility (PT)  AM-PAC 6 Clicks Score (PT): 23  PT Discharge Summary  Anticipated Discharge Disposition (PT): home    Eileen Bae PT  11/22/2022

## 2022-11-22 NOTE — DISCHARGE SUMMARY
Discharge summary    Date of admission 11/19/2022  Date of discharge 11/22/2022    Final diagnosis  Acute partial small bowel obstruction resolved  Atrial fibrillation with rapid ventricular rate  Hypertension  Crohn's disease  Elevated LFTs  Gastroesophageal reflux disease    Discharge medications    Current Facility-Administered Medications:   •  mesalamine (PENTASA) CR capsule 500 mg, 500 mg, Oral, 4x Daily, Drew Cardona MD, 500 mg at 11/22/22 0810  •  metoprolol tartrate (LOPRESSOR) tablet 25 mg, 25 mg, Oral, BID, Alejandro Neville MD, 25 mg at 11/22/22 0811  •  pantoprazole (PROTONIX) EC tablet 40 mg, 40 mg, Oral, Q AM, Drew Cardona MD, 40 mg at 11/22/22 0514  •  polyethylene glycol (MIRALAX) packet 17 g, 17 g, Oral, Daily, Shahida Cottrell MD, 17 g at 11/22/22 0811  •  predniSONE (DELTASONE) tablet 40 mg, 40 mg, Oral, Daily With Breakfast, Armani Orellana MD, 40 mg at 11/22/22 1112  •  [COMPLETED] Insert Peripheral IV, , , Once **AND** sodium chloride 0.9 % flush 10 mL, 10 mL, Intravenous, PRN, Joey Qureshi MD     Consult obtained  Cardiology  Gastroenterology  General surgery    Procedures  None    Hospital course  82-year-old white female with history of Crohn's disease hypertension hyperlipidemia and atrial fibrillation admitted through emergency room with abdominal pain started on the day of admission with nausea vomiting.  Patient also complain of constipation.  Patient work-up revealed acute partial small bowel obstruction.  Patient admitted treated with n.p.o. IV fluid and NG tube placed.  Patient received symptomatic treatment for pain nausea vomiting.  Patient also found to be in rapid ventricular rate and heart rate control with IV metoprolol.  Patient responded to the treatment started on liquid diet and advance as tolerated.  Now patient passing gas and had BM.  Patient also followed by gastroenterology for Crohn's disease and started on IV steroids which is changed to prednisone and the  recommend to continue 40 mg daily and further evaluated by her gastroenterologist after 1 week.  Patient restarted on anticoagulation which was held on admission.  Patient beta-blocker dose adjusted during this hospitalization.  Patient cleared for discharge.    Discharge diet regular    Activity as tolerated    Medication as above    Follow-up with prime doctor in 1 week and follow-up with cardiology gastroenterology and general surgery per their instructions and take medication as directed.    CESILIA YUAN MD

## 2022-11-22 NOTE — PLAN OF CARE
Problem: Adult Inpatient Plan of Care  Goal: Plan of Care Review  Outcome: Ongoing, Progressing  Flowsheets (Taken 11/21/2022 1922)  Progress: improving  Plan of Care Reviewed With: patient  Outcome Evaluation: pt vitals stable. NG removed today. Tolerating Clear liq diet. Positive for flatus. Up to chair and ambulating in bell. Pt safety maintained

## 2022-11-22 NOTE — PROGRESS NOTES
"General Surgery Progress Note    Summary: Mrs. Michela Aguilar is a 82 y.o. year old lady with Crohn's disease and a partial small bowel obstruction.  Continue supportive care. Passing flatus, no BM. Tolerating clears, no N/V, advance today. Started on IV steroids and Pentasa per GI. Ok for home once BM from general surgery standpoint, potentially later today.    Chief Complaint: Abdominal pain    Interval Events: No acute events overnight. NG removed, tolerating clears. Passing flatus, no BM yet.    Vitals: /79 (BP Location: Right arm, Patient Position: Lying)   Pulse 56   Temp 97.6 °F (36.4 °C) (Oral)   Resp 18   Ht 157.5 cm (62\")   Wt 58.4 kg (128 lb 12 oz)   SpO2 94%   BMI 23.55 kg/m²     GENERAL: alert, well appearing, and in no distress  HEENT: normocephalic, atraumatic, moist mucous membranes, clear sclerae  CHEST: no increased work of breathing, symmetric air entry  CARDIAC: regular rate and rhythm    ABDOMEN: Soft, nondistended, nontender  EXTREMITIES: no cyanosis, clubbing, or edema   SKIN: Warm and moist, no rashes    Labs:  Results from last 7 days   Lab Units 11/22/22  0513 11/21/22 0441 11/20/22 0524   WBC 10*3/mm3 13.10* 12.36* 11.27*   HEMOGLOBIN g/dL 10.9* 10.7* 11.9*   HEMATOCRIT % 33.1* 32.6* 35.7   PLATELETS 10*3/mm3 213 209 228     Results from last 7 days   Lab Units 11/22/22  0513 11/21/22 0441 11/20/22  0524 11/19/22  0521   SODIUM mmol/L 141 142 146* 142   POTASSIUM mmol/L 3.8 3.7 4.1 3.9   CHLORIDE mmol/L 107 110* 113* 103   CO2 mmol/L 23.6 20.4* 21.7* 24.0   BUN mg/dL 17 22 15 12   CREATININE mg/dL 0.69 0.73 0.91 0.99   CALCIUM mg/dL 8.8 8.4* 8.8 10.3   BILIRUBIN mg/dL  --  0.4 0.6 0.5   ALK PHOS U/L  --  81 96 128*   ALT (SGPT) U/L  --  84* 135* 122*   AST (SGOT) U/L  --  70* 134* 243*   GLUCOSE mg/dL 100* 99 104* 125*           OZZIE PARRISH MD  General and Endoscopic Surgery  [facility]    [address], [address]  [place], [place], [address]  P: " Patients wife Nunu called, the patient has been scheduled at [facility] [place] but not with Dr. Turner they have scheduled him with another doctor is this okay? He wanted to check   Best call back number [phone]   [phone]  F: [phone]

## 2022-11-22 NOTE — PROGRESS NOTES
"Unable to determine enter Query Response Below      Query Response:     Unable to determine Electronically signed by Drew Cardona MD, 22, 2:57 PM EST.           If applicable, please update the problem list.     Patient: Michela Aguilar        : 1940  Account: [id number]           Admit Date:         How to Respond to this query:       a. Click New Note     b. Answer query within the yellow box.                c. Update the Problem List, if applicable.      If you have any questions about this query contact me at: [email or web address]     Dr. Cardona,     81 yo female admitted 22 for \"Acute partial small bowel obstruction\" per History and Physical.  Gastroenterology consulted 22 notes, \"history of Crohn's disease s/p right hemicolectomy, cholecystectomy, appendectomy, splenectomy and Partial small bowel obstruction - d/t Crohn's disease or surgical history.\"  Treatment included IV hydrocortisone -22 with transition to oral steroid and surgery consult.     After study, do you agree with consulting provider's diagnosis of partial small bowel obstrution due to Crohn's or previous surgery?    Yes-due to Crohn's  Yes-due to previous surgery  Yes-due to both  No-due to other condition (please list)__________  Other (please specify)___________  Unable to determine    By submitting this query, we are merely seeking further clarification of documentation to accurately reflect all conditions that you are monitoring, evaluating, treating or that extend the hospitalization or utilize additional resources of care. Please utilize your independent clinical judgment when addressing the question(s) above.     This query and your response, once completed, will be entered into the legal medical record.    Sincerely,  Marine THOMAS RN CCDS   System Director Clinical Documentation Integrity Program     "

## 2022-11-22 NOTE — PROGRESS NOTES
"And hadDaily progress note    Primary care physician  Dr. Mckenzie    Chief complaint  Doing better with no complaints and tolerating regular diet BM with no nausea vomiting or abdominal pain.  Patient wants to go home.    History of present illness  82-year-old white female with history of Crohn's disease hypertension hyperlipidemia and paroxysmal atrial fibrillation presented to [facility] emergency room with abdominal pain started this morning.  Patient developed nausea vomiting after that and had a BM but not passing gas anymore.  Patient work-up in ER revealed acute partial small bowel obstruction admit for management.      REVIEW OF SYSTEMS  Unremarkable     PHYSICAL EXAM   Blood pressure 152/50, pulse 73, temperature 97.5 °F (36.4 °C), temperature source Oral, resp. rate 18, height 157.5 cm (62\"), weight 58.4 kg (128 lb 12 oz), SpO2 98 %.    GENERAL: not distressed awake and alert  HEENT:  Unremarkable  NECK:  Supple  CV: regular rhythm, regular rate  RESPIRATORY: normal effort, clear to auscultation bilaterally  ABDOMEN: soft, generalized abdominal tenderness hypoactive bowel sounds  MUSCULOSKELETAL: no deformity  NEURO: alert, moves all extremities, follows commands  SKIN: warm, dry     LAB RESULTS  Lab Results (last 24 hours)     Procedure Component Value Units Date/Time    Anti-Smooth Muscle Antibody Titer [[id number]] Collected: 11/20/22 0524    Specimen: Blood Updated: 11/22/22 1410     Smooth Muscle Ab 18 Units      Comment:                  Negative                     0 - 19                   Weak positive               20 - 30                   Moderate to strong positive     >30   Actin Antibodies are found in 52-85% of patients with   autoimmune hepatitis or chronic active hepatitis and   in 22% of patients with primary biliary cirrhosis.       Narrative:      Performed at:  [address]  [id number]  : Xu Pagan PhD, Phone:  " [phone]    Mitochondrial Antibodies, M2 [[id number]] Collected: 11/20/22 0524    Specimen: Blood Updated: 11/22/22 1410     Mitochondrial Ab <20.0 Units      Comment:                                 Negative    0.0 - 20.0                                  Equivocal  20.1 - 24.9                                  Positive         >24.9  Mitochondrial (M2) Antibodies are found in 90-96% of  patients with primary biliary cirrhosis.       Narrative:      Performed at:   - [address]  [id number]  : Xu Pagan PhD, Phone:  [phone]    MICHAELA Comprehensive Panel [[id number]] Collected: 11/19/22 1841    Specimen: Blood from Arm, Left Updated: 11/22/22 1211     Anti-DNA (DS) Ab Qn <1 IU/mL      Comment:                                    Negative      <5                                     Equivocal  5 - 9                                     Positive      >9        RNP Antibodies 0.2 AI      Ramos Antibodies <0.2 AI      Antiscleroderma-70 Antibodies <0.2 AI      KAILEY SSA (RO) Ab <0.2 AI      KAILEY SSB (LA) Ab <0.2 AI      Antichromatin Antibodies <0.2 AI      MIRI-1 IgG <0.2 AI      Anti-Centromere B Antibodies <0.2 AI      See below: Comment     Comment: Autoantibody                       Disease Association  ------------------------------------------------------------                          Condition                  Frequency  ---------------------   ------------------------   ---------  Antinuclear Antibody,    SLE, mixed connective  Direct (MICHAELA-D)           tissue diseases  ---------------------   ------------------------   ---------  dsDNA                    SLE                        40 - 60%  ---------------------   ------------------------   ---------  Chromatin                Drug induced SLE                90%                           SLE                        48 - 97%  ---------------------   ------------------------   ---------  SSA (Ro)                 SLE                         25 - 35%                           Sjogren's Syndrome         40 - 70%                            Lupus                 100%  ---------------------   ------------------------   ---------  SSB (La)                 SLE                             10%                           Sjogren's Syndrome              30%  ---------------------   -----------------------    ---------  Sm (anti-Smith)          SLE                        15 - 30%  ---------------------   -----------------------    ---------  RNP                      Mixed Connective Tissue                           Disease                         95%  (U1 nRNP,                SLE                        30 - 50%  anti-ribonucleoprotein)  Polymyositis and/or                           Dermatomyositis                 20%  ---------------------   ------------------------   ---------  Scl-70 (antiDNA          Scleroderma (diffuse)      20 - 35%  topoisomerase)           Crest                           13%  ---------------------   ------------------------   ---------  Jackelin-1                     Polymyositis and/or                           Dermatomyositis            20 - 40%  ---------------------   ------------------------   ---------  Centromere B             Scleroderma - Crest                           variant                         80%       Narrative:      Performed at:   - [address]  [id number]  : Xu Pagan PhD, Phone:  [phone]    Blood Culture - Blood, Arm, Left [[id number]]  (Normal) Collected: 22    Specimen: Blood from Arm, Left Updated: 22     Blood Culture No growth at 3 days    Blood Culture - Blood, Arm, Right [[id number]]  (Normal) Collected: 22    Specimen: Blood from Arm, Right Updated: 22     Blood Culture No growth at 3 days    Basic Metabolic Panel [[id number]]  (Abnormal) Collected: 22    Specimen: Blood Updated:  11/22/22 0617     Glucose 100 mg/dL      BUN 17 mg/dL      Creatinine 0.69 mg/dL      Sodium 141 mmol/L      Potassium 3.8 mmol/L      Chloride 107 mmol/L      CO2 23.6 mmol/L      Calcium 8.8 mg/dL      BUN/Creatinine Ratio 24.6     Anion Gap 10.4 mmol/L      eGFR 87.3 mL/min/1.73      Comment: National Kidney Foundation and American Society of Nephrology (ASN) Task Force recommended calculation based on the Chronic Kidney Disease Epidemiology Collaboration (CKD-EPI) equation refit without adjustment for race.       Narrative:      GFR Normal >60  Chronic Kidney Disease <60  Kidney Failure <15    The GFR formula is only valid for adults with stable renal function between ages 18 and 70.    CBC & Differential [[id number]]  (Abnormal) Collected: 11/22/22 0513    Specimen: Blood Updated: 11/22/22 0608    Narrative:      The following orders were created for panel order CBC & Differential.  Procedure                               Abnormality         Status                     ---------                               -----------         ------                     CBC Auto Differential[[id number]]        Abnormal            Final result                 Please view results for these tests on the individual orders.    CBC Auto Differential [[id number]]  (Abnormal) Collected: 11/22/22 0513    Specimen: Blood Updated: 11/22/22 0608     WBC 13.10 10*3/mm3      RBC 3.71 10*6/mm3      Hemoglobin 10.9 g/dL      Hematocrit 33.1 %      MCV 89.2 fL      MCH 29.4 pg      MCHC 32.9 g/dL      RDW 13.7 %      RDW-SD 44.9 fl      MPV 10.8 fL      Platelets 213 10*3/mm3      Neutrophil % 78.0 %      Lymphocyte % 14.7 %      Monocyte % 6.6 %      Eosinophil % 0.0 %      Basophil % 0.1 %      Immature Grans % 0.6 %      Neutrophils, Absolute 10.22 10*3/mm3      Lymphocytes, Absolute 1.92 10*3/mm3      Monocytes, Absolute 0.87 10*3/mm3      Eosinophils, Absolute 0.00 10*3/mm3      Basophils, Absolute 0.01 10*3/mm3      Immature Grans, Absolute  0.08 10*3/mm3      nRBC 0.1 /100 WBC         Imaging Results (Last 24 Hours)     ** No results found for the last 24 hours. **        ECG 12 Lead Rhythm Change  Order: [id number]   Status: Preliminary result      Visible to patient: No (not released)      0 Result Notes  Component   Ref Range & Units 08:56   (11/19/22) 05:23   (11/19/22) 1 yr ago   (8/4/21) 1 yr ago   (8/3/21) 1 yr ago   (8/3/21)   QT Interval   ms 299 P  370  335  261  380    Resulting Agency BH ECG BH ECG BH ECG BH ECG BH ECG             HEART RATE= 149  bpm  RR Interval= 403  ms  AK Interval= 88  ms  P Horizontal Axis=   deg  P Front Axis= -21  deg  QRSD Interval= 75  ms  QT Interval= 299  ms  QRS Axis= 56  deg  T Wave Axis= 236  deg  - ABNORMAL ECG -  Supraventricular tachycardia  Repolarization abnormality, prob rate related  Baseline wander in lead(s) I,V2,V3,V4             Current Facility-Administered Medications:   •  HYDROmorphone (DILAUDID) injection 0.5 mg, 0.5 mg, Intravenous, 4x Daily PRN, Drew Cardona MD  •  mesalamine (PENTASA) CR capsule 500 mg, 500 mg, Oral, 4x Daily, Drew Cardona MD, 500 mg at 11/22/22 0810  •  metoprolol tartrate (LOPRESSOR) injection 2.5 mg, 2.5 mg, Intravenous, Q4H PRN, Drew Cardona MD  •  metoprolol tartrate (LOPRESSOR) tablet 25 mg, 25 mg, Oral, BID, Alejandro Neville MD, 25 mg at 11/22/22 0811  •  ondansetron (ZOFRAN) injection 4 mg, 4 mg, Intravenous, Q6H PRN, Drew Cardona MD  •  pantoprazole (PROTONIX) EC tablet 40 mg, 40 mg, Oral, Q AM, Drew Cardona MD, 40 mg at 11/22/22 0514  •  polyethylene glycol (MIRALAX) packet 17 g, 17 g, Oral, Daily, Shahida Cottrell MD, 17 g at 11/22/22 0811  •  predniSONE (DELTASONE) tablet 40 mg, 40 mg, Oral, Daily With Breakfast, Dobozi, Armani M, MD, 40 mg at 11/22/22 1112  •  promethazine (PHENERGAN) 12.5 mg in sodium chloride 0.9 % 50 mL, 12.5 mg, Intravenous, Q6H PRN, Joey Castaneda II, MD, Stopped at 11/19/22 1050  •  sodium chloride 0.45 % infusion, 75 mL/hr,  Intravenous, Continuous, Cesilia Cardona MD, Last Rate: 75 mL/hr at 11/22/22 0516, 75 mL/hr at 11/22/22 0516  •  [COMPLETED] Insert Peripheral IV, , , Once **AND** sodium chloride 0.9 % flush 10 mL, 10 mL, Intravenous, PRN, Joey Qureshi MD     ASSESSMENT  Acute partial small bowel obstruction resolved  Atrial fibrillation with rapid ventricular rate  Hypertension  Crohn's disease  Elevated LFTs  Gastroesophageal reflux disease    PLAN  Discharge home  Discharge summary dictated    CESILIA CARDONA MD

## 2022-11-22 NOTE — PROGRESS NOTES
[facility]  Inpatient Progress Note    Reason for Follow Up: Crohn's disease small bowel    Subjective     Interval History:   NG tube removed yesterday tolerating clear liquids feels well wants to go home today.  General surgery also following.  Passing flatus today no bowel movement    Current Facility-Administered Medications:   •  Hydrocortisone Sod Suc (PF) (Solu-CORTEF) injection 100 mg, 100 mg, Intravenous, Q8H, Kaylee Mccullough APRN, 100 mg at 11/21/22 2334  •  HYDROmorphone (DILAUDID) injection 0.5 mg, 0.5 mg, Intravenous, 4x Daily PRN, Drew Cardona MD  •  mesalamine (PENTASA) CR capsule 500 mg, 500 mg, Oral, 4x Daily, Drew Cardona MD, 500 mg at 11/21/22 2104  •  metoprolol tartrate (LOPRESSOR) injection 2.5 mg, 2.5 mg, Intravenous, Q4H PRN, Drew Cardona MD  •  metoprolol tartrate (LOPRESSOR) tablet 25 mg, 25 mg, Oral, BID, Alejandro Neville MD, 25 mg at 11/21/22 2104  •  ondansetron (ZOFRAN) injection 4 mg, 4 mg, Intravenous, Q6H PRN, Drew Cardona MD  •  pantoprazole (PROTONIX) EC tablet 40 mg, 40 mg, Oral, Q AM, Drew Cardona MD, 40 mg at 11/22/22 0514  •  polyethylene glycol (MIRALAX) packet 17 g, 17 g, Oral, Daily, Shahida Cottrell MD, 17 g at 11/21/22 0914  •  promethazine (PHENERGAN) 12.5 mg in sodium chloride 0.9 % 50 mL, 12.5 mg, Intravenous, Q6H PRN, Joey Castaneda II, MD, Stopped at 11/19/22 1050  •  sodium chloride 0.45 % infusion, 75 mL/hr, Intravenous, Continuous, Drew Cardona MD, Last Rate: 75 mL/hr at 11/22/22 0516, 75 mL/hr at 11/22/22 0516  •  [COMPLETED] Insert Peripheral IV, , , Once **AND** sodium chloride 0.9 % flush 10 mL, 10 mL, Intravenous, PRN, Joey Qureshi MD  Review of Systems:    There is weakness of fatigue all the systems reviewed and    Objective     Vital Signs  Temp:  [97.6 °F (36.4 °C)-97.9 °F (36.6 °C)] 97.7 °F (36.5 °C)  Heart Rate:  [47-65] 51  Resp:  [18] 18  BP: (155-179)/(72-87) 179/79  Body mass index is 23.55  kg/m².    Intake/Output Summary (Last 24 hours) at 11/22/2022 0806  Last data filed at 11/22/2022 0732  Gross per 24 hour   Intake 0 ml   Output 1400 ml   Net -1400 ml     No intake/output data recorded.     Physical Exam:   General: patient awake, alert and cooperative   Eyes: Normal lids and lashes, no scleral icterus   Neck: supple, normal ROM   Skin: warm and dry, not jaundiced   Cardiovascular: regular rhythm and rate, no murmurs auscultated   Pulm: clear to auscultation bilaterally, regular and unlabored   Abdomen: soft, nontender, nondistended; normal bowel sounds   Extremities: no rash or edema   Psychiatric: Normal mood and behavior; memory intact     Results Review:     I reviewed the patient's new clinical results.    Results from last 7 days   Lab Units 11/22/22  0513 11/21/22 0441 11/20/22  0524   WBC 10*3/mm3 13.10* 12.36* 11.27*   HEMOGLOBIN g/dL 10.9* 10.7* 11.9*   HEMATOCRIT % 33.1* 32.6* 35.7   PLATELETS 10*3/mm3 213 209 228     Results from last 7 days   Lab Units 11/22/22  0513 11/21/22  0441 11/20/22  0524 11/19/22  0521   SODIUM mmol/L 141 142 146* 142   POTASSIUM mmol/L 3.8 3.7 4.1 3.9   CHLORIDE mmol/L 107 110* 113* 103   CO2 mmol/L 23.6 20.4* 21.7* 24.0   BUN mg/dL 17 22 15 12   CREATININE mg/dL 0.69 0.73 0.91 0.99   CALCIUM mg/dL 8.8 8.4* 8.8 10.3   BILIRUBIN mg/dL  --  0.4 0.6 0.5   ALK PHOS U/L  --  81 96 128*   ALT (SGPT) U/L  --  84* 135* 122*   AST (SGOT) U/L  --  70* 134* 243*   GLUCOSE mg/dL 100* 99 104* 125*         Lab Results   Lab Value Date/Time    LIPASE 47 11/19/2022 0521    LIPASE 24 11/18/2021 2139    LIPASE 43 08/03/2021 0530    LIPASE 37 03/08/2021 1145    LIPASE 49 12/20/2019 0418    LIPASE 15 04/28/2019 0714    LIPASE 42 04/27/2019 0557    LIPASE 29 04/19/2016 0925    LIPASE 60 09/01/2014 1000    LIPASE 22 01/14/2014 0611    LIPASE 49 01/12/2014 1340       Radiology:  US Liver   Final Result   Mild biliary ductal dilatation. This may be compensatory to   previous  cholecystectomy. Common duct measures 1 cm diameter. Otherwise,   negative exam.       This report was finalized on 11/20/2022 4:39 PM by Dr. Javier Marin M.D.          XR Abdomen KUB   Final Result       As described.       This report was finalized on 11/20/2022 7:17 AM by Dr. Randy De Paz M.D.          XR Abdomen KUB   Final Result       As described.       This report was finalized on 11/19/2022 1:18 PM by Dr. Randy De Paz M.D.          CT Abdomen Pelvis With Contrast   Final Result       Proximal small bowel is fluid-filled and shows borderline prominent   caliber, gradually tapering at the mid aspect to collapsed caliber. This   appearance may represent early or partial small bowel obstruction, close   follow-up recommended.       This report was finalized on 11/19/2022 7:24 AM by Dr. Randy De Paz M.D.              Assessment & Plan     Patient Active Problem List   Diagnosis   • Crohn's disease of small intestine with other complication (HCC)   • Crohn disease (HCC)   • Essential hypertension   • PAF (paroxysmal atrial fibrillation) (HCC)   • Partial small bowel obstruction (HCC)       2022 4:39 PM by Dr. Javier Marin M.D.           XR Abdomen KUB   Final Result       As described.       This report was finalized on 11/20/2022 7:17 AM by Dr. Randy De Paz M.D.           XR Abdomen KUB   Final Result       As described.       This report was finalized on 11/19/2022 1:18 PM by Dr. Randy De Paz M.D.           CT Abdomen Pelvis With Contrast   Final Result       Proximal small bowel is fluid-filled and shows borderline prominent   caliber, gradually tapering at the mid aspect to collapsed caliber. This   appearance may represent early or partial small bowel obstruction, close   follow-up recommended.       This report was finalized on 11/19/2022 7:24 AM by Dr. Randy De Paz M.D.                       Assessment & Plan            Patient Active Problem List    Diagnosis   • Crohn's disease of small intestine with other complication (HCC)   • Crohn disease (HCC)   • Essential hypertension   • PAF (paroxysmal atrial fibrillation) (HCC)   • Partial small bowel obstruction (HCC)         A  ASSESSMENT:  82 y.o. female with history of Crohn's disease s/p right hemicolectomy, A. fib, cholecystectomy, appendectomy, splenectomy, and previous partial SBO who presents with complaint of abdominal pain and vomiting.  -Partial small bowel obstruction - d/t Crohn's disease or surgical history  -Crohn's disease -s/P right hemicolectomy (2013), rx Pentasa  -S/p appendectomy, cholecystectomy, splenectomy  -A. Fib -Rx Eliquis  -Elevated liver enzymes - new, hepatitis panel nonreactive        PLAN:  Discontinue IV steroids switch to oral steroid 40 mg prednisone every morning, will need to be tapered by her primary gastroenterologist Dr. Serafin weinberg, she will make follow-up with his nurse practitioner next week  Continue Pentasa 500mg 4 times daily  Advance diet per general surgery  GI okay with discharge home  Liver tests have improved I would recommend recheck next week with Dr. Ricketts's nurse practitioner    I discussed the patients findings and my recommendations with patient and nursing staff.    Armani Orellana MD

## 2022-11-22 NOTE — PLAN OF CARE
Goal Outcome Evaluation:  Plan of Care Reviewed With: patient           Outcome Evaluation: Pt is a 83 yo F who was admitted with a partial small bowel obstructions. Pt has a history of crohn's disease and multiple abdominal surgeries. Pt demonstrates adequate strength and balance to perform functional mobility and gait independently. Pt was able to ambulate 300 ft in the bell without difficulty. PT will sign off at this time as pt appears to be near her baseline function.

## 2022-11-22 NOTE — PLAN OF CARE
Alert, vss, room air. Assist x 1. Passing gas but no stool yet. Advanced to regular diet this AM. IVF infusing.     Problem: Adult Inpatient Plan of Care  Goal: Absence of Hospital-Acquired Illness or Injury  Intervention: Identify and Manage Fall Risk  Recent Flowsheet Documentation  Taken 11/22/2022 0600 by Loren Gomez RN  Safety Promotion/Fall Prevention: safety round/check completed  Taken 11/22/2022 0405 by Loren Gomez RN  Safety Promotion/Fall Prevention: safety round/check completed  Taken 11/22/2022 0200 by Loren Gomez RN  Safety Promotion/Fall Prevention: safety round/check completed  Taken 11/21/2022 2342 by Loren Gomez RN  Safety Promotion/Fall Prevention: safety round/check completed  Taken 11/21/2022 2212 by Loren Gomez RN  Safety Promotion/Fall Prevention: safety round/check completed  Taken 11/21/2022 1949 by Loren Gomez RN  Safety Promotion/Fall Prevention: safety round/check completed   Goal Outcome Evaluation:

## 2022-11-23 ENCOUNTER — TELEPHONE (OUTPATIENT)
Dept: GASTROENTEROLOGY | Facility: CLINIC | Age: 82
End: 2022-11-23

## 2022-11-23 NOTE — CASE MANAGEMENT/SOCIAL WORK
Case Management Discharge Note      Final Note: Home with sister. No needs. Transport by private auto         Selected Continued Care - Discharged on 11/22/2022 Admission date: 11/19/2022 - Discharge disposition: Home or Self Care    Destination    No services have been selected for the patient.              Durable Medical Equipment    No services have been selected for the patient.              Dialysis/Infusion    No services have been selected for the patient.              Home Medical Care    No services have been selected for the patient.              Therapy    No services have been selected for the patient.              Community Resources    No services have been selected for the patient.              Community & DME    No services have been selected for the patient.                  Transportation Services  Private: Car    Final Discharge Disposition Code: 01 - home or self-care

## 2022-11-23 NOTE — TELEPHONE ENCOUNTER
"See Dr Orellana note of 11/22: \"Discontinue IV steroids switch to oral steroid 40 mg prednisone every morning, will need to be tapered by her primary gastroenterologist Dr. Betancourt he, she will make follow-up with his nurse practitioner next week\"    Call to pt.  Advise of above.  Pt concerned because rx that was sent upon d/c is for prednisone 20 mg 2 tabs by mouth x 7 days.  Concerned that no taper.      Advise that plan was for pt to be seen next wk, and that taper instructions and new rx would be arranged at that time.      Pt requests appt with DR Ricketts only.      Advise will send message to Manager for appt, and will send message to Dr Ricketts for taper instructions after completing current rx.  Verb understanding.   Will take prednisone as prescribed upon d/c and await further instructions.   "

## 2022-11-23 NOTE — TELEPHONE ENCOUNTER
Hub staff attempted to follow warm transfer process and was unsuccessful     Caller: Michela Aguilar    Relationship to patient: Self    Best call back number: [phone]    Patient is needing: PT WAS PRESCRIBED PREDNISONE WHEN DISCHARGED FROM HOSPITAL AND HAS CONCERNS WITH THE AMOUNT PRESCRIBED AND DIRECTIONS ON TAKING IT. PT IS REQUESTING A CALL BACK BEFORE SHE BEGINS THE MEDICATION PLEASE.

## 2022-11-24 LAB
BACTERIA SPEC AEROBE CULT: NORMAL
BACTERIA SPEC AEROBE CULT: NORMAL

## 2022-11-28 ENCOUNTER — OFFICE VISIT (OUTPATIENT)
Dept: GASTROENTEROLOGY | Facility: CLINIC | Age: 82
End: 2022-11-28

## 2022-11-28 VITALS
SYSTOLIC BLOOD PRESSURE: 138 MMHG | TEMPERATURE: 97.4 F | HEART RATE: 60 BPM | HEIGHT: 62 IN | DIASTOLIC BLOOD PRESSURE: 78 MMHG | BODY MASS INDEX: 23.55 KG/M2 | WEIGHT: 128 LBS

## 2022-11-28 DIAGNOSIS — R10.10 PAIN OF UPPER ABDOMEN: ICD-10-CM

## 2022-11-28 DIAGNOSIS — R79.89 ELEVATED LFTS: ICD-10-CM

## 2022-11-28 DIAGNOSIS — Z11.59 ENCOUNTER FOR SCREENING FOR OTHER VIRAL DISEASES: ICD-10-CM

## 2022-11-28 DIAGNOSIS — K56.609 SBO (SMALL BOWEL OBSTRUCTION): ICD-10-CM

## 2022-11-28 DIAGNOSIS — R93.89 ABNORMAL FINDINGS ON DIAGNOSTIC IMAGING OF OTHER SPECIFIED BODY STRUCTURES: ICD-10-CM

## 2022-11-28 DIAGNOSIS — K50.00 CROHN'S DISEASE OF SMALL INTESTINE WITHOUT COMPLICATION: Primary | ICD-10-CM

## 2022-11-28 PROCEDURE — 99214 OFFICE O/P EST MOD 30 MIN: CPT | Performed by: INTERNAL MEDICINE

## 2022-11-28 RX ORDER — PREDNISONE 10 MG/1
TABLET ORAL
Qty: 120 TABLET | Refills: 0 | Status: SHIPPED | OUTPATIENT
Start: 2022-11-28 | End: 2023-01-23

## 2022-11-28 NOTE — PROGRESS NOTES
Chief Complaint   Patient presents with   • Crohn's Disease       History of Present Illness:   82 y.o. female with terminal ileal crohn's disease dating back to 2014.  She had previous bowel resection by Dr. Bella Limon at that time involving the terminal ileum. She was admitted last week with a SBO that responded to conservative treatment and IV then po steroids:  Discharge summary     Date of admission 11/19/2022  Date of discharge 11/22/2022     Final diagnosis  Acute partial small bowel obstruction resolved  Atrial fibrillation with rapid ventricular rate  Hypertension  Crohn's disease  Elevated LFTs  Gastroesophageal reflux disease     Discharge medications     Current Facility-Administered Medications:   •  mesalamine (PENTASA) CR capsule 500 mg, 500 mg, Oral, 4x Daily, Drew Cardona MD, 500 mg at 11/22/22 0810  •  metoprolol tartrate (LOPRESSOR) tablet 25 mg, 25 mg, Oral, BID, Alejandro Neville MD, 25 mg at 11/22/22 0811  •  pantoprazole (PROTONIX) EC tablet 40 mg, 40 mg, Oral, Q AM, Drew Cardona MD, 40 mg at 11/22/22 0514  •  polyethylene glycol (MIRALAX) packet 17 g, 17 g, Oral, Daily, Shahida Cottrell MD, 17 g at 11/22/22 0811  •  predniSONE (DELTASONE) tablet 40 mg, 40 mg, Oral, Daily With Breakfast, Armani Orellana MD, 40 mg at 11/22/22 1112  •  [COMPLETED] Insert Peripheral IV, , , Once **AND** sodium chloride 0.9 % flush 10 mL, 10 mL, Intravenous, PRN, Joey Qureshi MD      Consult obtained  Cardiology  Gastroenterology  General surgery     Procedures  None     Hospital course  82-year-old white female with history of Crohn's disease hypertension hyperlipidemia and atrial fibrillation admitted through emergency room with abdominal pain started on the day of admission with nausea vomiting.  Patient also complain of constipation.  Patient work-up revealed acute partial small bowel obstruction.  Patient admitted treated with n.p.o. IV fluid and NG tube placed.  Patient received symptomatic  treatment for pain nausea vomiting.  Patient also found to be in rapid ventricular rate and heart rate control with IV metoprolol.  Patient responded to the treatment started on liquid diet and advance as tolerated.  Now patient passing gas and had BM.  Patient also followed by gastroenterology for Crohn's disease and started on IV steroids which is changed to prednisone and the recommend to continue 40 mg daily and further evaluated by her gastroenterologist after 1 week.  Patient restarted on anticoagulation which was held on admission.  Patient beta-blocker dose adjusted during this hospitalization.  Patient cleared for discharge.       She was previously admitted 8/3/21 thru 8/10/21 with abdominal pain and a partial small bowel obstruction, which also responded to conservative treatment then steroids.        She last had a colonoscopy in 8/19 and last had an EGD in 4/19.        NO nausea or vomiting. No abdominal pain. Some chest discomfort. No SOA. Still has diarrhea: 2-3 BM/day. No rectal bleeding or melena.        Prior to this admission she was on Pentasa 500 mg 2 BID.     Past Medical History:   Diagnosis Date   • Anemia    • Arthritis    • Colitis    • Crohn disease (HCC)    • Crohn's disease (HCC)    • GERD (gastroesophageal reflux disease)    • History of transfusion    • Hyperlipidemia    • Hypertension    • Migraine    • Ocular migraine    • Ocular migraine    • PAF (paroxysmal atrial fibrillation) (HCC)     with rapid ventricular rate   • SBO (small bowel obstruction) (HCC)    • SBO (small bowel obstruction) (HCC)    • Small bowel obstruction (HCC) 08/03/2021   • TIA (transient ischemic attack)    • UTI (urinary tract infection) 07/21/2021   • UTI (urinary tract infection)        Past Surgical History:   Procedure Laterality Date   • APPENDECTOMY     • CHOLECYSTECTOMY     • COLECTOMY PARTIAL / TOTAL     • COLONOSCOPY  08/20/2015    s/p right hemicolectomy, recurrent Crohns, IH   • COLONOSCOPY N/A  8/9/2019    Crohns, IH.     • ENDOSCOPY  08/20/2015    erythematous mucosa in stomach, chronic gastritis,    • ENDOSCOPY N/A 4/28/2019    Erythematous mucosa in stomach, path benign   • SPLENECTOMY  2009         Current Outpatient Medications:   •  diazepam (VALIUM) 5 MG tablet, Take 2.5 mg by mouth Daily As Needed., Disp: , Rfl:   •  Eliquis 5 MG tablet tablet, TAKE 1 TABLET TWICE A DAY FOR ATRIAL FIBRILLATION, Disp: 180 tablet, Rfl: 3  •  ezetimibe (ZETIA) 10 MG tablet, Take 10 mg by mouth Daily., Disp: , Rfl:   •  mesalamine (PENTASA) 250 MG CR capsule, Take 2 capsules by mouth 4 (Four) Times a Day for 30 days., Disp: 240 capsule, Rfl: 0  •  metoprolol tartrate (LOPRESSOR) 25 MG tablet, Take 1 tablet by mouth 2 (Two) Times a Day for 30 days., Disp: 60 tablet, Rfl: 0  •  Multiple Vitamins-Minerals (MULTI COMPLETE PO), Take  by mouth Daily., Disp: , Rfl:   •  pantoprazole (PROTONIX) 40 MG EC tablet, Take 40 mg by mouth Daily., Disp: , Rfl:   •  loperamide (IMODIUM) 2 MG capsule, Take 2 mg by mouth 4 (Four) Times a Day As Needed for Diarrhea. prn, Disp: , Rfl:   •  ondansetron ODT (ZOFRAN ODT) 4 MG disintegrating tablet, Take 1 tablet by mouth Every 8 (Eight) Hours As Needed for Nausea or Vomiting., Disp: 15 tablet, Rfl: 0  •  polyethylene glycol (MIRALAX) 17 g packet, Take 17 g by mouth Daily for 30 days., Disp: 30 packet, Rfl: 0  •  predniSONE (DELTASONE) 10 MG tablet, Take 4 by mouth daily x 5 days, then 3 po daily x 5 days, then 2 po daily x 5 days. Then take 1.5 pills po daily x 5 days, then take 1 po daily x 5 days, then take 1/2 pill daily x 5 days. Then take 1/2 pill every other day x 5 days then stop the prednisone., Disp: 120 tablet, Rfl: 0    Allergies   Allergen Reactions   • Amitriptyline Confusion   • Mysoline [Primidone] Confusion       History reviewed. No pertinent family history.    Social History     Socioeconomic History   • Marital status: Single   Tobacco Use   • Smoking status: Never   •  Smokeless tobacco: Never   Vaping Use   • Vaping Use: Never used   Substance and Sexual Activity   • Alcohol use: No     Comment: caffiene use coffee daily   • Drug use: No   • Sexual activity: Defer       Review of Systems   Gastrointestinal: Negative for abdominal pain, nausea and vomiting.   All other systems reviewed and are negative.    Pertinent positives and negatives documented in the HPI and all other systems reviewed and were found to be negative.  Vitals:    11/28/22 0835   BP: 138/78   Pulse: 60   Temp: 97.4 °F (36.3 °C)       Physical Exam  Vitals reviewed.   Constitutional:       General: She is not in acute distress.     Appearance: Normal appearance. She is well-developed. She is not diaphoretic.   HENT:      Head: Normocephalic and atraumatic. Hair is normal.      Right Ear: Hearing, tympanic membrane, ear canal and external ear normal. No decreased hearing noted. No drainage.      Left Ear: Hearing, tympanic membrane, ear canal and external ear normal. No decreased hearing noted.      Nose: Nose normal. No nasal deformity.      Mouth/Throat:      Mouth: Mucous membranes are moist.   Eyes:      General: Lids are normal.         Right eye: No discharge.         Left eye: No discharge.      Extraocular Movements: Extraocular movements intact.      Conjunctiva/sclera: Conjunctivae normal.      Pupils: Pupils are equal, round, and reactive to light.   Neck:      Thyroid: No thyromegaly.      Vascular: No JVD.      Trachea: No tracheal deviation.   Cardiovascular:      Rate and Rhythm: Normal rate and regular rhythm.      Pulses: Normal pulses.      Heart sounds: Normal heart sounds. No murmur heard.    No friction rub. No gallop.   Pulmonary:      Effort: Pulmonary effort is normal. No respiratory distress.      Breath sounds: Normal breath sounds. No wheezing or rales.   Chest:      Chest wall: No tenderness.   Abdominal:      General: Bowel sounds are normal. There is no distension.      Palpations:  Abdomen is soft. There is no mass.      Tenderness: There is no abdominal tenderness. There is no guarding or rebound.      Hernia: No hernia is present.   Genitourinary:     Rectum: Normal. Guaiac result negative.   Musculoskeletal:         General: No tenderness or deformity. Normal range of motion.      Cervical back: Normal range of motion and neck supple.   Lymphadenopathy:      Cervical: No cervical adenopathy.   Skin:     General: Skin is warm and dry.      Findings: No erythema or rash.   Neurological:      Mental Status: She is alert and oriented to person, place, and time.      Cranial Nerves: No cranial nerve deficit.      Motor: No abnormal muscle tone.      Coordination: Coordination normal.      Deep Tendon Reflexes: Reflexes are normal and symmetric. Reflexes normal.   Psychiatric:         Mood and Affect: Mood normal.         Behavior: Behavior normal.         Thought Content: Thought content normal.         Judgment: Judgment normal.         Diagnoses and all orders for this visit:    1. Crohn's disease of small intestine without complication (HCC) (Primary)  -     predniSONE (DELTASONE) 10 MG tablet; Take 4 by mouth daily x 5 days, then 3 po daily x 5 days, then 2 po daily x 5 days. Then take 1.5 pills po daily x 5 days, then take 1 po daily x 5 days, then take 1/2 pill daily x 5 days. Then take 1/2 pill every other day x 5 days then stop the prednisone.  Dispense: 120 tablet; Refill: 0  -     MRI abdomen w wo contrast mrcp; Future  -     MRI Abdomen Pelvis Enterography; Future  -     CBC & Differential  -     Comprehensive Metabolic Panel  -     Celiac Ab tTG DGP TIgA  -     TSH  -     Vitamin B12  -     Sedimentation Rate  -     C-reactive Protein  -     QuantiFERON TB Gold  -     Hepatitis B Core Antibody, Total    2. Pain of upper abdomen  -     MRI abdomen w wo contrast mrcp; Future  -     MRI Abdomen Pelvis Enterography; Future  -     CBC & Differential  -     Comprehensive Metabolic Panel  -      Celiac Ab tTG DGP TIgA  -     TSH  -     Vitamin B12  -     Sedimentation Rate  -     C-reactive Protein  -     QuantiFERON TB Gold  -     Hepatitis B Core Antibody, Total    3. SBO (small bowel obstruction) (HCC)  -     MRI abdomen w wo contrast mrcp; Future  -     MRI Abdomen Pelvis Enterography; Future  -     CBC & Differential  -     Comprehensive Metabolic Panel  -     Celiac Ab tTG DGP TIgA  -     TSH  -     Vitamin B12  -     Sedimentation Rate  -     C-reactive Protein  -     QuantiFERON TB Gold  -     Hepatitis B Core Antibody, Total    4. Elevated LFTs  -     MRI abdomen w wo contrast mrcp; Future  -     MRI Abdomen Pelvis Enterography; Future  -     CBC & Differential  -     Comprehensive Metabolic Panel  -     Celiac Ab tTG DGP TIgA  -     TSH  -     Vitamin B12  -     Sedimentation Rate  -     C-reactive Protein  -     QuantiFERON TB Gold  -     Hepatitis B Core Antibody, Total    5. Abnormal findings on diagnostic imaging of other specified body structures  -     TSH    6. Encounter for screening for other viral diseases  -     Hepatitis B Core Antibody, Total      Assessment:  1. On Eliquis for a fib.  2. terminal ileal crohn's disease dating back to 2014.  She had previous bowel resection by Dr. Bella Limon at that time involving the terminal ileum.   2. H/o recent SBO.  3. Anemia  4. H/o Vit B12 def  5.     Recommendations:  1. Labs: CBC, B12, CMP,   2. MRI for MRCP and enterography: r/o bile duct stone and r/o crohn's of small bowel.   3. Wean prednisone  4. Trial of Pentasa 3 BID.  5. F/u 8 weeks. I want her to think about Remicaide, Humira, etc.     Return in about 8 weeks (around 1/23/2023).    Marcel Ricketts MD  11/28/2022

## 2022-11-29 LAB
ALBUMIN SERPL-MCNC: 3.6 G/DL (ref 3.6–4.6)
ALBUMIN/GLOB SERPL: 1.8 {RATIO} (ref 1.2–2.2)
ALP SERPL-CCNC: 78 IU/L (ref 44–121)
ALT SERPL-CCNC: 31 IU/L (ref 0–32)
AST SERPL-CCNC: 18 IU/L (ref 0–40)
BASOPHILS # BLD AUTO: 0 X10E3/UL (ref 0–0.2)
BASOPHILS NFR BLD AUTO: 0 %
BILIRUB SERPL-MCNC: 0.4 MG/DL (ref 0–1.2)
BUN SERPL-MCNC: 16 MG/DL (ref 8–27)
BUN/CREAT SERPL: 16 (ref 12–28)
CALCIUM SERPL-MCNC: 8.9 MG/DL (ref 8.7–10.3)
CHLORIDE SERPL-SCNC: 102 MMOL/L (ref 96–106)
CO2 SERPL-SCNC: 25 MMOL/L (ref 20–29)
CREAT SERPL-MCNC: 1.02 MG/DL (ref 0.57–1)
CRP SERPL-MCNC: 2 MG/L (ref 0–10)
EGFRCR SERPLBLD CKD-EPI 2021: 55 ML/MIN/1.73
EOSINOPHIL # BLD AUTO: 0.1 X10E3/UL (ref 0–0.4)
EOSINOPHIL NFR BLD AUTO: 1 %
ERYTHROCYTE [DISTWIDTH] IN BLOOD BY AUTOMATED COUNT: 14.1 % (ref 11.7–15.4)
ERYTHROCYTE [SEDIMENTATION RATE] IN BLOOD BY WESTERGREN METHOD: 2 MM/HR (ref 0–40)
GLIADIN PEPTIDE IGA SER-ACNC: 7 UNITS (ref 0–19)
GLIADIN PEPTIDE IGG SER-ACNC: 2 UNITS (ref 0–19)
GLOBULIN SER CALC-MCNC: 2 G/DL (ref 1.5–4.5)
GLUCOSE SERPL-MCNC: 84 MG/DL (ref 70–99)
HBV CORE AB SERPL QL IA: NEGATIVE
HCT VFR BLD AUTO: 37 % (ref 34–46.6)
HGB BLD-MCNC: 12.3 G/DL (ref 11.1–15.9)
IGA SERPL-MCNC: 132 MG/DL (ref 64–422)
IMM GRANULOCYTES # BLD AUTO: 0.1 X10E3/UL (ref 0–0.1)
IMM GRANULOCYTES NFR BLD AUTO: 1 %
LYMPHOCYTES # BLD AUTO: 2.2 X10E3/UL (ref 0.7–3.1)
LYMPHOCYTES NFR BLD AUTO: 17 %
MCH RBC QN AUTO: 29.5 PG (ref 26.6–33)
MCHC RBC AUTO-ENTMCNC: 33.2 G/DL (ref 31.5–35.7)
MCV RBC AUTO: 89 FL (ref 79–97)
MONOCYTES # BLD AUTO: 1.1 X10E3/UL (ref 0.1–0.9)
MONOCYTES NFR BLD AUTO: 8 %
NEUTROPHILS # BLD AUTO: 9.8 X10E3/UL (ref 1.4–7)
NEUTROPHILS NFR BLD AUTO: 73 %
PLATELET # BLD AUTO: 296 X10E3/UL (ref 150–450)
POTASSIUM SERPL-SCNC: 3.2 MMOL/L (ref 3.5–5.2)
PROT SERPL-MCNC: 5.6 G/DL (ref 6–8.5)
RBC # BLD AUTO: 4.17 X10E6/UL (ref 3.77–5.28)
SODIUM SERPL-SCNC: 140 MMOL/L (ref 134–144)
TSH SERPL DL<=0.005 MIU/L-ACNC: 1.42 UIU/ML (ref 0.45–4.5)
TTG IGA SER-ACNC: <2 U/ML (ref 0–3)
TTG IGG SER-ACNC: <2 U/ML (ref 0–5)
VIT B12 SERPL-MCNC: 999 PG/ML (ref 232–1245)
WBC # BLD AUTO: 13.2 X10E3/UL (ref 3.4–10.8)

## 2022-11-30 LAB
GAMMA INTERFERON BACKGROUND BLD IA-ACNC: 0 IU/ML
M TB IFN-G BLD-IMP: NEGATIVE
M TB IFN-G CD4+ BCKGRND COR BLD-ACNC: 0 IU/ML
M TB IFN-G CD4+CD8+ BCKGRND COR BLD-ACNC: 0 IU/ML
MITOGEN IGNF BLD-ACNC: 4.46 IU/ML
QUANTIFERON INCUBATION: NORMAL
SERVICE CMNT-IMP: NORMAL

## 2022-12-12 ENCOUNTER — TELEPHONE (OUTPATIENT)
Dept: CARDIOLOGY | Facility: CLINIC | Age: 82
End: 2022-12-12

## 2022-12-12 NOTE — TELEPHONE ENCOUNTER
"Pt called triage line to report palpitations, SOA, and pauses since yesterday.    Pt reports she has a h/o AF.  She's experiencing the above symptoms, in addition she's had episodes where she feels like her heart \"pauses\".  She reports a baseline HR 50-60s.  Her VS yesterday & today:    135/70 HR 71  123/74 HR 73  117/73 HR 74  145/75 HR 75  141/75 HR 75    She tells me she is taking the metoprolol and Eliquis as prescribed.  She says she has a FU appt with TK on 12/20 and wants to know if she should be seen sooner.    Do you have any recommendations for this patient?    Thank you,    Mikayla Bob RN  [facility] Triage  12/12/22  16:23 EST    "

## 2022-12-13 ENCOUNTER — CLINICAL SUPPORT (OUTPATIENT)
Dept: CARDIOLOGY | Facility: CLINIC | Age: 82
End: 2022-12-13

## 2022-12-13 ENCOUNTER — TELEPHONE (OUTPATIENT)
Dept: GASTROENTEROLOGY | Facility: CLINIC | Age: 82
End: 2022-12-13

## 2022-12-13 VITALS
SYSTOLIC BLOOD PRESSURE: 167 MMHG | BODY MASS INDEX: 23.55 KG/M2 | WEIGHT: 128 LBS | DIASTOLIC BLOOD PRESSURE: 86 MMHG | HEART RATE: 68 BPM | OXYGEN SATURATION: 100 % | HEIGHT: 62 IN

## 2022-12-13 DIAGNOSIS — R00.2 PALPITATIONS: Primary | ICD-10-CM

## 2022-12-13 DIAGNOSIS — K50.00 CROHN'S DISEASE OF SMALL INTESTINE WITHOUT COMPLICATION: Primary | ICD-10-CM

## 2022-12-13 PROCEDURE — 93000 ELECTROCARDIOGRAM COMPLETE: CPT | Performed by: INTERNAL MEDICINE

## 2022-12-13 RX ORDER — MESALAMINE 500 MG/1
CAPSULE, EXTENDED RELEASE ORAL
Qty: 448 CAPSULE | Refills: 0 | Status: SHIPPED | OUTPATIENT
Start: 2022-12-13 | End: 2022-12-15 | Stop reason: SDUPTHER

## 2022-12-13 NOTE — TELEPHONE ENCOUNTER
Caller: Michela Aguilar    Relationship to patient: Self    Best call back number: [phone]    Patient is needing: PATIENT IS NEEDING TO DISCUSS RECENT CHANGES TO MEDICATION mesalamine (PENTASA) 250 MG CR capsule [[id number]] (Order [id number])    REQUESTING FOR A CALLBACK.

## 2022-12-13 NOTE — TELEPHONE ENCOUNTER
She should come in for a BP check and EKG today.I'm in [place], feel free to call us out there at [phone].    Mason gunn

## 2022-12-13 NOTE — TELEPHONE ENCOUNTER
12/13/22 - she is now on a higher dose of Pentasa: 500 mg  2 po TID. I sent in a prescription for this to [facility]. I would like for the patient to come by next week to have the following labs done to make sure that she is tolerating this higher dose of Pentasa:  - CBC, CMP, UA.       SA/MT - please have her come by next week to have the above labs drawn. arik

## 2022-12-13 NOTE — PROGRESS NOTES
Normal BP, normal EKG. Recommend 24 hour Holter, order placed. Can they accommodate her today?    jdk

## 2022-12-13 NOTE — TELEPHONE ENCOUNTER
I scheduled pt for an EKG today at 11:30 and asked her to bring in both of her BP cuffs from home to compare to our office readings.    Thank you,    Mikayla Bob RN  Triage [facility]  12/13/22 09:08 EST

## 2022-12-13 NOTE — PROGRESS NOTES
Procedure     ECG 12 Lead    Date/Time: 12/13/2022 11:36 AM  Performed by: Juan Damon MD  Authorized by: Juan Damon MD   Comparison: compared with previous ECG   Similar to previous ECG  Rhythm: sinus rhythm  Conduction: conduction normal  ST Segments: ST segments normal  T Waves: T waves normal  QRS axis: normal  Other: no other findings    Clinical impression: normal ECG

## 2022-12-13 NOTE — PROGRESS NOTES
Patient came in per Dr Gonzalez request . Patient check BP with her machine and ours , her BP machine is currently accurate with ours .

## 2022-12-13 NOTE — TELEPHONE ENCOUNTER
Called pt and pt reports that she is taking meslamine 500mg tabs .  She is taking 2 tabs tid ( total of 6 pills per day).    She is asking if this is safe.     She is not sure if this is working or not. Pt reports she is having issues with her heart and irregular beats. Pt also reporting she is short of air.  Her heart md is having her wear a heart monitor. Pt reports that her bms range from soft to watery.   Pt is asking if Dr Ricketts wants her to continue the 6 pills a day she will need a new script.   Advised will send message to Dr Ricketts. Verb understanding.

## 2022-12-14 NOTE — TELEPHONE ENCOUNTER
Call to pt.  Lab appt scheduled for 12/22 @ 11am.     Lab orders placed -  Message to Dr Ricketts.

## 2022-12-15 ENCOUNTER — TELEPHONE (OUTPATIENT)
Dept: GASTROENTEROLOGY | Facility: CLINIC | Age: 82
End: 2022-12-15

## 2022-12-15 RX ORDER — MESALAMINE 500 MG/1
CAPSULE, EXTENDED RELEASE ORAL
Qty: 540 CAPSULE | Refills: 1 | Status: SHIPPED | OUTPATIENT
Start: 2022-12-15

## 2022-12-15 NOTE — TELEPHONE ENCOUNTER
Called pt advised of Dr Ricketts's note. Verb understanding.     Pt reports that she saw her pcp after these labs were drawn and pt has started back on her potassium supp 10meg daily. Pt has lab appt with us on 12/22 .  Update sent to Dr Ricketts.

## 2022-12-15 NOTE — TELEPHONE ENCOUNTER
----- Message from Marcel Ricketts MD sent at 12/14/2022  5:39 PM EST -----  12/14/22       Tell her that the following labs were normal: TB test (QuantiFERON gold), celiac sprue antibody panel, C-reactive protein and sedimentation rate, TSH, and vitamin B12 level.  Her CBC still showed a white count that is mildly elevated at 13.2.  Her comprehensive metabolic profile shows a potassium level of 3.2 and a creatinine level of 1.02.  Please send in a prescription for some potassium supplementation.  She could take a potassium chloride pill 10 mEq, she would take 1 by mouth daily for 5 days.  In about 10 days I would have her go see her PCP to have her potassium and magnesium levels rechecked.       Please send a copy of this report to her PCP.  Dai law

## 2022-12-15 NOTE — TELEPHONE ENCOUNTER
Hub staff attempted to follow warm transfer process and was unsuccessful     Caller: Michela Aguilar    Relationship to patient: Self    Best call back number: [phone]    Patient is needing: PT CALLED. SHE SAID [facility] HAVE NOT RECEIVED ORDER FOR PENTASA.    PT WANTS CALL BACK TO CONFIRM THE ORDER. PT SAYS SHE IS LEAVING AT 12 FOR AN APPOINTMENT AND [facility]

## 2022-12-15 NOTE — TELEPHONE ENCOUNTER
Called pt and advised pt we will resend her pentasa script to [facility] since first script did not go thru. Script resent and confirmation received.

## 2022-12-20 ENCOUNTER — OFFICE VISIT (OUTPATIENT)
Dept: CARDIOLOGY | Facility: CLINIC | Age: 82
End: 2022-12-20

## 2022-12-20 VITALS
OXYGEN SATURATION: 100 % | BODY MASS INDEX: 22.97 KG/M2 | SYSTOLIC BLOOD PRESSURE: 130 MMHG | HEIGHT: 62 IN | DIASTOLIC BLOOD PRESSURE: 82 MMHG | WEIGHT: 124.8 LBS | HEART RATE: 64 BPM

## 2022-12-20 DIAGNOSIS — R06.09 DYSPNEA ON EXERTION: ICD-10-CM

## 2022-12-20 DIAGNOSIS — K50.018 CROHN'S DISEASE OF SMALL INTESTINE WITH OTHER COMPLICATION: ICD-10-CM

## 2022-12-20 DIAGNOSIS — I48.0 PAF (PAROXYSMAL ATRIAL FIBRILLATION): Primary | ICD-10-CM

## 2022-12-20 DIAGNOSIS — R04.0 NOSEBLEED: ICD-10-CM

## 2022-12-20 DIAGNOSIS — I10 ESSENTIAL HYPERTENSION: ICD-10-CM

## 2022-12-20 PROCEDURE — 93000 ELECTROCARDIOGRAM COMPLETE: CPT | Performed by: NURSE PRACTITIONER

## 2022-12-20 PROCEDURE — 99214 OFFICE O/P EST MOD 30 MIN: CPT | Performed by: NURSE PRACTITIONER

## 2022-12-20 NOTE — SIGNIFICANT NOTE
12/20/22 1354   [id number] Score for Atrial Fibrillation Stroke Risk   Age in Years 75+   Sex Female   CHF History N   Hypertension History Y   Stroke/TIA/Thromboembolism History Y   Vascular Disease History N   Diabetes History N   [id number] Score 6

## 2022-12-20 NOTE — PROGRESS NOTES
Date of Office Visit: 2022  Encounter Provider: YANY Garnett  Place of Service: [facility] CARDIOLOGY  Patient Name: Michela Aguilar  :1940  Primary Cardiologist: Dr. Juan Damon     Chief Complaint   Patient presents with   • Follow-up   • Atrial Fibrillation   :     HPI: Michela Aguilar is a 82 y.o. female who presents today for follow-up on paroxysmal atrial fibrillation and recent hospitalization. I reviewed her medical records.    She has been diagnosed with hypertension, anemia, and Crohn's disease.  She has had a TIA in the past.    In 2021, she was hospitalized for small bowel obstruction and was noted to have asymptomatic atrial fibrillation. Echocardiogram was unremarkable.  She was started on apixaban, diltiazem, and atenolol.  Her metoprolol was discontinued.     In 2021, she followed up with Dr. Damon.  She was having vivid dreams on atenolol and wanted to switch back to metoprolol.  Diltiazem was discontinued due to low heart rate.  She remained in normal sinus rhythm and was recommended continue with apixaban.     In 2022, she reported skipped heartbeats and elevated blood pressures.  Symptoms are stable so we decided we would just continue to monitor.    In 2022, she was hospitalized for abdominal pain, nausea/vomiting, and constipation.  She had a partial small bowel obstruction.  Once again she had atrial fibrillation with RVR and was treated with IV metoprolol.  Apixaban was restarted.  She converted to NSR with digoxin and was discharged on metoprolol 25 mg twice per day.  She was discharged with a 24-hour Holter monitor and the results are pending.    She presents today for hospital follow-up visit.  She is no longer having abdominal discomfort and remains on prednisone.  Today she is having an ocular migraine.  BP was elevated on first check and better on second check.  She had an NG tube during her hospitalization  "and since leaving the hospital she has had a little bit of blood when she blows her nose.  She reports \"a little\" shortness of breath with exertion, which is unchanged.  She denies chest pain, palpitations, edema, dizziness, syncope, or bleeding.      Past Medical History:   Diagnosis Date   • Anemia    • Arthritis    • Colitis    • Crohn's disease (HCC)    • GERD (gastroesophageal reflux disease)    • History of transfusion    • Hyperlipidemia    • Hypertension    • Migraine    • Ocular migraine    • PAF (paroxysmal atrial fibrillation) (HCC)     with rapid ventricular rate   • Small bowel obstruction (HCC) 08/03/2021   • TIA (transient ischemic attack)    • UTI (urinary tract infection) 07/21/2021       Past Surgical History:   Procedure Laterality Date   • APPENDECTOMY     • CHOLECYSTECTOMY     • COLECTOMY PARTIAL / TOTAL     • COLONOSCOPY  08/20/2015    s/p right hemicolectomy, recurrent Crohns, IH   • COLONOSCOPY N/A 8/9/2019    Crohns, IH.     • ENDOSCOPY  08/20/2015    erythematous mucosa in stomach, chronic gastritis,    • ENDOSCOPY N/A 4/28/2019    Erythematous mucosa in stomach, path benign   • SPLENECTOMY  2009       Social History     Socioeconomic History   • Marital status: Single   Tobacco Use   • Smoking status: Never   • Smokeless tobacco: Never   Vaping Use   • Vaping Use: Never used   Substance and Sexual Activity   • Alcohol use: No     Comment: caffiene use coffee daily   • Drug use: No   • Sexual activity: Defer       History reviewed. No pertinent family history.    The following portion of the patient's history were reviewed and updated as appropriate: past medical history, past surgical history, past social history, past family history, allergies, current medications, and problem list.    Review of Systems   Constitutional: Negative.   HENT: Positive for nosebleeds.    Cardiovascular: Positive for dyspnea on exertion.   Respiratory: Negative.    Hematologic/Lymphatic: Negative.  " "  Neurological: Negative.        Allergies   Allergen Reactions   • Amitriptyline Confusion   • Mysoline [Primidone] Confusion         Current Outpatient Medications:   •  diazepam (VALIUM) 5 MG tablet, Take 2.5 mg by mouth Daily As Needed., Disp: , Rfl:   •  Eliquis 5 MG tablet tablet, TAKE 1 TABLET TWICE A DAY FOR ATRIAL FIBRILLATION, Disp: 180 tablet, Rfl: 3  •  ezetimibe (ZETIA) 10 MG tablet, Take 10 mg by mouth Daily., Disp: , Rfl:   •  mesalamine (PENTASA) 500 MG CR capsule, Take 2 pills by mouth three times a day, Disp: 540 capsule, Rfl: 1  •  metoprolol tartrate (LOPRESSOR) 25 MG tablet, Take 1 tablet by mouth 2 (Two) Times a Day., Disp: 180 tablet, Rfl: 3  •  Multiple Vitamins-Minerals (MULTI COMPLETE PO), Take  by mouth Daily., Disp: , Rfl:   •  pantoprazole (PROTONIX) 40 MG EC tablet, Take 40 mg by mouth Daily., Disp: , Rfl:   •  predniSONE (DELTASONE) 10 MG tablet, Take 4 by mouth daily x 5 days, then 3 po daily x 5 days, then 2 po daily x 5 days. Then take 1.5 pills po daily x 5 days, then take 1 po daily x 5 days, then take 1/2 pill daily x 5 days. Then take 1/2 pill every other day x 5 days then stop the prednisone., Disp: 120 tablet, Rfl: 0  •  loperamide (IMODIUM) 2 MG capsule, Take 2 mg by mouth 4 (Four) Times a Day As Needed for Diarrhea. prn, Disp: , Rfl:          Objective:     Vitals:    12/20/22 1025 12/20/22 1053   BP: 160/80 130/82   BP Location: Left arm Right arm   Patient Position: Sitting Sitting   Cuff Size: Adult    Pulse: 64    SpO2: 100%    Weight: 56.6 kg (124 lb 12.8 oz)    Height: 157.5 cm (62\")      Body mass index is 22.83 kg/m².    PHYSICAL EXAM:    Vitals Reviewed.   General Appearance: No acute distress, well developed and well nourished.    Eyes: Conjunctiva and lids: No erythema, swelling, or discharge. Sclera non-icteric. Glasses.   HENT: Atraumatic, normocephalic. External eyes, ears, and nose normal. No hearing loss noted. Mucous membranes normal. Lips not cyanotic. Neck " supple with no tenderness. Wearing mask.   Respiratory: No signs of respiratory distress. Respiration rhythm and depth normal.   Clear to auscultation. No rales, crackles, rhonchi, or wheezing auscultated.   Cardiovascular:  Jugular Venous Pressure: Normal  Heart Rate and Rhythm: Normal, Heart Sounds: Normal S1 and S2. No S3 or S4 noted.  Murmurs: No murmurs noted. No rubs, thrills, or gallops.   Lower Extremities: No edema noted.  Gastrointestinal:  Abdomen soft, non-distended, non-tender.    Musculoskeletal: Normal movement of extremities.  Skin and Nails: General appearance normal. No pallor, cyanosis, diaphoresis. Skin temperature normal. No clubbing of fingernails.   Psychiatric: Patient alert and oriented to person, place, and time. Speech and behavior appropriate. Normal mood and affect.       ECG 12 Lead    Date/Time: 12/20/2022 10:19 AM  Performed by: Calista Mcclure APRN  Authorized by: Calista Mcclure APRN   Comparison: compared with previous ECG from 12/13/2022  Similar to previous ECG  Rhythm: sinus rhythm  Rate: normal  BPM: 64  Conduction: conduction normal  ST Segments: ST segments normal  T Waves: T waves normal  QRS axis: normal    Clinical impression: normal ECG              Assessment:       Diagnosis Plan   1. PAF (paroxysmal atrial fibrillation) (Formerly [facility])  Stress Test With Myocardial Perfusion One Day      2. Nosebleed        3. Dyspnea on exertion  Stress Test With Myocardial Perfusion One Day      4. Essential hypertension        5. Crohn's disease of small intestine with other complication ([facility])               Plan:       1.  Paroxysmal Atrial Fibrillation: She had an episode of atrial fibrillation in August 2021 and again in November 2022; both in the setting of small bowel obstructions.  Continue apixaban and metoprolol.  24-hour Holter monitor results pending.    2.  Nosebleeds: Occurred after having an NG tube.  I asked her to continue to monitor since she is on apixaban follow-up with  PCP.    3.  Dyspnea on exertion: Chronic.  She is concerned that this may be heart related.  I ordered a stress test to be done in January.  If normal, follow-up with PCP about chronic shortness of breath.    4.  Hypertension: BP normal on second check.    5.  Crohn's Disease: Followed by Dr. Ricketts.     6.  She will follow-up with Dr. Damon in 3 months.    As always, it has been a pleasure to participate in your patient's care. Thank you.         Sincerely,         YANY Chen  [facility] Cardiology      · Dictated utilizing Dragon Dictation  · COVID-19 Precautions - Patient was compliant in wearing a mask. When I saw the patient, I used appropriate personal protective equipment (PPE) including mask and eye shield (standard procedure).  Additionally, I used gown and gloves if indicated.  Hand hygiene was completed before and after seeing the patient.  · I spent 31 minutes reviewing her medical records/testing/previous office notes/labs, face-to-face interaction with patient, physical examination, formulating the plan of care, and discussion of plan of care with patient.

## 2022-12-22 ENCOUNTER — LAB (OUTPATIENT)
Dept: GASTROENTEROLOGY | Facility: CLINIC | Age: 82
End: 2022-12-22

## 2022-12-22 LAB
BASOPHILS # BLD AUTO: 0.05 10*3/MM3 (ref 0–0.2)
BASOPHILS NFR BLD AUTO: 0.5 % (ref 0–1.5)
EOSINOPHIL # BLD AUTO: 0.09 10*3/MM3 (ref 0–0.4)
EOSINOPHIL NFR BLD AUTO: 0.9 % (ref 0.3–6.2)
ERYTHROCYTE [DISTWIDTH] IN BLOOD BY AUTOMATED COUNT: 14.3 % (ref 12.3–15.4)
HCT VFR BLD AUTO: 38.7 % (ref 34–46.6)
HGB BLD-MCNC: 12.4 G/DL (ref 12–15.9)
IMM GRANULOCYTES # BLD AUTO: 0.06 10*3/MM3 (ref 0–0.05)
IMM GRANULOCYTES NFR BLD AUTO: 0.6 % (ref 0–0.5)
LYMPHOCYTES # BLD AUTO: 1.84 10*3/MM3 (ref 0.7–3.1)
LYMPHOCYTES NFR BLD AUTO: 19.1 % (ref 19.6–45.3)
MCH RBC QN AUTO: 28.8 PG (ref 26.6–33)
MCHC RBC AUTO-ENTMCNC: 32 G/DL (ref 31.5–35.7)
MCV RBC AUTO: 89.8 FL (ref 79–97)
MONOCYTES # BLD AUTO: 0.74 10*3/MM3 (ref 0.1–0.9)
MONOCYTES NFR BLD AUTO: 7.7 % (ref 5–12)
NEUTROPHILS # BLD AUTO: 6.87 10*3/MM3 (ref 1.7–7)
NEUTROPHILS NFR BLD AUTO: 71.2 % (ref 42.7–76)
NRBC BLD AUTO-RTO: 0 /100 WBC (ref 0–0.2)
PLATELET # BLD AUTO: 218 10*3/MM3 (ref 140–450)
RBC # BLD AUTO: 4.31 10*6/MM3 (ref 3.77–5.28)
WBC # BLD AUTO: 9.65 10*3/MM3 (ref 3.4–10.8)

## 2022-12-23 ENCOUNTER — APPOINTMENT (OUTPATIENT)
Dept: MRI IMAGING | Facility: HOSPITAL | Age: 82
End: 2022-12-23

## 2022-12-23 LAB
ALBUMIN SERPL-MCNC: NORMAL G/DL
ALP SERPL-CCNC: NORMAL U/L
ALT SERPL-CCNC: NORMAL U/L
APPEARANCE UR: CLEAR
AST SERPL-CCNC: NORMAL U/L
BACTERIA #/AREA URNS HPF: ABNORMAL /HPF
BILIRUB SERPL-MCNC: NORMAL MG/DL
BILIRUB UR QL STRIP: NEGATIVE
BUN SERPL-MCNC: NORMAL MG/DL
CALCIUM SERPL-MCNC: NORMAL MG/DL
CASTS URNS QL MICRO: ABNORMAL /LPF
CHLORIDE SERPL-SCNC: NORMAL MMOL/L
CO2 SERPL-SCNC: NORMAL MMOL/L
COLOR UR: YELLOW
CREAT SERPL-MCNC: NORMAL MG/DL
CRYSTALS URNS MICRO: ABNORMAL
EPI CELLS #/AREA URNS HPF: ABNORMAL /HPF (ref 0–10)
GLUCOSE SERPL-MCNC: NORMAL MG/DL
GLUCOSE UR QL STRIP: NEGATIVE
HGB UR QL STRIP: NEGATIVE
KETONES UR QL STRIP: NEGATIVE
LEUKOCYTE ESTERASE UR QL STRIP: NEGATIVE
MICRO URNS: NORMAL
MICRO URNS: NORMAL
MUCOUS THREADS URNS QL MICRO: PRESENT /HPF
NITRITE UR QL STRIP: NEGATIVE
PH UR STRIP: 5.5 [PH] (ref 5–7.5)
POTASSIUM SERPL-SCNC: NORMAL MMOL/L
PROT SERPL-MCNC: NORMAL G/DL
PROT UR QL STRIP: NEGATIVE
RBC #/AREA URNS HPF: ABNORMAL /HPF (ref 0–2)
SODIUM SERPL-SCNC: NORMAL MMOL/L
SP GR UR STRIP: 1.01 (ref 1–1.03)
UNIDENT CRYS URNS QL MICRO: PRESENT /LPF
URINALYSIS REFLEX: NORMAL
UROBILINOGEN UR STRIP-MCNC: 0.2 MG/DL (ref 0.2–1)
WBC #/AREA URNS HPF: ABNORMAL /HPF (ref 0–5)

## 2022-12-23 NOTE — PROGRESS NOTES
12/23/22       Tell her that her lab work showed an unrevealing urinalysis.  Her CBC was normal with a normal white count.  I do not see that her potassium level was read on.  She might go see her PCP to have her potassium level rechecked.  Please send a copy of this report to her PCP.  Laura. [email or web address]

## 2022-12-27 ENCOUNTER — TELEPHONE (OUTPATIENT)
Dept: GASTROENTEROLOGY | Facility: CLINIC | Age: 82
End: 2022-12-27

## 2022-12-27 NOTE — TELEPHONE ENCOUNTER
Call to pt.  Advise per DR Ricketts note.  Verb understanding.     Labs faxed via epic to DR Roni Mckenzie.

## 2022-12-27 NOTE — TELEPHONE ENCOUNTER
----- Message from Marcel Ricketts MD sent at 12/23/2022 10:21 AM EST -----  12/23/22       Tell her that her lab work showed an unrevealing urinalysis.  Her CBC was normal with a normal white count.  I do not see that her potassium level was read on.  She might go see her PCP to have her potassium level rechecked.  Please send a copy of this report to her PCP.  Thx. kjh

## 2022-12-28 ENCOUNTER — TELEPHONE (OUTPATIENT)
Dept: GASTROENTEROLOGY | Facility: CLINIC | Age: 82
End: 2022-12-28

## 2022-12-28 NOTE — TELEPHONE ENCOUNTER
Caller: Michela Aguilar    Relationship to patient: Self    Best call back number: [phone]    Patient is needing:  PATIENT CONTACTED TODAY TO STATE THAT SHE REQUESTED FOR HER PENTASA TO BE REFILLED 2 WEEKS AGO.  SHE CONFIRMED THAT IT WAS ORDERED HOWEVER IT IS DELAYED IN SHIPMENT.  SHE WAS TOLD TO REACH OUT TO PRESCRIBING PHYSICIAN ON WHAT TO DO UNTIL MEDICATION ARRIVES.   SHE IS RUNNING LOW AND HAS 3 DAYS WORTH OF MEDICATION LEFT.      MEDICATION- mesalamine (PENTASA) 500 MG CR capsule [90110] (Order [id number])

## 2022-12-28 NOTE — TELEPHONE ENCOUNTER
"Call to pt.  Offer to send short term rx to local [facility].  Reports spoke with [facility] today and was advised they would place a rush on the order - will be shipping from [place].  Has about 3 days left.  Asking if would \"hurt anything\" if she missed a few days.      Dr Ricketts and Ashwini out of office.  Message to Katherine Ibrahim.   "

## 2022-12-28 NOTE — TELEPHONE ENCOUNTER
I think it would be best if a short course is sent to the local pharmacy. Ok to send 7 days just in case there is a further issue with shipment.

## 2022-12-28 NOTE — TELEPHONE ENCOUNTER
Call to [facility] @ [phone] and spoke with Art.  Order given for mesalamine (pentasa) 500 mg 2 pills by mouth 3x/day, #42, R0.  Art states pt may have to pay OOP if mail order has already processed.       Call to pt.  Advise of above.  Verb understanding.

## 2023-01-10 ENCOUNTER — HOSPITAL ENCOUNTER (OUTPATIENT)
Dept: CARDIOLOGY | Facility: HOSPITAL | Age: 83
Discharge: HOME | End: 2023-01-10
Admitting: NURSE PRACTITIONER
Payer: MEDICARE

## 2023-01-10 VITALS — HEIGHT: 62 IN | WEIGHT: 124.78 LBS | BODY MASS INDEX: 22.96 KG/M2

## 2023-01-10 DIAGNOSIS — R06.09 DYSPNEA ON EXERTION: ICD-10-CM

## 2023-01-10 DIAGNOSIS — I48.0 PAF (PAROXYSMAL ATRIAL FIBRILLATION): ICD-10-CM

## 2023-01-10 LAB
BH CV NUCLEAR PRIOR STUDY: 2
BH CV REST NUCLEAR ISOTOPE DOSE: 6.9 MCI
BH CV STRESS BP STAGE 1: NORMAL
BH CV STRESS DURATION MIN STAGE 1: 5
BH CV STRESS DURATION SEC STAGE 1: 0
BH CV STRESS GRADE STAGE 1: 10
BH CV STRESS HR STAGE 1: 154
BH CV STRESS METS STAGE 1: 5
BH CV STRESS NUCLEAR ISOTOPE DOSE: 22.9 MCI
BH CV STRESS PROTOCOL 1: NORMAL
BH CV STRESS RECOVERY BP: NORMAL MMHG
BH CV STRESS RECOVERY HR: 104 BPM
BH CV STRESS SPEED STAGE 1: 1.7
BH CV STRESS STAGE 1: 1
LV EF NUC BP: 77 %
MAXIMAL PREDICTED HEART RATE: 139 BPM
PERCENT MAX PREDICTED HR: 110.79 %
STRESS BASELINE BP: NORMAL MMHG
STRESS BASELINE HR: 109 BPM
STRESS PERCENT HR: 130 %
STRESS POST ESTIMATED WORKLOAD: 5 METS
STRESS POST EXERCISE DUR MIN: 5 MIN
STRESS POST EXERCISE DUR SEC: 0 SEC
STRESS POST PEAK BP: NORMAL MMHG
STRESS POST PEAK HR: 154 BPM
STRESS TARGET HR: 118 BPM

## 2023-01-10 PROCEDURE — 93017 CV STRESS TEST TRACING ONLY: CPT

## 2023-01-10 PROCEDURE — A9502 TC99M TETROFOSMIN: HCPCS | Performed by: NURSE PRACTITIONER

## 2023-01-10 PROCEDURE — 78452 HT MUSCLE IMAGE SPECT MULT: CPT | Performed by: STUDENT IN AN ORGANIZED HEALTH CARE EDUCATION/TRAINING PROGRAM

## 2023-01-10 PROCEDURE — 93016 CV STRESS TEST SUPVJ ONLY: CPT | Performed by: STUDENT IN AN ORGANIZED HEALTH CARE EDUCATION/TRAINING PROGRAM

## 2023-01-10 PROCEDURE — 93018 CV STRESS TEST I&R ONLY: CPT | Performed by: STUDENT IN AN ORGANIZED HEALTH CARE EDUCATION/TRAINING PROGRAM

## 2023-01-10 PROCEDURE — 78452 HT MUSCLE IMAGE SPECT MULT: CPT

## 2023-01-10 PROCEDURE — 0 TECHNETIUM TETROFOSMIN KIT: Performed by: NURSE PRACTITIONER

## 2023-01-10 RX ADMIN — TETROFOSMIN 1 DOSE: 1.38 INJECTION, POWDER, LYOPHILIZED, FOR SOLUTION INTRAVENOUS at 12:07

## 2023-01-10 RX ADMIN — TETROFOSMIN 1 DOSE: 1.38 INJECTION, POWDER, LYOPHILIZED, FOR SOLUTION INTRAVENOUS at 11:22

## 2023-01-23 ENCOUNTER — OFFICE VISIT (OUTPATIENT)
Dept: GASTROENTEROLOGY | Facility: CLINIC | Age: 83
End: 2023-01-23
Payer: MEDICARE

## 2023-01-23 VITALS
SYSTOLIC BLOOD PRESSURE: 150 MMHG | HEART RATE: 62 BPM | HEIGHT: 62 IN | WEIGHT: 127.8 LBS | OXYGEN SATURATION: 98 % | BODY MASS INDEX: 23.52 KG/M2 | DIASTOLIC BLOOD PRESSURE: 73 MMHG | TEMPERATURE: 97.5 F

## 2023-01-23 DIAGNOSIS — Z51.81 THERAPEUTIC DRUG MONITORING: ICD-10-CM

## 2023-01-23 DIAGNOSIS — Z87.19 HISTORY OF SMALL BOWEL OBSTRUCTION: ICD-10-CM

## 2023-01-23 DIAGNOSIS — K21.9 GASTROESOPHAGEAL REFLUX DISEASE, UNSPECIFIED WHETHER ESOPHAGITIS PRESENT: ICD-10-CM

## 2023-01-23 DIAGNOSIS — K50.018 CROHN'S DISEASE OF SMALL INTESTINE WITH OTHER COMPLICATION: Primary | ICD-10-CM

## 2023-01-23 LAB
ALBUMIN SERPL-MCNC: 3.8 G/DL (ref 3.5–5.2)
ALBUMIN/GLOB SERPL: 1.7 G/DL
ALP SERPL-CCNC: 94 U/L (ref 39–117)
ALT SERPL-CCNC: 11 U/L (ref 1–33)
AST SERPL-CCNC: 21 U/L (ref 1–32)
BILIRUB SERPL-MCNC: 0.2 MG/DL (ref 0–1.2)
BUN SERPL-MCNC: 12 MG/DL (ref 8–23)
BUN/CREAT SERPL: 12.1 (ref 7–25)
CALCIUM SERPL-MCNC: 9.4 MG/DL (ref 8.6–10.5)
CHLORIDE SERPL-SCNC: 105 MMOL/L (ref 98–107)
CO2 SERPL-SCNC: 29.7 MMOL/L (ref 22–29)
CREAT SERPL-MCNC: 0.99 MG/DL (ref 0.57–1)
EGFRCR SERPLBLD CKD-EPI 2021: 57.4 ML/MIN/1.73
GLOBULIN SER CALC-MCNC: 2.2 GM/DL
GLUCOSE SERPL-MCNC: 76 MG/DL (ref 65–99)
POTASSIUM SERPL-SCNC: 4.2 MMOL/L (ref 3.5–5.2)
PROT SERPL-MCNC: 6 G/DL (ref 6–8.5)
SODIUM SERPL-SCNC: 140 MMOL/L (ref 136–145)

## 2023-01-23 PROCEDURE — 99214 OFFICE O/P EST MOD 30 MIN: CPT | Performed by: NURSE PRACTITIONER

## 2023-01-23 NOTE — PROGRESS NOTES
Chief Complaint   Patient presents with   • Crohn's Disease       Michela Aguilar is a  82 y.o. female here for a follow up visit for Crohn's disease.    HPI  82-year-old female presents today for follow-up visit for Crohn's disease.  She is a patient of Dr. Ricketts.  She was last seen in the office by him on 11/28/2022.  She has history of Crohn's disease of the terminal ileum diagnosed in 2014.  She is status post bowel resection with Dr. Limon.  She was hospitalized this past November for a partial bowel obstruction.  At that time Dr. Ricketts did increase her Pentasa to 2 tabs 3 times a day from twice daily.  She tells me she does not really feel a difference but she does not feel any worse.  While in the hospital she did have some elevated liver enzymes and underwent a liver ultrasound.  It showed :  IMPRESSION:  Mild biliary ductal dilatation. This may be compensatory to  previous cholecystectomy. Common duct measures 1 cm diameter. Otherwise,  negative exam.       She reports her stools are quite watery.  But this is normal for her when she does not take Imodium.  She admits she has been trying to not take Imodium since her bowel obstruction.  She denies any abdominal pain today.  She also has a history of GERD and admits she does well on Protonix 40 mg daily.  She denies any breakthrough reflux at this time.  Dr. Ricketts did order an MRCP with enterography after last office but the patient tells me she canceled this.  She tells me when the radiology tech told her how long the testing was going to be she told me there was no way she could lie on that table with her Crohn's and not have to run to the bathroom a million times.  So she canceled the test.  She tells me she would like to know if Dr. Ricketts had another option for her.  She does have a history of A. fib and is on Eliquis.  She has recently had quite an extensive cardiac work-up.  Everything so far has come back normal.  Her last colonoscopy and EGD was in  2019.  She denies any dysphagia, reflux, nausea and vomiting, constipation, rectal bleeding or melena.  She admits her appetite is good and her weight is up 3 pounds since last visit.  Denies any significant GI family history at this time.  She is up-to-date with all of her vaccinations and health screenings.  Past Medical History:   Diagnosis Date   • Anemia    • Arthritis    • Colitis    • Crohn's disease (HCC)    • GERD (gastroesophageal reflux disease)    • History of transfusion    • Hyperlipidemia    • Hypertension    • Migraine    • Ocular migraine    • PAF (paroxysmal atrial fibrillation) (HCC)     with rapid ventricular rate   • Small bowel obstruction (HCC) 08/03/2021   • TIA (transient ischemic attack)    • UTI (urinary tract infection) 07/21/2021       Past Surgical History:   Procedure Laterality Date   • APPENDECTOMY     • CHOLECYSTECTOMY     • COLECTOMY PARTIAL / TOTAL     • COLONOSCOPY  08/20/2015    s/p right hemicolectomy, recurrent Crohns, IH   • COLONOSCOPY N/A 8/9/2019    Crohns, IH.     • ENDOSCOPY  08/20/2015    erythematous mucosa in stomach, chronic gastritis,    • ENDOSCOPY N/A 4/28/2019    Erythematous mucosa in stomach, path benign   • SPLENECTOMY  2009       Scheduled Meds:    Continuous Infusions:No current facility-administered medications for this visit.      PRN Meds:.    Allergies   Allergen Reactions   • Amitriptyline Confusion   • Mysoline [Primidone] Confusion       Social History     Socioeconomic History   • Marital status: Single   Tobacco Use   • Smoking status: Never   • Smokeless tobacco: Never   Vaping Use   • Vaping Use: Never used   Substance and Sexual Activity   • Alcohol use: No     Comment: caffiene use coffee daily   • Drug use: No   • Sexual activity: Defer       History reviewed. No pertinent family history.    Review of Systems   Constitutional: Negative for appetite change, chills, diaphoresis, fatigue, fever and unexpected weight change.   HENT: Negative for  nosebleeds, postnasal drip, sore throat, trouble swallowing and voice change.    Respiratory: Negative for cough, choking, chest tightness, shortness of breath, wheezing and stridor.    Cardiovascular: Negative for chest pain, palpitations and leg swelling.   Gastrointestinal: Negative for abdominal distention, abdominal pain, anal bleeding, blood in stool, constipation, diarrhea, nausea, rectal pain and vomiting.   Endocrine: Negative for polydipsia, polyphagia and polyuria.   Musculoskeletal: Negative for gait problem.   Skin: Negative for rash and wound.   Allergic/Immunologic: Negative for food allergies.   Neurological: Negative for dizziness, speech difficulty and light-headedness.   Psychiatric/Behavioral: Negative for confusion, self-injury, sleep disturbance and suicidal ideas.       Vitals:    01/23/23 0907   BP: 150/73   Pulse: 62   Temp: 97.5 °F (36.4 °C)   SpO2: 98%       Physical Exam  Constitutional:       General: She is not in acute distress.     Appearance: She is well-developed. She is not ill-appearing.   HENT:      Head: Normocephalic.   Eyes:      Pupils: Pupils are equal, round, and reactive to light.   Cardiovascular:      Rate and Rhythm: Normal rate and regular rhythm.      Heart sounds: Normal heart sounds.   Pulmonary:      Effort: Pulmonary effort is normal.      Breath sounds: Normal breath sounds.   Abdominal:      General: Bowel sounds are normal. There is no distension.      Palpations: Abdomen is soft. There is no mass.      Tenderness: There is no abdominal tenderness. There is no guarding or rebound.      Hernia: No hernia is present.   Musculoskeletal:         General: Normal range of motion.   Skin:     General: Skin is warm and dry.   Neurological:      Mental Status: She is alert and oriented to person, place, and time.   Psychiatric:         Speech: Speech normal.         Behavior: Behavior normal.         Judgment: Judgment normal.         No radiology results for the last 7  days     Diagnoses and all orders for this visit:    1. Crohn's disease of small intestine with other complication (HCC) (Primary)  Overview:  Added automatically from request for surgery [id number]    Orders:  -     CBC & Differential  -     Comprehensive Metabolic Panel    2. History of small bowel obstruction  -     CBC & Differential  -     Comprehensive Metabolic Panel    3. Gastroesophageal reflux disease, unspecified whether esophagitis present    4. Therapeutic drug monitoring  -     CBC & Differential  -     Comprehensive Metabolic Panel     Reviewed most recent imaging and lab results with her today.  Most recent labs do show normal LFTs.  We will repeat labs today.  I will discuss her case further with Dr. Ricketts and come up with another plan to image her without doing the MRCP with enterography.  This time she seems to be stable on the Pentasa 500 mg 2 tabs 3 times daily.  I advised her she can use Imodium but sparingly given her history of bowel obstruction.  GERD seems well controlled on Protonix 40 mg daily.  Continue GERD precautions.  Patient to call the office with any issues.  Patient to follow-up with Dr. Ricketts in 3 months.  Patient is agreeable to the plan.

## 2023-01-23 NOTE — Clinical Note
Please call the patient and let her know I discussed her case with Dr. Ricketts this morning after our visit.  He would like to try to get her to do a CT of the abdomen and pelvis with enterography if she is agreeable.  I know she did not want to do the MRCP with enterography given her Crohn's.  If she is agreeable I am happy to order it for her.  Thanks

## 2023-01-24 ENCOUNTER — TELEPHONE (OUTPATIENT)
Dept: GASTROENTEROLOGY | Facility: CLINIC | Age: 83
End: 2023-01-24
Payer: MEDICARE

## 2023-01-24 DIAGNOSIS — Z87.19 HISTORY OF CROHN'S DISEASE: Primary | ICD-10-CM

## 2023-01-24 DIAGNOSIS — Z87.19 HISTORY OF SMALL BOWEL OBSTRUCTION: ICD-10-CM

## 2023-01-24 LAB
BASOPHILS # BLD AUTO: 0.07 10*3/MM3 (ref 0–0.2)
BASOPHILS NFR BLD AUTO: 0.8 % (ref 0–1.5)
EOSINOPHIL # BLD AUTO: 0.1 10*3/MM3 (ref 0–0.4)
EOSINOPHIL NFR BLD AUTO: 1.1 % (ref 0.3–6.2)
ERYTHROCYTE [DISTWIDTH] IN BLOOD BY AUTOMATED COUNT: 14.2 % (ref 12.3–15.4)
HCT VFR BLD AUTO: 38.5 % (ref 34–46.6)
HGB BLD-MCNC: 12.9 G/DL (ref 12–15.9)
IMM GRANULOCYTES # BLD AUTO: 0.03 10*3/MM3 (ref 0–0.05)
IMM GRANULOCYTES NFR BLD AUTO: 0.3 % (ref 0–0.5)
LYMPHOCYTES # BLD AUTO: 2.07 10*3/MM3 (ref 0.7–3.1)
LYMPHOCYTES NFR BLD AUTO: 22.3 % (ref 19.6–45.3)
MCH RBC QN AUTO: 29.9 PG (ref 26.6–33)
MCHC RBC AUTO-ENTMCNC: 33.5 G/DL (ref 31.5–35.7)
MCV RBC AUTO: 89.3 FL (ref 79–97)
MONOCYTES # BLD AUTO: 0.93 10*3/MM3 (ref 0.1–0.9)
MONOCYTES NFR BLD AUTO: 10 % (ref 5–12)
NEUTROPHILS # BLD AUTO: 6.08 10*3/MM3 (ref 1.7–7)
NEUTROPHILS NFR BLD AUTO: 65.5 % (ref 42.7–76)
NRBC BLD AUTO-RTO: 0 /100 WBC (ref 0–0.2)
PLATELET # BLD AUTO: 237 10*3/MM3 (ref 140–450)
RBC # BLD AUTO: 4.31 10*6/MM3 (ref 3.77–5.28)
WBC # BLD AUTO: 9.28 10*3/MM3 (ref 3.4–10.8)

## 2023-02-22 RX ORDER — PANTOPRAZOLE SODIUM 40 MG/1
TABLET, DELAYED RELEASE ORAL
Qty: 60 TABLET | Refills: 11 | Status: SHIPPED | OUTPATIENT
Start: 2023-02-22

## 2023-03-03 NOTE — PROGRESS NOTES
RM:________     PCP: Roni Mckenzie MD    : 1940  AGE: 82 y.o.  EST PATIENT   REASON FOR VISIT/  CC:    BP Readings from Last 3 Encounters:   23 150/73   22 130/82   22 167/86        WT: ____________ BP: __________L __________R HR______    CHEST PAIN: _____________    SOA: _____________PALPS: _______________     LIGHTHEADED: ___________FATIGUE: ________________ EDEMA __________    ALLERGIES:Amitriptyline and Mysoline [primidone] SMOKING HISTORY:  Social History     Tobacco Use   • Smoking status: Never   • Smokeless tobacco: Never   Vaping Use   • Vaping Use: Never used   Substance Use Topics   • Alcohol use: No     Comment: caffiene use coffee daily   • Drug use: No     CAFFEINE USE_________________  ALCOHOL ______________________    Below is the patient's most recent value for Albumin, ALT, AST, BUN, Calcium, Chloride, Cholesterol, CO2, Creatinine, GFR, Glucose, HDL, Hematocrit, Hemoglobin, Hemoglobin A1C, LDL, Magnesium, Phosphorus, Platelets, Potassium, PSA, Sodium, Triglycerides, TSH and WBC.   Lab Results   Component Value Date    ALBUMIN 3.8 2023    ALT 11 2023    AST 21 2023    BUN 12 2023    CALCIUM 9.4 2023     2023    CHOL 173 2022    CO2 29.7 (H) 2023    CREATININE 0.99 2023    HDL 61 (H) 2022    HCT 38.5 2023    HGB 12.9 2023    HGBA1C 5.30 2022    LDL 97 2022    MG 2.2 2022     2023    K 4.2 2023     2023    TRIG 81 2022    TSH 1.420 2022    WBC 9.28 2023          NEW DIAGNOSIS/ SURGERY/ HOSP OR ED VISITS: ______________________    __________________________________________________________________      RECENT LABS OR DIAGNOSTIC TESTING:  _____________________________    __________________________________________________________________      ASSESSMENT/ PLAN:  _______________________________________________    __________________________________________________________________

## 2023-03-09 ENCOUNTER — HOSPITAL ENCOUNTER (OUTPATIENT)
Dept: CT IMAGING | Facility: HOSPITAL | Age: 83
Discharge: HOME | End: 2023-03-09
Admitting: NURSE PRACTITIONER
Payer: MEDICARE

## 2023-03-09 DIAGNOSIS — Z87.19 HISTORY OF CROHN'S DISEASE: ICD-10-CM

## 2023-03-09 DIAGNOSIS — Z87.19 HISTORY OF SMALL BOWEL OBSTRUCTION: ICD-10-CM

## 2023-03-09 LAB — CREAT BLDA-MCNC: 1 MG/DL (ref 0.6–1.3)

## 2023-03-09 PROCEDURE — 82565 ASSAY OF CREATININE: CPT

## 2023-03-09 PROCEDURE — 74177 CT ABD & PELVIS W/CONTRAST: CPT

## 2023-03-09 PROCEDURE — 25510000001 IOPAMIDOL 61 % SOLUTION: Performed by: NURSE PRACTITIONER

## 2023-03-09 RX ADMIN — IOPAMIDOL 85 ML: 612 INJECTION, SOLUTION INTRAVENOUS at 13:21

## 2023-03-13 ENCOUNTER — OFFICE VISIT (OUTPATIENT)
Dept: CARDIOLOGY | Facility: CLINIC | Age: 83
End: 2023-03-13
Payer: MEDICARE

## 2023-03-13 VITALS
DIASTOLIC BLOOD PRESSURE: 70 MMHG | WEIGHT: 128.8 LBS | HEIGHT: 62 IN | BODY MASS INDEX: 23.7 KG/M2 | HEART RATE: 68 BPM | SYSTOLIC BLOOD PRESSURE: 140 MMHG

## 2023-03-13 DIAGNOSIS — I10 PRIMARY HYPERTENSION: ICD-10-CM

## 2023-03-13 DIAGNOSIS — I48.0 PAF (PAROXYSMAL ATRIAL FIBRILLATION): Primary | ICD-10-CM

## 2023-03-13 PROCEDURE — 3077F SYST BP >= 140 MM HG: CPT | Performed by: INTERNAL MEDICINE

## 2023-03-13 PROCEDURE — 93000 ELECTROCARDIOGRAM COMPLETE: CPT | Performed by: INTERNAL MEDICINE

## 2023-03-13 PROCEDURE — 1160F RVW MEDS BY RX/DR IN RCRD: CPT | Performed by: INTERNAL MEDICINE

## 2023-03-13 PROCEDURE — 3078F DIAST BP <80 MM HG: CPT | Performed by: INTERNAL MEDICINE

## 2023-03-13 PROCEDURE — 1159F MED LIST DOCD IN RCRD: CPT | Performed by: INTERNAL MEDICINE

## 2023-03-13 PROCEDURE — 99214 OFFICE O/P EST MOD 30 MIN: CPT | Performed by: INTERNAL MEDICINE

## 2023-03-13 RX ORDER — POTASSIUM CHLORIDE 750 MG/1
1 CAPSULE, EXTENDED RELEASE ORAL DAILY
COMMUNITY
Start: 2023-02-14

## 2023-03-13 NOTE — PROGRESS NOTES
Date of Office Visit: 23  Encounter Provider: Juan Damon MD  Place of Service: [facility]  Patient Name: Michela Aguilar  :1940    Chief Complaint   Patient presents with   • Atrial Fibrillation   :   HPI:     Ms. Aguilar is 82 y.o. and presents today to follow up.  I have reviewed prior notes and there are no changes except for any new updates described below. I have also reviewed any information entered into the medical record by the patient or by ancillary staff.     In 2021, she presented to [facility] ED with a small bowel obstruction; she was incidentally noted to be in atrial fibrillation, which was asymptomatic. An echo was unremarkable. She was started on apixaban, diltiazem, and atenolol. She had been on metoprolol before surgery. However, diltiazem caused her pulse to be too low, and atenolol gave her vivid dreams, so we went back to metoprolol.    She was hospitalized in 2022 with another PSBO/Crohn's flare; she had a brief episode of PAF in that setting. A follow up monitor was normal.    In 2023, she reported atypical chest pain; a perfusion stress test was normal.     She's doing well; she has no palpitations, chest pain, dyspnea, or bleeding.     Past Medical History:   Diagnosis Date   • Anemia    • Arthritis    • Colitis    • Crohn's disease (HCC)    • GERD (gastroesophageal reflux disease)    • History of transfusion    • Hyperlipidemia    • Hypertension    • Migraine    • Ocular migraine    • PAF (paroxysmal atrial fibrillation) (HCC)     with rapid ventricular rate   • Small bowel obstruction (HCC) 2021   • TIA (transient ischemic attack)    • UTI (urinary tract infection) 2021       Past Surgical History:   Procedure Laterality Date   • APPENDECTOMY     • CHOLECYSTECTOMY     • COLECTOMY PARTIAL / TOTAL     • COLONOSCOPY  2015    s/p right hemicolectomy, recurrent Crohns, IH   • COLONOSCOPY N/A  8/9/2019    Crohns, IH.     • ENDOSCOPY  08/20/2015    erythematous mucosa in stomach, chronic gastritis,    • ENDOSCOPY N/A 4/28/2019    Erythematous mucosa in stomach, path benign   • SPLENECTOMY  2009       Social History     Socioeconomic History   • Marital status: Single   Tobacco Use   • Smoking status: Never   • Smokeless tobacco: Never   Vaping Use   • Vaping Use: Never used   Substance and Sexual Activity   • Alcohol use: No     Comment: caffiene use coffee daily   • Drug use: No   • Sexual activity: Defer       History reviewed. No pertinent family history.    Review of Systems   All other systems reviewed and are negative.      Allergies   Allergen Reactions   • Amitriptyline Confusion   • Mysoline [Primidone] Confusion         Current Outpatient Medications:   •  Cyanocobalamin (VITAMIN B 12 PO), Take  by mouth Daily., Disp: , Rfl:   •  diazepam (VALIUM) 5 MG tablet, Take 2.5 mg by mouth Daily As Needed., Disp: , Rfl:   •  Eliquis 5 MG tablet tablet, TAKE 1 TABLET TWICE A DAY FOR ATRIAL FIBRILLATION, Disp: 180 tablet, Rfl: 3  •  ezetimibe (ZETIA) 10 MG tablet, Take 1 tablet by mouth Daily., Disp: , Rfl:   •  loperamide (IMODIUM) 2 MG capsule, Take 1 capsule by mouth 4 (Four) Times a Day As Needed for Diarrhea. prn, Disp: , Rfl:   •  mesalamine (PENTASA) 500 MG CR capsule, Take 2 pills by mouth three times a day, Disp: 540 capsule, Rfl: 1  •  metoprolol tartrate (LOPRESSOR) 25 MG tablet, Take 1 tablet by mouth 2 (Two) Times a Day., Disp: 180 tablet, Rfl: 3  •  Multiple Vitamins-Minerals (MULTI COMPLETE PO), Take  by mouth Daily., Disp: , Rfl:   •  pantoprazole (PROTONIX) 40 MG EC tablet, TAKE 1 TABLET BY MOUTH TWICE DAILY BEFORE MEALS, Disp: 60 tablet, Rfl: 11  •  potassium chloride (MICRO-K) 10 MEQ CR capsule, Take 1 capsule by mouth Daily., Disp: , Rfl:       Objective:     Vitals:    03/13/23 1350   BP: 158/80   BP Location: Right arm   Pulse: 68   Weight: 58.4 kg (128 lb 12.8 oz)   Height: 157.5 cm  "(62.01\")     Body mass index is 23.55 kg/m².    Vitals reviewed.   Constitutional:       Appearance: Healthy appearance. Well-developed and not in distress.   Eyes:      Conjunctiva/sclera: Conjunctivae normal.      Pupils: Pupils are equal, round, and reactive to light.   HENT:      Head: Normocephalic.      Nose: Nose normal.         Comments: Masked  Neck:      Vascular: No JVD.      Lymphadenopathy: No cervical adenopathy.   Pulmonary:      Effort: Pulmonary effort is normal.      Breath sounds: Normal breath sounds.   Cardiovascular:      Normal rate. Regular rhythm.      Murmurs: There is no murmur.   Pulses:     Intact distal pulses.   Edema:     Peripheral edema absent.   Abdominal:      Palpations: Abdomen is soft.      Tenderness: There is no abdominal tenderness.   Musculoskeletal: Normal range of motion.      Cervical back: Normal range of motion. Skin:     General: Skin is warm and dry.      Findings: No rash.   Neurological:      General: No focal deficit present.      Mental Status: Alert, oriented to person, place, and time and oriented to person, place and time.      Cranial Nerves: No cranial nerve deficit.   Psychiatric:         Behavior: Behavior normal.         Thought Content: Thought content normal.         Judgment: Judgment normal.           ECG 12 Lead    Date/Time: 3/13/2023 2:18 PM  Performed by: Juan Damon MD  Authorized by: Juan Damon MD   Comparison: compared with previous ECG   Similar to previous ECG  Rhythm: sinus rhythm  Conduction: conduction normal  ST Segments: ST segments normal  T Waves: T waves normal  QRS axis: normal  Other: no other findings    Clinical impression: normal ECG              Assessment:       Diagnosis Plan   1. PAF (paroxysmal atrial fibrillation) ([facility])        2. Primary hypertension               Plan:     She has a history of atrial fibrillation in the setting of small bowel obstructions and Crohn's flares.  She is doing well on metoprolol and " apixaban.  No changes have been made.    I rechecked her blood pressure, and it was within goal for her age and relatively low body weight.      Sincerely,       Juan Damon MD

## 2023-03-13 NOTE — PROGRESS NOTES
03/13/23       I tried to call her with the results of this CT of the abdomen and pelvis with small bowel enterography but there was no answer and her answering machine has not been set up yet.  Please call her and tell her that the CT scan showed stable acute inflammatory changes in the end of the small bowel and in the rectosigmoid colon.       Find out how she is feeling.  Is she having abdominal pain?  Is she having diarrhea?  Rectal bleeding?       Send a copy of this report to her PCP.  Dai law

## 2023-03-14 ENCOUNTER — TELEPHONE (OUTPATIENT)
Dept: GASTROENTEROLOGY | Facility: CLINIC | Age: 83
End: 2023-03-14
Payer: MEDICARE

## 2023-03-14 DIAGNOSIS — Z87.19 HISTORY OF CROHN'S DISEASE: Primary | ICD-10-CM

## 2023-03-14 NOTE — TELEPHONE ENCOUNTER
"Patient notified of results and recommendations and verbalized understanding  Result note routed to PCP via epic    Pt stated she is feeling ok.  She is not having pain or bleeding.  She said that she will have diarrhea 3 times in the morning and that is usually it for the day.    She had some questions concerning the CT;  1.  \"Mild mucosal hyperenhancement and wall thickening are seen in the rectosigmoid, increased from the prior exam\". -she would like to know what that means? She stated she has been taking the pentasa 500mg 2 tabs TID as ordered.    2. There is mild right-sided hydronephrosis.-is this anything she needs to worry ab?    Lastly she wanted you to know that the contrast she drank made her sick for a few days after the exam.    Thanks            "

## 2023-03-14 NOTE — TELEPHONE ENCOUNTER
----- Message from Marcel Ricketts MD sent at 3/13/2023  5:14 PM EDT -----  03/13/23       I tried to call her with the results of this CT of the abdomen and pelvis with small bowel enterography but there was no answer and her answering machine has not been set up yet.  Please call her and tell her that the CT scan showed stable acute inflammatory changes in the end of the small bowel and in the rectosigmoid colon.       Find out how she is feeling.  Is she having abdominal pain?  Is she having diarrhea?  Rectal bleeding?       Send a copy of this report to her PCP.  Laura. kjh

## 2023-03-15 NOTE — TELEPHONE ENCOUNTER
Marcel Ricketts MD  to Alicia Maynard RN • Pricilla Alvarenga RN • Me        4:30 PM   3/14/23 -I called her and discussed the CT of the abdomen and pelvis with small bowel enterography.  I recommend that she go see a urologist because of the right-sided hydronephrosis.        She says that she feels okay.  She has no abdominal pain.  No rectal bleeding or melena.  She is on Pentasa 2 pills 3 times daily and feels that that has helped the bloating.  She does still have nonbloody diarrhea, about 3 bowel movements a day.  She has no nausea or vomiting and no fever or chills.  I told her that I would get stool studies and make sure that they do not show anything.  Also told her that we could put her on something stronger, like prednisone, to see if that makes her have less diarrhea and feel better.  If so then there could be an argument made to potentially try her on something like Humira but I told her that I was cautious about starting an 81+-year-old woman on medicines like Humira.        LG/SA/MT - Please get the following stool studies done on her:   - stool for clostridium dificile, stool culture, O and P, giardia antigen, fecal calprotectin.        Thx. kjh     **Call to pt.  Advise stool kit at .      Lab orders placed.  Message to Dr Ricketts.  Brittny Cook RN.

## 2023-03-17 LAB
C DIFF TOX A+B STL QL IA: NEGATIVE
G LAMBLIA AG STL QL IA: NEGATIVE

## 2023-03-20 ENCOUNTER — TELEPHONE (OUTPATIENT)
Dept: GASTROENTEROLOGY | Facility: CLINIC | Age: 83
End: 2023-03-20
Payer: MEDICARE

## 2023-03-20 LAB
BACTERIA SPEC CULT: NORMAL
BACTERIA SPEC CULT: NORMAL
CALPROTECTIN STL-MCNT: 42 UG/G (ref 0–120)
CAMPYLOBACTER STL CULT: NORMAL
E COLI SXT STL QL IA: NEGATIVE
O+P SPEC MICRO: NORMAL
O+P STL CONC: NORMAL
SALM + SHIG STL CULT: NORMAL

## 2023-03-20 NOTE — TELEPHONE ENCOUNTER
Please see if [facility] is doing a fecal calprotectin on stool that has been handed in recently? Laura.[email or web address]

## 2023-03-21 NOTE — PROGRESS NOTES
03/20/23       Tell her that her stool studies came back normal. Have her come see me in the office on Monday, 3/27/23 at 11:30- noon shira Lalakjh  Dai kjerwin

## 2023-03-24 ENCOUNTER — TELEPHONE (OUTPATIENT)
Dept: GASTROENTEROLOGY | Facility: CLINIC | Age: 83
End: 2023-03-24
Payer: MEDICARE

## 2023-03-24 NOTE — TELEPHONE ENCOUNTER
Called pt and advised of Dr Ricketts's note. Verb understanding.     Pt has her first appt urologist appt on 03/28 and would like to see Dr Ricketts after this. Pt does have appt with Dr Ricketts scheduled for 04/26.  Update sent to Dr Ricketts.

## 2023-03-24 NOTE — TELEPHONE ENCOUNTER
----- Message from Marcel Ricketts MD sent at 3/20/2023  9:17 PM EDT -----  03/20/23       Tell her that her stool studies came back normal. Have her come see me in the office on Monday, 3/27/23 at 11:30- noon anne marie. Thx.kjh  Thx. kjh

## 2023-04-26 ENCOUNTER — OFFICE VISIT (OUTPATIENT)
Dept: GASTROENTEROLOGY | Facility: CLINIC | Age: 83
End: 2023-04-26
Payer: MEDICARE

## 2023-04-26 VITALS
TEMPERATURE: 97.1 F | SYSTOLIC BLOOD PRESSURE: 149 MMHG | HEART RATE: 70 BPM | WEIGHT: 128.6 LBS | OXYGEN SATURATION: 98 % | BODY MASS INDEX: 23.66 KG/M2 | DIASTOLIC BLOOD PRESSURE: 72 MMHG | HEIGHT: 62 IN

## 2023-04-26 DIAGNOSIS — Z87.19 HISTORY OF CROHN'S DISEASE: Primary | ICD-10-CM

## 2023-04-26 DIAGNOSIS — K21.9 GASTROESOPHAGEAL REFLUX DISEASE, UNSPECIFIED WHETHER ESOPHAGITIS PRESENT: ICD-10-CM

## 2023-04-26 NOTE — PROGRESS NOTES
Chief Complaint   Patient presents with   • Crohn's Disease       History of Present Illness:   82 y.o. female with terminal ileal crohn's disease dating back to 2014.  She had previous bowel resection by Dr. Bella Limon at that time involving the terminal ileum.   I last saw her in 11/2022:  Assessment:  1. On Eliquis for a fib.  2. terminal ileal crohn's disease dating back to 2014.  She had previous bowel resection by Dr. Bella Limon at that time involving the terminal ileum.   2. H/o recent SBO.  3. Anemia  4. H/o Vit B12 def  5.      Recommendations:  1. Labs: CBC, B12, CMP,   2. MRI for MRCP and enterography: r/o bile duct stone and r/o crohn's of small bowel.   3. Wean prednisone  4. Trial of Pentasa 3 BID.  5. F/u 8 weeks. I want her to think about Remicaide, Humira, etc.         In 3 of 2023 she had a CT of the abdomen and pelvis with small bowel enterography that showed:  IMPRESSION:  1. Stable acute inflammatory change in the terminal ileum  2. Increased inflammatory change in the rectosigmoid  3. Additional incidental findings as above.     This report was finalized on 3/9/2023 2:19 PM by Dr. Justin Ortiz M.D.       She feels fine. She had a CT abd at [facility].She is to have a cystoscopy on 5/9/23 at [facility]. She had mild hydronephrosis on previous CT abd/pelvis. Dr. Bonilla was not concerned.        No abdominal pain.  She is on Pentasa 3/day. No bloating. No nausea or vomiitn. +diarrhea: ave 2-3 BM/day. If she takes imodium she will have 1 BM/day. No rectal bleeding or melena.        She last had a colonoscopy in 8/19 and last had an EGD in 4/19.     Past Medical History:   Diagnosis Date   • Anemia    • Arthritis    • Colitis    • Crohn's disease    • GERD (gastroesophageal reflux disease)    • History of transfusion    • Hyperlipidemia    • Hypertension    • Migraine    • Ocular migraine    • PAF (paroxysmal atrial fibrillation)     with rapid ventricular rate   • Small bowel  obstruction 08/03/2021   • TIA (transient ischemic attack)    • UTI (urinary tract infection) 07/21/2021       Past Surgical History:   Procedure Laterality Date   • APPENDECTOMY     • CHOLECYSTECTOMY     • COLECTOMY PARTIAL / TOTAL     • COLONOSCOPY  08/20/2015    s/p right hemicolectomy, recurrent Crohns, IH   • COLONOSCOPY N/A 8/9/2019    Crohns, IH.     • ENDOSCOPY  08/20/2015    erythematous mucosa in stomach, chronic gastritis,    • ENDOSCOPY N/A 4/28/2019    Erythematous mucosa in stomach, path benign   • SPLENECTOMY  2009         Current Outpatient Medications:   •  Cyanocobalamin (VITAMIN B 12 PO), Take  by mouth Daily., Disp: , Rfl:   •  diazepam (VALIUM) 5 MG tablet, Take 2.5 mg by mouth Daily As Needed., Disp: , Rfl:   •  Eliquis 5 MG tablet tablet, TAKE 1 TABLET TWICE A DAY FOR ATRIAL FIBRILLATION, Disp: 180 tablet, Rfl: 3  •  ezetimibe (ZETIA) 10 MG tablet, Take 1 tablet by mouth Daily., Disp: , Rfl:   •  loperamide (IMODIUM) 2 MG capsule, Take 1 capsule by mouth 4 (Four) Times a Day As Needed for Diarrhea. prn, Disp: , Rfl:   •  mesalamine (PENTASA) 500 MG CR capsule, Take 2 pills by mouth three times a day (Patient taking differently: 6 capsules. Take 2 pills by mouth three times a day), Disp: 540 capsule, Rfl: 1  •  metoprolol tartrate (LOPRESSOR) 25 MG tablet, Take 1 tablet by mouth 2 (Two) Times a Day., Disp: 180 tablet, Rfl: 3  •  Multiple Vitamins-Minerals (MULTI COMPLETE PO), Take  by mouth Daily., Disp: , Rfl:   •  pantoprazole (PROTONIX) 40 MG EC tablet, TAKE 1 TABLET BY MOUTH TWICE DAILY BEFORE MEALS, Disp: 60 tablet, Rfl: 11  •  potassium chloride (MICRO-K) 10 MEQ CR capsule, Take 1 capsule by mouth Daily., Disp: , Rfl:     Allergies   Allergen Reactions   • Amitriptyline Confusion   • Mysoline [Primidone] Confusion       History reviewed. No pertinent family history.    Social History     Socioeconomic History   • Marital status: Single   Tobacco Use   • Smoking status: Never   • Smokeless  tobacco: Never   Vaping Use   • Vaping Use: Never used   Substance and Sexual Activity   • Alcohol use: No     Comment: caffiene use coffee daily   • Drug use: No   • Sexual activity: Defer       Review of Systems   Gastrointestinal: Positive for abdominal distention, abdominal pain and nausea.   All other systems reviewed and are negative.    Pertinent positives and negatives documented in the HPI and all other systems reviewed and were found to be negative.  Vitals:    04/26/23 1310   BP: 149/72   Pulse: 70   Temp: 97.1 °F (36.2 °C)   SpO2: 98%       Physical Exam  Vitals reviewed.   Constitutional:       General: She is not in acute distress.     Appearance: Normal appearance. She is well-developed. She is not diaphoretic.   HENT:      Head: Normocephalic and atraumatic. Hair is normal.      Right Ear: Hearing, tympanic membrane, ear canal and external ear normal. No decreased hearing noted. No drainage.      Left Ear: Hearing, tympanic membrane, ear canal and external ear normal. No decreased hearing noted.      Nose: Nose normal. No nasal deformity.      Mouth/Throat:      Mouth: Mucous membranes are moist.   Eyes:      General: Lids are normal.         Right eye: No discharge.         Left eye: No discharge.      Extraocular Movements: Extraocular movements intact.      Conjunctiva/sclera: Conjunctivae normal.      Pupils: Pupils are equal, round, and reactive to light.   Neck:      Thyroid: No thyromegaly.      Vascular: No JVD.      Trachea: No tracheal deviation.   Cardiovascular:      Rate and Rhythm: Normal rate and regular rhythm.      Pulses: Normal pulses.      Heart sounds: Normal heart sounds. No murmur heard.    No friction rub. No gallop.   Pulmonary:      Effort: Pulmonary effort is normal. No respiratory distress.      Breath sounds: Normal breath sounds. No wheezing or rales.   Chest:      Chest wall: No tenderness.   Abdominal:      General: Bowel sounds are normal. There is no distension.       Palpations: Abdomen is soft. There is no mass.      Tenderness: There is no abdominal tenderness. There is no guarding or rebound.      Hernia: No hernia is present.   Musculoskeletal:         General: No tenderness or deformity. Normal range of motion.      Cervical back: Normal range of motion and neck supple.   Lymphadenopathy:      Cervical: No cervical adenopathy.   Skin:     General: Skin is warm and dry.      Findings: No erythema or rash.   Neurological:      Mental Status: She is alert and oriented to person, place, and time.      Cranial Nerves: No cranial nerve deficit.      Motor: No abnormal muscle tone.      Coordination: Coordination normal.      Deep Tendon Reflexes: Reflexes are normal and symmetric. Reflexes normal.   Psychiatric:         Mood and Affect: Mood normal.         Behavior: Behavior normal.         Thought Content: Thought content normal.         Judgment: Judgment normal.         Diagnoses and all orders for this visit:    1. History of Crohn's disease (Primary)    2. Gastroesophageal reflux disease, unspecified whether esophagitis present      Assessment:  1. On Eliquis for a fib.  2. terminal ileal crohn's disease dating back to 2014.  She had previous bowel resection by Dr. Bella Limon at that time involving the terminal ileum.   2. H/o recent SBO.  3. Anemia  4. H/o Vit B12 def  5.       Recommendations:  1. Continue the Pentasa 3/day and the lomotil  2. F/u 6 mos.   3. Get records from [facility].    Return in about 6 months (around 10/26/2023).    Marcel Ricketts MD  4/26/2023

## 2023-04-27 ENCOUNTER — TELEPHONE (OUTPATIENT)
Dept: GASTROENTEROLOGY | Facility: CLINIC | Age: 83
End: 2023-04-27
Payer: MEDICARE

## 2023-04-27 NOTE — TELEPHONE ENCOUNTER
----- Message from Marcel Ricketts MD sent at 4/26/2023  1:32 PM EDT -----  Get most recent office note and report of CT abd done at [facility] (Dr. Bonilla). Thx.kjh

## 2023-04-27 NOTE — TELEPHONE ENCOUNTER
Call to [facility] @ [phone] and spoke with Ermelinda.  ELOINA per DR Ricketts instructions - fax to [phone].

## 2023-05-01 NOTE — TELEPHONE ENCOUNTER
Called and passed on Dr Damon's ok to hold her eliquis for 48 hours prior to scope.  Pt verbalized understanding.    Dr Ricketts, pt is scheduled to see you on 5/31, she is asking if she should be seen sooner?

## 2023-05-01 NOTE — TELEPHONE ENCOUNTER
"April 29, 2023  Marcel Ricketts MD  to Me • Pricilla Alvarenga RN • Alicia Maynard RN        9:39 PM  Tell her that I reviewed the report of the CT abd/pelvis that was done 4/5/23 at [facility]. It shows worsening colitis. I am thinking that we should probably do a colonoscopy to look at the colon to make sure that the \"colitis\" is not anything more ominous than colitis. Please schedule her to see me in the office and we can discuss. To do a colonoscopy she would need to be off the Eliquis for 2 days prior to the colonoscopy and her doctor (who manages the Eliquis) would have to give their \"OK\" to stop the Eliquis for 2 days prior to the colonoscopy.     **Call to pt.  Advise of above.  Verb understanding.     F/u appt scheduled with DR Ricketts for 5/31 @ 2:30 pm - message to Dr Ricketts.     Message to DR Juan Damon checking if pt may hold Eliquis for 2 days prior to colonoscopy to be scheduled.  Brittny Cook RN.   "

## 2023-05-23 DIAGNOSIS — K50.00 CROHN'S DISEASE OF SMALL INTESTINE WITHOUT COMPLICATION: ICD-10-CM

## 2023-05-24 RX ORDER — MESALAMINE 500 MG/1
CAPSULE, EXTENDED RELEASE ORAL
Qty: 540 CAPSULE | Refills: 3 | Status: SHIPPED | OUTPATIENT
Start: 2023-05-24

## 2023-05-31 ENCOUNTER — OFFICE VISIT (OUTPATIENT)
Dept: GASTROENTEROLOGY | Facility: CLINIC | Age: 83
End: 2023-05-31

## 2023-05-31 VITALS
SYSTOLIC BLOOD PRESSURE: 160 MMHG | WEIGHT: 129 LBS | BODY MASS INDEX: 23.74 KG/M2 | TEMPERATURE: 97.7 F | HEART RATE: 64 BPM | HEIGHT: 62 IN | DIASTOLIC BLOOD PRESSURE: 88 MMHG | OXYGEN SATURATION: 98 %

## 2023-05-31 DIAGNOSIS — K50.00 CROHN'S DISEASE OF SMALL INTESTINE WITHOUT COMPLICATION: Primary | ICD-10-CM

## 2023-05-31 DIAGNOSIS — R93.5 ABNORMAL CT OF THE ABDOMEN: ICD-10-CM

## 2023-05-31 DIAGNOSIS — K21.9 GASTROESOPHAGEAL REFLUX DISEASE, UNSPECIFIED WHETHER ESOPHAGITIS PRESENT: ICD-10-CM

## 2023-05-31 PROCEDURE — 3077F SYST BP >= 140 MM HG: CPT | Performed by: INTERNAL MEDICINE

## 2023-05-31 PROCEDURE — 99213 OFFICE O/P EST LOW 20 MIN: CPT | Performed by: INTERNAL MEDICINE

## 2023-05-31 PROCEDURE — 3079F DIAST BP 80-89 MM HG: CPT | Performed by: INTERNAL MEDICINE

## 2023-05-31 PROCEDURE — 1159F MED LIST DOCD IN RCRD: CPT | Performed by: INTERNAL MEDICINE

## 2023-05-31 PROCEDURE — 1160F RVW MEDS BY RX/DR IN RCRD: CPT | Performed by: INTERNAL MEDICINE

## 2023-05-31 NOTE — PROGRESS NOTES
Chief Complaint   Patient presents with   • Crohn's Disease       History of Present Illness:   82 y.o. female with terminal ileal crohn's disease dating back to 2014.  She had previous bowel resection by Dr. Bella Limon at that time involving the terminal ileum.   I last saw her in 4/26/23:  Assessment:  1. On Eliquis for a fib.  2. terminal ileal crohn's disease dating back to 2014.  She had previous bowel resection by Dr. Bella Limon at that time involving the terminal ileum.   2. H/o recent SBO.  3. Anemia  4. H/o Vit B12 def    Recommendations:  1. Continue the Pentasa 3/day and the lomotil  2. F/u 6 mos.   3. Get records from [facility].       On 4/5/23 she had a CT abd/pelvis at [facility] that showed colitis that extended more proximally from the proximal transverse colon to the rectum.        She last had a colonoscopy in 8/19 and last had an EGD in 4/19.        She has been off of prednisone since early 12/22.        Urology did a cystoscopy and was OK.        She feels OK. NO abdominal pain or chest pain. Diarrhea with +urgency. On Pentasa 2 TID. She has 2-3 BM/day. No rectal bleeding or melena.     Past Medical History:   Diagnosis Date   • Anemia    • Arthritis    • Colitis    • Crohn's disease    • GERD (gastroesophageal reflux disease)    • History of transfusion    • Hyperlipidemia    • Hypertension    • Migraine    • Ocular migraine    • PAF (paroxysmal atrial fibrillation)     with rapid ventricular rate   • Small bowel obstruction 08/03/2021   • TIA (transient ischemic attack)    • UTI (urinary tract infection) 07/21/2021       Past Surgical History:   Procedure Laterality Date   • APPENDECTOMY     • CHOLECYSTECTOMY     • COLECTOMY PARTIAL / TOTAL     • COLONOSCOPY  08/20/2015    s/p right hemicolectomy, recurrent Crohns, IH   • COLONOSCOPY N/A 8/9/2019    Crohns, IH.     • ENDOSCOPY  08/20/2015    erythematous mucosa in stomach, chronic gastritis,    • ENDOSCOPY N/A 4/28/2019     Erythematous mucosa in stomach, path benign   • SPLENECTOMY  2009         Current Outpatient Medications:   •  Cyanocobalamin (VITAMIN B 12 PO), Take  by mouth Daily., Disp: , Rfl:   •  diazepam (VALIUM) 5 MG tablet, Take 2.5 mg by mouth Daily As Needed., Disp: , Rfl:   •  Eliquis 5 MG tablet tablet, TAKE 1 TABLET TWICE A DAY FOR ATRIAL FIBRILLATION, Disp: 180 tablet, Rfl: 3  •  ezetimibe (ZETIA) 10 MG tablet, Take 1 tablet by mouth Daily., Disp: , Rfl:   •  loperamide (IMODIUM) 2 MG capsule, Take 1 capsule by mouth 4 (Four) Times a Day As Needed for Diarrhea. prn, Disp: , Rfl:   •  mesalamine (PENTASA) 500 MG CR capsule, TAKE 2 CAPSULES THREE TIMES A DAY, Disp: 540 capsule, Rfl: 3  •  metoprolol tartrate (LOPRESSOR) 25 MG tablet, Take 1 tablet by mouth 2 (Two) Times a Day., Disp: 180 tablet, Rfl: 3  •  Multiple Vitamins-Minerals (MULTI COMPLETE PO), Take  by mouth Daily., Disp: , Rfl:   •  pantoprazole (PROTONIX) 40 MG EC tablet, TAKE 1 TABLET BY MOUTH TWICE DAILY BEFORE MEALS, Disp: 60 tablet, Rfl: 11  •  potassium chloride (MICRO-K) 10 MEQ CR capsule, Take 1 capsule by mouth Daily., Disp: , Rfl:     Allergies   Allergen Reactions   • Amitriptyline Confusion   • Mysoline [Primidone] Confusion       History reviewed. No pertinent family history.    Social History     Socioeconomic History   • Marital status: Single   Tobacco Use   • Smoking status: Never   • Smokeless tobacco: Never   Vaping Use   • Vaping Use: Never used   Substance and Sexual Activity   • Alcohol use: No     Comment: caffiene use coffee daily   • Drug use: No   • Sexual activity: Defer       Review of Systems   Gastrointestinal: Positive for diarrhea. Negative for abdominal pain.   All other systems reviewed and are negative.    Pertinent positives and negatives documented in the HPI and all other systems reviewed and were found to be negative.  Vitals:    05/31/23 1453   BP: 160/88   Pulse: 64   Temp: 97.7 °F (36.5 °C)   SpO2: 98%       Physical  Exam  Vitals reviewed.   Constitutional:       General: She is not in acute distress.     Appearance: Normal appearance. She is well-developed. She is not diaphoretic.   HENT:      Head: Normocephalic and atraumatic. Hair is normal.      Right Ear: Hearing, tympanic membrane, ear canal and external ear normal. No decreased hearing noted. No drainage.      Left Ear: Hearing, tympanic membrane, ear canal and external ear normal. No decreased hearing noted.      Nose: Nose normal. No nasal deformity.      Mouth/Throat:      Mouth: Mucous membranes are moist.   Eyes:      General: Lids are normal.         Right eye: No discharge.         Left eye: No discharge.      Extraocular Movements: Extraocular movements intact.      Conjunctiva/sclera: Conjunctivae normal.      Pupils: Pupils are equal, round, and reactive to light.   Neck:      Thyroid: No thyromegaly.      Vascular: No JVD.      Trachea: No tracheal deviation.   Cardiovascular:      Rate and Rhythm: Normal rate and regular rhythm.      Pulses: Normal pulses.      Heart sounds: Normal heart sounds. No murmur heard.    No friction rub. No gallop.   Pulmonary:      Effort: Pulmonary effort is normal. No respiratory distress.      Breath sounds: Normal breath sounds. No wheezing or rales.   Chest:      Chest wall: No tenderness.   Abdominal:      General: Bowel sounds are normal. There is no distension.      Palpations: Abdomen is soft. There is no mass.      Tenderness: There is no abdominal tenderness. There is no guarding or rebound.      Hernia: No hernia is present.   Musculoskeletal:         General: No tenderness or deformity. Normal range of motion.      Cervical back: Normal range of motion and neck supple.   Lymphadenopathy:      Cervical: No cervical adenopathy.   Skin:     General: Skin is warm and dry.      Findings: No erythema or rash.   Neurological:      Mental Status: She is alert and oriented to person, place, and time.      Cranial Nerves: No  cranial nerve deficit.      Motor: No abnormal muscle tone.      Coordination: Coordination normal.      Deep Tendon Reflexes: Reflexes are normal and symmetric. Reflexes normal.   Psychiatric:         Mood and Affect: Mood normal.         Behavior: Behavior normal.         Thought Content: Thought content normal.         Judgment: Judgment normal.         Diagnoses and all orders for this visit:    1. Crohn's disease of small intestine without complication (Primary)    2. Gastroesophageal reflux disease, unspecified whether esophagitis present    3. Abnormal CT of the abdomen      Assessment:  1. On Eliquis for a fib.  2. terminal ileal crohn's disease dating back to 2014.  She had previous bowel resection by Dr. Bella Limon at that time involving the terminal ileum.   2. H/o periodic SBO.  3. Anemia  4. H/o Vit B12 def  5.  GERD  6. Abnormal CT abd with inflammation in the left colon that starts more proximally in the transverse colon than seen on previous scans.   7.     Recommendations:  1. Try weaning the Pantoprazole  2. I think that we should do a colonoscopy. She doesn't want to do this.  3. Continue Pentasa  4. F/u 5 mos.     No follow-ups on file.    Marcel Ricketts MD  5/31/2023

## 2023-09-09 NOTE — PROGRESS NOTES
Chief Complaint   Patient presents with   • Follow-up   • Abnormal Lab   • Crohn's Disease       History of Present Illness:   79 y.o. female with a history of Crohn's disease of the terminal ileum and colon.  In 2014 Dr. Bella Limon performed resection of the terminal ileum and cecum.  She is on mesalamine 3/day and feels OK. No bloating except sometime. She is not sure that the mesalamine helps and it is expensive. NO abdominal or chest pain. No nausea or vomiting. No fevers, chills. NO diarrhea or constioaption. 1-2 BM/day. No recctal bleeding or melena. Weight stable.     Past Medical History:   Diagnosis Date   • Anemia    • Arthritis    • Crohn disease (CMS/HCC)    • GERD (gastroesophageal reflux disease)    • History of transfusion    • Hyperlipidemia    • Hypertension    • Ocular migraine    • TIA (transient ischemic attack)        Past Surgical History:   Procedure Laterality Date   • APPENDECTOMY     • CHOLECYSTECTOMY     • COLECTOMY PARTIAL / TOTAL     • COLONOSCOPY  08/20/2015    s/p right hemicolectomy, recurrent Crohns, IH   • COLONOSCOPY N/A 8/9/2019    Crohns, IH.     • ENDOSCOPY  08/20/2015    erythematous mucosa in stomach, chronic gastritis,    • ENDOSCOPY N/A 4/28/2019    Erythematous mucosa in stomach, path benign   • SPLENECTOMY           Current Outpatient Medications:   •  amLODIPine (NORVASC) 5 MG tablet, Take 5 mg by mouth Daily., Disp: , Rfl: 1  •  colestipol (COLESTID) 1 G tablet, Take 1 g by mouth Daily., Disp: , Rfl:   •  diazepam (VALIUM) 5 MG tablet, Take 2.5 mg by mouth Every Morning., Disp: , Rfl:   •  ezetimibe (ZETIA) 10 MG tablet, Take 10 mg by mouth Every Evening., Disp: , Rfl:   •  famotidine (PEPCID) 20 MG tablet, Take 20 mg by mouth Daily., Disp: , Rfl:   •  loperamide (IMODIUM) 2 MG capsule, Take 2 mg by mouth Every Morning., Disp: , Rfl:   •  mesalamine (LIALDA) 1.2 g EC tablet, Take 4 po daily (Patient taking differently: Take 800 mg by mouth Daily. Take 3 po daily),  Take omeprazole daily for a month.  Use albuterol 2-4 times a day and/or before exercise to see if helps with cough.  If not better in 2 weeks follow-up with Dr. Hadley.  Return for shortness of breath which is not expected.   Disp: 120 tablet, Rfl: 11  •  metoprolol tartrate (LOPRESSOR) 50 MG tablet, take 1 tablet by mouth twice a day, Disp: , Rfl: 0  •  Multiple Vitamin (MULTI VITAMIN DAILY) tablet, Take  by mouth., Disp: , Rfl:   •  ondansetron ODT (ZOFRAN ODT) 4 MG disintegrating tablet, Take 1 tablet by mouth Every 8 (Eight) Hours As Needed for Nausea or Vomiting., Disp: 15 tablet, Rfl: 0  •  potassium chloride (MICRO-K) 10 MEQ CR capsule, take 1 capsule by mouth once daily, Disp: , Rfl: 0  •  vitamin B-12 (CYANOCOBALAMIN) 100 MCG tablet, Take 1,000 mcg by mouth Daily., Disp: , Rfl:     Allergies   Allergen Reactions   • Amitriptyline Confusion   • Mysoline [Primidone] Confusion       History reviewed. No pertinent family history.    Social History     Socioeconomic History   • Marital status: Single     Spouse name: Not on file   • Number of children: Not on file   • Years of education: Not on file   • Highest education level: Not on file   Tobacco Use   • Smoking status: Never Smoker   • Smokeless tobacco: Never Used   Substance and Sexual Activity   • Alcohol use: No   • Drug use: No   • Sexual activity: Defer       Review of Systems   Gastrointestinal: Negative for abdominal pain.     Pertinent positives and negatives documented in the HPI and all other systems reviewed and were found to be negative.  Vitals:    03/04/20 0959   BP: 124/74   Temp: 97.9 °F (36.6 °C)       Physical Exam   Constitutional: She is oriented to person, place, and time. She appears well-developed and well-nourished. No distress.   HENT:   Head: Normocephalic and atraumatic. Hair is normal.   Right Ear: Hearing, tympanic membrane, external ear and ear canal normal. No drainage. No decreased hearing is noted.   Left Ear: Hearing, tympanic membrane, external ear and ear canal normal. No decreased hearing is noted.   Nose: No nasal deformity.   Mouth/Throat: Oropharynx is clear and moist.   Eyes: Pupils are equal, round, and reactive to light. Conjunctivae,  EOM and lids are normal. Right eye exhibits no discharge. Left eye exhibits no discharge.   Neck: Normal range of motion. Neck supple. No JVD present. No tracheal deviation present. No thyromegaly present.   Cardiovascular: Normal rate, regular rhythm, normal heart sounds, intact distal pulses and normal pulses. Exam reveals no gallop and no friction rub.   No murmur heard.  Pulmonary/Chest: Effort normal and breath sounds normal. No respiratory distress. She has no wheezes. She has no rales. She exhibits no tenderness.   Abdominal: Soft. Bowel sounds are normal. She exhibits no distension and no mass. There is no tenderness. There is no rebound and no guarding. No hernia.   Musculoskeletal: Normal range of motion. She exhibits no edema, tenderness or deformity.   Lymphadenopathy:     She has no cervical adenopathy.   Neurological: She is alert and oriented to person, place, and time. She has normal reflexes. She displays normal reflexes. No cranial nerve deficit. She exhibits normal muscle tone. Coordination normal.   Skin: Skin is warm and dry. No rash noted. She is not diaphoretic. No erythema.   Psychiatric: She has a normal mood and affect. Her behavior is normal. Judgment and thought content normal.   Vitals reviewed.      Michela was seen today for follow-up, abnormal lab and crohn's disease.    Diagnoses and all orders for this visit:    Crohn's disease of small intestine with other complication (CMS/HCC)    Gastroesophageal reflux disease, esophagitis presence not specified      Assessment:  1. with a history of Crohn's disease of the terminal ileum and colon.  In 2014 Dr. Bella Limon performed resection of the terminal ileum and cecum.  2. GERD  3.     Recommendations:  1. Continue Lialda 3/day and Pepcid.  2.     Return in about 1 year (around 3/4/2021).    Marcel Ricketts MD  3/4/2020

## 2023-10-03 NOTE — PROGRESS NOTES
[facility]  Inpatient Progress Note    Reason for Follow Up:  Crohn's disease, small bowel obstruction    Subjective     Interval History:   Her upper abdominal discomfort is better.  No nausea or vomiting.  She is having bowel movements.       Her small bowel follow-through in 6 of 2019 was unrevealing and showed no evidence of Crohn's.       Her liver function tests are mildly elevated and have been so for a while.  She had an MRCP in 3 of 2021 that was unrevealing.    Current Facility-Administered Medications:   •  diazePAM (VALIUM) tablet 2.5 mg, 2.5 mg, Oral, QAM, Drew Cardona MD, 2.5 mg at 08/06/21 0715  •  dilTIAZem (CARDIZEM) 125 mg in 125 mL 0.7% sodium chloride  infusion, 5-15 mg/hr, Intravenous, Titrated, Gildardo Clinton MD, Last Rate: 5 mL/hr at 08/06/21 0423, 5 mg/hr at 08/06/21 0423  •  hydrocortisone sodium succinate (Solu-CORTEF) injection 100 mg, 100 mg, Intravenous, Q12H, Power Singh MD, 100 mg at 08/05/21 2202  •  HYDROmorphone (DILAUDID) injection 0.5 mg, 0.5 mg, Intravenous, Q4H PRN, Drew Cardona MD  •  metoprolol tartrate (LOPRESSOR) injection 5 mg, 5 mg, Intravenous, Q6H PRN, Drew Cardona MD, 5 mg at 08/03/21 1940  •  metoprolol tartrate (LOPRESSOR) tablet 75 mg, 75 mg, Oral, TID, Juan Damon MD, 75 mg at 08/05/21 2202  •  ondansetron (ZOFRAN) injection 4 mg, 4 mg, Intravenous, Q6H PRN, Drew Cardona MD  •  pantoprazole (PROTONIX) EC tablet 40 mg, 40 mg, Oral, Q AM, Drew Cardona MD, 40 mg at 08/06/21 0715  •  sodium chloride 0.9 % flush 10 mL, 10 mL, Intravenous, PRN, Emergency, Triage Protocol, MD  Review of Systems:    The following systems were reviewed and negative;  constitution, ENT, respiratory, cardiovascular, genitourinary, musculoskeletal and neurological    Objective     Vital Signs  Temp:  [97.2 °F (36.2 °C)-98.5 °F (36.9 °C)] 98.5 °F (36.9 °C)  Heart Rate:  [] 110  Resp:  [16-20] 18  BP: (116-138)/(77-95) 137/77  Body mass  index is 24.69 kg/m².    Intake/Output Summary (Last 24 hours) at 8/6/2021 0805  Last data filed at 8/6/2021 0721  Gross per 24 hour   Intake 460 ml   Output 750 ml   Net -290 ml     I/O this shift:  In: -   Out: 200 [Urine:200]     Physical Exam:   General: patient awake, alert and cooperative   Eyes: Normal lids and lashes, no scleral icterus   Neck: supple, normal ROM   Skin: warm and dry, not jaundiced   Cardiovascular: regular rhythm and rate, no murmurs auscultated   Pulm: clear to auscultation bilaterally, regular and unlabored   Abdomen: soft, nontender, nondistended; normal bowel sounds   Extremities: no rash or edema   Psychiatric: Normal mood and behavior; memory intact     Results Review:     I reviewed the patient's new clinical results.    Results from last 7 days   Lab Units 08/06/21  0345 08/05/21  0501 08/04/21  0530   WBC 10*3/mm3 14.71* 12.74* 16.26*   HEMOGLOBIN g/dL 11.1* 11.4* 12.9   HEMATOCRIT % 33.7* 34.2 38.9   PLATELETS 10*3/mm3 233 219 240     Results from last 7 days   Lab Units 08/06/21  0345 08/05/21  0501 08/04/21  0530   SODIUM mmol/L 144 143 141   POTASSIUM mmol/L 3.8 4.2 4.3   CHLORIDE mmol/L 108* 112* 108*   CO2 mmol/L 22.8 18.1* 22.6   BUN mg/dL 19 20 14   CREATININE mg/dL 0.73 0.81 0.87   CALCIUM mg/dL 8.5* 8.4* 8.5*   BILIRUBIN mg/dL 0.6 0.6 1.0   ALK PHOS U/L 85 96 121*   ALT (SGPT) U/L 127* 175* 317*   AST (SGOT) U/L 41* 78* 223*   GLUCOSE mg/dL 102* 109* 103*         Lab Results   Lab Value Date/Time    LIPASE 43 08/03/2021 0530    LIPASE 37 03/08/2021 1145    LIPASE 49 12/20/2019 0418    LIPASE 15 04/28/2019 0714    LIPASE 42 04/27/2019 0557    LIPASE 29 04/19/2016 0925    LIPASE 60 09/01/2014 1000    LIPASE 22 01/14/2014 0611    LIPASE 49 01/12/2014 1340       Radiology:  XR Abdomen KUB   Final Result      XR Abdomen KUB   Final Result      XR Abdomen KUB   Final Result      CT Abdomen Pelvis With Contrast   Final Result   CT findings consistent with distal small bowel  obstruction   as described. The exact site of the obstruction is not identified, but   is suspected to be in the right side of the pelvis.       This report was finalized on 8/3/2021 10:44 AM by Dr. Honorio Montemayor M.D.          XR Chest 2 View   Final Result          Assessment/Plan     Patient Active Problem List   Diagnosis   • Partial small bowel obstruction (CMS/HCC)   • Generalized abdominal pain   • Crohn's disease of small intestine with other complication (CMS/HCC)   • Crohn disease (CMS/HCC)   • Bloating   • Small bowel obstruction (CMS/HCC)   • Atrial fibrillation, persistent (CMS/HCC)   • Essential hypertension       Assessment/Recommendations:    Assessment:  1. Crohn's disease: Small bowel, managed with mesalamine  2. Small bowel obstruction resolving: May be of a mechanical nature with adhesions or scar tissue, cannot rule out exacerbation of the Crohn's disease.  She is now on IV steroids.    Is this small bowel obstruction from adhesive disease versus from Crohn's?  She had a similar episode last year but was discharged from the emergency room and got over it fairly quickly.  She was last admitted in 2019 for similar complaints.  Before I would want to put her on a more aggressive medicine like Humira I would have to have evidence that this is from Crohn's vs from adhesions?  3. Atrial fibrillation.   4. Elevated LFT's.     Plan:  · Continue IV corticosteroids  · Defer advancing her diet to Surgery.   · I will check an US of the liver to see if her bile ducts are more dilated or if other reason for elevated lft's?    I discussed the patients findings and my recommendations with patient.    Marcel Ricketts MD   as per pst

## 2023-10-11 ENCOUNTER — OFFICE VISIT (OUTPATIENT)
Dept: GASTROENTEROLOGY | Facility: CLINIC | Age: 83
End: 2023-10-11
Payer: MEDICARE

## 2023-10-11 VITALS
TEMPERATURE: 97.5 F | SYSTOLIC BLOOD PRESSURE: 153 MMHG | WEIGHT: 128.4 LBS | HEIGHT: 62 IN | OXYGEN SATURATION: 98 % | DIASTOLIC BLOOD PRESSURE: 78 MMHG | HEART RATE: 65 BPM | BODY MASS INDEX: 23.63 KG/M2

## 2023-10-11 DIAGNOSIS — K50.018 CROHN'S DISEASE OF SMALL INTESTINE WITH OTHER COMPLICATION: ICD-10-CM

## 2023-10-11 DIAGNOSIS — I48.0 PAF (PAROXYSMAL ATRIAL FIBRILLATION): Primary | ICD-10-CM

## 2023-10-11 PROCEDURE — 1159F MED LIST DOCD IN RCRD: CPT | Performed by: INTERNAL MEDICINE

## 2023-10-11 PROCEDURE — 3078F DIAST BP <80 MM HG: CPT | Performed by: INTERNAL MEDICINE

## 2023-10-11 PROCEDURE — 1160F RVW MEDS BY RX/DR IN RCRD: CPT | Performed by: INTERNAL MEDICINE

## 2023-10-11 PROCEDURE — 99213 OFFICE O/P EST LOW 20 MIN: CPT | Performed by: INTERNAL MEDICINE

## 2023-10-11 PROCEDURE — 3077F SYST BP >= 140 MM HG: CPT | Performed by: INTERNAL MEDICINE

## 2023-10-11 NOTE — PROGRESS NOTES
Chief Complaint   Patient presents with    rohn's disease of small intestine without complication       History of Present Illness:   82 y.o. female  with terminal ileal crohn's disease dating back to 2014.  She had previous bowel resection by Dr. Bella Limon at that time involving the terminal ileum.   I last saw her in 5/23:  Assessment:  1. On Eliquis for a fib.  2. terminal ileal crohn's disease dating back to 2014.  She had previous bowel resection by Dr. Bella Limon at that time involving the terminal ileum.   2. H/o periodic SBO.  3. Anemia  4. H/o Vit B12 def  5.  GERD  6. Abnormal CT abd with inflammation in the left colon that starts more proximally in the transverse colon than seen on previous scans.     Recommendations:  1. Try weaning the Pantoprazole  2. I think that we should do a colonoscopy. She doesn't want to do this.  3. Continue Pentasa  4. F/u 5 mos.        She last had a colonoscopy in 8/19.       She last had an EGD in 4/19.        She feels good. No abdominal pain or chest pain. She has intermittent diarrhea. She has 3 BM/day. She is off Protonix and doesn't notice any change. She is on Pentasa 2 TID. She takes imodium 1/2 to 1 imodium/day. No rectal bleeding or melena. No heartburn. No dysphagia. Weight stable.       Past Medical History:   Diagnosis Date    Anemia     Arthritis     Colitis     Crohn's disease     GERD (gastroesophageal reflux disease)     History of transfusion     Hyperlipidemia     Hypertension     Migraine     Ocular migraine     PAF (paroxysmal atrial fibrillation)     with rapid ventricular rate    Small bowel obstruction 08/03/2021    TIA (transient ischemic attack)     UTI (urinary tract infection) 07/21/2021       Past Surgical History:   Procedure Laterality Date    APPENDECTOMY      CHOLECYSTECTOMY      COLECTOMY PARTIAL / TOTAL      COLONOSCOPY  08/20/2015    s/p right hemicolectomy, recurrent Crohns, IH    COLONOSCOPY N/A 8/9/2019    Crohns, IH.       ENDOSCOPY  08/20/2015    erythematous mucosa in stomach, chronic gastritis,     ENDOSCOPY N/A 4/28/2019    Erythematous mucosa in stomach, path benign    SPLENECTOMY  2009         Current Outpatient Medications:     Cyanocobalamin (VITAMIN B 12 PO), Take  by mouth Daily., Disp: , Rfl:     diazepam (VALIUM) 5 MG tablet, Take 0.5 tablets by mouth Daily As Needed., Disp: , Rfl:     Eliquis 5 MG tablet tablet, TAKE 1 TABLET TWICE A DAY FOR ATRIAL FIBRILLATION, Disp: 180 tablet, Rfl: 3    ezetimibe (ZETIA) 10 MG tablet, Take 1 tablet by mouth Daily., Disp: , Rfl:     loperamide (IMODIUM) 2 MG capsule, Take 1 capsule by mouth 4 (Four) Times a Day As Needed for Diarrhea. prn, Disp: , Rfl:     mesalamine (PENTASA) 500 MG CR capsule, TAKE 2 CAPSULES THREE TIMES A DAY, Disp: 540 capsule, Rfl: 3    metoprolol tartrate (LOPRESSOR) 25 MG tablet, Take 1 tablet by mouth 2 (Two) Times a Day., Disp: 180 tablet, Rfl: 3    Multiple Vitamins-Minerals (MULTI COMPLETE PO), Take  by mouth Daily., Disp: , Rfl:     potassium chloride (MICRO-K) 10 MEQ CR capsule, Take 1 capsule by mouth Daily., Disp: , Rfl:     pantoprazole (PROTONIX) 40 MG EC tablet, TAKE 1 TABLET BY MOUTH TWICE DAILY BEFORE MEALS (Patient not taking: Reported on 10/11/2023), Disp: 60 tablet, Rfl: 11    Allergies   Allergen Reactions    Amitriptyline Confusion    Mysoline [Primidone] Confusion       History reviewed. No pertinent family history.    Social History     Socioeconomic History    Marital status: Single   Tobacco Use    Smoking status: Never    Smokeless tobacco: Never   Vaping Use    Vaping Use: Never used   Substance and Sexual Activity    Alcohol use: No     Comment: caffiene use coffee daily    Drug use: No    Sexual activity: Defer       Review of Systems   Gastrointestinal:  Negative for abdominal pain.   All other systems reviewed and are negative.    Pertinent positives and negatives documented in the HPI and all other systems reviewed and were found to  be negative.  Vitals:    10/11/23 1253   BP: 153/78   Pulse: 65   Temp: 97.5 øF (36.4 øC)   SpO2: 98%       Physical Exam  Vitals reviewed.   Constitutional:       General: She is not in acute distress.     Appearance: Normal appearance. She is well-developed. She is not diaphoretic.   HENT:      Head: Normocephalic and atraumatic. Hair is normal.      Right Ear: Hearing, tympanic membrane, ear canal and external ear normal. No decreased hearing noted. No drainage.      Left Ear: Hearing, tympanic membrane, ear canal and external ear normal. No decreased hearing noted.      Nose: Nose normal. No nasal deformity.      Mouth/Throat:      Mouth: Mucous membranes are moist.   Eyes:      General: Lids are normal.         Right eye: No discharge.         Left eye: No discharge.      Extraocular Movements: Extraocular movements intact.      Conjunctiva/sclera: Conjunctivae normal.      Pupils: Pupils are equal, round, and reactive to light.   Neck:      Thyroid: No thyromegaly.      Vascular: No JVD.      Trachea: No tracheal deviation.   Cardiovascular:      Rate and Rhythm: Normal rate and regular rhythm.      Pulses: Normal pulses.      Heart sounds: Normal heart sounds. No murmur heard.     No friction rub. No gallop.   Pulmonary:      Effort: Pulmonary effort is normal. No respiratory distress.      Breath sounds: Normal breath sounds. No wheezing or rales.   Chest:      Chest wall: No tenderness.   Abdominal:      General: Bowel sounds are normal. There is no distension.      Palpations: Abdomen is soft. There is no mass.      Tenderness: There is no abdominal tenderness. There is no guarding or rebound.      Hernia: No hernia is present.   Musculoskeletal:         General: No tenderness or deformity. Normal range of motion.      Cervical back: Normal range of motion and neck supple.   Lymphadenopathy:      Cervical: No cervical adenopathy.   Skin:     General: Skin is warm and dry.      Findings: No erythema or  rash.   Neurological:      Mental Status: She is alert and oriented to person, place, and time.      Cranial Nerves: No cranial nerve deficit.      Motor: No abnormal muscle tone.      Coordination: Coordination normal.      Deep Tendon Reflexes: Reflexes are normal and symmetric. Reflexes normal.   Psychiatric:         Mood and Affect: Mood normal.         Behavior: Behavior normal.         Thought Content: Thought content normal.         Judgment: Judgment normal.         Diagnoses and all orders for this visit:    1. PAF (paroxysmal atrial fibrillation) (Primary)    2. Crohn's disease of small intestine with other complication      Assessment:  On Eliquis  terminal ileal crohn's disease dating back to 2014.  She had previous bowel resection by Dr. Bella Limon at that time involving the terminal ileum.    H/o periodic SBO.   Anemia   H/o Vit B12 def     Abnormal CT abd with inflammation in the left colon that starts more proximally in the transverse colon than seen on previous scans.       Recommendations:  She doesn't want a colonoscopy  Get labs from her PCP.   F/u 6 mos.     Return in about 6 months (around 4/11/2024).    Marcel Ricketts MD  10/11/2023   No

## 2023-11-07 RX ORDER — APIXABAN 5 MG/1
TABLET, FILM COATED ORAL
Qty: 180 TABLET | Refills: 2 | Status: SHIPPED | OUTPATIENT
Start: 2023-11-07

## 2024-01-08 ENCOUNTER — TELEPHONE (OUTPATIENT)
Dept: CARDIOLOGY | Facility: CLINIC | Age: 84
End: 2024-01-08
Payer: MEDICARE

## 2024-01-08 NOTE — TELEPHONE ENCOUNTER
Caller: Michela Aguilar    Relationship: Self    Best call back number: [phone]    Requested Prescriptions:   Requested Prescriptions     Pending Prescriptions Disp Refills    metoprolol tartrate (LOPRESSOR) 25 MG tablet 180 tablet 3     Sig: Take 1 tablet by mouth 2 (Two) Times a Day.        Pharmacy where request should be sent: [id number] [place] [id number] [facility] - [phone]  - [phone] FX     Last office visit with prescribing clinician: 12/20/2022   Last telemedicine visit with prescribing clinician: Visit date not found   Next office visit with prescribing clinician: 3/14/2024       Does the patient have less than a 3 day supply:  [x] Yes  [] No    Would you like a call back once the refill request has been completed: [] Yes [x] No    If the office needs to give you a call back, can they leave a voicemail: [] Yes [x] No    Lo Childs Rep   01/08/24 12:12 EST

## 2024-03-14 ENCOUNTER — OFFICE VISIT (OUTPATIENT)
Dept: CARDIOLOGY | Facility: CLINIC | Age: 84
End: 2024-03-14
Payer: MEDICARE

## 2024-03-14 VITALS
DIASTOLIC BLOOD PRESSURE: 70 MMHG | OXYGEN SATURATION: 100 % | HEART RATE: 61 BPM | WEIGHT: 126.6 LBS | BODY MASS INDEX: 23.3 KG/M2 | HEIGHT: 62 IN | SYSTOLIC BLOOD PRESSURE: 130 MMHG

## 2024-03-14 DIAGNOSIS — K50.00 CROHN'S DISEASE OF SMALL INTESTINE WITHOUT COMPLICATION: ICD-10-CM

## 2024-03-14 DIAGNOSIS — I48.0 PAF (PAROXYSMAL ATRIAL FIBRILLATION): Primary | ICD-10-CM

## 2024-03-14 DIAGNOSIS — R60.0 LOWER EXTREMITY EDEMA: ICD-10-CM

## 2024-03-14 DIAGNOSIS — I10 ESSENTIAL HYPERTENSION: ICD-10-CM

## 2024-03-14 PROBLEM — K56.600 PARTIAL SMALL BOWEL OBSTRUCTION: Status: RESOLVED | Noted: 2022-11-19 | Resolved: 2024-03-14

## 2024-03-14 PROCEDURE — 3075F SYST BP GE 130 - 139MM HG: CPT | Performed by: NURSE PRACTITIONER

## 2024-03-14 PROCEDURE — 93000 ELECTROCARDIOGRAM COMPLETE: CPT | Performed by: NURSE PRACTITIONER

## 2024-03-14 PROCEDURE — 3078F DIAST BP <80 MM HG: CPT | Performed by: NURSE PRACTITIONER

## 2024-03-14 PROCEDURE — 99214 OFFICE O/P EST MOD 30 MIN: CPT | Performed by: NURSE PRACTITIONER

## 2024-03-14 PROCEDURE — 1159F MED LIST DOCD IN RCRD: CPT | Performed by: NURSE PRACTITIONER

## 2024-03-14 PROCEDURE — 1160F RVW MEDS BY RX/DR IN RCRD: CPT | Performed by: NURSE PRACTITIONER

## 2024-03-14 NOTE — PROGRESS NOTES
Date of Office Visit: 2024  Encounter Provider: YANY Garnett  Place of Service: [facility] CARDIOLOG[facility]  Patient Name: Michela Aguilar  :1940  Primary Cardiologist: Dr. Juan Damon    No chief complaint on file.  :     HPI: Michela Aguilar is a 83 y.o. female who presents today for annual cardiac follow-up visit.  I have reviewed her medical records.     She has been diagnosed with hypertension, hyperlipidemia, Crohn's disease, colitis, GERD, and anemia.  She has had a previous TIA.    In 2021, she was hospitalized for small bowel obstruction and was noted to have asymptomatic atrial fibrillation.  Echocardiogram was stable.  She was started on apixaban, diltiazem, and atenolol.  Metoprolol was discontinued.  At her follow-up visit, she was having vivid dreams on atenolol.  Atenolol and diltiazem were discontinued and she was placed back on metoprolol and apixaban.    In 2022, she had a partial small bowel obstruction and was once again in atrial fibrillation with RVR.  Apixaban had been discontinued at some point and was restarted.  She converted to normal sinus rhythm with digoxin and was discharged on metoprolol.  Follow-up Holter monitor showed normal sinus rhythm, rare ectopy, and no atrial fibrillation.    In 2023, she reported atypical chest pain and myocardial perfusion study was normal.    She presents today for follow-up visit.  She reports some mild shortness of breath when her abdomen is bloated.  She experiences rare palpitations for a few seconds, nothing sustained.  She reports trace lower extremity edema.  She denies chest pain, PND, orthopnea, dizziness, syncope, or bleeding.      Past Medical History:   Diagnosis Date    Anemia     Arthritis     Colitis     Crohn's disease     GERD (gastroesophageal reflux disease)     History of transfusion     Hyperlipidemia     Hypertension     Migraine     Ocular migraine     PAF  (paroxysmal atrial fibrillation)     with rapid ventricular rate    Partial small bowel obstruction 11/19/2022    Small bowel obstruction 08/03/2021    TIA (transient ischemic attack)     UTI (urinary tract infection) 07/21/2021       Past Surgical History:   Procedure Laterality Date    APPENDECTOMY      CHOLECYSTECTOMY      COLECTOMY PARTIAL / TOTAL      COLONOSCOPY  08/20/2015    s/p right hemicolectomy, recurrent Crohns, IH    COLONOSCOPY N/A 8/9/2019    Crohns, IH.      ENDOSCOPY  08/20/2015    erythematous mucosa in stomach, chronic gastritis,     ENDOSCOPY N/A 4/28/2019    Erythematous mucosa in stomach, path benign    SPLENECTOMY  2009       Social History     Socioeconomic History    Marital status: Single   Tobacco Use    Smoking status: Never    Smokeless tobacco: Never   Vaping Use    Vaping status: Never Used   Substance and Sexual Activity    Alcohol use: No     Comment: caffiene use coffee daily    Drug use: No    Sexual activity: Defer       History reviewed. No pertinent family history.    The following portion of the patient's history were reviewed and updated as appropriate: past medical history, past surgical history, past social history, past family history, allergies, current medications, and problem list.    Review of Systems   Constitutional: Negative.   Cardiovascular:  Positive for leg swelling and palpitations (rare).   Respiratory:  Positive for shortness of breath (with abdominal bloating).    Hematologic/Lymphatic: Negative.    Neurological: Negative.        Allergies   Allergen Reactions    Amitriptyline Confusion    Mysoline [Primidone] Confusion         Current Outpatient Medications:     apixaban (Eliquis) 5 MG tablet tablet, Take 1 tablet by mouth Every 12 (Twelve) Hours., Disp: 180 tablet, Rfl: 3    Cyanocobalamin (VITAMIN B 12 PO), Take  by mouth Daily., Disp: , Rfl:     diazepam (VALIUM) 5 MG tablet, Take 0.5 tablets by mouth Daily As Needed., Disp: , Rfl:     ezetimibe (ZETIA)  "10 MG tablet, Take 1 tablet by mouth Daily., Disp: , Rfl:     loperamide (IMODIUM) 2 MG capsule, Take 1 capsule by mouth 4 (Four) Times a Day As Needed for Diarrhea. prn, Disp: , Rfl:     mesalamine (PENTASA) 500 MG CR capsule, TAKE 2 CAPSULES THREE TIMES A DAY, Disp: 540 capsule, Rfl: 3    metoprolol tartrate (LOPRESSOR) 25 MG tablet, Take 1 tablet by mouth 2 (Two) Times a Day., Disp: 180 tablet, Rfl: 3    Multiple Vitamins-Minerals (MULTI COMPLETE PO), Take  by mouth Daily., Disp: , Rfl:     potassium chloride (MICRO-K) 10 MEQ CR capsule, Take 1 capsule by mouth Daily., Disp: , Rfl:          Objective:     Vitals:    03/14/24 1131   BP: 130/70   BP Location: Right arm   Patient Position: Sitting   Cuff Size: Adult   Pulse: 61   SpO2: 100%   Weight: 57.4 kg (126 lb 9.6 oz)   Height: 157.5 cm (62.01\")     Body mass index is 23.15 kg/m².    PHYSICAL EXAM:    Vitals Reviewed.   General Appearance: No acute distress, well developed and well nourished.   HENT: No hearing loss noted.    Respiratory: No signs of respiratory distress. Respiration rhythm and depth normal.  Clear to auscultation.   Cardiovascular:  Jugular Venous Pressure: Normal  Heart Rate and Rhythm: Normal, Heart Sounds: Normal S1 and S2. No S3 or S4 noted.  Murmurs: No murmurs noted. No rubs, thrills, or gallops.   Lower Extremities: Trace lower extremity edema noted.  Musculoskeletal: Normal movement of extremities.  Skin: General appearance normal.    Psychiatric: Patient alert and oriented to person, place, and time. Speech and behavior appropriate. Normal mood and affect.       ECG 12 Lead    Date/Time: 3/14/2024 11:35 AM  Performed by: Calista Mcclure APRN    Authorized by: Calista Mcclure APRN  Comparison: compared with previous ECG from 3/13/2023  Similar to previous ECG  Rhythm: sinus rhythm  Rate: normal  BPM: 61  Conduction: conduction normal  ST Segments: ST segments normal  T Waves: T waves normal  QRS axis: normal    Clinical " impression: normal ECG            Assessment:       Diagnosis Plan   1. PAF (paroxysmal atrial fibrillation)        2. Essential hypertension        3. Lower extremity edema        4. Crohn's disease of small intestine without complication               Plan:       1.  Paroxysmal Atrial Fibrillation: Typically occurs in the setting of GI issues.  Currently in a normal sinus rhythm.  Continue metoprolol tartrate. [id number] score of 6 and remains on apixaban.  If she has a palpitation that is more sustained, I told her she could take an extra half or full tablet of metoprolol as needed.    2.  Hypertension: Blood pressure well-controlled.    3.  Trace lower extremity edema present.  She would benefit from leg elevation, support hose, and low-sodium diet.    4.  Crohn's disease.    5.  Follow-up with Dr. Damon in 1 year.    As always, it has been a pleasure to participate in your patient's care. Thank you.         Sincerely,         YANY Chen  [facility]      Dictated utilizing Dragon Dictation

## 2024-05-30 ENCOUNTER — OFFICE VISIT (OUTPATIENT)
Dept: GASTROENTEROLOGY | Facility: CLINIC | Age: 84
End: 2024-05-30
Payer: MEDICARE

## 2024-05-30 VITALS
HEART RATE: 66 BPM | SYSTOLIC BLOOD PRESSURE: 156 MMHG | HEIGHT: 62 IN | TEMPERATURE: 98.4 F | WEIGHT: 126.8 LBS | DIASTOLIC BLOOD PRESSURE: 81 MMHG | BODY MASS INDEX: 23.34 KG/M2

## 2024-05-30 DIAGNOSIS — R10.31 RLQ ABDOMINAL PAIN: ICD-10-CM

## 2024-05-30 DIAGNOSIS — K50.018 CROHN'S DISEASE OF SMALL INTESTINE WITH OTHER COMPLICATION: Primary | ICD-10-CM

## 2024-05-30 DIAGNOSIS — R14.0 BLOATING: ICD-10-CM

## 2024-05-30 DIAGNOSIS — E53.8 VITAMIN B12 DEFICIENCY: ICD-10-CM

## 2024-05-30 PROCEDURE — 3077F SYST BP >= 140 MM HG: CPT | Performed by: INTERNAL MEDICINE

## 2024-05-30 PROCEDURE — 1159F MED LIST DOCD IN RCRD: CPT | Performed by: INTERNAL MEDICINE

## 2024-05-30 PROCEDURE — 3079F DIAST BP 80-89 MM HG: CPT | Performed by: INTERNAL MEDICINE

## 2024-05-30 PROCEDURE — 99213 OFFICE O/P EST LOW 20 MIN: CPT | Performed by: INTERNAL MEDICINE

## 2024-05-30 PROCEDURE — 1160F RVW MEDS BY RX/DR IN RCRD: CPT | Performed by: INTERNAL MEDICINE

## 2024-05-30 NOTE — PROGRESS NOTES
"Chief Complaint   Patient presents with    Crohn's Disease    Diarrhea       History of Present Illness:   83 y.o. female with terminal ileal crohn's disease diagnosed in 2014.  She had previous bowel resection by Dr. Bella Limon at that time involving the terminal ileum.   I last saw her in 10/23:  Assessment:  On Eliquis  terminal ileal crohn's disease diagnosed in 2014.  She had previous bowel resection by Dr. Bella Limon at that time involving the terminal ileum.    H/o periodic SBO.   Anemia   H/o Vit B12 def     Abnormal CT abd with inflammation in the left colon that starts more proximally in the transverse colon than seen on previous scans.         Recommendations:  She doesn't want a colonoscopy  Get labs from her PCP.   F/u 6 mos.        She last had a colonoscopy in 8 of 2019.        She last had an EGD in 4 of 2019.       She is on Pentasa 500 mg 2 TID. She doesn't know if it is helping. NO abdominal pain but has bloating occasionally. She doesn't know what makes it worse or better. Rare vomiting. No chest pain. Occas RLQ \"stinging\" that doesn't last long. Nothing brings it on and nothing makes it better. She has urgency twice daily. She averages 2 BM/day. No rectal bleeding or melena. No constipation. Weight stable. Denies NSAIDs use. Occas heartburn.     Past Medical History:   Diagnosis Date    Anemia     Arthritis     Colitis     Crohn's disease     GERD (gastroesophageal reflux disease)     History of transfusion     Hyperlipidemia     Hypertension     Migraine     Ocular migraine     PAF (paroxysmal atrial fibrillation)     with rapid ventricular rate    Partial small bowel obstruction 11/19/2022    Small bowel obstruction 08/03/2021    TIA (transient ischemic attack)     UTI (urinary tract infection) 07/21/2021       Past Surgical History:   Procedure Laterality Date    APPENDECTOMY      CHOLECYSTECTOMY      COLECTOMY PARTIAL / TOTAL      COLONOSCOPY  08/20/2015    s/p right hemicolectomy, " recurrent Crohns, IH    COLONOSCOPY N/A 8/9/2019    Crohns, IH.      ENDOSCOPY  08/20/2015    erythematous mucosa in stomach, chronic gastritis,     ENDOSCOPY N/A 4/28/2019    Erythematous mucosa in stomach, path benign    SPLENECTOMY  2009         Current Outpatient Medications:     apixaban (Eliquis) 5 MG tablet tablet, Take 1 tablet by mouth Every 12 (Twelve) Hours., Disp: 180 tablet, Rfl: 3    Cyanocobalamin (VITAMIN B 12 PO), Take  by mouth Daily., Disp: , Rfl:     diazepam (VALIUM) 5 MG tablet, Take 0.5 tablets by mouth Daily As Needed., Disp: , Rfl:     ezetimibe (ZETIA) 10 MG tablet, Take 1 tablet by mouth Daily., Disp: , Rfl:     loperamide (IMODIUM) 2 MG capsule, Take 1 capsule by mouth 4 (Four) Times a Day As Needed for Diarrhea. prn, Disp: , Rfl:     mesalamine (PENTASA) 500 MG CR capsule, TAKE 2 CAPSULES THREE TIMES A DAY, Disp: 540 capsule, Rfl: 3    metoprolol tartrate (LOPRESSOR) 25 MG tablet, Take 1 tablet by mouth 2 (Two) Times a Day., Disp: 180 tablet, Rfl: 3    Multiple Vitamins-Minerals (MULTI COMPLETE PO), Take  by mouth Daily., Disp: , Rfl:     potassium chloride (MICRO-K) 10 MEQ CR capsule, Take 1 capsule by mouth Daily., Disp: , Rfl:     Allergies   Allergen Reactions    Amitriptyline Confusion    Mysoline [Primidone] Confusion       History reviewed. No pertinent family history.    Social History     Socioeconomic History    Marital status: Single   Tobacco Use    Smoking status: Never    Smokeless tobacco: Never   Vaping Use    Vaping status: Never Used   Substance and Sexual Activity    Alcohol use: No     Comment: caffiene use coffee daily    Drug use: No    Sexual activity: Defer       Review of Systems   Gastrointestinal:  Positive for abdominal pain.   All other systems reviewed and are negative.    Pertinent positives and negatives documented in the HPI and all other systems reviewed and were found to be negative.  Vitals:    05/30/24 1100   BP: 156/81   Pulse: 66   Temp: 98.4 °F  (36.9 °C)       Physical Exam  Vitals reviewed.   Constitutional:       General: She is not in acute distress.     Appearance: Normal appearance. She is well-developed. She is not diaphoretic.   HENT:      Head: Normocephalic and atraumatic. Hair is normal.      Right Ear: Hearing, tympanic membrane, ear canal and external ear normal. No decreased hearing noted. No drainage.      Left Ear: Hearing, tympanic membrane, ear canal and external ear normal. No decreased hearing noted.      Nose: Nose normal. No nasal deformity.      Mouth/Throat:      Mouth: Mucous membranes are moist.   Eyes:      General: Lids are normal.         Right eye: No discharge.         Left eye: No discharge.      Extraocular Movements: Extraocular movements intact.      Conjunctiva/sclera: Conjunctivae normal.      Pupils: Pupils are equal, round, and reactive to light.   Neck:      Thyroid: No thyromegaly.      Vascular: No JVD.      Trachea: No tracheal deviation.   Cardiovascular:      Rate and Rhythm: Normal rate and regular rhythm.      Pulses: Normal pulses.      Heart sounds: Normal heart sounds. No murmur heard.     No friction rub. No gallop.   Pulmonary:      Effort: Pulmonary effort is normal. No respiratory distress.      Breath sounds: Normal breath sounds. No wheezing or rales.   Chest:      Chest wall: No tenderness.   Abdominal:      General: Bowel sounds are normal. There is no distension.      Palpations: Abdomen is soft. There is no mass.      Tenderness: There is no abdominal tenderness. There is no guarding or rebound.      Hernia: No hernia is present.   Musculoskeletal:         General: No tenderness or deformity. Normal range of motion.      Cervical back: Normal range of motion and neck supple.   Lymphadenopathy:      Cervical: No cervical adenopathy.   Skin:     General: Skin is warm and dry.      Findings: No erythema or rash.   Neurological:      Mental Status: She is alert and oriented to person, place, and time.       Cranial Nerves: No cranial nerve deficit.      Motor: No abnormal muscle tone.      Coordination: Coordination normal.      Deep Tendon Reflexes: Reflexes are normal and symmetric. Reflexes normal.   Psychiatric:         Mood and Affect: Mood normal.         Behavior: Behavior normal.         Thought Content: Thought content normal.         Judgment: Judgment normal.         Diagnoses and all orders for this visit:    1. Crohn's disease of small intestine with other complication (Primary)    2. RLQ abdominal pain    3. Bloating    4. Vitamin B12 deficiency      Assessment:  with terminal ileal crohn's disease diagnosed in 2014.  She had previous bowel resection by Dr. Bella Limon at that time involving the terminal ileum.  She is on Pentasa.  She is on Eliquis for a fib.  H/o periodic SBO.  History of vitamin B12 deficiency.  Bloating  RLQ singing discomfort in the abd.      Recommendations:  She doesn't want a colonoscopy  She doesn't want a CT abd/pelvis with enterography  Get labs done recently by PCP.  Continue the Pentasa  F/u 6 mos.     Return in about 6 months (around 11/30/2024).    Marcel Ricketts MD  5/30/2024

## 2024-05-31 ENCOUNTER — TELEPHONE (OUTPATIENT)
Dept: GASTROENTEROLOGY | Facility: CLINIC | Age: 84
End: 2024-05-31
Payer: MEDICARE

## 2024-05-31 NOTE — TELEPHONE ENCOUNTER
Called Dr Mckenzie's office at [phone] and vm requested recent labs be faxed to us at [phone].

## 2024-06-12 DIAGNOSIS — K50.00 CROHN'S DISEASE OF SMALL INTESTINE WITHOUT COMPLICATION: ICD-10-CM

## 2024-06-12 RX ORDER — MESALAMINE 500 MG/1
CAPSULE, EXTENDED RELEASE ORAL
Qty: 540 CAPSULE | Refills: 3 | Status: SHIPPED | OUTPATIENT
Start: 2024-06-12

## 2024-06-12 NOTE — TELEPHONE ENCOUNTER
6/12/24 - Please let her know that I signed a prescription for mesalamine and sent it to [facility]. [email or web address]

## 2024-09-13 ENCOUNTER — TELEPHONE (OUTPATIENT)
Dept: GASTROENTEROLOGY | Facility: CLINIC | Age: 84
End: 2024-09-13
Payer: MEDICARE

## 2024-09-13 NOTE — TELEPHONE ENCOUNTER
Called patient to reschedule 11/27 appt with Dr Ricketts due to Dr Ricketts being out of the office, appt moved to 12/2 at 1pm.

## 2024-12-02 ENCOUNTER — OFFICE VISIT (OUTPATIENT)
Dept: GASTROENTEROLOGY | Facility: CLINIC | Age: 84
End: 2024-12-02
Payer: MEDICARE

## 2024-12-02 VITALS
HEART RATE: 58 BPM | BODY MASS INDEX: 22.93 KG/M2 | SYSTOLIC BLOOD PRESSURE: 202 MMHG | HEIGHT: 62 IN | WEIGHT: 124.6 LBS | TEMPERATURE: 97.3 F | DIASTOLIC BLOOD PRESSURE: 70 MMHG

## 2024-12-02 DIAGNOSIS — K50.018 CROHN'S DISEASE OF SMALL INTESTINE WITH OTHER COMPLICATION: Primary | ICD-10-CM

## 2024-12-02 PROCEDURE — 3078F DIAST BP <80 MM HG: CPT | Performed by: INTERNAL MEDICINE

## 2024-12-02 PROCEDURE — 3077F SYST BP >= 140 MM HG: CPT | Performed by: INTERNAL MEDICINE

## 2024-12-02 PROCEDURE — 99213 OFFICE O/P EST LOW 20 MIN: CPT | Performed by: INTERNAL MEDICINE

## 2024-12-02 PROCEDURE — 1159F MED LIST DOCD IN RCRD: CPT | Performed by: INTERNAL MEDICINE

## 2024-12-02 PROCEDURE — 1160F RVW MEDS BY RX/DR IN RCRD: CPT | Performed by: INTERNAL MEDICINE

## 2024-12-02 NOTE — PROGRESS NOTES
Chief Complaint   Patient presents with    Crohn's Disease       History of Present Illness:   84 y.o. female with terminal ileal crohn's disease diagnosed in 2014.  She had previous bowel resection by Dr. Bella Limon at that time involving the terminal ileum.   I last saw her in 5/24. My assessment and plan then was:  Assessment:  with terminal ileal crohn's disease diagnosed in 2014.  She had previous bowel resection by Dr. Bella Limon at that time involving the terminal ileum.  She is on Pentasa.  She is on Eliquis for a fib.  H/o periodic SBO.  History of vitamin B12 deficiency.  Bloating  RLQ singing discomfort in the abd.        Recommendations:  She doesn't want a colonoscopy  She doesn't want a CT abd/pelvis with enterography  Get labs done recently by PCP.  Continue the Pentasa  F/u 6 mos.        Feeling well. No flair ups recently. Last admission 11/22. No abd pain but has abdominal bloating occasionally. She last had nausea/vomiting in 5/24 and lasted one day only. She has frequent BM's, 2-3 BM/day. No rectal bleeding or melena. Weight stable. On Pentasa 2 TID. She takes imodium prn and it works. No fevers, chills. +night sweats. I reviewed her 10/24 labs.     Past Medical History:   Diagnosis Date    Anemia     Arthritis     Colitis     Crohn's disease     GERD (gastroesophageal reflux disease)     History of transfusion     Hyperlipidemia     Hypertension     Migraine     Ocular migraine     PAF (paroxysmal atrial fibrillation)     with rapid ventricular rate    Partial small bowel obstruction 11/19/2022    Small bowel obstruction 08/03/2021    TIA (transient ischemic attack)     UTI (urinary tract infection) 07/21/2021       Past Surgical History:   Procedure Laterality Date    APPENDECTOMY      CHOLECYSTECTOMY      COLECTOMY PARTIAL / TOTAL      COLONOSCOPY  08/20/2015    s/p right hemicolectomy, recurrent Crohns, IH    COLONOSCOPY N/A 8/9/2019    Crohns, IH.      ENDOSCOPY  08/20/2015     "erythematous mucosa in stomach, chronic gastritis,     ENDOSCOPY N/A 4/28/2019    Erythematous mucosa in stomach, path benign    SPLENECTOMY  2009         Current Outpatient Medications:     apixaban (Eliquis) 5 MG tablet tablet, Take 1 tablet by mouth Every 12 (Twelve) Hours., Disp: 180 tablet, Rfl: 3    Cyanocobalamin (VITAMIN B 12 PO), Take  by mouth Daily., Disp: , Rfl:     diazepam (VALIUM) 5 MG tablet, Take 0.5 tablets by mouth Daily As Needed., Disp: , Rfl:     ezetimibe (ZETIA) 10 MG tablet, Take 1 tablet by mouth Daily., Disp: , Rfl:     loperamide (IMODIUM) 2 MG capsule, Take 1 capsule by mouth 4 (Four) Times a Day As Needed for Diarrhea. prn, Disp: , Rfl:     mesalamine (PENTASA) 500 MG CR capsule, TAKE 2 CAPSULES THREE TIMES A DAY, Disp: 540 capsule, Rfl: 3    metoprolol tartrate (LOPRESSOR) 25 MG tablet, Take 1 tablet by mouth 2 (Two) Times a Day., Disp: 180 tablet, Rfl: 3    Multiple Vitamins-Minerals (MULTI COMPLETE PO), Take  by mouth Daily., Disp: , Rfl:     potassium chloride (MICRO-K) 10 MEQ CR capsule, Take 1 capsule by mouth Daily., Disp: , Rfl:     Allergies   Allergen Reactions    Amitriptyline Confusion    Mysoline [Primidone] Confusion       History reviewed. No pertinent family history.    Social History     Socioeconomic History    Marital status: Single   Tobacco Use    Smoking status: Never    Smokeless tobacco: Never   Vaping Use    Vaping status: Never Used   Substance and Sexual Activity    Alcohol use: No     Comment: caffiene use coffee daily    Drug use: No    Sexual activity: Defer       Review of Systems   Gastrointestinal:  Positive for abdominal pain and diarrhea.   All other systems reviewed and are negative.    Pertinent positives and negatives documented in the HPI and all other systems reviewed and were found to be negative.  BP (!) 202/70   Pulse 58   Temp 97.3 °F (36.3 °C) (Temporal)   Ht 158.5 cm (62.4\")   Wt 56.5 kg (124 lb 9.6 oz)   BMI 22.50 kg/m²       Physical " Exam  Vitals reviewed.   Constitutional:       General: She is not in acute distress.     Appearance: Normal appearance. She is well-developed. She is not diaphoretic.   HENT:      Head: Normocephalic and atraumatic. Hair is normal.      Right Ear: Hearing, tympanic membrane, ear canal and external ear normal. No decreased hearing noted. No drainage.      Left Ear: Hearing, tympanic membrane, ear canal and external ear normal. No decreased hearing noted.      Nose: Nose normal. No nasal deformity.      Mouth/Throat:      Mouth: Mucous membranes are moist.   Eyes:      General: Lids are normal.         Right eye: No discharge.         Left eye: No discharge.      Extraocular Movements: Extraocular movements intact.      Conjunctiva/sclera: Conjunctivae normal.      Pupils: Pupils are equal, round, and reactive to light.   Neck:      Thyroid: No thyromegaly.      Vascular: No JVD.      Trachea: No tracheal deviation.   Cardiovascular:      Rate and Rhythm: Normal rate and regular rhythm.      Pulses: Normal pulses.      Heart sounds: Normal heart sounds. No murmur heard.     No friction rub. No gallop.   Pulmonary:      Effort: Pulmonary effort is normal. No respiratory distress.      Breath sounds: Normal breath sounds. No wheezing or rales.   Chest:      Chest wall: No tenderness.   Abdominal:      General: Bowel sounds are normal. There is no distension.      Palpations: Abdomen is soft. There is no mass.      Tenderness: There is no abdominal tenderness. There is no guarding or rebound.      Hernia: No hernia is present.   Musculoskeletal:         General: No tenderness or deformity. Normal range of motion.      Cervical back: Normal range of motion and neck supple.   Lymphadenopathy:      Cervical: No cervical adenopathy.   Skin:     General: Skin is warm and dry.      Findings: No erythema or rash.   Neurological:      Mental Status: She is alert and oriented to person, place, and time.      Cranial Nerves: No  cranial nerve deficit.      Motor: No abnormal muscle tone.      Coordination: Coordination normal.      Deep Tendon Reflexes: Reflexes are normal and symmetric. Reflexes normal.   Psychiatric:         Mood and Affect: Mood normal.         Behavior: Behavior normal.         Thought Content: Thought content normal.         Judgment: Judgment normal.         Diagnoses and all orders for this visit:    1. Crohn's disease of small intestine with other complication (Primary)      Assessment:  with terminal ileal crohn's disease diagnosed in 2014.  She had previous bowel resection by Dr. Bella Limon at that time involving the terminal ileum.  She is on Pentasa.  She is on Eliquis for a fib.  H/o periodic SBO.  History of vitamin B12 deficiency.      Recommendations:  She does not want a CT abd/pelvis  She does not want a colonoscopy  F/u 6 mos. She would like to see Dr. ALBERT Cerna if possible.     Return in about 6 months (around 6/2/2025).    Marcel Ricketts MD  12/2/2024

## 2024-12-26 RX ORDER — METOPROLOL TARTRATE 25 MG/1
25 TABLET, FILM COATED ORAL 2 TIMES DAILY
Qty: 180 TABLET | Refills: 0 | Status: SHIPPED | OUTPATIENT
Start: 2024-12-26

## 2025-03-17 NOTE — PROGRESS NOTES
RM:________     PCP: Roni Mckenzie MD    : 1940  AGE: 84 y.o.  EST PATIENT           Wt Readings from Last 3 Encounters:   24 56.5 kg (124 lb 9.6 oz)   24 57.5 kg (126 lb 12.8 oz)   24 57.4 kg (126 lb 9.6 oz)           CP______  SOA_______ DIZZINESS _____ FATIGUE ______  PALPS ______    WT: ____________ BP: __________L __________R HR______    ALLERGIES:Amitriptyline and Mysoline [primidone]      Social History     Tobacco Use    Smoking status: Never    Smokeless tobacco: Never   Vaping Use    Vaping status: Never Used   Substance Use Topics    Alcohol use: No     Comment: caffiene use coffee daily    Drug use: No

## 2025-03-20 ENCOUNTER — OFFICE VISIT (OUTPATIENT)
Dept: CARDIOLOGY | Facility: CLINIC | Age: 85
End: 2025-03-20
Payer: MEDICARE

## 2025-03-20 VITALS
HEIGHT: 62 IN | DIASTOLIC BLOOD PRESSURE: 88 MMHG | WEIGHT: 125.4 LBS | HEART RATE: 61 BPM | SYSTOLIC BLOOD PRESSURE: 170 MMHG | BODY MASS INDEX: 23.08 KG/M2

## 2025-03-20 DIAGNOSIS — I48.0 PAF (PAROXYSMAL ATRIAL FIBRILLATION): Primary | ICD-10-CM

## 2025-03-20 DIAGNOSIS — R42 LIGHTHEADEDNESS: ICD-10-CM

## 2025-03-20 DIAGNOSIS — I10 ESSENTIAL HYPERTENSION: ICD-10-CM

## 2025-03-20 PROCEDURE — 3079F DIAST BP 80-89 MM HG: CPT | Performed by: INTERNAL MEDICINE

## 2025-03-20 PROCEDURE — 99214 OFFICE O/P EST MOD 30 MIN: CPT | Performed by: INTERNAL MEDICINE

## 2025-03-20 PROCEDURE — 93000 ELECTROCARDIOGRAM COMPLETE: CPT | Performed by: INTERNAL MEDICINE

## 2025-03-20 PROCEDURE — 1160F RVW MEDS BY RX/DR IN RCRD: CPT | Performed by: INTERNAL MEDICINE

## 2025-03-20 PROCEDURE — 3077F SYST BP >= 140 MM HG: CPT | Performed by: INTERNAL MEDICINE

## 2025-03-20 PROCEDURE — 1159F MED LIST DOCD IN RCRD: CPT | Performed by: INTERNAL MEDICINE

## 2025-03-20 RX ORDER — METOPROLOL TARTRATE 25 MG/1
25 TABLET, FILM COATED ORAL 2 TIMES DAILY
Qty: 180 TABLET | Refills: 3 | Status: SHIPPED | OUTPATIENT
Start: 2025-03-20

## 2025-03-20 NOTE — PROGRESS NOTES
Date of Office Visit: 25  Encounter Provider: Juan Damon MD  Place of Service: [facility] C[facility]  Patient Name: Michela Aguilar  :1940    Chief Complaint   Patient presents with    Atrial Fibrillation   :   HPI:     Ms. Aguilar is 84 y.o. and presents today in follow up.  I have reviewed prior notes and there are no changes except for any new updates described below. I have also reviewed any information entered into the medical record by the patient or by ancillary staff.     In 2021, she presented to [facility] ED with a small bowel obstruction; she was incidentally noted to be in atrial fibrillation, which was asymptomatic. An echo was unremarkable. She was started on apixaban, diltiazem, and atenolol. She had been on metoprolol before surgery. However, diltiazem caused her pulse to be too low, and atenolol gave her vivid dreams, so we went back to metoprolol.    She was hospitalized in 2022 with another PSBO/Crohn's flare; she had a brief episode of PAF in that setting. A follow up monitor was normal.    In 2023, she reported atypical chest pain; a perfusion stress test was normal.     For the most part, she's doing well; she has no chest pain, dyspnea, or bleeding. She has infrequent, unpredictable episodes where she gets lightheaded for a second or two and then feels like she needs to cough.    Past Medical History:   Diagnosis Date    Anemia     Arthritis     Colitis     Crohn's disease     GERD (gastroesophageal reflux disease)     History of transfusion     Hyperlipidemia     Hypertension     Migraine     Ocular migraine     PAF (paroxysmal atrial fibrillation)     with rapid ventricular rate    Partial small bowel obstruction 2022    Small bowel obstruction 2021    TIA (transient ischemic attack)     UTI (urinary tract infection) 2021       Past Surgical History:   Procedure Laterality Date    APPENDECTOMY       CHOLECYSTECTOMY      COLECTOMY PARTIAL / TOTAL      COLONOSCOPY  08/20/2015    s/p right hemicolectomy, recurrent Crohns, IH    COLONOSCOPY N/A 8/9/2019    Crohns, IH.      ENDOSCOPY  08/20/2015    erythematous mucosa in stomach, chronic gastritis,     ENDOSCOPY N/A 4/28/2019    Erythematous mucosa in stomach, path benign    SPLENECTOMY  2009       Social History     Socioeconomic History    Marital status: Single   Tobacco Use    Smoking status: Never    Smokeless tobacco: Never   Vaping Use    Vaping status: Never Used   Substance and Sexual Activity    Alcohol use: No     Comment: caffiene use coffee daily    Drug use: No    Sexual activity: Defer       History reviewed. No pertinent family history.    Review of Systems   Cardiovascular:  Negative for chest pain.   Respiratory:  Negative for shortness of breath.    All other systems reviewed and are negative.      Allergies   Allergen Reactions    Amitriptyline Confusion    Mysoline [Primidone] Confusion    Atenolol Other (See Comments)         Current Outpatient Medications:     apixaban (Eliquis) 2.5 MG tablet tablet, Take 1 tablet by mouth Every 12 (Twelve) Hours., Disp: , Rfl:     Cyanocobalamin (VITAMIN B 12 PO), Take  by mouth Daily., Disp: , Rfl:     diazepam (VALIUM) 5 MG tablet, Take 0.5 tablets by mouth Daily As Needed., Disp: , Rfl:     ezetimibe (ZETIA) 10 MG tablet, Take 1 tablet by mouth Daily., Disp: , Rfl:     loperamide (IMODIUM) 2 MG capsule, Take 1 capsule by mouth 4 (Four) Times a Day As Needed for Diarrhea. prn, Disp: , Rfl:     mesalamine (PENTASA) 500 MG CR capsule, TAKE 2 CAPSULES THREE TIMES A DAY, Disp: 540 capsule, Rfl: 3    metoprolol tartrate (LOPRESSOR) 25 MG tablet, Take 1 tablet by mouth 2 (Two) Times a Day., Disp: 180 tablet, Rfl: 3    Multiple Vitamins-Minerals (MULTI COMPLETE PO), Take  by mouth Daily., Disp: , Rfl:     potassium chloride (MICRO-K) 10 MEQ CR capsule, Take 1 capsule by mouth Daily., Disp: , Rfl:       Objective:  "    Vitals:    03/20/25 1159   BP: 170/88   BP Location: Left arm   Pulse: 61   Weight: 56.9 kg (125 lb 6.4 oz)   Height: 158.5 cm (62.4\")       Body mass index is 22.64 kg/m².    Vitals reviewed.   Constitutional:       Appearance: Well-developed and not in distress.   Eyes:      Conjunctiva/sclera: Conjunctivae normal.      Pupils: Pupils are equal, round, and reactive to light.   HENT:      Head: Normocephalic.      Nose: Nose normal.   Neck:      Vascular: No JVD.      Lymphadenopathy: No cervical adenopathy.   Pulmonary:      Effort: Pulmonary effort is normal.      Breath sounds: Normal breath sounds.   Cardiovascular:      Normal rate. Regular rhythm.      Murmurs: There is no murmur.   Pulses:     Intact distal pulses.   Edema:     Peripheral edema absent.   Abdominal:      Palpations: Abdomen is soft.      Tenderness: There is no abdominal tenderness.   Musculoskeletal: Normal range of motion.      Cervical back: Normal range of motion. Skin:     General: Skin is warm and dry.   Neurological:      General: No focal deficit present.      Mental Status: Oriented to person, place, and time.      Cranial Nerves: No cranial nerve deficit.   Psychiatric:         Behavior: Behavior normal.         Thought Content: Thought content normal.         Judgment: Judgment normal.           ECG 12 Lead    Date/Time: 3/20/2025 12:09 PM  Performed by: Juan Damon MD    Authorized by: Juan Damon MD  Comparison: compared with previous ECG   Similar to previous ECG  Rhythm: sinus rhythm  Conduction: conduction normal  ST Segments: ST segments normal  T Waves: T waves normal  QRS axis: normal  Other: no other findings    Clinical impression: normal ECG            Assessment:       Diagnosis Plan   1. PAF (paroxysmal atrial fibrillation)  ECG 12 Lead      2. Lightheadedness  Holter Monitor - 72 Hour Up To 15 Days      3. Essential hypertension                 Plan:     She has a history of atrial fibrillation in the " setting of small bowel obstructions and Crohn's flares.  She is doing well on metoprolol and apixaban.  No changes have been made.    She has infrequent, short lived episodes of LH followed by a cough. I suspect this is also due to ectopy. I'll try to catch an episode of a 14 day rhythm monitor.    Her BP is always high in the office but controlled in more relaxed settings.      Sincerely,       Juan Damon MD

## 2025-03-26 RX ORDER — METOPROLOL TARTRATE 25 MG/1
25 TABLET, FILM COATED ORAL 2 TIMES DAILY
Qty: 180 TABLET | Refills: 3 | OUTPATIENT
Start: 2025-03-26

## 2025-04-29 ENCOUNTER — OFFICE VISIT (OUTPATIENT)
Dept: GASTROENTEROLOGY | Facility: CLINIC | Age: 85
End: 2025-04-29
Payer: MEDICARE

## 2025-04-29 VITALS
DIASTOLIC BLOOD PRESSURE: 80 MMHG | HEART RATE: 65 BPM | BODY MASS INDEX: 22.71 KG/M2 | SYSTOLIC BLOOD PRESSURE: 175 MMHG | WEIGHT: 123.4 LBS | TEMPERATURE: 97.3 F | HEIGHT: 62 IN

## 2025-04-29 DIAGNOSIS — L29.0 PRURITUS ANI: ICD-10-CM

## 2025-04-29 DIAGNOSIS — Z87.19 HISTORY OF SMALL BOWEL OBSTRUCTION: ICD-10-CM

## 2025-04-29 DIAGNOSIS — K50.80 CROHN'S DISEASE OF BOTH SMALL AND LARGE INTESTINE WITHOUT COMPLICATION: Primary | ICD-10-CM

## 2025-04-29 PROCEDURE — 3077F SYST BP >= 140 MM HG: CPT | Performed by: INTERNAL MEDICINE

## 2025-04-29 PROCEDURE — 99214 OFFICE O/P EST MOD 30 MIN: CPT | Performed by: INTERNAL MEDICINE

## 2025-04-29 PROCEDURE — 1159F MED LIST DOCD IN RCRD: CPT | Performed by: INTERNAL MEDICINE

## 2025-04-29 PROCEDURE — 1160F RVW MEDS BY RX/DR IN RCRD: CPT | Performed by: INTERNAL MEDICINE

## 2025-04-29 PROCEDURE — 3079F DIAST BP 80-89 MM HG: CPT | Performed by: INTERNAL MEDICINE

## 2025-04-29 NOTE — PROGRESS NOTES
Subjective   Chief Complaint   Patient presents with    Crohn's Disease    Anal Itching       Michela Aguilar is a  84 y.o. female here for a follow up visit for disease and anal itching.  She is a previous patient of Dr. Marcel Ricketts.    She has a history of Crohn's disease of the small intestine diagnosed in 2014.  She had a bowel resection at that time performed by Dr. Bella Limon.  She is on Pentasa.    She has 2-3 BM/d - varies in consistency.  Looser as the day moves on.  No blood in stool. She c/o bloating at times but it has been better.  This is essentially her baseline.  No abdominal pain.    She complains of some perianal itching.  She has seen a little red sore in the area.  No real pain with wiping or having a bowel movement.  No mucus per rectum or drainage.    She has a h/o sbo - multipple episodes - thought to be due to adhesions.    She was offered both colonoscopy and CT enterography last visit which she declined.  Last colonoscopy was in 2019-exam reflected evidence of prior right hemicolectomy.  There was ulceration at the anastomosis as well as involving the ascending colon.  The terminal ileum appeared normal.  HPI  Past Medical History:   Diagnosis Date    Anemia     Arthritis     Colitis     Crohn's disease     GERD (gastroesophageal reflux disease)     History of transfusion     Hyperlipidemia     Hypertension     Migraine     Ocular migraine     PAF (paroxysmal atrial fibrillation)     with rapid ventricular rate    Partial small bowel obstruction 11/19/2022    Small bowel obstruction 08/03/2021    TIA (transient ischemic attack)     UTI (urinary tract infection) 07/21/2021     Past Surgical History:   Procedure Laterality Date    APPENDECTOMY      CHOLECYSTECTOMY      COLECTOMY PARTIAL / TOTAL      COLONOSCOPY  08/20/2015    s/p right hemicolectomy, recurrent Crohns, IH    COLONOSCOPY N/A 8/9/2019    Crohns, IH.      ENDOSCOPY  08/20/2015    erythematous mucosa in stomach, chronic  gastritis,     ENDOSCOPY N/A 4/28/2019    Erythematous mucosa in stomach, path benign    SPLENECTOMY  2009       Current Outpatient Medications:     apixaban (Eliquis) 2.5 MG tablet tablet, Take 1 tablet by mouth Every 12 (Twelve) Hours., Disp: , Rfl:     Cyanocobalamin (VITAMIN B 12 PO), Take  by mouth Daily., Disp: , Rfl:     diazepam (VALIUM) 5 MG tablet, Take 0.5 tablets by mouth Daily As Needed., Disp: , Rfl:     ezetimibe (ZETIA) 10 MG tablet, Take 1 tablet by mouth Daily., Disp: , Rfl:     loperamide (IMODIUM) 2 MG capsule, Take 1 capsule by mouth 4 (Four) Times a Day As Needed for Diarrhea. prn, Disp: , Rfl:     mesalamine (PENTASA) 500 MG CR capsule, TAKE 2 CAPSULES THREE TIMES A DAY, Disp: 540 capsule, Rfl: 3    metoprolol tartrate (LOPRESSOR) 25 MG tablet, Take 1 tablet by mouth 2 (Two) Times a Day., Disp: 180 tablet, Rfl: 3    Multiple Vitamins-Minerals (MULTI COMPLETE PO), Take  by mouth Daily., Disp: , Rfl:     potassium chloride (MICRO-K) 10 MEQ CR capsule, Take 1 capsule by mouth Daily., Disp: , Rfl:   PRN Meds:.  Allergies   Allergen Reactions    Amitriptyline Confusion    Mysoline [Primidone] Confusion    Atenolol Other (See Comments)     Social History     Socioeconomic History    Marital status: Single   Tobacco Use    Smoking status: Never    Smokeless tobacco: Never   Vaping Use    Vaping status: Never Used   Substance and Sexual Activity    Alcohol use: No     Comment: caffiene use coffee daily    Drug use: No    Sexual activity: Defer     History reviewed. No pertinent family history.  Review of Systems   Constitutional:  Negative for appetite change and unexpected weight change.   Gastrointestinal:  Positive for anal bleeding. Negative for abdominal pain and blood in stool.     Vitals:    04/29/25 1316   BP: 175/80   Pulse: 65   Temp: 97.3 °F (36.3 °C)         04/29/25  1316   Weight: 56 kg (123 lb 6.4 oz)       Objective   Physical Exam  Constitutional:       Appearance: Normal appearance.  "She is well-developed.   HENT:      Head: Normocephalic and atraumatic.   Eyes:      General: No scleral icterus.     Conjunctiva/sclera: Conjunctivae normal.   Pulmonary:      Effort: Pulmonary effort is normal.   Abdominal:      General: There is no distension.      Palpations: Abdomen is soft.   Musculoskeletal:      Cervical back: Normal range of motion and neck supple.   Skin:     General: Skin is warm and dry.   Neurological:      Mental Status: She is alert.   Psychiatric:         Mood and Affect: Mood normal.         Behavior: Behavior normal.     Rectal - small excoriation about an inch from the anus-no drainage or evidence of fistula/erythema.  Small skin tags-otherwise normal.    Lab Results   Component Value Date    WBC 9.9 01/23/2025    HGB 13.9 01/23/2025    HCT 42.5 01/23/2025    MCV 92 01/23/2025     01/23/2025     Lab Results   Component Value Date    GLUCOSE 76 01/23/2023    BUN 12 01/23/2023    CREATININE 1.00 03/09/2023    EGFR 57.4 (L) 01/23/2023    BCR 12.1 01/23/2023    K 4.2 01/23/2023    CO2 29.7 (H) 01/23/2023    CALCIUM 9.4 01/23/2023    ALBUMIN 3.8 01/23/2023    BILITOT 0.2 01/23/2023    AST 21 01/23/2023    ALT 11 01/23/2023     No results found for: \"TMON272\"   Calprotectin, Fecal   Date Value Ref Range Status   03/16/2023 42 0 - 120 ug/g Final     Comment:     Concentration     Interpretation   Follow-Up  <16 - 50 ug/g     Normal           None  >50 -120 ug/g     Borderline       Re-evaluate in 4-6 weeks      >120 ug/g     Abnormal         Repeat as clinically                                     indicated          No radiology results for the last 90 days.     Assessment & Plan   Diagnoses and all orders for this visit:    Crohn's disease of both small and large intestine without complication    History of small bowel obstruction    Pruritus ani      Plan:  Labs drawn by her PCP reviewed-CBC/CMP normal.  Symptomatically stable on Pentasa  No obvious cause for itching on perianal " exam - discussed perianal care

## 2025-05-27 ENCOUNTER — TELEPHONE (OUTPATIENT)
Dept: GASTROENTEROLOGY | Facility: CLINIC | Age: 85
End: 2025-05-27

## 2025-05-27 NOTE — TELEPHONE ENCOUNTER
Caller: Michela Aguilar    Relationship to patient: Self    Best call back number: [phone]     Patient is needing: PT IS WANTING TO SEE DR. CAMPOS . PT IS NOT WANTING TO SEE HER APRN OR PA PLEASE ADVISE AND CALL BACK

## 2025-05-27 NOTE — TELEPHONE ENCOUNTER
Pt called to schedule her 6 month fu with Dr Cerna, she would like to schedule this with her, but is ok to see Violet if Dr Cerna is agreeable.  Advised I will send a message to Dr Cerna to advise .

## 2025-07-01 DIAGNOSIS — K50.00 CROHN'S DISEASE OF SMALL INTESTINE WITHOUT COMPLICATION: ICD-10-CM

## 2025-07-01 RX ORDER — MESALAMINE 500 MG/1
CAPSULE, EXTENDED RELEASE ORAL
Qty: 540 CAPSULE | Refills: 1 | Status: SHIPPED | OUTPATIENT
Start: 2025-07-01

## (undated) DEVICE — Device: Brand: DEFENDO AIR/WATER/SUCTION AND BIOPSY VALVE

## (undated) DEVICE — SINGLE-USE BIOPSY FORCEPS: Brand: RADIAL JAW 4

## (undated) DEVICE — TUBING, SUCTION, 1/4" X 10', STRAIGHT: Brand: MEDLINE

## (undated) DEVICE — CANN NASL CO2 TRULINK W/O2 A/

## (undated) DEVICE — FRCP BX RADJAW4 NDL 2.8 240CM LG OG BX40

## (undated) DEVICE — BITEBLOCK OMNI BLOC

## (undated) DEVICE — SENSR O2 OXIMAX FNGR A/ 18IN NONSTR

## (undated) DEVICE — THE TORRENT IRRIGATION SCOPE CONNECTOR IS USED WITH THE TORRENT IRRIGATION TUBING TO PROVIDE IRRIGATION FLUIDS SUCH AS STERILE WATER DURING GASTROINTESTINAL ENDOSCOPIC PROCEDURES WHEN USED IN CONJUNCTION WITH AN IRRIGATION PUMP (OR ELECTROSURGICAL UNIT).: Brand: TORRENT